# Patient Record
Sex: MALE | Race: WHITE | Employment: FULL TIME | URBAN - METROPOLITAN AREA
[De-identification: names, ages, dates, MRNs, and addresses within clinical notes are randomized per-mention and may not be internally consistent; named-entity substitution may affect disease eponyms.]

---

## 2021-05-25 ENCOUNTER — RECORDS - HEALTHEAST (OUTPATIENT)
Dept: ADMINISTRATIVE | Facility: CLINIC | Age: 41
End: 2021-05-25

## 2023-07-31 ENCOUNTER — HOSPITAL ENCOUNTER (OUTPATIENT)
Dept: CT IMAGING | Facility: CLINIC | Age: 43
Discharge: HOME | End: 2023-07-31
Attending: NURSE PRACTITIONER | Admitting: NURSE PRACTITIONER
Payer: COMMERCIAL

## 2023-07-31 ENCOUNTER — TELEPHONE (OUTPATIENT)
Dept: UROLOGY | Facility: CLINIC | Age: 43
End: 2023-07-31

## 2023-07-31 ENCOUNTER — VIRTUAL VISIT (OUTPATIENT)
Dept: UROLOGY | Facility: CLINIC | Age: 43
End: 2023-07-31
Payer: COMMERCIAL

## 2023-07-31 DIAGNOSIS — R10.9 ACUTE FLANK PAIN: Primary | ICD-10-CM

## 2023-07-31 DIAGNOSIS — R10.9 ACUTE FLANK PAIN: ICD-10-CM

## 2023-07-31 DIAGNOSIS — N20.0 NEPHROLITHIASIS: ICD-10-CM

## 2023-07-31 LAB — RADIOLOGIST FLAGS: ABNORMAL

## 2023-07-31 PROCEDURE — 99204 OFFICE O/P NEW MOD 45 MIN: CPT | Mod: VID | Performed by: NURSE PRACTITIONER

## 2023-07-31 PROCEDURE — 74176 CT ABD & PELVIS W/O CONTRAST: CPT

## 2023-07-31 RX ORDER — HYDROCODONE BITARTRATE AND ACETAMINOPHEN 5; 325 MG/1; MG/1
1 TABLET ORAL
COMMUNITY
Start: 2023-07-28 | End: 2023-08-07

## 2023-07-31 RX ORDER — TAMSULOSIN HYDROCHLORIDE 0.4 MG/1
0.4 CAPSULE ORAL
COMMUNITY
Start: 2023-03-07 | End: 2023-08-07

## 2023-07-31 ASSESSMENT — PAIN SCALES - GENERAL: PAINLEVEL: MODERATE PAIN (5)

## 2023-07-31 NOTE — PROGRESS NOTES
Patient is roomed via telephone for a telehealth visit.  Patient confirmed he is in the [facility] at the time of this appointment.  Patient understand that this visit is billable and agree to proceed with appointment.

## 2023-07-31 NOTE — PATIENT INSTRUCTIONS
UROLOGY CLINIC VISIT PATIENT INSTRUCTIONS    -Recommend CT scan for further evaluation  -Discussed if no current passing stone the option for definitive stone surgery.   -If no currently pass stone, recommend gross hematuria (blood in urine) workup with cystoscopy.  -Please continue on acetaminophen, ibuprofen, and Dramamine as needed for pain control.    If you have any issues, questions or concerns in the meantime, do not hesitate to contact us at [facility] at [phone] or via [email or web address].     Leslie Blackburn CNP  Department of Urology     Medicines to Control Your Kidney Stone Symptoms    Control Pain: First Line Treatment    Dramamine (Please use the drowsy version, nongeneric formulation)  Available over the counter  **This medicine will cause increased drowsiness. DON T DRIVE OR OPERATE MACHINERY FOR 6 HOURS**    How to take:   Take 50 mg at bedtime every night until the stone passes  In addition, take 50 mg every 6 hours as needed    What it does:  Decreases spasm of the ureter  Decreases recurrence of pain for next 24 hours  Decreases severe pain  Decreases nausea  Will help you sleep    Ibuprofen (Advil or Motrin)  Available over the counter  **Please do not take if advise to avoid NSAIDS, history of stomach ulcers/bleeding issues, blood thinners, or already on NSAIDS**    How to take:   Take 2 to 4 (200 mg) tablets every 6 hours for the first 48 hours. After that, use only as needed    What it does:  Decreases pain  Prevents spasm of the ureter    Acetaminophen (Tylenol)   Available over the counter    How to Take:  Take 2 (500 mg) tablets every 6 hours as needed. Do not exceed 8 tablets (4,000 mg total) in 24 hours    What it does:  Highly effective in controlling pain      Control pain: second line treatment (if you still have severe pain 1 hour after trying all of the above)    Narcotics (oxycodone)     How to take   Take 1 to 2 tablets every 4-6 hours as needed    Narcotics have  major side effects:   Confusion, disorientation and sleepiness. DO NOT DRIVE OR OPERATE MACHINERY WITHIN 24 HOURS.   Nausea. Take Dramamine, Zofran or Haldol to help control this.   May cause constipation (hard, dry stools). Start over-the-counter Miralax (1/2 to 1 capful) as needed if experiencing constipation.   Trouble sleeping    Other medicines we may give you:    Tamsulosin (Flomax): Take 0.4 mg daily with food     What it does:   May decrease stone pain   May help stones pass faster   May make surgery more successful by improving access to stone   May decrease discomfort from ureteral stent, if used    Possible side effects:   Lightheadedness when standing too quickly (especially in older people)   Stuffy nose

## 2023-07-31 NOTE — PROGRESS NOTES
Urology Video Office Visit  Video-Visit Details    Type of service:  Video Visit    Video Start Time (time video started): 0801    Video End Time (time video stopped): 08    Originating Location (pt. Location): Home    Distant Location (provider location):  Off-site    Mode of Communication:  Video Conference via [employer]    Video visit was converted to telephonic visit due to connection difficulties via [employer]        Chief Complaint:   Bilateral Low Back Pain         History of Present Illness:    Ba Mireles is a very pleasant 43 year old male who presents with concerns of a bilateral low back pain.    Mr. Mireles notes that symptoms started in Feb/March of 2023 with bilateral low back pain. He states symptoms are similar to when he has had acute stones in the past. He had a CT scan on 2/8/23 (images personally reviewed) revealed bilateral nonobstructing renal stones. Right kidney revealed about 4 stones with largest about 3.5mm in size. Left kidney revealed about 4 stones with largest about 4mm in size. No noted ureteral stones or hydronephrosis. He did not note a passage of a urinary stone at that time.     He continues to have bilateral low back pain with a pressure like feeling in hips. When pain becomes severe he will get nauseated-denies any bouts of emesis. Is able to tolerate fluids however states pain worsens with eating/drinking with increased abdominal bloating. He had 1-2 days of gross hematuria about one month ago-no dysuria at that time.     First stone episode at age 20. He notes he was passing stones about every year but now frequency has increased to 3 stones per year or every 4 months. He has passed most stones spontaneously. Did have a URS/LL in 2017 with [place].     Per patient stones in the past may have been calcium oxalate. Has not had a 24 hour urine chemistries completed at this time. He is following dietary prevention measurements at this time of increase water  intake, low sodium diet, low protein intake, and low oxalate intake.     Continue on tamsulosin for urination issues and to help with stone passage.    Family history of nephrolithiasis with mother and sister. Family history of renal cell carcinoma in paternal and maternal grandfathers.     History of smoking of 25 years, 1ppd .           Past Medical History:   Nephrolithiasis         Past Surgical History:     Past Surgical History:   Procedure Laterality Date    CYSTOSCOPY,URETEROSCOPY,LITHOTRIPSY      at [facility] SURGERY Right             Medications     Current Outpatient Medications   Medication    HYDROcodone-acetaminophen (NORCO) 5-325 MG tablet    tamsulosin (FLOMAX) 0.4 MG capsule     No current facility-administered medications for this visit.            Family History:     Family History   Problem Relation Age of Onset    Cancer Mother         kidney    Heart Disease Mother     Nephrolithiasis Mother     Gout Mother     Cancer Maternal Grandfather         renal cell    Heart Disease Maternal Grandfather     Nephrolithiasis Maternal Grandfather     Cancer Paternal Grandfather         lucille cell    Kidney Disease Paternal Grandfather     Nephrolithiasis Maternal Aunt             Social History:     Social History     Socioeconomic History    Marital status:      Spouse name: Not on file    Number of children: Not on file    Years of education: Not on file    Highest education level: Not on file   Occupational History    Not on file   Tobacco Use    Smoking status: Every Day     Types: Cigarettes    Smokeless tobacco: Never   Substance and Sexual Activity    Alcohol use: Not Currently    Drug use: Yes     Types: GHB     Comment: once in a while per pt    Sexual activity: Not on file   Other Topics Concern    Not on file   Social History Narrative    Not on file     Social Determinants of Health     Financial Resource Strain: Not on file   Food Insecurity: Not on file   Transportation Needs: Not  on file   Physical Activity: Not on file   Stress: Not on file   Social Connections: Not on file   Intimate Partner Violence: Not on file   Housing Stability: Not on file            Allergies:   Amoxicillin-pot clavulanate         Review of Systems:  From intake questionnaire   Negative 14 system review except as noted on HPI, nurse's note.         Physical Exam:   General Appearance: Well groomed, hygenic  Eyes: No redness, discharge  Respiratory: No cough, no respiratory distress or labored breathing  Musculoskeletal: Grossly normal, full range of motion in upper extremities, no gross deficits  Skin: No discoloration or apparent rashes  Neurologic - No tremors  Psychiatric - Alert and oriented  The rest of a comprehensive physical examination is deferred due to video visit restrictions        Labs:    I personally reviewed all applicable laboratory data and went over findings with patient\    Significant for:     BMP RESULTS:   Ref Range & Units 5 mo ago   SODIUM 136 - 145 mmol/L 140   POTASSIUM 3.5 - 5.1 mmol/L 4.4   CHLORIDE 98 - 107 mmol/L 105   CO2,TOTAL 22 - 29 mmol/L 25   ANION GAP 5 - 18 10   GLUCOSE 70 - 99 mg/dL 90   CALCIUM 8.6 - 10.0 mg/dL 9.2   BUN 6 - 20 mg/dL 9   CREATININE 0.70 - 1.20 mg/dL 0.82   BUN/CREAT RATIO 10 - 20 11   eGFR >90 mL/min/1.73m2 >90      ALBUMIN 4.0 - 4.9 g/dL 4.4   PROTEIN,TOTAL 6.0 - 8.0 g/dL 7.0   GLOBULIN 2.0 - 3.7 g/dL 2.6   BILIRUBIN,TOTAL 0.0 - 1.2 mg/dL <0.2   ALK PHOSPHATASE 40 - 129 IU/L 74   ALT (SGPT) 10 - 50 IU/L 14   AST (SGOT) 10 - 50 IU/L 26   A/G RATIO 1.0 - 2.0 1.7       UA RESULTS:   COLOR                     Yellow Color Yellow   CLARITY                   Clear Clarity Clear   SPECIFIC GRAVITY,URINE   1.010, 1.015, 1.020, 1.025                 1.015   PH,URINE                 6.0, 7.0, 8.0, 5.5, 6.5, 7.5, 8.5                 7.5   UROBILINOGEN,QUALITATIVE Normal EU/dl Normal   PROTEIN, URINE Negative mg/dL Negative   GLUCOSE, URINE Negative mg/dL Negative    KETONES,URINE             Negative mg/dL Negative   BILIRUBIN,URINE           Negative                 Negative   OCCULT BLOOD,URINE       Negative                 Trace Abnormal    NITRITE                   Negative                 Negative   LEUKOCYTE ESTERASE       Negative                 Negative     RBC 0-2, None Seen /HPF 0-2   WBC 0-2, 3-5, None Seen /HPF 0-2   BACTERIA                 None Seen, Rare, Few Bacteria/HPF None Seen   EPITHELIAL CELLS         None Seen, Few Epi/HPF None Seen       Imaging:    I personally reviewed all applicable imaging and went over the below findings with patient.    Impression    1. No acute findings in the abdomen or pelvis. No ureteral stone.  2. Numerous small nonobstructing bilateral intrarenal stones, unchanged.  Narrative    For Patients: As a result of the 21st Century Cures Act, medical imaging exams and procedure reports are released immediately into your electronic medical record. You may view this report before your referring provider. If you have questions, please contact your health care provider.    EXAM: CT ABDOMEN PELVIS STONE PROTOCOL WO  LOCATION: [facility]  DATE/TIME: 2/28/2023 8:02 AM    INDICATION: Calculus Of Kidney. Epigastric abdominal pain. New left flank pain.  COMPARISON: CT abdomen pelvis 02/20/2023.  TECHNIQUE: CT scan of the abdomen and pelvis was performed without oral or IV contrast. Multiplanar reformats were obtained. Dose reduction techniques were used.  CONTRAST: None.    FINDINGS:  LOWER CHEST: Normal.    HEPATOBILIARY: Normal.    PANCREAS: Normal.    SPLEEN: Normal.    ADRENAL GLANDS: Normal.    KIDNEY/BLADDER: Numerous small nonobstructing bilateral intrarenal stones, 6 on the right, largest 3 mm, 7 on the left, largest 4 mm. Findings unchanged. No ureteral stone or hydronephrosis, including on the left. Small benign simple right renal cyst, no follow-up needed. Normal bladder, no stone.    BOWEL: Few colonic diverticula. No acute  findings. No evidence of diverticulitis or colitis. No evidence of bowel obstruction. Normal appendix.    LYMPH NODES: Normal.    VASCULATURE: Minimal atherosclerotic calcifications. Normal caliber abdominal aorta.    PELVIC ORGANS: Normal.    MUSCULOSKELETAL: Normal.         Assessment and Plan:     Assessment: 43 year old male with bilateral low back pain and nephrolithiasis    Plan:  -Recommend to obtain CT scan for further evaluation of urological tract. Pt amenable to plan of care.   -Reviewed CT scan from Feb 2023 with patient. Noted no current ureteral stones or hydronephrosis at that time. Noted several bilateral nonobstructing renal stones.   -We discussed the nature of nonobstructing renal stones and pain.  We reviewed the fact that widely held opinion in the field of urology is that nonobstructing stones should not cause pain; however, several publications have demonstrated improvement with stone removal in such circumstances. We discussed that stone removal for pain in the absence of obstruction is not a guarantee to improve pain and that the potential for pain improvement is likely around 50%.    -If no noted stone and wanting to observe current stones discussed concern of gross hematuria and need for cystoscopy for further evaluation.   -We discussed some general measures to prevent recurrent kidney stones.  These include fluid intake to achieve 2.5 liters of urine per day, decreased salt intake, normal calcium intake, lowering animal protein intake, avoiding high amounts of oxalate containing foods, and increased citrate intake with orange juice/lemonade.  -Discussed future option of 24 hour urine chemistries for metabolic evaluation to determine risk factors for recurrent stones.   -Recommend acetaminophen, ibuprofen, and dimenhydrinate as needed for pain control.   -If having severe flank pain, fever,chills, and nausea/vomiting please notify Urology clinic or be seen in the ER.     Leslie Blackburn  CNP  Department of Urology  July 31, 2023    I spent a total of 45 minutes spent on the date of the encounter doing chart review, history and exam, documentation, and further activities as noted above.

## 2023-08-01 ENCOUNTER — MYC MEDICAL ADVICE (OUTPATIENT)
Dept: UROLOGY | Facility: CLINIC | Age: 43
End: 2023-08-01
Payer: COMMERCIAL

## 2023-08-01 NOTE — TELEPHONE ENCOUNTER
Called Mr. Mireles to discuss results of CT scan results on 07/31/23. Discussed left 4mm proximal ureteral stone with slight hydronephrosis. Noted bilateral nephrolithiasis.     Discussed treatment options including MET vs. ureteroscopy and laser lithotripsy. I counseled the patient regarding the potential need for a ureteral stent after treatment and the necessity of removing the stent after surgery. I also discussed the possibility of additional procedures to render the patient stone free. Discussed option for bilateral URS/LL    Pt will reach out via [employer] on decision of MET vs URS/LL.   Recommend to continue with acetaminophen, ibuprofen, dimenhydrinate, and oxycodone as needed for pain control.   If having severe flank pain, fevers,chills, nausea, or vomiting please notify urology clinic or be seen in the ER.     Leslie Blackburn CNP    CT ABDOMEN AND PELVIS WITHOUT CONTRAST 7/31/2023 2:50 PM     CLINICAL HISTORY: Abdominal pain. Acute bilateral flank. Assess for  nephrolithiasis.     TECHNIQUE: CT scan of the abdomen and pelvis was performed without IV  contrast. Multiplanar reformats were obtained. Dose reduction  techniques were used.  CONTRAST: None.     COMPARISON: CT abdomen and pelvis without contrast 2/28/2023.     FINDINGS:   LOWER CHEST: No infiltrates or effusions.     HEPATOBILIARY: No significant mass or bile duct dilatation. No  calcified gallstones.      PANCREAS: No significant mass, duct dilatation, or inflammatory  change.     SPLEEN: Normal size.     ADRENAL GLANDS: No significant nodules.     KIDNEYS/BLADDER: Left proximal ureteral calculus (0.4 cm; 3/#30)  without significant upstream collecting system dilatation. Bilateral  nephrolithiasis (at least 6 on the right, largest measures 0.4 cm; at  least 5 on the left, largest measures 0.3 cm). Similar  benign-appearing right mid pole cyst.     BOWEL: No obstruction or inflammatory change.     VASCULATURE: No abdominal aortic aneurysm.      PELVIC ORGANS: No pelvic masses.     OTHER: No free air or free fluid.     MUSCULOSKELETAL: No suspicious bony lesions.                                                                      IMPRESSION:   1.  Since 2/28/2023, new left proximal ureteral calculus (0.4 cm)  without significant upstream collecting system dilatation.     2.  Bilateral nephrolithiasis.

## 2023-08-01 NOTE — TELEPHONE ENCOUNTER
Patient is wanting a call back asap to discuss surgery per rosemarie below  He is wanting to do this as fast as possible due to not wanting to miss work because of being a single dad. Please call the patient back asap to discuss as well as set up an apt.

## 2023-08-01 NOTE — TELEPHONE ENCOUNTER
FUTURE VISIT INFORMATION      SURGERY INFORMATION:  Date: Kidney Stone removal// per pt// pt stated the surgery will be scheduled within the next couple of weeks. // records in [facility]       RECORDS REQUESTED FROM:       Primary Care Provider: [facility]

## 2023-08-02 DIAGNOSIS — N20.0 BILATERAL NEPHROLITHIASIS: Primary | ICD-10-CM

## 2023-08-02 DIAGNOSIS — N20.1 LEFT URETERAL STONE: ICD-10-CM

## 2023-08-03 ENCOUNTER — TELEPHONE (OUTPATIENT)
Dept: UROLOGY | Facility: CLINIC | Age: 43
End: 2023-08-03
Payer: COMMERCIAL

## 2023-08-03 ENCOUNTER — MYC REFILL (OUTPATIENT)
Dept: UROLOGY | Facility: CLINIC | Age: 43
End: 2023-08-03
Payer: COMMERCIAL

## 2023-08-03 DIAGNOSIS — R10.9 ACUTE FLANK PAIN: Primary | ICD-10-CM

## 2023-08-03 DIAGNOSIS — N20.0 NEPHROLITHIASIS: ICD-10-CM

## 2023-08-03 DIAGNOSIS — R10.9 ACUTE FLANK PAIN: ICD-10-CM

## 2023-08-03 PROBLEM — N20.1 LEFT URETERAL STONE: Status: ACTIVE | Noted: 2023-08-02

## 2023-08-03 RX ORDER — OXYCODONE HYDROCHLORIDE 5 MG/1
5 TABLET ORAL EVERY 6 HOURS PRN
Qty: 12 TABLET | Refills: 0 | Status: CANCELLED | OUTPATIENT
Start: 2023-08-03

## 2023-08-03 RX ORDER — OXYCODONE HYDROCHLORIDE 5 MG/1
5 TABLET ORAL EVERY 6 HOURS PRN
Qty: 12 TABLET | Refills: 0 | Status: SHIPPED | OUTPATIENT
Start: 2023-08-03 | End: 2023-08-07

## 2023-08-03 NOTE — TELEPHONE ENCOUNTER
Spoke with: Patient      Date of surgery: Monday August 7 2023       Location: [facility]      Informed patient they will need a adult : YES      Pre op with provider: Patient had pre op on 8/2 at [facility]       H&P Scheduled in PAC- NA        Pre procedure covid :Not required      Additional imaging: NA        Surgery Packet : Sent via [employer]       Additional comments: Please call for surgery teaching

## 2023-08-03 NOTE — TELEPHONE ENCOUNTER
Patient called to request a refill of pain medication, he is set up for surgery with Dr Shipman on 8/7.  No previous refills of oxycodone in chart.  Historical hydrocodone from past at [facility]. Patient had vv on 7/31.  Reena Davis RN

## 2023-08-04 ENCOUNTER — PRE VISIT (OUTPATIENT)
Dept: SURGERY | Facility: CLINIC | Age: 43
End: 2023-08-04

## 2023-08-04 ENCOUNTER — ANESTHESIA EVENT (OUTPATIENT)
Dept: SURGERY | Facility: AMBULATORY SURGERY CENTER | Age: 43
End: 2023-08-04
Payer: COMMERCIAL

## 2023-08-04 ENCOUNTER — PATIENT OUTREACH (OUTPATIENT)
Dept: UROLOGY | Facility: CLINIC | Age: 43
End: 2023-08-04

## 2023-08-04 NOTE — PROGRESS NOTES
RNCC lm for pt with cb number for surgery teaching.  Will send mychart also.  Per notes pt had pre-op on 08/02/2023    [facility]IA HernandezCC Urology  [phone]

## 2023-08-06 ENCOUNTER — HEALTH MAINTENANCE LETTER (OUTPATIENT)
Age: 43
End: 2023-08-06

## 2023-08-07 ENCOUNTER — HOSPITAL ENCOUNTER (OUTPATIENT)
Facility: AMBULATORY SURGERY CENTER | Age: 43
Discharge: HOME | End: 2023-08-07
Attending: UROLOGY
Payer: COMMERCIAL

## 2023-08-07 ENCOUNTER — ANESTHESIA (OUTPATIENT)
Dept: SURGERY | Facility: AMBULATORY SURGERY CENTER | Age: 43
End: 2023-08-07
Payer: COMMERCIAL

## 2023-08-07 ENCOUNTER — NURSE TRIAGE (OUTPATIENT)
Dept: NURSING | Facility: CLINIC | Age: 43
End: 2023-08-07

## 2023-08-07 VITALS
OXYGEN SATURATION: 99 % | HEIGHT: 69 IN | SYSTOLIC BLOOD PRESSURE: 132 MMHG | TEMPERATURE: 97.1 F | WEIGHT: 155 LBS | HEART RATE: 63 BPM | BODY MASS INDEX: 22.96 KG/M2 | DIASTOLIC BLOOD PRESSURE: 74 MMHG | RESPIRATION RATE: 16 BRPM

## 2023-08-07 DIAGNOSIS — N20.1 LEFT URETERAL STONE: ICD-10-CM

## 2023-08-07 DIAGNOSIS — N20.0 BILATERAL NEPHROLITHIASIS: ICD-10-CM

## 2023-08-07 DIAGNOSIS — N20.0 NEPHROLITHIASIS: ICD-10-CM

## 2023-08-07 DIAGNOSIS — R10.9 ACUTE FLANK PAIN: ICD-10-CM

## 2023-08-07 PROCEDURE — 82365 CALCULUS SPECTROSCOPY: CPT | Mod: 90 | Performed by: UROLOGY

## 2023-08-07 PROCEDURE — 99207 PR NO CHARGE NURSE ONLY: CPT | Performed by: UROLOGY

## 2023-08-07 PROCEDURE — 99000 SPECIMEN HANDLING OFFICE-LAB: CPT | Performed by: UROLOGY

## 2023-08-07 DEVICE — STENT, URETERAL, 6FR X 26CM, HYDROPLUS COATING, WITHOUT WIRE, PERCUFLEX PLUS: Type: IMPLANTABLE DEVICE | Site: URETER | Status: FUNCTIONAL

## 2023-08-07 RX ORDER — KETOROLAC TROMETHAMINE 30 MG/ML
INJECTION, SOLUTION INTRAMUSCULAR; INTRAVENOUS PRN
Status: DISCONTINUED | OUTPATIENT
Start: 2023-08-07 | End: 2023-08-07

## 2023-08-07 RX ORDER — FENTANYL CITRATE 0.05 MG/ML
50 INJECTION, SOLUTION INTRAMUSCULAR; INTRAVENOUS EVERY 5 MIN PRN
Status: DISCONTINUED | OUTPATIENT
Start: 2023-08-07 | End: 2023-08-09 | Stop reason: HOSPADM

## 2023-08-07 RX ORDER — OXYCODONE HYDROCHLORIDE 10 MG/1
10 TABLET ORAL
Status: DISCONTINUED | OUTPATIENT
Start: 2023-08-07 | End: 2023-08-09 | Stop reason: HOSPADM

## 2023-08-07 RX ORDER — FENTANYL CITRATE 0.05 MG/ML
25 INJECTION, SOLUTION INTRAMUSCULAR; INTRAVENOUS EVERY 5 MIN PRN
Status: DISCONTINUED | OUTPATIENT
Start: 2023-08-07 | End: 2023-08-09 | Stop reason: HOSPADM

## 2023-08-07 RX ORDER — LIDOCAINE 40 MG/G
CREAM TOPICAL
Status: DISCONTINUED | OUTPATIENT
Start: 2023-08-07 | End: 2023-08-09 | Stop reason: HOSPADM

## 2023-08-07 RX ORDER — ONDANSETRON 4 MG/1
4 TABLET, ORALLY DISINTEGRATING ORAL EVERY 30 MIN PRN
Status: DISCONTINUED | OUTPATIENT
Start: 2023-08-07 | End: 2023-08-09 | Stop reason: HOSPADM

## 2023-08-07 RX ORDER — PROPOFOL 10 MG/ML
INJECTION, EMULSION INTRAVENOUS CONTINUOUS PRN
Status: DISCONTINUED | OUTPATIENT
Start: 2023-08-07 | End: 2023-08-07

## 2023-08-07 RX ORDER — HYDROMORPHONE HCL IN WATER/PF 6 MG/30 ML
0.4 PATIENT CONTROLLED ANALGESIA SYRINGE INTRAVENOUS EVERY 5 MIN PRN
Status: DISCONTINUED | OUTPATIENT
Start: 2023-08-07 | End: 2023-08-09 | Stop reason: HOSPADM

## 2023-08-07 RX ORDER — LIDOCAINE HYDROCHLORIDE 20 MG/ML
JELLY TOPICAL PRN
Status: DISCONTINUED | OUTPATIENT
Start: 2023-08-07 | End: 2023-08-07 | Stop reason: HOSPADM

## 2023-08-07 RX ORDER — OXYCODONE HYDROCHLORIDE 5 MG/1
5 TABLET ORAL EVERY 6 HOURS PRN
Qty: 12 TABLET | Refills: 0 | Status: SHIPPED | OUTPATIENT
Start: 2023-08-07 | End: 2023-08-10

## 2023-08-07 RX ORDER — ONDANSETRON 2 MG/ML
4 INJECTION INTRAMUSCULAR; INTRAVENOUS EVERY 30 MIN PRN
Status: DISCONTINUED | OUTPATIENT
Start: 2023-08-07 | End: 2023-08-09 | Stop reason: HOSPADM

## 2023-08-07 RX ORDER — PROPOFOL 10 MG/ML
INJECTION, EMULSION INTRAVENOUS PRN
Status: DISCONTINUED | OUTPATIENT
Start: 2023-08-07 | End: 2023-08-07

## 2023-08-07 RX ORDER — TOLTERODINE TARTRATE 2 MG/1
2 TABLET, EXTENDED RELEASE ORAL ONCE
Status: DISCONTINUED | OUTPATIENT
Start: 2023-08-07 | End: 2023-08-09 | Stop reason: HOSPADM

## 2023-08-07 RX ORDER — DEXAMETHASONE SODIUM PHOSPHATE 10 MG/ML
INJECTION, SOLUTION INTRAMUSCULAR; INTRAVENOUS PRN
Status: DISCONTINUED | OUTPATIENT
Start: 2023-08-07 | End: 2023-08-07

## 2023-08-07 RX ORDER — GLYCOPYRROLATE 0.2 MG/ML
INJECTION, SOLUTION INTRAMUSCULAR; INTRAVENOUS PRN
Status: DISCONTINUED | OUTPATIENT
Start: 2023-08-07 | End: 2023-08-07

## 2023-08-07 RX ORDER — CEFAZOLIN SODIUM 2 G/100ML
2 INJECTION, SOLUTION INTRAVENOUS SEE ADMIN INSTRUCTIONS
Status: DISCONTINUED | OUTPATIENT
Start: 2023-08-07 | End: 2023-08-09 | Stop reason: HOSPADM

## 2023-08-07 RX ORDER — HYDROMORPHONE HCL IN WATER/PF 6 MG/30 ML
0.2 PATIENT CONTROLLED ANALGESIA SYRINGE INTRAVENOUS EVERY 5 MIN PRN
Status: DISCONTINUED | OUTPATIENT
Start: 2023-08-07 | End: 2023-08-09 | Stop reason: HOSPADM

## 2023-08-07 RX ORDER — CEFAZOLIN SODIUM 2 G/100ML
2 INJECTION, SOLUTION INTRAVENOUS
Status: DISCONTINUED | OUTPATIENT
Start: 2023-08-07 | End: 2023-08-09 | Stop reason: HOSPADM

## 2023-08-07 RX ORDER — FENTANYL CITRATE 0.05 MG/ML
25 INJECTION, SOLUTION INTRAMUSCULAR; INTRAVENOUS
Status: DISCONTINUED | OUTPATIENT
Start: 2023-08-07 | End: 2023-08-09 | Stop reason: HOSPADM

## 2023-08-07 RX ORDER — LIDOCAINE HYDROCHLORIDE 20 MG/ML
JELLY TOPICAL ONCE
Status: COMPLETED | OUTPATIENT
Start: 2023-08-07 | End: 2023-08-07

## 2023-08-07 RX ORDER — SODIUM CHLORIDE, SODIUM LACTATE, POTASSIUM CHLORIDE, CALCIUM CHLORIDE 600; 310; 30; 20 MG/100ML; MG/100ML; MG/100ML; MG/100ML
INJECTION, SOLUTION INTRAVENOUS CONTINUOUS
Status: DISCONTINUED | OUTPATIENT
Start: 2023-08-07 | End: 2023-08-09 | Stop reason: HOSPADM

## 2023-08-07 RX ORDER — OXYCODONE HYDROCHLORIDE 5 MG/1
5 TABLET ORAL
Status: COMPLETED | OUTPATIENT
Start: 2023-08-07 | End: 2023-08-07

## 2023-08-07 RX ORDER — ACETAMINOPHEN 325 MG/1
975 TABLET ORAL ONCE
Status: COMPLETED | OUTPATIENT
Start: 2023-08-07 | End: 2023-08-07

## 2023-08-07 RX ORDER — TAMSULOSIN HYDROCHLORIDE 0.4 MG/1
0.4 CAPSULE ORAL DAILY
Qty: 7 CAPSULE | Refills: 0 | Status: SHIPPED | OUTPATIENT
Start: 2023-08-07 | End: 2023-08-14

## 2023-08-07 RX ORDER — ONDANSETRON 2 MG/ML
INJECTION INTRAMUSCULAR; INTRAVENOUS PRN
Status: DISCONTINUED | OUTPATIENT
Start: 2023-08-07 | End: 2023-08-07

## 2023-08-07 RX ORDER — LIDOCAINE HYDROCHLORIDE 20 MG/ML
INJECTION, SOLUTION INFILTRATION; PERINEURAL PRN
Status: DISCONTINUED | OUTPATIENT
Start: 2023-08-07 | End: 2023-08-07

## 2023-08-07 RX ORDER — TOLTERODINE 2 MG/1
2 CAPSULE, EXTENDED RELEASE ORAL DAILY PRN
Qty: 7 CAPSULE | Refills: 0 | Status: SHIPPED | OUTPATIENT
Start: 2023-08-07 | End: 2023-08-14

## 2023-08-07 RX ORDER — FENTANYL CITRATE 50 UG/ML
INJECTION, SOLUTION INTRAMUSCULAR; INTRAVENOUS PRN
Status: DISCONTINUED | OUTPATIENT
Start: 2023-08-07 | End: 2023-08-07

## 2023-08-07 RX ORDER — EPHEDRINE SULFATE 50 MG/ML
INJECTION, SOLUTION INTRAMUSCULAR; INTRAVENOUS; SUBCUTANEOUS PRN
Status: DISCONTINUED | OUTPATIENT
Start: 2023-08-07 | End: 2023-08-07

## 2023-08-07 RX ADMIN — OXYCODONE HYDROCHLORIDE 5 MG: 5 TABLET ORAL at 12:25

## 2023-08-07 RX ADMIN — GLYCOPYRROLATE 0.2 MG: 0.2 INJECTION, SOLUTION INTRAMUSCULAR; INTRAVENOUS at 10:12

## 2023-08-07 RX ADMIN — SODIUM CHLORIDE, SODIUM LACTATE, POTASSIUM CHLORIDE, CALCIUM CHLORIDE: 600; 310; 30; 20 INJECTION, SOLUTION INTRAVENOUS at 11:25

## 2023-08-07 RX ADMIN — FENTANYL CITRATE 25 MCG: 0.05 INJECTION, SOLUTION INTRAMUSCULAR; INTRAVENOUS at 12:06

## 2023-08-07 RX ADMIN — PROPOFOL 200 MCG/KG/MIN: 10 INJECTION, EMULSION INTRAVENOUS at 10:25

## 2023-08-07 RX ADMIN — EPHEDRINE SULFATE 5 MG: 50 INJECTION, SOLUTION INTRAMUSCULAR; INTRAVENOUS; SUBCUTANEOUS at 11:18

## 2023-08-07 RX ADMIN — PROPOFOL 150 MG: 10 INJECTION, EMULSION INTRAVENOUS at 10:25

## 2023-08-07 RX ADMIN — ONDANSETRON 4 MG: 2 INJECTION INTRAMUSCULAR; INTRAVENOUS at 10:37

## 2023-08-07 RX ADMIN — FENTANYL CITRATE 25 MCG: 0.05 INJECTION, SOLUTION INTRAMUSCULAR; INTRAVENOUS at 11:52

## 2023-08-07 RX ADMIN — CEFAZOLIN SODIUM 2 G: 2 INJECTION, SOLUTION INTRAVENOUS at 10:12

## 2023-08-07 RX ADMIN — DEXAMETHASONE SODIUM PHOSPHATE 10 MG: 10 INJECTION, SOLUTION INTRAMUSCULAR; INTRAVENOUS at 10:37

## 2023-08-07 RX ADMIN — Medication 200 MCG: at 10:51

## 2023-08-07 RX ADMIN — Medication 1 MCG/KG/MIN: at 10:58

## 2023-08-07 RX ADMIN — FENTANYL CITRATE 75 MCG: 50 INJECTION, SOLUTION INTRAMUSCULAR; INTRAVENOUS at 10:17

## 2023-08-07 RX ADMIN — ACETAMINOPHEN 975 MG: 325 TABLET ORAL at 09:44

## 2023-08-07 RX ADMIN — PROPOFOL 50 MG: 10 INJECTION, EMULSION INTRAVENOUS at 10:29

## 2023-08-07 RX ADMIN — Medication 100 MCG: at 10:54

## 2023-08-07 RX ADMIN — Medication 200 MCG: at 10:43

## 2023-08-07 RX ADMIN — LIDOCAINE HYDROCHLORIDE: 20 JELLY TOPICAL at 12:01

## 2023-08-07 RX ADMIN — SODIUM CHLORIDE, SODIUM LACTATE, POTASSIUM CHLORIDE, CALCIUM CHLORIDE: 600; 310; 30; 20 INJECTION, SOLUTION INTRAVENOUS at 09:48

## 2023-08-07 RX ADMIN — FENTANYL CITRATE 25 MCG: 50 INJECTION, SOLUTION INTRAMUSCULAR; INTRAVENOUS at 10:29

## 2023-08-07 RX ADMIN — FENTANYL CITRATE 25 MCG: 0.05 INJECTION, SOLUTION INTRAMUSCULAR; INTRAVENOUS at 11:45

## 2023-08-07 RX ADMIN — LIDOCAINE HYDROCHLORIDE 3 ML: 20 INJECTION, SOLUTION INFILTRATION; PERINEURAL at 10:12

## 2023-08-07 RX ADMIN — EPHEDRINE SULFATE 10 MG: 50 INJECTION, SOLUTION INTRAMUSCULAR; INTRAVENOUS; SUBCUTANEOUS at 11:04

## 2023-08-07 RX ADMIN — KETOROLAC TROMETHAMINE 15 MG: 30 INJECTION, SOLUTION INTRAMUSCULAR; INTRAVENOUS at 10:51

## 2023-08-07 ASSESSMENT — LIFESTYLE VARIABLES: TOBACCO_USE: 1

## 2023-08-07 NOTE — BRIEF OP NOTE
[facility] Brief Operative Note    Pre-operative diagnosis: Bilateral nephrolithiasis [N20.0]  Left ureteral stone [N20.1]   Post-operative diagnosis bilateral renal stones   Procedure: Procedure(s):  Cystoscopy, Bilateral Ureteroscopy, Bilateral Retrograde Pyelogram, Bilateral Stone Basket Extraction, Bilateral Stent placement   Surgeon: Gus Shipman MD   Assistants(s): none   Estimated blood loss: 5 ml    Specimens: Right renal stone for analysis  Left renal stone for analysis   Findings: No left ureteral stone found  Multiple bilateral renal stones lasered and basketed  6 fr x 26 cm ureteral stent placed on modified single string tether

## 2023-08-07 NOTE — ANESTHESIA CARE TRANSFER NOTE
Patient: Ba Mireles    Procedure: Procedure(s):  Cystoscopy, Bilateral Ureteroscopy, Bilateral Retrograde Pyelogram, Bilateral Stone Basket Extraction, Bilateral Stent placement       Diagnosis: Bilateral nephrolithiasis [N20.0]  Left ureteral stone [N20.1]  Diagnosis Additional Information: No value filed.    Anesthesia Type:   General     Note:    Oropharynx: oropharynx clear of all foreign objects  Level of Consciousness: awake  Oxygen Supplementation: face mask  Level of Supplemental Oxygen (L/min / FiO2): 6  Independent Airway: airway patency satisfactory and stable  Dentition: dentition unchanged  Vital Signs Stable: post-procedure vital signs reviewed and stable  Report to RN Given: handoff report given  Patient transferred to: PACU    Handoff Report: Identifed the Patient, Identified the Reponsible Provider, Reviewed the pertinent medical history, Discussed the surgical course, Reviewed Intra-OP anesthesia mangement and issues during anesthesia, Set expectations for post-procedure period and Allowed opportunity for questions and acknowledgement of understanding      Vitals:  Vitals Value Taken Time   /62 08/07/23 1134   Temp 97.4  F (36.3  C) 08/07/23 1134   Pulse 87 08/07/23 1134   Resp 16 08/07/23 1134   SpO2 100 % 08/07/23 1134       Electronically Signed By: Dorie Pratt MD  August 7, 2023  11:37 AM

## 2023-08-07 NOTE — ANESTHESIA POSTPROCEDURE EVALUATION
Patient: Ba Mireles    Procedure: Procedure(s):  Cystoscopy, Bilateral Ureteroscopy, Bilateral Retrograde Pyelogram, Bilateral Stone Basket Extraction, Bilateral Stent placement       Anesthesia Type:  General    Note:  Disposition: Outpatient   Postop Pain Control: Uneventful            Sign Out: Well controlled pain   PONV: No   Neuro/Psych: Uneventful            Sign Out: Acceptable/Baseline neuro status   Airway/Respiratory: Uneventful            Sign Out: Acceptable/Baseline resp. status   CV/Hemodynamics: Uneventful            Sign Out: Acceptable CV status; No obvious hypovolemia; No obvious fluid overload   Other NRE: NONE   DID A NON-ROUTINE EVENT OCCUR?            Last vitals:  Vitals Value Taken Time   /76 08/07/23 1200   Temp 97.4  F (36.3  C) 08/07/23 1134   Pulse 57 08/07/23 1212   Resp 16 08/07/23 1200   SpO2 100 % 08/07/23 1212   Vitals shown include unvalidated device data.    Electronically Signed By: Ba Payne MD  August 7, 2023  12:19 PM

## 2023-08-07 NOTE — ANESTHESIA PROCEDURE NOTES
Airway       Patient location during procedure: OR       Procedure Start/Stop Times: 8/7/2023 10:25 AM  Staff -        Anesthesiologist:  Dorie Pratt MD       Performed By: anesthesiologistIndications and Patient Condition       Indications for airway management: grecia-procedural       Induction type:intravenous       Mask difficulty assessment: 0 - not attempted    Final Airway Details       Final airway type: supraglottic airway    Supraglottic Airway Details        Type: LMA       LMA size: 4    Post intubation assessment        Placement verified by: capnometry        Number of attempts at approach: 1       Number of other approaches attempted: 0       Secured with: commercial tube machado       Ease of procedure: easy       Dentition: Intact

## 2023-08-07 NOTE — DISCHARGE INSTRUCTIONS
"If you have any questions or concerns regarding your procedure, please contact Dr. Shipman, his office number is [phone].    Take tylenol and ibuprofen every 6 hours as your main form of pain control.     Acetaminophen (Tylenol): Next Dose: 3:44 pm. Take 650-1000 mg every 6 hours for mild to moderate pain.    Ibuprofen (Motrin, Advil): Next Dose: 4:51 pm. Take 600 mg every 6 hours for mild to moderate pain.    Do not exceed 4,000 mg of acetaminophen during a 24 hour period  or 1000 mg per dose. Keep in mind that acetaminophen can also be found in many over-the-counter cold medications as well as narcotics that may be given for pain.     Narcotics:  5mg of oxycodone given prior to discharge at 1240pm.    Senna-Docusate (Stool Softener): Next dose: When you get home. Take as instructed on the bottle. This is an over-the-counter medication. Take this while taking narcotic medications to prevent constipation.    Post-Operative Symptom Control    While you recover from your procedure, you can take steps to ease your recovery.    Diet and Fluids:    Eating your normal diet is fine.  Drink a little more than normal but you don not have to \"flush\" your system.    Activity:    There are no activity restrictions after stone surgery.  Some people find bending twisting movements cause discomfort or increased blood in urine.  Activity may irritating but you will not hurt yourself.    Medications: (That may be suggested or prescribed)    Ibuprofen (Advil/Motrin)-Available over the counter.  Take 2 (200mg) tablets at bedtime and every six hour for base pain management.      Dramamine (brand name drowsy version)-Is available over the counter.  Take 50 mg at bedtime and every 6 hours as needed.  This medication can be helpful by:   Decreasing nausea   Decrease acute pain   Decrease recurrence of pain for 24 hours  *This medication my cause increased drowsiness, do not drive or operate machinery within 6 hours.    Flomax " "(Tamsulosin)-After surgery Flomax has several potential benefits   Decreased stent discomfort   Increased likelihood passage of small stone fragments   Take daily with food until your stent is removed  *This may cause nasal congestion or light-headedness    Detrol (Tolterodine)-After surgery Detrol may decrease stent irritation and pelvic pain   Take as prescribed  *This medication may cause dry mouth, constipation or blurry vision.    Narcotics (oxycodone)   Take as prescribed for severe pain   Narcotics have significant side effects and only \"cover-up\" pain.  They have no effect on cause of pain.     Common side effects  Confusion, disorientation and sedation-DO NOT DRIVE OR OPERATE MACHINERY WITH IN 24 HOURS OF TAKING NARCOTICS    Nausea-take Dramamine or Zofran to help control  Constipation  Sleep Disturbances    Call the Clinic Immediately If You Have Any Of The Following Symptoms:   Nausea/vomiting that is uncontrolled with medications   You have a fever over 100.0   Chills   Are not able to urinate for 8 hours    Increasing back pain that is not relieved with pain medications   Large amounts of blood in urine or large clots    We can respond to your questions or concerns 24 hours a day at [phone].   For after hours phone calls please wait on call to be connected with the \"care connection\" service for after hours care.     Going Home With a Ureteral Stent    What is it?  A stent is a soft, plastic tube that helps urine (pee) drain into the bladder.  During the surgery, it is placed in the ureter the tube that connects the kidney to the bladder.  A thin curl at each end of the stent keeps one end in your kidney and the other in your bladder.  The stent can not be seen from outside of the body.    Why Do I Have It?  Some sort of blockage is not letting pee drain into your bladder.  This could be from a stone, certain surgeries or kidney infection.    What Should I Expect?   Stents often cause some " discomfort.  You may have:    The need to pee suddenly    Pain when you pee    A dull backache, which may get worse when you pee  Blood in your pee (color of fruit punch) and some clots, which may increase with physical activity.     What Can I Do To Feel Better?    Drink a little more fluids than usual.  You can eat your normal diet.    Enjoy a warm bath.  Decrease your activity.  Some people find bending or twisting movement cause discomfort or increased blood in the urine.  Even so, you will not harm yourself.    Your stent can be removed on Monday 8/14/2023 by pulling on the string until you see both curls.  If the stent is left in more than 3 months, it can lead to a need for procedure in order to remove it, permanent kidney damage or loss.    Follow up:  We will have you follow-up in clinic in 2 to 3 months after kidney renal ultrasound.     Please complete your lab testing and 24 hr urine testing around the same time, at least 2 weeks prior to your follow up appointment.     For 24 hours after surgery    Get plenty of rest.  A responsible adult must stay with you for at least 24 hours after you leave the hospital.   Do not drive or use heavy equipment.  If you have weakness or tingling, don't drive or use heavy equipment until this feeling goes away.  Do not drink alcohol.  Avoid strenuous or risky activities.  Ask for help when climbing stairs.   You may feel lightheaded.  IF so, sit for a few minutes before standing.  Have someone help you get up.   If you have nausea (feel sick to your stomach): Drink only clear liquids such as apple juice, ginger ale, broth or 7-Up.  Rest may also help.  Be sure to drink enough fluids.  Move to a regular diet as you feel able.  You may have a slight fever. Call the doctor if your fever is over 100 F (37.7 C) (taken under the tongue) or lasts longer than 24 hours.  You may have a dry mouth, a sore throat, muscle aches or trouble sleeping.  These should go away after 24  hours.  Do not make important or legal decisions.   Call your doctor for any of the followin.  Signs of infection (fever, growing tenderness at the surgery site, a large amount of drainage or bleeding, severe pain, foul-smelling drainage, redness, swelling).    2. It has been over 8 to 10 hours since surgery and you are still not able to urinate (pass water).    3.  Headache for over 24 hours.

## 2023-08-07 NOTE — ANESTHESIA PREPROCEDURE EVALUATION
Anesthesia Pre-Procedure Evaluation    Patient: Ba Mireles   MRN: [id number] : 1980        Procedure : Procedure(s):  Cytoscopy, Bilateral Ureteroscopy, Bilateral Retrograde Pyelogram, Bilateral Laser Lithotripsy, Right Possible Stent placement. Left Possible Stent placement          Past Medical History:   Diagnosis Date    Kidney stone     first stone since age 20      Past Surgical History:   Procedure Laterality Date    CYSTOSCOPY,URETEROSCOPY,LITHOTRIPSY      at [facility] SURGERY Right       Allergies   Allergen Reactions    Amoxicillin-Pot Clavulanate Anxiety     Has tolerated amoxicillin well in the past      Social History     Tobacco Use    Smoking status: Every Day     Types: Cigarettes    Smokeless tobacco: Never   Substance Use Topics    Alcohol use: Not Currently      Wt Readings from Last 1 Encounters:   23 70.3 kg (155 lb)        Anesthesia Evaluation   Pt has had prior anesthetic.     No history of anesthetic complications       ROS/MED HX  ENT/Pulmonary:     (+)                tobacco use (0.5 ppd), Current use,                      Neurologic:  - neg neurologic ROS     Cardiovascular:  - neg cardiovascular ROS     METS/Exercise Tolerance: >4 METS    Hematologic:  - neg hematologic  ROS     Musculoskeletal:  - neg musculoskeletal ROS     GI/Hepatic:  - neg GI/hepatic ROS     Renal/Genitourinary:     (+)       Nephrolithiasis ,       Endo:  - neg endo ROS     Psychiatric/Substance Use:  - neg psychiatric ROS     Infectious Disease:  - neg infectious disease ROS     Malignancy:  - neg malignancy ROS     Other:  - neg other ROS          Physical Exam    Airway  airway exam normal      Mallampati: II   TM distance: > 3 FB   Neck ROM: full   Mouth opening: > 3 cm    Respiratory Devices and Support         Dental  no notable dental history         Cardiovascular   cardiovascular exam normal          Pulmonary   pulmonary exam normal                OUTSIDE LABS:  CBC: No results  found for: WBC, HGB, HCT, PLT  BMP: No results found for: NA, POTASSIUM, CHLORIDE, CO2, BUN, CR, GLC  COAGS: No results found for: PTT, INR, FIBR  POC: No results found for: BGM, HCG, HCGS  HEPATIC: No results found for: ALBUMIN, PROTTOTAL, ALT, AST, GGT, ALKPHOS, BILITOTAL, BILIDIRECT, REJI  OTHER: No results found for: PH, LACT, A1C, CHARLY, PHOS, MAG, LIPASE, AMYLASE, TSH, T4, T3, CRP, SED    Anesthesia Plan    ASA Status:  2    NPO Status:  NPO Appropriate    Anesthesia Type: General.     - Airway: LMA   Induction: Intravenous, Propofol.   Maintenance: TIVA.        Consents    Anesthesia Plan(s) and associated risks, benefits, and realistic alternatives discussed. Questions answered and patient/representative(s) expressed understanding.     - Discussed:     - Discussed with:  Patient, Parent (Mother and/or Father)      - Extended Intubation/Ventilatory Support Discussed: No.      - Patient is DNR/DNI Status: No     Use of blood products discussed: No .     Postoperative Care    Pain management: IV analgesics, Multi-modal analgesia.   PONV prophylaxis: Ondansetron (or other 5HT-3), Dexamethasone or Solumedrol, Droperidol or Haldol     Comments:                Dorie Pratt MD

## 2023-08-07 NOTE — INTERVAL H&P NOTE
The History and Physical has been reviewed, the patient has been examined and no changes have occurred in the patient's condition since the H & P was completed.     We discussed the benefits and risks of ureteroscopy, including but not limited to, bleeding, infection, need for stent/stent related symptoms, injury to ureter, need for second procedure if unable to reach stone or residual fragments. He would like to be prestented if unable to access the kidneys

## 2023-08-08 ENCOUNTER — NURSE TRIAGE (OUTPATIENT)
Dept: NURSING | Facility: CLINIC | Age: 43
End: 2023-08-08
Payer: COMMERCIAL

## 2023-08-08 ENCOUNTER — TELEPHONE (OUTPATIENT)
Dept: UROLOGY | Facility: CLINIC | Age: 43
End: 2023-08-08
Payer: COMMERCIAL

## 2023-08-08 DIAGNOSIS — R10.9 ACUTE FLANK PAIN: Primary | ICD-10-CM

## 2023-08-08 DIAGNOSIS — N20.0 NEPHROLITHIASIS: Primary | ICD-10-CM

## 2023-08-08 DIAGNOSIS — N20.0 NEPHROLITHIASIS: ICD-10-CM

## 2023-08-08 RX ORDER — DIMENHYDRINATE 50 MG
50 TABLET ORAL AT BEDTIME
Qty: 20 TABLET | Refills: 1 | Status: SHIPPED | OUTPATIENT
Start: 2023-08-08 | End: 2023-08-28

## 2023-08-08 NOTE — TELEPHONE ENCOUNTER
[facility] Call Center    Phone Message    May a detailed message be left on voicemail: yes     Reason for Call: Other: Patient called stating he had surgery yesterday and he didn't sleep at all last night. States he called a number last night at 9 and at 2:30 am. Hasn't slept at all. States the care packet page 8 has a typo states that number [phone] doesn't work. (Our number is [phone]). States he's progressively getting worse. The number he did dial last night he found a number on another sheet,  states he spoke to a nurse but he feels like she wasn't really helpful, states he doesn't think she was a Urology nurse. States the pain meds are doing nothing. He's having extreme pain with urination. Please contact patient. Thank you       Action Taken: Other: KSI    Travel Screening: Not Applicable

## 2023-08-08 NOTE — TELEPHONE ENCOUNTER
Pt is calling again in regards to his postop pain. He at first stated he was going to the bathroom every hour and it was painful with jsut a trickling of urine. He mostly is just claling to see if he can take the oxy early. Recommended we try to page urology which unfortunately there was no response. Upon calling the pt back he stated that he was able to go a really good amount of urine and that it was not nearly as painful. No clots or any other issues there. Recommended he be seen in the ER should anything worsen but he should continue with the previous triage RN's advice and still call the clinic in the AM.     Reason for Disposition   Nursing judgment    Protocols used: No Guideline or Reference [id number]    Haley Castillo RN on 8/8/2023 at 3:15 AM

## 2023-08-08 NOTE — TELEPHONE ENCOUNTER
"Patient reporting he had stents put in for kidneys and kidney stones removed.  He tried calling the \"24-hour\" number to urology, [phone], and said it didn't work.     Patient reporting \"trickling urine constantly\" and having to clench.  He is able to urinate and denies large clots.  He is having increased pain now, but he is due for his pain medications soon. Pain medications bring pain to 5/10.  Discussed staggering his Tylenol/Oxy with the ibuprofen so he has something on board every three hours.      Disposition is to call his surgery office within 24 hours.  Patient will try plan for pain control and call urology in the morning.      Yovana Amado RN  [facility]      Reason for Disposition   [1] Caller has NON-URGENT question AND [2] triager unable to answer question    Additional Information   Negative: Sounds like a life-threatening emergency to the triager   Negative: [1] Widespread rash AND [2] bright red, sunburn-like   Negative: [1] SEVERE headache AND [2] after spinal (epidural) anesthesia   Negative: [1] Vomiting AND [2] persists > 4 hours   Negative: [1] Vomiting AND [2] abdomen looks much more swollen than usual   Negative: [1] Drinking very little AND [2] dehydration suspected (e.g., no urine > 12 hours, very dry mouth, very lightheaded)   Negative: Patient sounds very sick or weak to the triager   Negative: Sounds like a serious complication to the triager   Negative: Fever > 100.4 F (38.0 C)   Negative: [1] SEVERE post-op pain (e.g., excruciating, pain scale 8-10) AND [2] not controlled with pain medications   Negative: [1] Caller has URGENT question AND [2] triager unable to answer question   Negative: [1] Headache AND [2] after spinal (epidural) anesthesia AND [3] not severe   Negative: Fever present > 3 days (72 hours)   Negative: [1] MILD-MODERATE post-op pain (e.g., pain scale 1-7) AND [2] not controlled with pain medications    Protocols used: Post-Op Symptoms and " [id number]

## 2023-08-08 NOTE — TELEPHONE ENCOUNTER
Nurse Triage SBAR    Situation:   -Post-Op questions    Background:   -Patient calling, It is okay to leave a detailed message at this number.   -Had a Cystoscopy, Bilateral Ureteroscopy, Bilateral Retrograde Pyelogram, Bilateral Stone Basket Extraction, Bilateral Stent placement  by Gus Shipman MD at [facility]     Assessment:   Medication questions:  -taking (dimenhyDRINATE (DRAMAMINE)) for Acute flank pain Nephrolithiasis  -last took at 2pm  -can/should he take tonight again?    Pain:  -still has pain with urination  -pain is 6/10 at rest  -pain is 9/10 with urnation   -okay tolerate it overnight  -last took oxy at 1pm, has about 14 pills left    Color:  -urin is still pink   -not improving/getting worse  -color is between pink lemonade and fruit punch    Other:  -no fever, highest temp is 99.3  -no rash     Recommendation:   Call PCP Now   APARNA RN paged on call CRISTY PHILLIPS MD at 1822:  He called back with the following recommendations:  -okay to take another dramamine tonight  -pain response is okay, should improve after the first few days  -color is okay, normal for it to be this color until the stent removed  -if temp goes above 101.0F go to ED    Provider Recommendation Follow Up:   Reached patient/caregiver. Informed of provider's recommendations. Patient verbalized understanding and agrees with the plan.     .PRACHI LI RN on 8/8/2023 at 6:33 PM     Reason for Disposition   [1] Caller has URGENT question AND [2] triager unable to answer question    Additional Information   Negative: Sounds like a life-threatening emergency to the triager   Negative: Chest pain   Negative: Difficulty breathing   Negative: Acting confused (e.g., disoriented, slurred speech) or excessively sleepy   Negative: Post-Op tonsil and adenoid surgery, symptoms or questions about   Negative: Surgical incision symptoms and questions   Negative: [1] Pain or burning with passing urine  (urination) AND [2] male   Negative: [1] Pain or burning with passing urine (urination) AND [2] female   Negative: Constipation   Negative: New or worsening leg (calf, thigh) pain   Negative: New or worsening leg swelling   Negative: Dizziness is severe, or persists > 24 hours after surgery   Negative: Pain, redness, swelling, or pus at IV Site   Negative: Symptoms arising from use of a urinary catheter (e.g., coude, Ladd)   Negative: Cast problems or questions   Negative: Medication question   Negative: [1] Widespread rash AND [2] bright red, sunburn-like   Negative: [1] SEVERE headache AND [2] after spinal (epidural) anesthesia   Negative: [1] Vomiting AND [2] persists > 4 hours   Negative: [1] Vomiting AND [2] abdomen looks much more swollen than usual   Negative: [1] Drinking very little AND [2] dehydration suspected (e.g., no urine > 12 hours, very dry mouth, very lightheaded)   Negative: Patient sounds very sick or weak to the triager   Negative: Sounds like a serious complication to the triager   Negative: Fever > 100.4 F (38.0 C)   Negative: [1] SEVERE post-op pain (e.g., excruciating, pain scale 8-10) AND [2] not controlled with pain medications    Protocols used: Post-Op Symptoms and [id number]-RH

## 2023-08-08 NOTE — TELEPHONE ENCOUNTER
Spoke with patient regarding his symptoms. He is taking protocol medications as recommended.  He will add in dramamine also. His pain will settle and then will become severe again, jovany with urination.  His symptoms are consistent with bilateral stents.  He has no fever and is voiding.  He is staying well hydrated, and he will call as needed.  He is set up for his stent removal appointment.  Reena Davis RN

## 2023-08-08 NOTE — OP NOTE
OPERATIVE REPORT    PREOPERATIVE DIAGNOSIS:  Left ureteral stone, bilateral nephrolithiasis    POSTOPERATIVE DIAGNOSIS: Bilateral nephrolithiasis    PROCEDURES PERFORMED:   Cystoscopy  Bilateral ureteroscopy with stone basket extraction  Bilateral retrograde pyelogram  Bilateral ureteral stent placement  Intraoperative interpretation fluoroscopic imaging less than an hour    STAFF SURGEON: Gus Shipman MD  ASSISTANT(S): None  ANESTHESIA: General  ESTIMATED BLOOD LOSS: 5 ml  COMPLICATIONS: None.   SPECIMEN: Left renal stone for analysis  Right ureteral stone for analysis    SIGNIFICANT FINDINGS:   Left ureteral stone passed  Bilateral renal stones removed via basket extraction  Bilateral 6 Cambodian by 26 cm ureteral stents placed on modified single string tether    BRIEF OPERATIVE INDICATIONS: Ba Mireles is a(n) 43 year old male who presented with left ureteral stone, which he believes he may have passed, as well as bilateral renal stones.  Patient intermittent clearing his renal stone burden if it stone passage, which she experiences multiple times per year.  After a discussion of all risks, benefits, and alternatives, the patient elected to proceed with bilateral ureteroscopy.    DESCRIPTION OF PROCEDURE:  After informed consent was obtained, the patient was transported to the operating room & placed supine on the table. After adequate anesthesia was induced, the patient was placed in lithotomy and prepped and draped in the usual sterile fashion. A timeout was taken to confirm correct patient, procedure and laterality. Pre-operative IV antibiotics were administered.     A 22 (ureteroscope inserted through the urethra and the bladder.  There is no urethral or bladder abnormalities.  The ureteral orifices were cannulated with a Bentson wires without resistance.    Left ureteral orifice was calibrated with the dual-lumen catheter however this would not pass beyond the distal ureter.  Given his prior ureteral  stone, I elected to perform semirigid ureteroscopy.  Semirigid ureteroscopy to the mid ureter was unremarkable for stone.  Retrograde pyelogram was unremarkable.  I then placed a 11-13 Italian by 46 cm ureteral access sheath with mild resistance to the proximal ureter.  Flexible ureteroscopy with a digital Storz Flex XC ureteroscope demonstrated no ureteral stones, multiple renal stones with the largest being 4 mm.  All stones larger than 2 mm removed via basket extraction.  There are multiple papillary tip stones that were either brushed off of the scope or basket and basket extracted as possible.  Pullback ureteroscopy demonstrated a Traxer grade 1 ureteral injury in the distal ureter.  The wire was replaced before removing the ureteroscope completely.    Attention was then turned to the right side.  The dual-lumen catheter was easily inserted to the proximal ureter without resistance and retrograde pyelogram was unremarkable..  The ureteral access sheath was then inserted over the working wire to the proximal ureter without resistance.  Flexible ureteroscopy demonstrated again multiple stones including multiple papillary tip stones.  The largest stone approximately 3 mm and also enlarged with a 2 mm removed by basket extraction the rest were displaced with the scope or basket.  After removal of all basketball fragments, pullback ureteroscopy was unremarkable.    Bilateral 6 Italian by 26 cm ureteral stents were placed on modified single string tethers with good proximal and distal curl seen on fluoroscopy.    They were awakened from anesthesia and transferred to the PACU.       POSTOP PLAN:  Patient to remove stents Monday, August 14  Metabolic evaluation, renal ultrasound and follow-up visit in 2 to 3 months as an outpatient

## 2023-08-09 ENCOUNTER — MYC MEDICAL ADVICE (OUTPATIENT)
Dept: UROLOGY | Facility: CLINIC | Age: 43
End: 2023-08-09
Payer: COMMERCIAL

## 2023-08-09 LAB
APPEARANCE STONE: NORMAL
APPEARANCE STONE: NORMAL
COMPN STONE: NORMAL
COMPN STONE: NORMAL
SPECIMEN WT: 14 MG
SPECIMEN WT: 65 MG

## 2023-08-10 ENCOUNTER — TELEPHONE (OUTPATIENT)
Dept: UROLOGY | Facility: CLINIC | Age: 43
End: 2023-08-10
Payer: COMMERCIAL

## 2023-08-10 ENCOUNTER — NURSE TRIAGE (OUTPATIENT)
Dept: NURSING | Facility: CLINIC | Age: 43
End: 2023-08-10

## 2023-08-10 DIAGNOSIS — R10.9 ACUTE FLANK PAIN: ICD-10-CM

## 2023-08-10 DIAGNOSIS — N20.0 NEPHROLITHIASIS: ICD-10-CM

## 2023-08-10 DIAGNOSIS — N32.89 BLADDER SPASMS: Primary | ICD-10-CM

## 2023-08-10 RX ORDER — TOLTERODINE 4 MG/1
4 CAPSULE, EXTENDED RELEASE ORAL DAILY
Qty: 7 CAPSULE | Refills: 0 | Status: SHIPPED | OUTPATIENT
Start: 2023-08-10 | End: 2023-08-28

## 2023-08-10 RX ORDER — OXYCODONE HYDROCHLORIDE 5 MG/1
5 TABLET ORAL EVERY 6 HOURS PRN
Qty: 12 TABLET | Refills: 0 | Status: SHIPPED | OUTPATIENT
Start: 2023-08-10 | End: 2023-08-14

## 2023-08-10 NOTE — TELEPHONE ENCOUNTER
See telephone note, writer spoke with patient and send refill request to provider.  Reena Davis RN

## 2023-08-10 NOTE — TELEPHONE ENCOUNTER
[facility] Call Center    Phone Message    May a detailed message be left on voicemail: yes     Reason for Call: Medication Refill Request    Has the patient contacted the pharmacy for the refill? Yes   Name of medication being requested: oxyCODONE (ROXICODONE) 5 MG tablet    Provider who prescribed the medication: Ramonita  Pharmacy:   [id number] - [place], [place] - [address]     Date medication is needed: ASAP    Action Taken: Message routed to:  [facility] ([facility] Uro    Travel Screening: Not Applicable

## 2023-08-10 NOTE — TELEPHONE ENCOUNTER
Nurse Triage SBAR    Is this a 2nd Level Triage  Yes    Situation:  questions and med request    Background/Assessment:       8/7/2023 Cystoscopy, Bilateral Ureteroscopy, Bilateral Retrograde Pyelogram, Bilateral Stone Basket Extraction, Bilateral Stent placement     Pt is having severe bladder spasms.    Wondering if they can take more tolterodine ER (DETROL LA) 2 MG 24 hr capsule.         Pt wondering if he can get another order so he can take more medications for the spasms.    Also wondering if he could get another order for Oxycodone 5 Mg Tabs to get him through for a few more days.             Increased pinked lemonade urine.   Feels more urgency with the Stents.    A little burning when he starts to urinate.    No fever, hot sweats, cold sweats or chills.    Pain level 3/10 on pain scale.            Pt would like a call back from the clinic for further assistance.     [phone]    Thank you    Lacey Perez RN  Central Triage Red Flags/Med Refills      Protocol Recommended Disposition:   Pt would like a call back after discussing with the provider.       Additional Information   Negative: Nursing judgment   Nursing judgment    Protocols used: No Protocol Available - Information Only-A-OH

## 2023-08-10 NOTE — TELEPHONE ENCOUNTER
Spoke with patient and let him know that the provider was sent his refill request for pain medication.  Patient will continue protocol medications as he has.  He will call with any other issues or questions.  Reena Davis RN

## 2023-08-10 NOTE — TELEPHONE ENCOUNTER
Patient set up for stent removal appointment, he is having a lot of pain from his bilateral stents.  Is requesting a refill of pain medication.  Please advise.  Reena Davis RN

## 2023-08-14 ENCOUNTER — ALLIED HEALTH/NURSE VISIT (OUTPATIENT)
Dept: UROLOGY | Facility: CLINIC | Age: 43
End: 2023-08-14
Payer: COMMERCIAL

## 2023-08-14 DIAGNOSIS — N20.0 NEPHROLITHIASIS: Primary | ICD-10-CM

## 2023-08-14 DIAGNOSIS — R10.9 ACUTE FLANK PAIN: ICD-10-CM

## 2023-08-14 DIAGNOSIS — N20.0 NEPHROLITHIASIS: ICD-10-CM

## 2023-08-14 PROCEDURE — 99207 PR NO CHARGE NURSE ONLY: CPT

## 2023-08-14 RX ORDER — OXYCODONE HYDROCHLORIDE 5 MG/1
5 TABLET ORAL EVERY 6 HOURS PRN
Qty: 12 TABLET | Refills: 0 | Status: SHIPPED | OUTPATIENT
Start: 2023-08-14 | End: 2023-08-28

## 2023-08-14 NOTE — PROGRESS NOTES
Patient here to have stents on string removed in clinic.  Stents were removed without issue.  Patient is having a lot of pain, but it is improving. He will call as needed with any issues or concerns.  Reena Davis RN

## 2023-08-14 NOTE — TELEPHONE ENCOUNTER
Patient had removed bilateral stents today and is now having severe pain.  He is taking protocol medications, but is also requesting pain medication to get him through this pain, please advise.  Reena Davis RN

## 2023-08-21 ENCOUNTER — MYC MEDICAL ADVICE (OUTPATIENT)
Dept: UROLOGY | Facility: CLINIC | Age: 43
End: 2023-08-21
Payer: COMMERCIAL

## 2023-08-22 ENCOUNTER — HOSPITAL ENCOUNTER (OUTPATIENT)
Dept: ULTRASOUND IMAGING | Facility: HOSPITAL | Age: 43
Discharge: HOME | End: 2023-08-22
Attending: UROLOGY | Admitting: UROLOGY
Payer: COMMERCIAL

## 2023-08-22 ENCOUNTER — TELEPHONE (OUTPATIENT)
Dept: UROLOGY | Facility: CLINIC | Age: 43
End: 2023-08-22
Payer: COMMERCIAL

## 2023-08-22 DIAGNOSIS — N20.0 NEPHROLITHIASIS: ICD-10-CM

## 2023-08-22 PROCEDURE — 76770 US EXAM ABDO BACK WALL COMP: CPT

## 2023-08-22 NOTE — TELEPHONE ENCOUNTER
Spoke with patient on the phone.  Writer does not know why [facility] called out to patient, as we are [place] and no orders were sent to them.  Patient has not seen them in 20 years.  Patient is currently scheduled at the [facility] on [facility].  Patient states he is still having pain jovany with any movement other than walking and feeling tired.  No fever, urination symptoms.  Pain is keeping him from working.  Will confer with provider regarding next steps.  Reena Davis RN

## 2023-08-22 NOTE — TELEPHONE ENCOUNTER
[facility] Call Center    Phone Message    May a detailed message be left on voicemail: yes     Reason for Call: Other: Pt has questions regarding his US in Oct also pt is wanting to know would it be okay to get US at an earlier date. Please call pt      Action Taken: Message routed to:  Other: [facility]    Travel Screening: Not Applicable

## 2023-08-22 NOTE — TELEPHONE ENCOUNTER
Per provider, patient's ultrasound was moved up to this afternoon.  Will follow up.  Reena Davis RN

## 2023-08-23 ENCOUNTER — TELEPHONE (OUTPATIENT)
Dept: UROLOGY | Facility: CLINIC | Age: 43
End: 2023-08-23
Payer: COMMERCIAL

## 2023-08-23 NOTE — TELEPHONE ENCOUNTER
Spoke with patient who states that he continues to have pain especially on the right side near his spine.  It has not improved and feels the same as even before the surgery.  No improvement and is more intense with movement.  Provider advised.  Reena Davis RN

## 2023-08-28 ENCOUNTER — OFFICE VISIT (OUTPATIENT)
Dept: FAMILY MEDICINE | Facility: CLINIC | Age: 43
End: 2023-08-28
Payer: COMMERCIAL

## 2023-08-28 VITALS
DIASTOLIC BLOOD PRESSURE: 84 MMHG | TEMPERATURE: 98.9 F | BODY MASS INDEX: 22.33 KG/M2 | WEIGHT: 156 LBS | HEIGHT: 70 IN | OXYGEN SATURATION: 99 % | HEART RATE: 85 BPM | RESPIRATION RATE: 16 BRPM | SYSTOLIC BLOOD PRESSURE: 134 MMHG

## 2023-08-28 DIAGNOSIS — Z13.220 SCREENING FOR LIPOID DISORDERS: ICD-10-CM

## 2023-08-28 DIAGNOSIS — Z11.59 NEED FOR HEPATITIS C SCREENING TEST: ICD-10-CM

## 2023-08-28 DIAGNOSIS — R63.4 WEIGHT LOSS: ICD-10-CM

## 2023-08-28 DIAGNOSIS — Z11.4 SCREENING FOR HIV (HUMAN IMMUNODEFICIENCY VIRUS): ICD-10-CM

## 2023-08-28 DIAGNOSIS — N20.0 BILATERAL NEPHROLITHIASIS: ICD-10-CM

## 2023-08-28 DIAGNOSIS — N20.1 LEFT URETERAL STONE: ICD-10-CM

## 2023-08-28 DIAGNOSIS — R53.83 FATIGUE, UNSPECIFIED TYPE: Primary | ICD-10-CM

## 2023-08-28 LAB
ANION GAP SERPL CALCULATED.3IONS-SCNC: 10 MMOL/L (ref 7–15)
BUN SERPL-MCNC: 11.5 MG/DL (ref 6–20)
CALCIUM SERPL-MCNC: 9 MG/DL (ref 8.6–10)
CHLORIDE SERPL-SCNC: 103 MMOL/L (ref 98–107)
CHOLEST SERPL-MCNC: 223 MG/DL
CREAT SERPL-MCNC: 0.79 MG/DL (ref 0.67–1.17)
CRP SERPL-MCNC: <3 MG/L
DEPRECATED HCO3 PLAS-SCNC: 25 MMOL/L (ref 22–29)
ERYTHROCYTE [DISTWIDTH] IN BLOOD BY AUTOMATED COUNT: 12.8 % (ref 10–15)
ERYTHROCYTE [SEDIMENTATION RATE] IN BLOOD BY WESTERGREN METHOD: 10 MM/HR (ref 0–15)
GFR SERPL CREATININE-BSD FRML MDRD: >90 ML/MIN/1.73M2
GLUCOSE SERPL-MCNC: 87 MG/DL (ref 70–99)
HCT VFR BLD AUTO: 41.4 % (ref 40–53)
HCV AB SERPL QL IA: NONREACTIVE
HDLC SERPL-MCNC: 47 MG/DL
HGB BLD-MCNC: 13.7 G/DL (ref 13.3–17.7)
HIV 1+2 AB+HIV1 P24 AG SERPL QL IA: NONREACTIVE
LDLC SERPL CALC-MCNC: 150 MG/DL
MCH RBC QN AUTO: 30.9 PG (ref 26.5–33)
MCHC RBC AUTO-ENTMCNC: 33.1 G/DL (ref 31.5–36.5)
MCV RBC AUTO: 93 FL (ref 78–100)
NONHDLC SERPL-MCNC: 176 MG/DL
PLATELET # BLD AUTO: 322 10E3/UL (ref 150–450)
POTASSIUM SERPL-SCNC: 4 MMOL/L (ref 3.4–5.3)
RBC # BLD AUTO: 4.44 10E6/UL (ref 4.4–5.9)
SODIUM SERPL-SCNC: 138 MMOL/L (ref 136–145)
TRIGL SERPL-MCNC: 132 MG/DL
TSH SERPL DL<=0.005 MIU/L-ACNC: 1.62 UIU/ML (ref 0.3–4.2)
WBC # BLD AUTO: 8 10E3/UL (ref 4–11)

## 2023-08-28 PROCEDURE — 84443 ASSAY THYROID STIM HORMONE: CPT | Performed by: FAMILY MEDICINE

## 2023-08-28 PROCEDURE — 36415 COLL VENOUS BLD VENIPUNCTURE: CPT | Performed by: FAMILY MEDICINE

## 2023-08-28 PROCEDURE — 87389 HIV-1 AG W/HIV-1&-2 AB AG IA: CPT | Performed by: FAMILY MEDICINE

## 2023-08-28 PROCEDURE — 99204 OFFICE O/P NEW MOD 45 MIN: CPT | Performed by: FAMILY MEDICINE

## 2023-08-28 PROCEDURE — 80048 BASIC METABOLIC PNL TOTAL CA: CPT | Performed by: FAMILY MEDICINE

## 2023-08-28 PROCEDURE — 85027 COMPLETE CBC AUTOMATED: CPT | Performed by: FAMILY MEDICINE

## 2023-08-28 PROCEDURE — 86140 C-REACTIVE PROTEIN: CPT | Performed by: FAMILY MEDICINE

## 2023-08-28 PROCEDURE — 86803 HEPATITIS C AB TEST: CPT | Performed by: FAMILY MEDICINE

## 2023-08-28 PROCEDURE — 85652 RBC SED RATE AUTOMATED: CPT | Performed by: FAMILY MEDICINE

## 2023-08-28 PROCEDURE — 80061 LIPID PANEL: CPT | Performed by: FAMILY MEDICINE

## 2023-08-28 PROCEDURE — 86364 TISS TRNSGLTMNASE EA IG CLAS: CPT | Performed by: FAMILY MEDICINE

## 2023-08-28 RX ORDER — TAMSULOSIN HYDROCHLORIDE 0.4 MG/1
0.4 CAPSULE ORAL DAILY
COMMUNITY
End: 2024-05-30

## 2023-08-28 ASSESSMENT — PAIN SCALES - GENERAL: PAINLEVEL: NO PAIN (0)

## 2023-08-28 NOTE — PROGRESS NOTES
Assessment & Plan   Problem List Items Addressed This Visit          Urinary    Bilateral nephrolithiasis    Relevant Orders    Basic metabolic panel (Completed)    Left ureteral stone     Other Visit Diagnoses       Fatigue, unspecified type    -  Primary    Relevant Orders    CBC with platelets (Completed)    Lipid panel reflex to direct LDL Fasting (Completed)    Weight loss        Relevant Orders    TSH with free T4 reflex (Completed)    ESR: Erythrocyte sedimentation rate (Completed)    CRP, inflammation (Completed)    Tissue transglutaminase maia IgA and IgG (Completed)    Screening for HIV (human immunodeficiency virus)        Relevant Orders    HIV Antigen Antibody Combo (Completed)    Need for hepatitis C screening test        Relevant Orders    Hepatitis C Screen Reflex to HCV RNA Quant and Genotype (Completed)    Screening for lipoid disorders                       BRENDA JONES MD  [facility]    Wendy Cosme is a 43 year old, presenting for the following health issues:  Establish Care and Weight Loss (Loss 20 lbs in 2 months )      8/28/2023     7:58 AM   Additional Questions   Roomed by Ivory LOFTON CMA       History of Present Illness       Reason for visit:  Switching care providers. Need physical and need to get to the bottom of kidney issues.    He eats 2-3 servings of fruits and vegetables daily.He consumes 1 sweetened beverage(s) daily.He exercises with enough effort to increase his heart rate 30 to 60 minutes per day.  He exercises with enough effort to increase his heart rate 5 days per week.   He is taking medications regularly.     Patient reports overall improvements of back pain, but continues to have it time to time.  Bigger concerns are ongoing issues with fatigue and some weight loss.  Appetite has been somewhat decreased.    Has been dealing with kidney stones bilateral, please see urology notes.      Review of Systems   Constitutional, HEENT,  "cardiovascular, pulmonary, gi and gu systems are negative, except as otherwise noted.      Objective    /84   Pulse 85   Temp 98.9  F (37.2  C) (Temporal)   Resp 16   Ht 1.765 m (5' 9.5\")   Wt 70.8 kg (156 lb)   SpO2 99%   BMI 22.71 kg/m    Body mass index is 22.71 kg/m .  Physical Exam   GENERAL: healthy, alert and no distress  NECK: no adenopathy, no asymmetry, masses, or scars and thyroid normal to palpation  RESP: lungs clear to auscultation - no rales, rhonchi or wheezes  CV: regular rate and rhythm, normal S1 S2, no S3 or S4, no murmur, click or rub, no peripheral edema and peripheral pulses strong  ABDOMEN: soft, nontender, no hepatosplenomegaly, no masses and bowel sounds normal  MS: no gross musculoskeletal defects noted, no edema                      "

## 2023-08-30 LAB
TTG IGA SER-ACNC: 0.6 U/ML
TTG IGG SER-ACNC: <0.6 U/ML

## 2023-09-05 ENCOUNTER — TELEPHONE (OUTPATIENT)
Dept: UROLOGY | Facility: CLINIC | Age: 43
End: 2023-09-05

## 2023-09-05 ENCOUNTER — VIRTUAL VISIT (OUTPATIENT)
Dept: UROLOGY | Facility: CLINIC | Age: 43
End: 2023-09-05
Payer: COMMERCIAL

## 2023-09-05 VITALS — HEIGHT: 69 IN | WEIGHT: 156 LBS | BODY MASS INDEX: 23.11 KG/M2

## 2023-09-05 DIAGNOSIS — N20.0 NEPHROLITHIASIS: Primary | ICD-10-CM

## 2023-09-05 DIAGNOSIS — M54.50 RIGHT-SIDED LOW BACK PAIN WITHOUT SCIATICA, UNSPECIFIED CHRONICITY: ICD-10-CM

## 2023-09-05 PROCEDURE — 99214 OFFICE O/P EST MOD 30 MIN: CPT | Mod: VID | Performed by: NURSE PRACTITIONER

## 2023-09-05 RX ORDER — HYDROCODONE BITARTRATE AND ACETAMINOPHEN 5; 325 MG/1; MG/1
TABLET ORAL
COMMUNITY
End: 2023-09-06

## 2023-09-05 ASSESSMENT — PAIN SCALES - GENERAL: PAINLEVEL: MODERATE PAIN (5)

## 2023-09-05 NOTE — TELEPHONE ENCOUNTER
Spoke to patient regarding symptoms.  He continues to have back pain since his last stone surgery.  He is unsure if he pulled muscle or kidney stone related and would like to have an appointment with provider to discuss next step.   Ok per provider, a video visit was scheduled for today at 2:00 p.m.

## 2023-09-05 NOTE — PROGRESS NOTES
Urology Video Office Visit    Video-Visit Details    Type of service:  Video Visit    Video Start Time: 1404    Video End Time: 1417    Originating Location (pt. Location): Home    Distant Location (provider location):  Off-site     Platform used for Video Visit: [employer]           Assessment and Plan:     Assessment: 43 year old male with right low back pain    Plan:  -Reviewed renal US with patient. No noted hydronephrosis.   -Discussed possible etiologies for ongoing back pain unlikely due to renal stones at this time. Would recommend further evaluation with PCP. Pt amenable to plan of care.   -Discussed option of obtaining urinalysis and urine culture or repeat CT scan to ensure no stone passage. Pt deferred at this time.   -RTC with schedule appointment to review 24 hour urine study.     Leslie Blackburn CNP  Department of Urology  September 5, 2023    I spent a total of 35 minutes spent on the date of the encounter doing chart review, history and exam, documentation, and further activities as noted above.          Chief Complaint:   Low back pain         History of Present Illness:    Ba Mireles is a pleasant 43 year old male who presents with concerns of ongoing right low back pain.     Mr. Mireles was initially seen in July 2023 for bilateral low back pain. He notes pain started in Feb/March of 2023 and pain was similar to when he had acute stones in the past.     He had a bilateral URS/LL on 08/07/23 for bilateral nephrolithiasis and left ureteral stone. Left ureteral stone did pass prior to surgery date. Bilateral stents removed in office on 08/14/23 without complications.     He continue to have increasing back pain and states 3-4 days ago pain increased in severity. He was seen by his chiropractor and noted slight improvement after adjustments. He is using heat and oxycodone to help with pain control at this time.     He states pain is along right low back near spine. Pain is similar to pain he  felt prior to bilateral URS/LL.     Renal US on 08/22/23 revealed bilateral symmetrical kidneys without hydronephrosis. No noted concerns of obstructing renal stones.     Family history of nephrolithiasis with mother and sister. Family history of renal cell carcinoma in paternal and maternal grandfathers.      History of smoking of 25 years, 1ppd .            Past Medical History:     Past Medical History:   Diagnosis Date    Kidney stone     first stone since age 20            Past Surgical History:     Past Surgical History:   Procedure Laterality Date    COMBINED CYSTOSCOPY, RETROGRADES, URETEROSCOPY, LASER HOLMIUM LITHOTRIPSY URETER(S), INSERT STENT Bilateral 8/7/2023    Procedure: Cystoscopy, Bilateral Ureteroscopy, Bilateral Retrograde Pyelogram, Bilateral Stone Basket Extraction, Bilateral Stent placement;  Surgeon: Gus Shipman MD;  Location: [facility] OR    CYSTOSCOPY,URETEROSCOPY,LITHOTRIPSY      at [facility] SURGERY Right             Medications     Current Outpatient Medications   Medication    HYDROcodone-acetaminophen (NORCO) 5-325 MG tablet    tamsulosin (FLOMAX) 0.4 MG capsule     No current facility-administered medications for this visit.            Family History:     Family History   Problem Relation Age of Onset    Cancer Mother         kidney    Heart Disease Mother     Nephrolithiasis Mother     Gout Mother     Cancer Maternal Grandfather         renal cell    Heart Disease Maternal Grandfather     Nephrolithiasis Maternal Grandfather     Cancer Paternal Grandfather         lucille cell    Kidney Disease Paternal Grandfather     Nephrolithiasis Maternal Aunt             Social History:     Social History     Socioeconomic History    Marital status:      Spouse name: Not on file    Number of children: Not on file    Years of education: Not on file    Highest education level: Not on file   Occupational History    Not on file   Tobacco Use    Smoking status: Every Day      Packs/day: 1.00     Types: Cigarettes    Smokeless tobacco: Never   Vaping Use    Vaping Use: Never used   Substance and Sexual Activity    Alcohol use: Not Currently    Drug use: Yes     Types: Marijuana    Sexual activity: Not on file   Other Topics Concern    Not on file   Social History Narrative    Not on file     Social Determinants of Health     Financial Resource Strain: Not on file   Food Insecurity: Not on file   Transportation Needs: Not on file   Physical Activity: Not on file   Stress: Not on file   Social Connections: Not on file   Intimate Partner Violence: Not on file   Housing Stability: Not on file            Allergies:   Amoxicillin-pot clavulanate         Review of Systems:  From intake questionnaire   Negative 14 system review except as noted on HPI, nurse's note.         Physical Exam:   General Appearance: Well groomed, hygenic  Eyes: No redness, discharge  Respiratory: No cough, no respiratory distress or labored breathing  Musculoskeletal: Grossly normal, full range of motion in upper extremities, no gross deficits  Skin: No discoloration or apparent rashes  Neurologic - No tremors  Psychiatric - Alert and oriented  The rest of a comprehensive physical examination is deferred due to video visit restrictions        Labs:    I personally reviewed all applicable laboratory data and went over findings with patient  Significant for:    CBC RESULTS:  Recent Labs   Lab Test 08/28/23  0841   WBC 8.0   HGB 13.7           BMP RESULTS:  Recent Labs   Lab Test 08/28/23  0841      POTASSIUM 4.0   CHLORIDE 103   CO2 25   ANIONGAP 10   GLC 87   BUN 11.5   CR 0.79   GFRESTIMATED >90   CHARLY 9.0       CALCIUM RESULTS  Lab Results   Component Value Date    CHARLY 9.0 08/28/2023           Imaging:    I personally reviewed all applicable imaging and went over the below findings with patient.    Results for orders placed or performed during the hospital encounter of 08/22/23   US Renal Complete  Non-Vascular    Narrative    EXAM: US RENAL COMPLETE NON-VASCULAR  LOCATION: [facility]  DATE: 8/22/2023    INDICATION: Left ureteral stone and multiple bilateral renal stones at recent CT.  COMPARISON: Noncontrast CT AP 07/31/2023.  TECHNIQUE: Routine Bilateral Renal and Bladder Ultrasound.    FINDINGS:    RIGHT KIDNEY: 10.8 x 4.9 x 4.8 cm. Normal parenchymal echotexture with no hydronephrosis or solid mass. Benign 1.3 cm right renal cyst requires no follow-up. The known small nonobstructing renal stones are not well visualized sonographically.    LEFT KIDNEY: 10.2 x 6.2 x 5.8 cm. Normal parenchymal echotexture with no hydronephrosis or mass. The known small nonobstructing renal stones are not well visualized sonographically.     BLADDER: Normal. Urine jets within the bladder are visible by Doppler.      Impression    IMPRESSION:  1.  Benign 1.3 cm right renal cyst requires no follow-up.   2.  The known small nonobstructing renal stones are not well visualized sonographically.  3.  Kidneys and bladder otherwise normal.

## 2023-09-05 NOTE — TELEPHONE ENCOUNTER
[facility] Call Center    Phone Message    May a detailed message be left on voicemail: yes     Reason for Call: Symptoms or Concerns     If patient has red-flag symptoms, warm transfer to triage line    Current symptom or concern: Back pain    Symptoms have been present for:  1 month(s)    Has patient previously been seen for this? No    Are there any new or worsening symptoms? Yes: Over the weekend pain has gotten so bad it is making it hard to walk    Patient is unsure if it is a pulled muscle or kidney related. He states he has pulled muscles before that has felt like this but does not want to wait around if the provider thinks it is kidney related and should be seen. Patient said he can go to his [facility] or [facility] if provider does not think its kidney related. Please call back asap to discuss- PT phone has been acting up if no answer please leave MAYA and rosemarie     Action Taken: Message routed to:  Other: [facility]    Travel Screening: Not Applicable

## 2023-09-05 NOTE — NURSING NOTE
Is the patient currently in the state of [place]? YES    Visit mode:VIDEO    If the visit is dropped, the patient can be reconnected by: VIDEO VISIT: Text to cell phone:   Telephone Information:   Mobile [phone]       Will anyone else be joining the visit? NO  (If patient encounters technical issues they should call [phone])    How would you like to obtain your AVS? MyChart    Are changes needed to the allergy or medication list? No    Reason for visit: Follow Up    Nakita GOMEZ

## 2023-09-06 ENCOUNTER — TELEPHONE (OUTPATIENT)
Dept: FAMILY MEDICINE | Facility: CLINIC | Age: 43
End: 2023-09-06

## 2023-09-06 ENCOUNTER — OFFICE VISIT (OUTPATIENT)
Dept: FAMILY MEDICINE | Facility: CLINIC | Age: 43
End: 2023-09-06
Payer: COMMERCIAL

## 2023-09-06 ENCOUNTER — HOSPITAL ENCOUNTER (OUTPATIENT)
Dept: MRI IMAGING | Facility: CLINIC | Age: 43
Discharge: HOME | End: 2023-09-06
Attending: FAMILY MEDICINE | Admitting: FAMILY MEDICINE
Payer: COMMERCIAL

## 2023-09-06 VITALS
HEART RATE: 69 BPM | HEIGHT: 70 IN | WEIGHT: 159 LBS | DIASTOLIC BLOOD PRESSURE: 82 MMHG | BODY MASS INDEX: 22.76 KG/M2 | SYSTOLIC BLOOD PRESSURE: 130 MMHG | TEMPERATURE: 97.9 F | OXYGEN SATURATION: 99 % | RESPIRATION RATE: 16 BRPM

## 2023-09-06 DIAGNOSIS — M54.41 BILATERAL LOW BACK PAIN WITH RIGHT-SIDED SCIATICA, UNSPECIFIED CHRONICITY: ICD-10-CM

## 2023-09-06 DIAGNOSIS — M54.41 BILATERAL LOW BACK PAIN WITH RIGHT-SIDED SCIATICA, UNSPECIFIED CHRONICITY: Primary | ICD-10-CM

## 2023-09-06 PROCEDURE — 72148 MRI LUMBAR SPINE W/O DYE: CPT

## 2023-09-06 PROCEDURE — 99214 OFFICE O/P EST MOD 30 MIN: CPT | Performed by: FAMILY MEDICINE

## 2023-09-06 RX ORDER — HYDROCODONE BITARTRATE AND ACETAMINOPHEN 5; 325 MG/1; MG/1
1 TABLET ORAL EVERY 6 HOURS PRN
Qty: 12 TABLET | Refills: 0 | Status: SHIPPED | OUTPATIENT
Start: 2023-09-06 | End: 2024-05-30

## 2023-09-06 ASSESSMENT — PAIN SCALES - GENERAL: PAINLEVEL: SEVERE PAIN (6)

## 2023-09-06 NOTE — TELEPHONE ENCOUNTER
It appears there is disc bulge, that could be pushing on the nerves, at S1.       This may explain pt's symptoms.   Given findings and pt's symptoms, would recommend follow up with Spine Specialist.  Referral placed.    BRENDA JONES MD    IMPRESSION:  1.  At L4-L5 a small central herniation causes mild left and minor  right lateral recess narrowing and minor foraminal narrowing.  Correlate for any left L5 symptoms.  2.  At L5-S1 there is a mild disc bulge and small superimposed central  herniation that contacts the right greater than left nerve root  sleeves. Mild foraminal narrowing. Correlate for any right S1  symptoms.

## 2023-09-06 NOTE — PROGRESS NOTES
Assessment & Plan   Problem List Items Addressed This Visit    None  Visit Diagnoses       Bilateral low back pain with right-sided sciatica, unspecified chronicity    -  Primary    Relevant Medications    HYDROcodone-acetaminophen (NORCO) 5-325 MG tablet    Other Relevant Orders    MR Lumbar Spine w/o Contrast (Completed)           Setting up for MRI ASAP.  Discussed with patient given his continued low back pain and radicular symptoms, concerned of nerve impingement, possibly due to bulging disc.  Patient is neurologically intact at the time, but would want to hear if any change, reviewed red flags.  Follow-up based on MRI findings, or any other new symptoms.          BRENDA JONES MD  [facility]    Wendy Cosme is a 43 year old, presenting for the following health issues:  RECHECK (Back pain lower back tailbone , urology says kidney stones are all out that shouldn't be causing pain, )      9/6/2023     8:38 AM   Additional Questions   Roomed by jamil melgoza cma       History of Present Illness       Back Pain:  He presents for follow up of back pain. Patient's back pain is a recurring problem.  Location of back pain:  Right lower back  Description of back pain: burning, dull ache, sharp, shooting and stabbing  Back pain spreads: nowhere    Since patient first noticed back pain, pain is: gradually worsening  Does back pain interfere with his job:  Yes       He eats 2-3 servings of fruits and vegetables daily.He consumes 1 sweetened beverage(s) daily.He exercises with enough effort to increase his heart rate 10 to 19 minutes per day.  He exercises with enough effort to increase his heart rate 3 or less days per week. He is missing 2 dose(s) of medications per week.  He is not taking prescribed medications regularly due to remembering to take.        When further asked about back history.  Patient states continued bilateral lower back pain.  Seems to be greater on the right side.   "Does endorse pain down his right leg to his toes at times.  No bowel or bladder dysfunction.  No saddle numbness.      Objective    /82   Pulse 69   Temp 97.9  F (36.6  C)   Resp 16   Ht 1.765 m (5' 9.5\")   Wt 72.1 kg (159 lb)   SpO2 99%   BMI 23.14 kg/m    Body mass index is 23.14 kg/m .  Physical Exam   GENERAL: healthy, alert and no distress  RESP: lungs clear to auscultation - no rales, rhonchi or wheezes  CV: regular rate and rhythm, normal S1 S2, no S3 or S4, no murmur, click or rub, no peripheral edema and peripheral pulses strong  ABDOMEN: soft, nontender, no hepatosplenomegaly, no masses and bowel sounds normal  Comprehensive back pain exam:  Tenderness of midline umbar/sacral spine, Range of motion not limited by pain, Lower extremity strength functional and equal on both sides, Lower extremity reflexes within normal limits bilaterally, Lower extremity sensation normal and equal on both sides, and Straight leg raise negative bilaterally                    "

## 2023-09-06 NOTE — TELEPHONE ENCOUNTER
General Call      Reason for Call:  results from MRI    What are your questions or concerns:  please call patient's emergency contact cell phone number, per patient, regarding MRI results    Date of last appointment with provider: 9/6/23    Could we send this information to you in "[employer] or would you prefer to receive a phone call?:   Patient would prefer a phone call   Okay to leave a detailed message?: Yes at Other phone number:  [phone]

## 2023-09-07 NOTE — TELEPHONE ENCOUNTER
SPINE PATIENTS - NEW PROTOCOL PREVISIT    RECORDS RECEIVED FROM: Internal    REASON FOR VISIT: Bilateral low back pain with right-sided sciatica, down right leg to toes   Date of Appt: 9/8/23 10:45 am    NOTES (FOR ALL VISITS) STATUS DETAILS   OFFICE NOTE from referring provider Internal 9/6/23, 8/28/23 Zaid Eli MD @[facility]     OFFICE NOTE from other specialist Internal 1/4/21 Karla Ortez MD  @[facility]     MEDICATION LIST Internal    IMAGING  (FOR ALL VISITS)     MRI (HEAD, NECK, SPINE) Internal [facility]  9/6/23 MR Lumbar Spine

## 2023-09-07 NOTE — TELEPHONE ENCOUNTER
Pt called back , as stated in original message,we can call the emergency contact which is his mother, he has given a verbal ok to do so as it is the only number he can be reached at and he has no access to [facility],    No RN available, please call back asap

## 2023-09-07 NOTE — TELEPHONE ENCOUNTER
Spoke to patient.      Relayed message from Dr. Eli with results. He has his mother's cell phone is why the request is there to call that number.  Writer gave him the number for the Spine Specialist, since that referral was placed and they may have tried to call his cell.     Patient plans to call and get an appointment with the Spine Specialist.    MARIEL Chun RN  [facility]

## 2023-09-07 NOTE — TELEPHONE ENCOUNTER
Izabela Hussein, RN called Ba on 9/7 and left a message to return call to clinic. If patient calls back, please route to any available RN to triage.    Message stated to call emergency contact, but there is no consent to communicate on file for her.  Stated this in the message.      JUAN MANUEL ChunN RN  [facility]

## 2023-09-08 ENCOUNTER — PRE VISIT (OUTPATIENT)
Dept: NEUROSURGERY | Facility: CLINIC | Age: 43
End: 2023-09-08

## 2023-09-12 NOTE — PROGRESS NOTES
"Patient seen at the request of Zaid Eli for an opinion and evaluation of back pain back pain.      HISTORY OF PRESENT ILLNESS:  Ba Mireles is a 43 year old male who presents with a chief complaint of back pain.     He was seen today in the clinic.  He reports pain that started in March.  He was initially being seen at [facility].  He has a history of kidney stones since he was 20 years old.  In the past, he had yearly issue with kidney stones.  However over time he developed multiple episodes per year.  He was evaluated by 2 different urologists.  He had a procedure to remove kidney stones and 2 months ago he had stents removed.  However he continued to have persistent low back pain, and was referred to this clinic.    He says that his typical pain related to kidney stones is in his low back and hips.  The pain is worse on the right low back.  He has a constant pressure on his hips, that feels like being squeezed with a tight belt and also compressed vertically.  Intermittently, he has sharp shooting pain down both legs.  He cannot recall which part of his legs is affected when the pain happens.  He also has tingling in his legs and groin area \"randomly\".    Sometimes when walking he gets a sharp pain down his legs (R>L) and has to come down to his knees.  He has not fallen though.  Movement of any kind tends to aggravate the pain.  Laying down helps.  Today he rates the pain 1.5-2/10.  At its worst over the last week, the pain was 8-9/10.  His sleep is not affected.    Due to pain, he has been mostly off of work for the last 2 months.  He says he spent a lot of that time sitting in bed.  Lately, he says he is trying to slowly walk more.    He denies falls, weakness, bowel or bladder incontinence, saddle anesthesia, or fevers/chills.  He reports weight loss (he was 175 pounds in March, then in June he was 138 pounds).  However, he changed his diet substantially due to trying to control his kidney stone " issue.  He does endorse a history of night sweats and nightmares.    Of note, he does report an issue with dropping things at times.  He says that he had surgery on his right wrist.  He's was concerned that there was an issue with the nerve block that was done for that surgical procedure.  He has had intermittent numbness/tingling in the right upper extremity since that time.      PRIOR INJURIES/TREATMENT:   Ice/Heat: heat helps    Brace: none    Physical Therapy:   None     - Current Pain Medications -    Tylenol 2,000-3,000 mg per day  Ibuprofen 800 mg per day  Norco, rarely for more severe pain       - Prior/Trialed Pain Medications -   Oxycodone  Cyclobenzaprine - helped with sleep    Prior Procedures:  Date    Procedure   Improvement (%)  none              Prior Related Surgery: none     Other (acupuncture, OMT, CMM, TENS, DME, etc.):   Chiropractor - helps temporarily       Specialists Seen - (with most recent, available notes and clinic visits reviewed)   1. Urologist       IMAGING - reviewed   MR LUMBAR SPINE WITHOUT CONTRAST 9/6/2023   FINDINGS: Nomenclature is based on 5 lumbar type vertebral bodies.  Normal vertebral body heights. Normal alignment. Minimal T2  hyperintense Modic type 1 edematous degenerative endplate changes at  L5-S1. No pars defect. The conus tip is identified at T12-L1.     T12-L1: Normal disc signal. Slight loss of disc height. No herniation.  Unremarkable facets. No stenosis.         L1-L2: Mild loss of disc signal. Normal disc height. No herniation.  Unremarkable facets. No stenosis.     L2-L3: Minimal loss of disc signal. Normal disc height. No herniation.  Minor facet arthropathy. No stenosis.     L3-L4: Normal disc height and signal. No herniation. Mild facet  arthropathy. No stenosis.     L4-L5: Mild to moderate loss of disc signal. Mild loss of disc height.  Central annular tear and small protrusion. Mild to moderate facet  arthropathy. Mild left and minor right lateral recess  narrowing.  Adequate central canal. Minor foraminal narrowing.     L5-S1: Moderate loss of disc signal. Mild to moderate loss of disc  height. Mild circumferential disc bulge with small superimposed  central protrusion. This contacts the right greater than left S1 nerve  root sleeves. Otherwise adequate central canal. Minor facet  arthropathy. Mild foraminal narrowing.                                                                      IMPRESSION:  1.  At L4-L5 a small central herniation causes mild left and minor  right lateral recess narrowing and minor foraminal narrowing.  Correlate for any left L5 symptoms.  2.  At L5-S1 there is a mild disc bulge and small superimposed central  herniation that contacts the right greater than left nerve root  sleeves. Mild foraminal narrowing. Correlate for any right S1  symptoms.        Review Of Systems:  I am responding to those symptoms which are directly relevant to the specific indication for my consultation. I recommend that the patient follow up with their primary or referring provider to pursue any other symptoms which may be of concern.       Medical History:  He has a past medical history of Kidney stone.     He has a past surgical history that includes Wrist surgery (Right); cystoscopy,ureteroscopy,lithotripsy; and Combined Cystoscopy, Retrogrades, Ureteroscopy, Laser Holmium Lithotripsy Ureter(S), Insert Stent (Bilateral, 8/7/2023).    Family History  His family history includes Cancer in his maternal grandfather, mother, and paternal grandfather; Gout in his mother; Heart Disease in his maternal grandfather and mother; Kidney Disease in his paternal grandfather; Nephrolithiasis in his maternal aunt, maternal grandfather, and mother.     Social History:  Work: Works in [employer]. Has been mostly off work for last 2 months     Current living situation: lives with parents and daughter. Need help with lifting. Otherwise performs ADLs/IADLs  "independently.    He reports that he quit smoking 13 days ago. His smoking use included cigarettes. He smoked an average of 1 pack per day. He has never used smokeless tobacco. He reports that he does not currently use alcohol. He reports current drug use. Drug: Marijuana.        Current Medications:   He has a current medication list which includes the following prescription(s): hydrocodone-acetaminophen and tamsulosin.     Allergies:    -- Amoxicillin-Pot Clavulanate -- Anxiety    --  Has tolerated amoxicillin well in the past    PHYSICAL EXAMINATION:  BP (!) 146/88 (BP Location: Right arm, Patient Position: Sitting, Cuff Size: Adult Regular)   Pulse 62   Resp 16   Ht 1.79 m (5' 10.47\")   Wt 73 kg (161 lb)   SpO2 99%   BMI 22.79 kg/m      --CONSTITUTIONAL: Vital signs as above. No acute distress. The patient is well nourished and well groomed.  --PSYCHIATRIC: The patient is awake, alert, oriented to person, place, time and answering questions appropriately with clear speech. Appropriate mood and affect   --SKIN:  Posterior torso is clean, dry, intact without rashes.   --RESPIRATORY: Normal rhythm and effort. No abnormal accessory muscle breathing patterns noted.   --GROSS MOTOR: Easily arises from a seated position. Toe walking and heel walking are normal.    --STANDING EXAMINATION: Gait is non-antalgic.  Posterior pelvic tilt.  No lateral shift.  --LOWER EXTREMITY MOTOR TESTING:  Plantar flexion left 5/5, right 5/5   Dorsiflexion left 5/5, right 5/5   Great toe MTP extension left 5/5, right 5/5  Knee flexion left 4+/5, right 4+/5-pain limited?  Knee extension left 4+/5, right 4+/5-pain limited?  Hip flexion left 4+/5, right 4+/5-pain limited?  --MUSCULOSKELETAL: Lumbar spine inspection reveals no evidence of deformity. Range of motion is not limited in lumbar flexion, extension, lateral rotation. No tenderness to palpation lumbar spine. Straight leg raise negative. Sciatic notch non-tender. Facet loading " maneuvers are  egative.  Facet loading and lumbar extension provide pain relief.  Lumbar flexion elicits pain.  --SACROILIAC JOINT: No pain with palpation of the SI joint.  Natali negative.  Sherrell's positive bilaterally. Gaenslen's negative bilaterally. Sacroiliac Joint Compression Test negative bilaterally.   --HIPS: Slightly limited range of motion bilaterally.  He has pain with passive left hip flexion and passive right hip range of motion in general.  Sherrell's positive on both sides.  No pain with logrolling. No pain with palpation of the greater trochanter.   --NEUROLOGIC: 3/4 patellar and 2/4 achilles reflexes bilaterally.  Sensation to light touch is intact in the bilateral L4, L5, and S1 dermatomes except along the lateral right leg and affecting the whole right foot.  No clonus.    --VASCULAR:  Warm lower limbs bilaterally. There is no pitting edema of the bilateral lower extremities.       ASSESSMENT:  Ba Mireles is a pleasant 43 year old male who presents with right-sided low back pain with intermittent bilateral lower extremity pain and numbness since March.  He has bilateral weakness with knee flexion/ extension, and hip flexion -possibly pain limited.  He also has pain with range of motion in both hips.  He has a history of left hip surgery in the past.  He has a history of kidney stones and underwent combined cystoscopy, bilateral ureteroscopy, retrograde pyelogram, bilateral stone basket extraction, bilateral stent placement on 8/7/2023 with Dr. Shipman.    PLAN:  -MRI of the lumbar spine was reviewed in detail with the patient.  The imaging shows disc bulges at L4-5 and L5-S1 with lateral recess narrowing bilaterally at both levels as well.  -Could consider repeat imaging of the bilateral hips.  -Add referral to the acute spine PT program.  We discussed the importance of PT and home exercise program  -Add gabapentin to be increased slowly as tolerated according to the chart in the after visit  summary  -He was not able to tolerate oral steroid in the past due to mood/anger   -A letter was provided for his workplace regarding activity restrictions, to be reassessed at the visit in 4 weeks.  -If the above are not effective, he would like to consider an epidural steroid injection  -He plans to follow-up with the surgeon who performed his right wrist surgery regarding the numbness/tingling/dropping things he has experienced in the right upper extremity since wrist surgery  - RTC after 4 weeks of PT and home exercise program    Ready to learn, no apparent learning barriers.  Education provided on treatment plan according to patient's preferred learning style.  Patient verbalizes understanding.   __________________________________  Elle Mcgowan NP  Physical Medicine & Rehabilitation        60 minutes spent by me on the date of the encounter doing chart review, history and exam, documentation and further activities per the note

## 2023-09-14 ENCOUNTER — OFFICE VISIT (OUTPATIENT)
Dept: PHYSICAL MEDICINE AND REHAB | Facility: CLINIC | Age: 43
End: 2023-09-14
Payer: COMMERCIAL

## 2023-09-14 VITALS
OXYGEN SATURATION: 99 % | HEART RATE: 62 BPM | SYSTOLIC BLOOD PRESSURE: 146 MMHG | RESPIRATION RATE: 16 BRPM | HEIGHT: 70 IN | WEIGHT: 161 LBS | BODY MASS INDEX: 23.05 KG/M2 | DIASTOLIC BLOOD PRESSURE: 88 MMHG

## 2023-09-14 DIAGNOSIS — M54.41 CHRONIC RIGHT-SIDED LOW BACK PAIN WITH BILATERAL SCIATICA: Primary | ICD-10-CM

## 2023-09-14 DIAGNOSIS — M54.41 BILATERAL LOW BACK PAIN WITH RIGHT-SIDED SCIATICA, UNSPECIFIED CHRONICITY: ICD-10-CM

## 2023-09-14 DIAGNOSIS — G89.29 CHRONIC RIGHT-SIDED LOW BACK PAIN WITH BILATERAL SCIATICA: Primary | ICD-10-CM

## 2023-09-14 DIAGNOSIS — M25.551 HIP PAIN, RIGHT: ICD-10-CM

## 2023-09-14 DIAGNOSIS — R20.0 NUMBNESS AND TINGLING OF BOTH LOWER EXTREMITIES: ICD-10-CM

## 2023-09-14 DIAGNOSIS — R20.2 NUMBNESS AND TINGLING OF BOTH LOWER EXTREMITIES: ICD-10-CM

## 2023-09-14 DIAGNOSIS — M25.552 HIP PAIN, LEFT: ICD-10-CM

## 2023-09-14 DIAGNOSIS — M54.42 CHRONIC RIGHT-SIDED LOW BACK PAIN WITH BILATERAL SCIATICA: Primary | ICD-10-CM

## 2023-09-14 PROCEDURE — 99205 OFFICE O/P NEW HI 60 MIN: CPT | Performed by: NURSE PRACTITIONER

## 2023-09-14 RX ORDER — GABAPENTIN 300 MG/1
300 CAPSULE ORAL 3 TIMES DAILY
Qty: 90 CAPSULE | Refills: 3 | Status: SHIPPED | OUTPATIENT
Start: 2023-09-14 | End: 2024-05-30

## 2023-09-14 ASSESSMENT — PAIN SCALES - GENERAL: PAINLEVEL: MILD PAIN (2)

## 2023-09-14 NOTE — LETTER
[facility] PHYSICAL MEDICINE AND REHABILITATION CLINIC [place]  [address]  [address]RD [address]LO[address]  [facility] [address]  Phone: [phone]  Fax: [phone]    September 14, 2023        Ba Mireles  [address] [place] [id number]          To whom it may concern:    RE: Ba Mireles    Patient was seen and treated today at our clinic. He should avoid excessive bending and twisting activities. He should limit lifting to 10 lbs.       Please contact me for questions or concerns.      Sincerely,        Elle Mcgowan NP  Physical Medicine and Rehabilitation, Medical Spine  PM&R clinic Phone: [phone]  PM&R clinic Fax: [phone]

## 2023-09-14 NOTE — LETTER
[facility] PHYSICAL MEDICINE AND REHABILITATION CLINIC [place] [id number] [address]  3RD FLO[address]  [facility] [address]  Phone: [phone]  Fax: [phone]    September 14, 2023        Ba Mireles  [address] [place] [id number]          To whom it may concern:    RE: Ba Mireles    Patient was seen and treated today at our clinic. He should avoid excessive bending or twisting and avoid bending or lifting more than 10 lbs until follow up in 1 month.    Please contact me for questions or concerns.      Sincerely,        JUSTINA Yu CNP

## 2023-09-14 NOTE — LETTER
"9/14/2023       RE: Ba Mireles  [address] [facility] [address]     Dear Colleague,    Thank you for referring your patient, Ba Mireles, to the [facility] PHYSICAL MEDICINE AND REHABILITATION CLINIC [place] [facility]. Please see a copy of my visit note below.    Patient seen at the request of Zaid Eli for an opinion and evaluation of back pain back pain.      HISTORY OF PRESENT ILLNESS:  Ba Mireles is a 43 year old male who presents with a chief complaint of back pain.     He was seen today in the clinic.  He reports pain that started in March.  He was initially being seen at [facility].  He has a history of kidney stones since he was 20 years old.  In the past, he had yearly issue with kidney stones.  However over time he developed multiple episodes per year.  He was evaluated by 2 different urologists.  He had a procedure to remove kidney stones and 2 months ago he had stents removed.  However he continued to have persistent low back pain, and was referred to this clinic.    He says that his typical pain related to kidney stones is in his low back and hips.  The pain is worse on the right low back.  He has a constant pressure on his hips, that feels like being squeezed with a tight belt and also compressed vertically.  Intermittently, he has sharp shooting pain down both legs.  He cannot recall which part of his legs is affected when the pain happens.  He also has tingling in his legs and groin area \"randomly\".    Sometimes when walking he gets a sharp pain down his legs (R>L) and has to come down to his knees.  He has not fallen though.  Movement of any kind tends to aggravate the pain.  Laying down helps.  Today he rates the pain 1.5-2/10.  At its worst over the last week, the pain was 8-9/10.  His sleep is not affected.    Due to pain, he has been mostly off of work for the last 2 months.  He says he spent a lot of that time " sitting in bed.  Lately, he says he is trying to slowly walk more.    He denies falls, weakness, bowel or bladder incontinence, saddle anesthesia, or fevers/chills.  He reports weight loss (he was 175 pounds in March, then in June he was 138 pounds).  However, he changed his diet substantially due to trying to control his kidney stone issue.  He does endorse a history of night sweats and nightmares.    Of note, he does report an issue with dropping things at times.  He says that he had surgery on his right wrist.  He's was concerned that there was an issue with the nerve block that was done for that surgical procedure.  He has had intermittent numbness/tingling in the right upper extremity since that time.      PRIOR INJURIES/TREATMENT:   Ice/Heat: heat helps    Brace: none    Physical Therapy:   None     - Current Pain Medications -    Tylenol 2,000-3,000 mg per day  Ibuprofen 800 mg per day  Norco, rarely for more severe pain       - Prior/Trialed Pain Medications -   Oxycodone  Cyclobenzaprine - helped with sleep    Prior Procedures:  Date    Procedure   Improvement (%)  none              Prior Related Surgery: none     Other (acupuncture, OMT, CMM, TENS, DME, etc.):   Chiropractor - helps temporarily       Specialists Seen - (with most recent, available notes and clinic visits reviewed)   1. Urologist       IMAGING - reviewed   MR LUMBAR SPINE WITHOUT CONTRAST 9/6/2023   FINDINGS: Nomenclature is based on 5 lumbar type vertebral bodies.  Normal vertebral body heights. Normal alignment. Minimal T2  hyperintense Modic type 1 edematous degenerative endplate changes at  L5-S1. No pars defect. The conus tip is identified at T12-L1.     T12-L1: Normal disc signal. Slight loss of disc height. No herniation.  Unremarkable facets. No stenosis.         L1-L2: Mild loss of disc signal. Normal disc height. No herniation.  Unremarkable facets. No stenosis.     L2-L3: Minimal loss of disc signal. Normal disc height. No  herniation.  Minor facet arthropathy. No stenosis.     L3-L4: Normal disc height and signal. No herniation. Mild facet  arthropathy. No stenosis.     L4-L5: Mild to moderate loss of disc signal. Mild loss of disc height.  Central annular tear and small protrusion. Mild to moderate facet  arthropathy. Mild left and minor right lateral recess narrowing.  Adequate central canal. Minor foraminal narrowing.     L5-S1: Moderate loss of disc signal. Mild to moderate loss of disc  height. Mild circumferential disc bulge with small superimposed  central protrusion. This contacts the right greater than left S1 nerve  root sleeves. Otherwise adequate central canal. Minor facet  arthropathy. Mild foraminal narrowing.                                                                      IMPRESSION:  1.  At L4-L5 a small central herniation causes mild left and minor  right lateral recess narrowing and minor foraminal narrowing.  Correlate for any left L5 symptoms.  2.  At L5-S1 there is a mild disc bulge and small superimposed central  herniation that contacts the right greater than left nerve root  sleeves. Mild foraminal narrowing. Correlate for any right S1  symptoms.        Review Of Systems:  I am responding to those symptoms which are directly relevant to the specific indication for my consultation. I recommend that the patient follow up with their primary or referring provider to pursue any other symptoms which may be of concern.       Medical History:  He has a past medical history of Kidney stone.     He has a past surgical history that includes Wrist surgery (Right); cystoscopy,ureteroscopy,lithotripsy; and Combined Cystoscopy, Retrogrades, Ureteroscopy, Laser Holmium Lithotripsy Ureter(S), Insert Stent (Bilateral, 8/7/2023).    Family History  His family history includes Cancer in his maternal grandfather, mother, and paternal grandfather; Gout in his mother; Heart Disease in his maternal grandfather and mother; Kidney  "Disease in his paternal grandfather; Nephrolithiasis in his maternal aunt, maternal grandfather, and mother.     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 Has been mostly off work for last 2 months     Current living situation: lives with parents and daughter. Need help with lifting. Otherwise performs ADLs/IADLs independently.    He reports that he quit smoking 13 days ago. His smoking use included cigarettes. He smoked an average of 1 pack per day. He has never used smokeless tobacco. He reports that he does not currently use alcohol. He reports current drug use. Drug: Marijuana.        Current Medications:   He has a current medication list which includes the following prescription(s): hydrocodone-acetaminophen and tamsulosin.     Allergies:    -- Amoxicillin-Pot Clavulanate -- Anxiety    --  Has tolerated amoxicillin well in the past    PHYSICAL EXAMINATION:  BP (!) 146/88 (BP Location: Right arm, Patient Position: Sitting, Cuff Size: Adult Regular)   Pulse 62   Resp 16   Ht 1.79 m (5' 10.47\")   Wt 73 kg (161 lb)   SpO2 99%   BMI 22.79 kg/m      --CONSTITUTIONAL: Vital signs as above. No acute distress. The patient is well nourished and well groomed.  --PSYCHIATRIC: The patient is awake, alert, oriented to person, place, time and answering questions appropriately with clear speech. Appropriate mood and affect   --SKIN:  Posterior torso is clean, dry, intact without rashes.   --RESPIRATORY: Normal rhythm and effort. No abnormal accessory muscle breathing patterns noted.   --GROSS MOTOR: Easily arises from a seated position. Toe walking and heel walking are normal.    --STANDING EXAMINATION: Gait is non-antalgic.  Posterior pelvic tilt.  No lateral shift.  --LOWER EXTREMITY MOTOR TESTING:  Plantar flexion left 5/5, right 5/5   Dorsiflexion left 5/5, right 5/5   Great toe MTP extension left 5/5, right 5/5  Knee flexion left 4+/5, right 4+/5-pain limited?  Knee extension left 4+/5, right " 4+/5-pain limited?  Hip flexion left 4+/5, right 4+/5-pain limited?  --MUSCULOSKELETAL: Lumbar spine inspection reveals no evidence of deformity. Range of motion is not limited in lumbar flexion, extension, lateral rotation. No tenderness to palpation lumbar spine. Straight leg raise negative. Sciatic notch non-tender. Facet loading maneuvers are  egative.  Facet loading and lumbar extension provide pain relief.  Lumbar flexion elicits pain.  --SACROILIAC JOINT: No pain with palpation of the SI joint.  Natali negative.  Sherrell's positive bilaterally. Gaenslen's negative bilaterally. Sacroiliac Joint Compression Test negative bilaterally.   --HIPS: Slightly limited range of motion bilaterally.  He has pain with passive left hip flexion and passive right hip range of motion in general.  Sherrell's positive on both sides.  No pain with logrolling. No pain with palpation of the greater trochanter.   --NEUROLOGIC: 3/4 patellar and 2/4 achilles reflexes bilaterally.  Sensation to light touch is intact in the bilateral L4, L5, and S1 dermatomes except along the lateral right leg and affecting the whole right foot.  No clonus.    --VASCULAR:  Warm lower limbs bilaterally. There is no pitting edema of the bilateral lower extremities.       ASSESSMENT:  Ba Mireles is a pleasant 43 year old male who presents with right-sided low back pain with intermittent bilateral lower extremity pain and numbness since March.  He has bilateral weakness with knee flexion/ extension, and hip flexion -possibly pain limited.  He also has pain with range of motion in both hips.  He has a history of left hip surgery in the past.  He has a history of kidney stones and underwent combined cystoscopy, bilateral ureteroscopy, retrograde pyelogram, bilateral stone basket extraction, bilateral stent placement on 8/7/2023 with Dr. Shipman.    PLAN:  -MRI of the lumbar spine was reviewed in detail with the patient.  The imaging shows disc bulges at L4-5  and L5-S1 with lateral recess narrowing bilaterally at both levels as well.  -Could consider repeat imaging of the bilateral hips.  -Add referral to the acute spine PT program.  We discussed the importance of PT and home exercise program  -Add gabapentin to be increased slowly as tolerated according to the chart in the after visit summary  -He was not able to tolerate oral steroid in the past due to mood/anger   -A letter was provided for his workplace regarding activity restrictions, to be reassessed at the visit in 4 weeks.  -If the above are not effective, he would like to consider an epidural steroid injection  -He plans to follow-up with the surgeon who performed his right wrist surgery regarding the numbness/tingling/dropping things he has experienced in the right upper extremity since wrist surgery  - RTC after 4 weeks of PT and home exercise program    Ready to learn, no apparent learning barriers.  Education provided on treatment plan according to patient's preferred learning style.  Patient verbalizes understanding.   __________________________________      60 minutes spent by me on the date of the encounter doing chart review, history and exam, documentation and further activities per the note         Again, thank you for allowing me to participate in the care of your patient.      Sincerely,    JUSTINA Yu CNP

## 2023-09-14 NOTE — PATIENT INSTRUCTIONS
It was a pleasure seeing you in the clinic today.  As discussed, we made the following updates to your plan of care:       An order for physical therapy was added today. Physical therapy schedulers should call you in the next few days to schedule an appointment. You may also call [phone] to arrange an appointment at any of the [facility] outpatient physical therapy locations.  It will be very important for you to do the physical therapy exercises on a regular basis to decrease your pain and prevent future flares of pain.       Gabapentin (Neurontin) was prescribed that you can slowly increase as tolerated, according to the chart below:             AM        PM       BEDTIME    Day 0-5         -            -             300mg  Day 5-10      300       -              300mg  Day 10+       300       300         300mg    Continue to increase your dose as discussed above if you are not experiencing any side effects (in other words - if you experience side effects do not progress to the next week). If you are experiencing any side effects, return to the previous dose that was tolerable and continue until follow-up.     The most common side effect is sedation. Do not drive a motor vehicle until after you are familiar with how the medication may impact your attention and reaction.    Note that it may take several weeks to notice the benefits of this medication.   Do not abruptly stopped this medication prior to consulting with a physician.       Work letter was provided.       Let's follow up after 4 weeks of physical therapy and home exercise program to see how you are doing and talk about next steps.       Thank you,  Elle Mcgowan NP  Physical Medicine and Rehabilitation, Medical Spine  PM&R clinic Phone: [phone]  PM&R clinic Fax: [phone]

## 2023-09-14 NOTE — NURSING NOTE
Chief Complaint   Patient presents with    New Patient     Here for consult, confirmed with patient     Mandi Posadas

## 2023-09-21 ENCOUNTER — THERAPY VISIT (OUTPATIENT)
Dept: PHYSICAL THERAPY | Facility: REHABILITATION | Age: 43
End: 2023-09-21
Attending: NURSE PRACTITIONER
Payer: COMMERCIAL

## 2023-09-21 DIAGNOSIS — M54.41 CHRONIC RIGHT-SIDED LOW BACK PAIN WITH BILATERAL SCIATICA: Primary | ICD-10-CM

## 2023-09-21 DIAGNOSIS — G89.29 CHRONIC RIGHT-SIDED LOW BACK PAIN WITH BILATERAL SCIATICA: Primary | ICD-10-CM

## 2023-09-21 DIAGNOSIS — M25.551 HIP PAIN, RIGHT: ICD-10-CM

## 2023-09-21 DIAGNOSIS — R20.2 NUMBNESS AND TINGLING OF BOTH LOWER EXTREMITIES: ICD-10-CM

## 2023-09-21 DIAGNOSIS — M25.552 HIP PAIN, LEFT: ICD-10-CM

## 2023-09-21 DIAGNOSIS — R20.0 NUMBNESS AND TINGLING OF BOTH LOWER EXTREMITIES: ICD-10-CM

## 2023-09-21 DIAGNOSIS — M54.42 CHRONIC RIGHT-SIDED LOW BACK PAIN WITH BILATERAL SCIATICA: Primary | ICD-10-CM

## 2023-09-21 PROCEDURE — 97110 THERAPEUTIC EXERCISES: CPT | Mod: GP | Performed by: PHYSICAL THERAPIST

## 2023-09-21 PROCEDURE — 97161 PT EVAL LOW COMPLEX 20 MIN: CPT | Mod: GP | Performed by: PHYSICAL THERAPIST

## 2023-09-21 NOTE — PROGRESS NOTES
"PHYSICAL THERAPY EVALUATION  Type of Visit: Evaluation    See electronic medical record for Abuse and Falls Screening details.    Subjective       Presenting condition or subjective complaint: Pain with movement especially with bending forward. Pain is in lower back near the spine.  Pain started many years ago when he began having kidney stones. He did have these removed as well as stent removed.He does report falling on a skateboard and landed on his tailbone pretty hard in the Spring of 2023. 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 He is worse when he is moving more. He is going out of town for [employer] race this weekend and concerned about this. Pain Described as pressure around the hips compacted in the back, has had pain shoot down the right leg and leg can give out. Initially did have some numbness and tingling, is slightly less sensitive in the right side. No change in bowel/bladder control. Worse with bending over, lifting with the arms extended out, sitting for long periods of time, driving, walking can catch up with him, working/digging/shoveling, raking rock. Better with laying down and resting. Previous Treatment chiropractor about beginning of September.   Date of onset:      Relevant medical history:     Dates & types of surgery: Kidney stone removal August 7th 2023. Right wrist surgery October 15th 2022. Kidney stone removal October of 2016. Hip surgery sometime in May of 2014    Prior diagnostic imaging/testing results: CT scan; Other Ultrasound   Prior therapy history for the same diagnosis, illness or injury: No      From Referring provider note on 9/14/23  \"HISTORY OF PRESENT ILLNESS:  Ba Mireles is a 43 year old male who presents with a chief complaint of back pain.      He was seen today in the clinic.  He reports pain that started in March.  He was initially being seen at [facility].  He has a history of kidney stones since he was 20 years old.  In the " "past, he had yearly issue with kidney stones.  However over time he developed multiple episodes per year.  He was evaluated by 2 different urologists.  He had a procedure to remove kidney stones and 2 months ago he had stents removed.  However he continued to have persistent low back pain, and was referred to this clinic.     He says that his typical pain related to kidney stones is in his low back and hips.  The pain is worse on the right low back.  He has a constant pressure on his hips, that feels like being squeezed with a tight belt and also compressed vertically.  Intermittently, he has sharp shooting pain down both legs.  He cannot recall which part of his legs is affected when the pain happens.  He also has tingling in his legs and groin area \"randomly\".     Sometimes when walking he gets a sharp pain down his legs (R>L) and has to come down to his knees.  He has not fallen though.  Movement of any kind tends to aggravate the pain.  Laying down helps.  Today he rates the pain 1.5-2/10.  At its worst over the last week, the pain was 8-9/10.  His sleep is not affected.     Due to pain, he has been mostly off of work for the last 2 months.  He says he spent a lot of that time sitting in bed.  Lately, he says he is trying to slowly walk more.     He denies falls, weakness, bowel or bladder incontinence, saddle anesthesia, or fevers/chills.  He reports weight loss (he was 175 pounds in March, then in June he was 138 pounds).  However, he changed his diet substantially due to trying to control his kidney stone issue.  He does endorse a history of night sweats and nightmares.     Of note, he does report an issue with dropping things at times.  He says that he had surgery on his right wrist.  He's was concerned that there was an issue with the nerve block that was done for that surgical procedure.  He has had intermittent numbness/tingling in the right upper extremity since that time.    IMAGING - reviewed   MR " "LUMBAR SPINE WITHOUT CONTRAST 9/6/2023   FINDINGS: Nomenclature is based on 5 lumbar type vertebral bodies.  Normal vertebral body heights. Normal alignment. Minimal T2  hyperintense Modic type 1 edematous degenerative endplate changes at  L5-S1. No pars defect. The conus tip is identified at T12-L1.     T12-L1: Normal disc signal. Slight loss of disc height. No herniation.  Unremarkable facets. No stenosis.         L1-L2: Mild loss of disc signal. Normal disc height. No herniation.  Unremarkable facets. No stenosis.     L2-L3: Minimal loss of disc signal. Normal disc height. No herniation.  Minor facet arthropathy. No stenosis.     L3-L4: Normal disc height and signal. No herniation. Mild facet  arthropathy. No stenosis.     L4-L5: Mild to moderate loss of disc signal. Mild loss of disc height.  Central annular tear and small protrusion. Mild to moderate facet  arthropathy. Mild left and minor right lateral recess narrowing.  Adequate central canal. Minor foraminal narrowing.     L5-S1: Moderate loss of disc signal. Mild to moderate loss of disc  height. Mild circumferential disc bulge with small superimposed  central protrusion. This contacts the right greater than left S1 nerve  root sleeves. Otherwise adequate central canal. Minor facet  arthropathy. Mild foraminal narrowing.                                                                      IMPRESSION:  1.  At L4-L5 a small central herniation causes mild left and minor  right lateral recess narrowing and minor foraminal narrowing.  Correlate for any left L5 symptoms.  2.  At L5-S1 there is a mild disc bulge and small superimposed central  herniation that contacts the right greater than left nerve root  sleeves. Mild foraminal narrowing. Correlate for any right S1  symptoms.\"    Prior Level of Function  Transfers:   Ambulation:   ADL:   IADL:     Living Environment  Social support: With family members   Type of home: House; 2-story   Stairs to enter the home: " Yes 1 Is there a railing: No   Ramp: No   Stairs inside the home: Yes 12 Is there a railing: Yes   Help at home: None; Self Cares (home health aide/personal care attendant, family, etc)  Equipment owned:       Employment: Yes [employer]  Hobbies/Interests: [employer],    Patient goals for therapy: Go back to work.  Be able to do everything that i have had in the past.    Pain assessment: Pain present     Objective   Precautions/Restrictions: h/o kidney stones  Involved side: R>L  Posture Observation:      Patient flexed forward    Lumbar ROM:    Date: 9/21/2023     *Indicate scale AROM AROM AROM   Lumbar Flexion 14 cm      Lumbar Extension Feels better      Right Left Right Left Right Left   Lumbar Sidebending WNL WNL       Lumbar Rotation         Thoracic Rotation           Lower Extremity Strength:     Date: 9/21/2023     LE strength/5 Right Left Right Left Right Left   Hip Flexion (L1-3) 5 5       Hip Extension (L5-S1) 4 4       Hip Abduction (L4-5) 4 4       Hip Adduction (L2-3)         Hip External Rotation         Hip Internal Rotation         Knee Extension (L3-4) 5 5       Knee Flexion         Ankle Dorsiflexion (L4-5) 5 5       Great Toe Extension (L5) 5 5       Ankle Plantar flexion (S1) 5 5       Abdominals        Sensation           Reflex Testing  Lumbar Dermatomes Right Left UE Reflexes Right Left   Iliac Crest and Groin (L1)   Biceps (C5-6)     Anterior Medial Thigh (L2)   Brachioradialis (C5-6)     Anterior Thigh, Medial Epicondyle Femur (L3) dec  Triceps (C7-8)     Lateral Thigh, Anterior Knee, Medial Leg/Malleolus (L4) dec  Padmini's test     Lateral Leg, Dorsal Foot (L5) dec  LE Reflexes     Lateral Foot (S1) dec  Patellar (L3-4)     Posterior Leg (S2)   Achilles (S1-2)     Other:   Babinski Response       Palpation: tenderness to lower lumbar paraspinals bilaterally    Lumbar Special Tests:     Lumbar Special Tests Right Left SI Tests Right  Left   Quadrant test   SI Compression      Straight leg raise 50 + 75 SI Distraction     Crossover response   POSH/Thigh Thrust Test - -   Slump + - Sacral Thrust     Sit-up test  Gaenslen's - -   Trunk extensor endurance test  FADIR - -   Prone instability test  SANDRITA - -   Pubic shotgun  Natali's - -     Repeated Motion Testing:  Repeated extensions - reduction in pain, improved lumbar flexion to 8 cm, SLR improved to 65 degrees    Passive Mobility - Joint Integrity:  Painfree, WNL    LE Screen:  Pain with ely's test    Assessment & Plan   CLINICAL IMPRESSIONS  Medical Diagnosis: Chronic right-sided low back pain with bilateral sciatica  Hip pain, left  Hip pain, right  Numbness and tingling of both lower extremities    Treatment Diagnosis: low back pain, + LE neural tension, lumbar derangement with directional preference   Impression/Assessment: Patient is a 43 year old male with low back pain and RLE pain complaints.  The following significant findings have been identified: Pain, Decreased ROM/flexibility, Decreased joint mobility, and Decreased strength. These impairments interfere with their ability to perform self care tasks, work tasks, recreational activities, household chores, driving , household mobility, and community mobility as compared to previous level of function. Pain is consistent with lumbar derangement with directional preference into extension.    Clinical Decision Making (Complexity):  Clinical Presentation: Stable/Uncomplicated  Clinical Presentation Rationale: based on medical and personal factors listed in PT evaluation  Clinical Decision Making (Complexity): Low complexity    PLAN OF CARE  Treatment Interventions:  Interventions: Manual Therapy, Neuromuscular Re-education, Therapeutic Activity, Therapeutic Exercise, Self-Care/Home Management    Long Term Goals            Frequency of Treatment:    Duration of Treatment:      Recommended Referrals to Other Professionals:   Education Assessment:        Risks and benefits of  evaluation/treatment have been explained.   Patient/Family/caregiver agrees with Plan of Care.     Evaluation Time:             Signing Clinician: Matthew Malloy PT

## 2023-09-28 ENCOUNTER — THERAPY VISIT (OUTPATIENT)
Dept: PHYSICAL THERAPY | Facility: REHABILITATION | Age: 43
End: 2023-09-28
Attending: NURSE PRACTITIONER
Payer: COMMERCIAL

## 2023-09-28 DIAGNOSIS — M54.41 CHRONIC RIGHT-SIDED LOW BACK PAIN WITH BILATERAL SCIATICA: Primary | ICD-10-CM

## 2023-09-28 DIAGNOSIS — M25.551 HIP PAIN, RIGHT: ICD-10-CM

## 2023-09-28 DIAGNOSIS — M25.552 HIP PAIN, LEFT: ICD-10-CM

## 2023-09-28 DIAGNOSIS — M54.42 CHRONIC RIGHT-SIDED LOW BACK PAIN WITH BILATERAL SCIATICA: Primary | ICD-10-CM

## 2023-09-28 DIAGNOSIS — G89.29 CHRONIC RIGHT-SIDED LOW BACK PAIN WITH BILATERAL SCIATICA: Primary | ICD-10-CM

## 2023-09-28 DIAGNOSIS — R20.0 NUMBNESS AND TINGLING OF BOTH LOWER EXTREMITIES: ICD-10-CM

## 2023-09-28 DIAGNOSIS — R20.2 NUMBNESS AND TINGLING OF BOTH LOWER EXTREMITIES: ICD-10-CM

## 2023-09-28 PROCEDURE — 97110 THERAPEUTIC EXERCISES: CPT | Mod: GP

## 2023-10-05 ENCOUNTER — THERAPY VISIT (OUTPATIENT)
Dept: PHYSICAL THERAPY | Facility: REHABILITATION | Age: 43
End: 2023-10-05
Attending: NURSE PRACTITIONER

## 2023-10-05 DIAGNOSIS — G89.29 CHRONIC RIGHT-SIDED LOW BACK PAIN WITH BILATERAL SCIATICA: Primary | ICD-10-CM

## 2023-10-05 DIAGNOSIS — R20.2 NUMBNESS AND TINGLING OF BOTH LOWER EXTREMITIES: ICD-10-CM

## 2023-10-05 DIAGNOSIS — R20.0 NUMBNESS AND TINGLING OF BOTH LOWER EXTREMITIES: ICD-10-CM

## 2023-10-05 DIAGNOSIS — M25.551 HIP PAIN, RIGHT: ICD-10-CM

## 2023-10-05 DIAGNOSIS — M54.41 CHRONIC RIGHT-SIDED LOW BACK PAIN WITH BILATERAL SCIATICA: Primary | ICD-10-CM

## 2023-10-05 DIAGNOSIS — M25.552 HIP PAIN, LEFT: ICD-10-CM

## 2023-10-05 DIAGNOSIS — M54.42 CHRONIC RIGHT-SIDED LOW BACK PAIN WITH BILATERAL SCIATICA: Primary | ICD-10-CM

## 2023-10-05 PROCEDURE — 97110 THERAPEUTIC EXERCISES: CPT | Mod: GP | Performed by: PHYSICAL THERAPIST

## 2023-10-12 ENCOUNTER — THERAPY VISIT (OUTPATIENT)
Dept: PHYSICAL THERAPY | Facility: REHABILITATION | Age: 43
End: 2023-10-12
Attending: NURSE PRACTITIONER

## 2023-10-12 DIAGNOSIS — M25.552 HIP PAIN, LEFT: ICD-10-CM

## 2023-10-12 DIAGNOSIS — M25.551 HIP PAIN, RIGHT: ICD-10-CM

## 2023-10-12 DIAGNOSIS — G89.29 CHRONIC RIGHT-SIDED LOW BACK PAIN WITH BILATERAL SCIATICA: Primary | ICD-10-CM

## 2023-10-12 DIAGNOSIS — R20.2 NUMBNESS AND TINGLING OF BOTH LOWER EXTREMITIES: ICD-10-CM

## 2023-10-12 DIAGNOSIS — M54.42 CHRONIC RIGHT-SIDED LOW BACK PAIN WITH BILATERAL SCIATICA: Primary | ICD-10-CM

## 2023-10-12 DIAGNOSIS — R20.0 NUMBNESS AND TINGLING OF BOTH LOWER EXTREMITIES: ICD-10-CM

## 2023-10-12 DIAGNOSIS — M54.41 CHRONIC RIGHT-SIDED LOW BACK PAIN WITH BILATERAL SCIATICA: Primary | ICD-10-CM

## 2023-10-12 PROCEDURE — 97110 THERAPEUTIC EXERCISES: CPT | Mod: GP | Performed by: PHYSICAL THERAPIST

## 2023-10-12 PROCEDURE — 97140 MANUAL THERAPY 1/> REGIONS: CPT | Mod: GP | Performed by: PHYSICAL THERAPIST

## 2023-10-16 ENCOUNTER — VIRTUAL VISIT (OUTPATIENT)
Dept: PHYSICAL MEDICINE AND REHAB | Facility: CLINIC | Age: 43
End: 2023-10-16

## 2023-10-16 DIAGNOSIS — R20.2 NUMBNESS AND TINGLING OF BOTH LOWER EXTREMITIES: ICD-10-CM

## 2023-10-16 DIAGNOSIS — R20.0 NUMBNESS AND TINGLING OF BOTH LOWER EXTREMITIES: ICD-10-CM

## 2023-10-16 DIAGNOSIS — M25.552 HIP PAIN, LEFT: ICD-10-CM

## 2023-10-16 DIAGNOSIS — M25.551 HIP PAIN, RIGHT: ICD-10-CM

## 2023-10-16 DIAGNOSIS — M54.42 CHRONIC RIGHT-SIDED LOW BACK PAIN WITH BILATERAL SCIATICA: Primary | ICD-10-CM

## 2023-10-16 DIAGNOSIS — M54.41 CHRONIC RIGHT-SIDED LOW BACK PAIN WITH BILATERAL SCIATICA: Primary | ICD-10-CM

## 2023-10-16 DIAGNOSIS — G89.29 CHRONIC RIGHT-SIDED LOW BACK PAIN WITH BILATERAL SCIATICA: Primary | ICD-10-CM

## 2023-10-16 PROCEDURE — 99213 OFFICE O/P EST LOW 20 MIN: CPT | Mod: VID | Performed by: NURSE PRACTITIONER

## 2023-10-16 ASSESSMENT — PAIN SCALES - GENERAL: PAINLEVEL: MILD PAIN (2)

## 2023-10-16 NOTE — NURSING NOTE
Is the patient currently in the state of [place]? YES    Visit mode:VIDEO    If the visit is dropped, the patient can be reconnected by: VIDEO VISIT: Text to cell phone:   Telephone Information:   Mobile [phone]       Will anyone else be joining the visit? NO  (If patient encounters technical issues they should call [phone] :[id number])    How would you like to obtain your AVS? MyChart    Are changes needed to the allergy or medication list? Yes pt states he is no longer taking any of the medication listed    Reason for visit: RECHECK (4 weeks recheck )        Pt has pain in low back/ mid.  Pt states pain level 2- its the best he has felt in a long time.     Nya GOODWINF

## 2023-10-16 NOTE — LETTER
"10/16/2023       RE: Ba Mireles  [address] [facility] [address]     Dear Colleague,    Thank you for referring your patient, Ba Mireles, to the [facility] PHYSICAL MEDICINE AND REHABILITATION CLINIC [place] [facility]. Please see a copy of my visit note below.    Ba is a 43 year old who is being evaluated via a billable video visit.        PM&R Follow-Up Visit -     Date of Initial Visit: 9/14/2023  LOV: 9/14/2023  TD: 10/16/2023     Recall: Ba Mireles is a 43 year old male who presents with a chief complaint of back pain.      INTERVAL HISTORY:  Patient was last seen in clinic 9/14/2023. At that visit, the plan was to start acute spine PT, start gabapentin, and consider MARCELO in the future.    He was seen today via telehealth.  He states that PT has really been helping a lot.  He has been participating with a home exercise program.  He was also working on some home stretches which he found helpful.  He says today he feels \"better than he has in the last 2 years\".  Today, he estimates that he feels \"75% better\".  Since last time, he started gabapentin and slowly had increased dosing up to 3 times a day.  He did not find the medication helpful and found it difficult to keep up with the dosing intervals, and so last week he decided to taper the medication back down on his own.  Now he says he is completely off of the gabapentin and has not noticed much difference.  He continues to have some pain with transitioning -in particular from supine to sitting or sitting to standing.  When he has shooting pain into his legs, this makes him possibly feel weak, but denies overt weakness.  He has not yet returned to work.  He says there is not an option for light duty at work so he was not comfortable returning it.  He says he does not need a letter for work.    He denies falls, weakness, bowel or bladder incontinence, saddle anesthesia. " "        RECALL HISTORY OF PRESENT ILLNESS:  Ba Mireles is a 43 year old male who presents with a chief complaint of back pain.     He was seen today in the clinic.  He reports pain that started in March.  He was initially being seen at [facility].  He has a history of kidney stones since he was 20 years old.  In the past, he had yearly issue with kidney stones.  However over time he developed multiple episodes per year.  He was evaluated by 2 different urologists.  He had a procedure to remove kidney stones and 2 months ago he had stents removed.  However he continued to have persistent low back pain, and was referred to this clinic.    He says that his typical pain related to kidney stones is in his low back and hips.  The pain is worse on the right low back.  He has a constant pressure on his hips, that feels like being squeezed with a tight belt and also compressed vertically.  Intermittently, he has sharp shooting pain down both legs.  He cannot recall which part of his legs is affected when the pain happens.  He also has tingling in his legs and groin area \"randomly\".    Sometimes when walking he gets a sharp pain down his legs (R>L) and has to come down to his knees.  He has not fallen though.  Movement of any kind tends to aggravate the pain.  Laying down helps.  Today he rates the pain 1.5-2/10.  At its worst over the last week, the pain was 8-9/10.  His sleep is not affected.    Due to pain, he has been mostly off of work for the last 2 months.  He says he spent a lot of that time sitting in bed.  Lately, he says he is trying to slowly walk more.    He denies falls, weakness, bowel or bladder incontinence, saddle anesthesia, or fevers/chills.  He reports weight loss (he was 175 pounds in March, then in June he was 138 pounds).  However, he changed his diet substantially due to trying to control his kidney stone issue.  He does endorse a history of night sweats and nightmares.    Of note, he does report an " issue with dropping things at times.  He says that he had surgery on his right wrist.  He's was concerned that there was an issue with the nerve block that was done for that surgical procedure.  He has had intermittent numbness/tingling in the right upper extremity since that time.      PRIOR INJURIES/TREATMENT:   Ice/Heat: heat helps    Brace: none    Physical Therapy:   None     - Current Pain Medications -    Tylenol 2,000-3,000 mg per day  Ibuprofen 800 mg per day  Norco, rarely for more severe pain       - Prior/Trialed Pain Medications -   Gabapentin 300 mg TID - did not improve his pain  Oxycodone  Cyclobenzaprine - helped with sleep    Prior Procedures:  Date    Procedure   Improvement (%)  none              Prior Related Surgery: none     Other (acupuncture, OMT, CMM, TENS, DME, etc.):   Chiropractor - helps temporarily       Specialists Seen - (with most recent, available notes and clinic visits reviewed)   1. Urologist       IMAGING - reviewed   MR LUMBAR SPINE WITHOUT CONTRAST 9/6/2023   FINDINGS: Nomenclature is based on 5 lumbar type vertebral bodies.  Normal vertebral body heights. Normal alignment. Minimal T2  hyperintense Modic type 1 edematous degenerative endplate changes at  L5-S1. No pars defect. The conus tip is identified at T12-L1.     T12-L1: Normal disc signal. Slight loss of disc height. No herniation.  Unremarkable facets. No stenosis.         L1-L2: Mild loss of disc signal. Normal disc height. No herniation.  Unremarkable facets. No stenosis.     L2-L3: Minimal loss of disc signal. Normal disc height. No herniation.  Minor facet arthropathy. No stenosis.     L3-L4: Normal disc height and signal. No herniation. Mild facet  arthropathy. No stenosis.     L4-L5: Mild to moderate loss of disc signal. Mild loss of disc height.  Central annular tear and small protrusion. Mild to moderate facet  arthropathy. Mild left and minor right lateral recess narrowing.  Adequate central canal. Minor  foraminal narrowing.     L5-S1: Moderate loss of disc signal. Mild to moderate loss of disc  height. Mild circumferential disc bulge with small superimposed  central protrusion. This contacts the right greater than left S1 nerve  root sleeves. Otherwise adequate central canal. Minor facet  arthropathy. Mild foraminal narrowing.                                                                      IMPRESSION:  1.  At L4-L5 a small central herniation causes mild left and minor  right lateral recess narrowing and minor foraminal narrowing.  Correlate for any left L5 symptoms.  2.  At L5-S1 there is a mild disc bulge and small superimposed central  herniation that contacts the right greater than left nerve root  sleeves. Mild foraminal narrowing. Correlate for any right S1  symptoms.        Review Of Systems:  I am responding to those symptoms which are directly relevant to the specific indication for my consultation. I recommend that the patient follow up with their primary or referring provider to pursue any other symptoms which may be of concern.       Medical History:  He has a past medical history of Kidney stone.     He has a past surgical history that includes Wrist surgery (Right); cystoscopy,ureteroscopy,lithotripsy; and Combined Cystoscopy, Retrogrades, Ureteroscopy, Laser Holmium Lithotripsy Ureter(S), Insert Stent (Bilateral, 8/7/2023).    Family History  His family history includes Cancer in his maternal grandfather, mother, and paternal grandfather; Gout in his mother; Heart Disease in his maternal grandfather and mother; Kidney Disease in his paternal grandfather; Nephrolithiasis in his maternal aunt, maternal grandfather, and mother.     Social History:  Work: Works in [employer]. Has been mostly off work for last 2 months     Current living situation: lives with parents and daughter. Need help with lifting. Otherwise performs ADLs/IADLs independently.    He reports that he quit smoking 13  days ago. His smoking use included cigarettes. He smoked an average of 1 pack per day. He has never used smokeless tobacco. He reports that he does not currently use alcohol. He reports current drug use. Drug: Marijuana.        Current Medications:   He has a current medication list which includes the following prescription(s): hydrocodone-acetaminophen and tamsulosin.     Allergies:    -- Amoxicillin-Pot Clavulanate -- Anxiety    --  Has tolerated amoxicillin well in the past    PHYSICAL EXAMINATION: *limited by nature of virtual visit   No vital signs taken for visit  --CONSTITUTIONAL: No acute distress.   --PSYCHIATRIC: The patient is awake, alert, oriented to person, place, time and answering questions appropriately with clear speech. Appropriate mood and affect         ASSESSMENT:  Ba Mireles is a pleasant 43 year old male who presents with right-sided low back pain with intermittent bilateral lower extremity pain and numbness since March.  On prior exam, he had bilateral weakness with knee flexion/ extension, and hip flexion -possibly pain limited.  He also had pain with range of motion in both hips.  He has a history of left hip surgery in the past.  He has a history of kidney stones and underwent combined cystoscopy, bilateral ureteroscopy, retrograde pyelogram, bilateral stone basket extraction, bilateral stent placement on 8/7/2023 with Dr. Shipman.    MRI of the lumbar spine shows disc bulges at L4-5 and L5-S1 with lateral recess narrowing bilaterally at both levels as well.    PLAN:  -He reports 75% improvement in the level of his pain since last time.  -Continue PT and home exercise program.  -He did not find gabapentin helpful and discontinued it on his own last week.  He had been prescribed Norco and Flexeril by another provider in the past.  No medication changes made today.  -He was not able to tolerate oral steroid in the past due to mood/anger   -MRI of the lumbar spine disc bulges at L4-5  and L5-S1 with lateral recess narrowing bilaterally at both levels as well. Could consider MARCELO in future for recurrent pain  -He declines need for a letter for his workplace  -RTC in 1-2 months    Ready to learn, no apparent learning barriers.  Education provided on treatment plan according to patient's preferred learning style.  Patient verbalizes understanding.     25 minutes spent by me on the date of the encounter doing chart review, history and exam, documentation and further activities per the note         Again, thank you for allowing me to participate in the care of your patient.      Sincerely,    JUSTINA Yu CNP

## 2023-10-16 NOTE — PROGRESS NOTES
"Ba is a 43 year old who is being evaluated via a billable video visit.        Video-Visit Details    Type of service:  Video Visit     Originating Location (pt. Location): Home  Distant Location (provider location):  on site  Platform used for Video Visit: Rosy    Joined the call at 10/16/2023, 11:04:33 am.  Left the call at 10/16/2023, 11:20:06 am.  You were on the call for 15 minutes 33 seconds .        PM&R Follow-Up Visit -     Date of Initial Visit: 9/14/2023  LOV: 9/14/2023  TD: 10/16/2023     Recall: Ba Mireles is a 43 year old male who presents with a chief complaint of back pain.      INTERVAL HISTORY:  Patient was last seen in clinic 9/14/2023. At that visit, the plan was to start acute spine PT, start gabapentin, and consider MARCELO in the future.    He was seen today via telehealth.  He states that PT has really been helping a lot.  He has been participating with a home exercise program.  He was also working on some home stretches which he found helpful.  He says today he feels \"better than he has in the last 2 years\".  Today, he estimates that he feels \"75% better\".  Since last time, he started gabapentin and slowly had increased dosing up to 3 times a day.  He did not find the medication helpful and found it difficult to keep up with the dosing intervals, and so last week he decided to taper the medication back down on his own.  Now he says he is completely off of the gabapentin and has not noticed much difference.  He continues to have some pain with transitioning -in particular from supine to sitting or sitting to standing.  When he has shooting pain into his legs, this makes him possibly feel weak, but denies overt weakness.  He has not yet returned to work.  He says there is not an option for light duty at work so he was not comfortable returning it.  He says he does not need a letter for work.    He denies falls, weakness, bowel or bladder incontinence, saddle anesthesia.         RECALL " "HISTORY OF PRESENT ILLNESS:  Ba Mireles is a 43 year old male who presents with a chief complaint of back pain.     He was seen today in the clinic.  He reports pain that started in March.  He was initially being seen at [facility].  He has a history of kidney stones since he was 20 years old.  In the past, he had yearly issue with kidney stones.  However over time he developed multiple episodes per year.  He was evaluated by 2 different urologists.  He had a procedure to remove kidney stones and 2 months ago he had stents removed.  However he continued to have persistent low back pain, and was referred to this clinic.    He says that his typical pain related to kidney stones is in his low back and hips.  The pain is worse on the right low back.  He has a constant pressure on his hips, that feels like being squeezed with a tight belt and also compressed vertically.  Intermittently, he has sharp shooting pain down both legs.  He cannot recall which part of his legs is affected when the pain happens.  He also has tingling in his legs and groin area \"randomly\".    Sometimes when walking he gets a sharp pain down his legs (R>L) and has to come down to his knees.  He has not fallen though.  Movement of any kind tends to aggravate the pain.  Laying down helps.  Today he rates the pain 1.5-2/10.  At its worst over the last week, the pain was 8-9/10.  His sleep is not affected.    Due to pain, he has been mostly off of work for the last 2 months.  He says he spent a lot of that time sitting in bed.  Lately, he says he is trying to slowly walk more.    He denies falls, weakness, bowel or bladder incontinence, saddle anesthesia, or fevers/chills.  He reports weight loss (he was 175 pounds in March, then in June he was 138 pounds).  However, he changed his diet substantially due to trying to control his kidney stone issue.  He does endorse a history of night sweats and nightmares.    Of note, he does report an issue with " dropping things at times.  He says that he had surgery on his right wrist.  He's was concerned that there was an issue with the nerve block that was done for that surgical procedure.  He has had intermittent numbness/tingling in the right upper extremity since that time.      PRIOR INJURIES/TREATMENT:   Ice/Heat: heat helps    Brace: none    Physical Therapy:   None     - Current Pain Medications -    Tylenol 2,000-3,000 mg per day  Ibuprofen 800 mg per day  Norco, rarely for more severe pain       - Prior/Trialed Pain Medications -   Gabapentin 300 mg TID - did not improve his pain  Oxycodone  Cyclobenzaprine - helped with sleep    Prior Procedures:  Date    Procedure   Improvement (%)  none              Prior Related Surgery: none     Other (acupuncture, OMT, CMM, TENS, DME, etc.):   Chiropractor - helps temporarily       Specialists Seen - (with most recent, available notes and clinic visits reviewed)   1. Urologist       IMAGING - reviewed   MR LUMBAR SPINE WITHOUT CONTRAST 9/6/2023   FINDINGS: Nomenclature is based on 5 lumbar type vertebral bodies.  Normal vertebral body heights. Normal alignment. Minimal T2  hyperintense Modic type 1 edematous degenerative endplate changes at  L5-S1. No pars defect. The conus tip is identified at T12-L1.     T12-L1: Normal disc signal. Slight loss of disc height. No herniation.  Unremarkable facets. No stenosis.         L1-L2: Mild loss of disc signal. Normal disc height. No herniation.  Unremarkable facets. No stenosis.     L2-L3: Minimal loss of disc signal. Normal disc height. No herniation.  Minor facet arthropathy. No stenosis.     L3-L4: Normal disc height and signal. No herniation. Mild facet  arthropathy. No stenosis.     L4-L5: Mild to moderate loss of disc signal. Mild loss of disc height.  Central annular tear and small protrusion. Mild to moderate facet  arthropathy. Mild left and minor right lateral recess narrowing.  Adequate central canal. Minor foraminal  narrowing.     L5-S1: Moderate loss of disc signal. Mild to moderate loss of disc  height. Mild circumferential disc bulge with small superimposed  central protrusion. This contacts the right greater than left S1 nerve  root sleeves. Otherwise adequate central canal. Minor facet  arthropathy. Mild foraminal narrowing.                                                                      IMPRESSION:  1.  At L4-L5 a small central herniation causes mild left and minor  right lateral recess narrowing and minor foraminal narrowing.  Correlate for any left L5 symptoms.  2.  At L5-S1 there is a mild disc bulge and small superimposed central  herniation that contacts the right greater than left nerve root  sleeves. Mild foraminal narrowing. Correlate for any right S1  symptoms.        Review Of Systems:  I am responding to those symptoms which are directly relevant to the specific indication for my consultation. I recommend that the patient follow up with their primary or referring provider to pursue any other symptoms which may be of concern.       Medical History:  He has a past medical history of Kidney stone.     He has a past surgical history that includes Wrist surgery (Right); cystoscopy,ureteroscopy,lithotripsy; and Combined Cystoscopy, Retrogrades, Ureteroscopy, Laser Holmium Lithotripsy Ureter(S), Insert Stent (Bilateral, 8/7/2023).    Family History  His family history includes Cancer in his maternal grandfather, mother, and paternal grandfather; Gout in his mother; Heart Disease in his maternal grandfather and mother; Kidney Disease in his paternal grandfather; Nephrolithiasis in his maternal aunt, maternal grandfather, and mother.     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 Need help with lifting. Otherwise performs ADLs/IADLs independently.    He reports that he quit smoking 13 days ago.  His smoking use included cigarettes. He smoked an average of 1 pack per day. He has never used smokeless tobacco. He reports that he does not currently use alcohol. He reports current drug use. Drug: Marijuana.        Current Medications:   He has a current medication list which includes the following prescription(s): hydrocodone-acetaminophen and tamsulosin.     Allergies:    -- Amoxicillin-Pot Clavulanate -- Anxiety    --  Has tolerated amoxicillin well in the past    PHYSICAL EXAMINATION: *limited by nature of virtual visit   No vital signs taken for visit  --CONSTITUTIONAL: No acute distress.   --PSYCHIATRIC: The patient is awake, alert, oriented to person, place, time and answering questions appropriately with clear speech. Appropriate mood and affect         ASSESSMENT:  Ba Mireles is a pleasant 43 year old male who presents with right-sided low back pain with intermittent bilateral lower extremity pain and numbness since March.  On prior exam, he had bilateral weakness with knee flexion/ extension, and hip flexion -possibly pain limited.  He also had pain with range of motion in both hips.  He has a history of left hip surgery in the past.  He has a history of kidney stones and underwent combined cystoscopy, bilateral ureteroscopy, retrograde pyelogram, bilateral stone basket extraction, bilateral stent placement on 8/7/2023 with Dr. Shipman.    MRI of the lumbar spine shows disc bulges at L4-5 and L5-S1 with lateral recess narrowing bilaterally at both levels as well.    PLAN:  -He reports 75% improvement in the level of his pain since last time.  -Continue PT and home exercise program.  -He did not find gabapentin helpful and discontinued it on his own last week.  He had been prescribed Norco and Flexeril by another provider in the past.  No medication changes made today.  -He was not able to tolerate oral steroid in the past due to mood/anger   -MRI of the lumbar spine disc bulges at L4-5 and L5-S1  with lateral recess narrowing bilaterally at both levels as well. Could consider MARCELO in future for recurrent pain  -He declines need for a letter for his workplace  -RTC in 1-2 months    Ready to learn, no apparent learning barriers.  Education provided on treatment plan according to patient's preferred learning style.  Patient verbalizes understanding.   __________________________________  Elle Mcgowan NP  Physical Medicine & Rehabilitation        25 minutes spent by me on the date of the encounter doing chart review, history and exam, documentation and further activities per the note

## 2023-10-20 ENCOUNTER — THERAPY VISIT (OUTPATIENT)
Dept: PHYSICAL THERAPY | Facility: CLINIC | Age: 43
End: 2023-10-20

## 2023-10-20 DIAGNOSIS — M54.42 CHRONIC RIGHT-SIDED LOW BACK PAIN WITH BILATERAL SCIATICA: Primary | ICD-10-CM

## 2023-10-20 DIAGNOSIS — R20.0 NUMBNESS AND TINGLING OF BOTH LOWER EXTREMITIES: ICD-10-CM

## 2023-10-20 DIAGNOSIS — M25.552 HIP PAIN, LEFT: ICD-10-CM

## 2023-10-20 DIAGNOSIS — G89.29 CHRONIC RIGHT-SIDED LOW BACK PAIN WITH BILATERAL SCIATICA: Primary | ICD-10-CM

## 2023-10-20 DIAGNOSIS — M25.551 HIP PAIN, RIGHT: ICD-10-CM

## 2023-10-20 DIAGNOSIS — R20.2 NUMBNESS AND TINGLING OF BOTH LOWER EXTREMITIES: ICD-10-CM

## 2023-10-20 DIAGNOSIS — M54.41 CHRONIC RIGHT-SIDED LOW BACK PAIN WITH BILATERAL SCIATICA: Primary | ICD-10-CM

## 2023-10-20 PROCEDURE — 97110 THERAPEUTIC EXERCISES: CPT | Mod: GP | Performed by: PHYSICAL THERAPIST

## 2023-10-27 ENCOUNTER — THERAPY VISIT (OUTPATIENT)
Dept: PHYSICAL THERAPY | Facility: CLINIC | Age: 43
End: 2023-10-27

## 2023-10-27 DIAGNOSIS — M25.552 HIP PAIN, LEFT: ICD-10-CM

## 2023-10-27 DIAGNOSIS — M54.42 CHRONIC RIGHT-SIDED LOW BACK PAIN WITH BILATERAL SCIATICA: Primary | ICD-10-CM

## 2023-10-27 DIAGNOSIS — R20.2 NUMBNESS AND TINGLING OF BOTH LOWER EXTREMITIES: ICD-10-CM

## 2023-10-27 DIAGNOSIS — M25.551 HIP PAIN, RIGHT: ICD-10-CM

## 2023-10-27 DIAGNOSIS — M54.41 CHRONIC RIGHT-SIDED LOW BACK PAIN WITH BILATERAL SCIATICA: Primary | ICD-10-CM

## 2023-10-27 DIAGNOSIS — R20.0 NUMBNESS AND TINGLING OF BOTH LOWER EXTREMITIES: ICD-10-CM

## 2023-10-27 DIAGNOSIS — G89.29 CHRONIC RIGHT-SIDED LOW BACK PAIN WITH BILATERAL SCIATICA: Primary | ICD-10-CM

## 2023-10-27 PROCEDURE — 97110 THERAPEUTIC EXERCISES: CPT | Mod: GP | Performed by: PHYSICAL THERAPIST

## 2023-11-02 ENCOUNTER — THERAPY VISIT (OUTPATIENT)
Dept: PHYSICAL THERAPY | Facility: REHABILITATION | Age: 43
End: 2023-11-02

## 2023-11-02 DIAGNOSIS — R20.2 NUMBNESS AND TINGLING OF BOTH LOWER EXTREMITIES: ICD-10-CM

## 2023-11-02 DIAGNOSIS — M54.41 CHRONIC RIGHT-SIDED LOW BACK PAIN WITH BILATERAL SCIATICA: Primary | ICD-10-CM

## 2023-11-02 DIAGNOSIS — R20.0 NUMBNESS AND TINGLING OF BOTH LOWER EXTREMITIES: ICD-10-CM

## 2023-11-02 DIAGNOSIS — G89.29 CHRONIC RIGHT-SIDED LOW BACK PAIN WITH BILATERAL SCIATICA: Primary | ICD-10-CM

## 2023-11-02 DIAGNOSIS — M25.552 HIP PAIN, LEFT: ICD-10-CM

## 2023-11-02 DIAGNOSIS — M25.551 HIP PAIN, RIGHT: ICD-10-CM

## 2023-11-02 DIAGNOSIS — M54.42 CHRONIC RIGHT-SIDED LOW BACK PAIN WITH BILATERAL SCIATICA: Primary | ICD-10-CM

## 2023-11-02 PROCEDURE — 97110 THERAPEUTIC EXERCISES: CPT | Mod: GP | Performed by: PHYSICAL THERAPIST

## 2023-11-09 ENCOUNTER — THERAPY VISIT (OUTPATIENT)
Dept: PHYSICAL THERAPY | Facility: REHABILITATION | Age: 43
End: 2023-11-09

## 2023-11-09 DIAGNOSIS — M25.552 HIP PAIN, LEFT: ICD-10-CM

## 2023-11-09 DIAGNOSIS — M54.42 CHRONIC RIGHT-SIDED LOW BACK PAIN WITH BILATERAL SCIATICA: Primary | ICD-10-CM

## 2023-11-09 DIAGNOSIS — R20.0 NUMBNESS AND TINGLING OF BOTH LOWER EXTREMITIES: ICD-10-CM

## 2023-11-09 DIAGNOSIS — M25.551 HIP PAIN, RIGHT: ICD-10-CM

## 2023-11-09 DIAGNOSIS — G89.29 CHRONIC RIGHT-SIDED LOW BACK PAIN WITH BILATERAL SCIATICA: Primary | ICD-10-CM

## 2023-11-09 DIAGNOSIS — R20.2 NUMBNESS AND TINGLING OF BOTH LOWER EXTREMITIES: ICD-10-CM

## 2023-11-09 DIAGNOSIS — M54.41 CHRONIC RIGHT-SIDED LOW BACK PAIN WITH BILATERAL SCIATICA: Primary | ICD-10-CM

## 2023-11-09 PROCEDURE — 97110 THERAPEUTIC EXERCISES: CPT | Mod: GP | Performed by: PHYSICAL THERAPIST

## 2023-11-15 ENCOUNTER — THERAPY VISIT (OUTPATIENT)
Dept: PHYSICAL THERAPY | Facility: CLINIC | Age: 43
End: 2023-11-15

## 2023-11-15 DIAGNOSIS — M54.42 CHRONIC RIGHT-SIDED LOW BACK PAIN WITH BILATERAL SCIATICA: Primary | ICD-10-CM

## 2023-11-15 DIAGNOSIS — M54.41 CHRONIC RIGHT-SIDED LOW BACK PAIN WITH BILATERAL SCIATICA: Primary | ICD-10-CM

## 2023-11-15 DIAGNOSIS — R20.2 NUMBNESS AND TINGLING OF BOTH LOWER EXTREMITIES: ICD-10-CM

## 2023-11-15 DIAGNOSIS — G89.29 CHRONIC RIGHT-SIDED LOW BACK PAIN WITH BILATERAL SCIATICA: Primary | ICD-10-CM

## 2023-11-15 DIAGNOSIS — M25.552 HIP PAIN, LEFT: ICD-10-CM

## 2023-11-15 DIAGNOSIS — M25.551 HIP PAIN, RIGHT: ICD-10-CM

## 2023-11-15 DIAGNOSIS — R20.0 NUMBNESS AND TINGLING OF BOTH LOWER EXTREMITIES: ICD-10-CM

## 2023-11-15 PROCEDURE — 97110 THERAPEUTIC EXERCISES: CPT | Mod: GP | Performed by: PHYSICAL THERAPIST

## 2023-11-15 NOTE — PROGRESS NOTES
"Ba is a 43 year old who is being evaluated via a billable video visit.      Video-Visit Details    Type of service:  Video Visit     Originating Location (pt. Location): Home  Distant Location (provider location):  On-site  Platform used for Video Visit: Rosy    Joined the call at 11/17/2023, 10:00:37 am.  Left the call at 11/17/2023, 10:25:37 am.  You were on the call for 24 minutes 59 seconds .        PM&R Follow-Up Visit -     Date of Initial Visit: 9/14/2023  LOV: 10/16/2023-virtual visit  TD: 11/17/2023     Recall: Ba Mireles is a 43 year old male who presents with a chief complaint of back pain.       INTERVAL HISTORY:  Patient was last seen via virtual visit 10/16/2023. At that visit, the plan was to continue PT and the home exercise program.    He was seen today for follow-up.  He reports that the level of his pain varies day-to-day.    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 He finds that if he does more activity during the day, he has pain in the evening or the next day.  He wakes up feeling stiff in the morning.  If he drives for longer than 30 minutes he feels like his back \"locks up\".  Both prolonged sitting or prolonged activity can trigger pain.  Lifting things also can cause pain -  he noticed that yesterday when he tried to lift a cast iron pan out of the oven. He feels like his \"core\" is weak but denies lower extremity weakness.    Today he describes the level of pain as \"so-so\". He describes right-sided low back pain.  Previously he was experiencing paresthesias into the posterior right leg and all 5 toes, but currently denies RLE paresthesias.      He has been taking Tylenol as needed for pain.  He does not feel that the pain level is high enough to warrant taking Flexeril.  He prefers to avoid medications when he can.    He says that he was discharged from physical therapy Wednesday.  He did find " "the PT very helpful, but noticed increased level of pain the day after the activity.      He would like to consider an epidural steroid injection to help with pain, however he did experience mood related effects with an oral steroid in the past and would also like to know more about insurance coverage for the procedure.        RECALL HISTORY OF PRESENT ILLNESS:  Ba Mireles is a 43 year old male who presents with a chief complaint of back pain.     He was seen today in the clinic.  He reports pain that started in March.  He was initially being seen at [facility].  He has a history of kidney stones since he was 20 years old.  In the past, he had yearly issue with kidney stones.  However over time he developed multiple episodes per year.  He was evaluated by 2 different urologists.  He had a procedure to remove kidney stones and 2 months ago he had stents removed.  However he continued to have persistent low back pain, and was referred to this clinic.    He says that his typical pain related to kidney stones is in his low back and hips.  The pain is worse on the right low back.  He has a constant pressure on his hips, that feels like being squeezed with a tight belt and also compressed vertically.  Intermittently, he has sharp shooting pain down both legs.  He cannot recall which part of his legs is affected when the pain happens.  He also has tingling in his legs and groin area \"randomly\".    Sometimes when walking he gets a sharp pain down his legs (R>L) and has to come down to his knees.  He has not fallen though.  Movement of any kind tends to aggravate the pain.  Laying down helps.  Today he rates the pain 1.5-2/10.  At its worst over the last week, the pain was 8-9/10.  His sleep is not affected.    Due to pain, he has been mostly off of work for the last 2 months.  He says he spent a lot of that time sitting in bed.  Lately, he says he is trying to slowly walk more.    He denies falls, weakness, bowel or " "bladder incontinence, saddle anesthesia, or fevers/chills.  He reports weight loss (he was 175 pounds in March, then in June he was 138 pounds).  However, he changed his diet substantially due to trying to control his kidney stone issue.  He does endorse a history of night sweats and nightmares.    Of note, he does report an issue with dropping things at times.  He says that he had surgery on his right wrist.  He's was concerned that there was an issue with the nerve block that was done for that surgical procedure.  He has had intermittent numbness/tingling in the right upper extremity since that time.      10/16/2023:  He was seen today via telehealth.  He states that PT has really been helping a lot.  He has been participating with a home exercise program.  He was also working on some home stretches which he found helpful.  He says today he feels \"better than he has in the last 2 years\".  Today, he estimates that he feels \"75% better\".  Since last time, he started gabapentin and slowly had increased dosing up to 3 times a day.  He did not find the medication helpful and found it difficult to keep up with the dosing intervals, and so last week he decided to taper the medication back down on his own.  Now he says he is completely off of the gabapentin and has not noticed much difference.  He continues to have some pain with transitioning -in particular from supine to sitting or sitting to standing.  When he has shooting pain into his legs, this makes him possibly feel weak, but denies overt weakness.  He has not yet returned to work.  He says there is not an option for light duty at work so he was not comfortable returning it.  He says he does not need a letter for work.    He denies falls, weakness, bowel or bladder incontinence, saddle anesthesia.       PRIOR INJURIES/TREATMENT:   Ice/Heat: heat helps    Brace: none    Physical Therapy:   None     - Current Pain Medications -    Tylenol  Ibuprofen   Norco, rarely " for more severe pain       - Prior/Trialed Pain Medications -   Gabapentin 300 mg TID - did not improve his pain  Oxycodone  Cyclobenzaprine - helped with sleep    Prior Procedures:  Date    Procedure   Improvement (%)  none              Prior Related Surgery: none     Other (acupuncture, OMT, CMM, TENS, DME, etc.):   Chiropractor - helps temporarily       Specialists Seen - (with most recent, available notes and clinic visits reviewed)   1. Urologist       IMAGING - reviewed   MR LUMBAR SPINE WITHOUT CONTRAST 9/6/2023   FINDINGS: Nomenclature is based on 5 lumbar type vertebral bodies.  Normal vertebral body heights. Normal alignment. Minimal T2  hyperintense Modic type 1 edematous degenerative endplate changes at  L5-S1. No pars defect. The conus tip is identified at T12-L1.     T12-L1: Normal disc signal. Slight loss of disc height. No herniation.  Unremarkable facets. No stenosis.         L1-L2: Mild loss of disc signal. Normal disc height. No herniation.  Unremarkable facets. No stenosis.     L2-L3: Minimal loss of disc signal. Normal disc height. No herniation.  Minor facet arthropathy. No stenosis.     L3-L4: Normal disc height and signal. No herniation. Mild facet  arthropathy. No stenosis.     L4-L5: Mild to moderate loss of disc signal. Mild loss of disc height.  Central annular tear and small protrusion. Mild to moderate facet  arthropathy. Mild left and minor right lateral recess narrowing.  Adequate central canal. Minor foraminal narrowing.     L5-S1: Moderate loss of disc signal. Mild to moderate loss of disc  height. Mild circumferential disc bulge with small superimposed  central protrusion. This contacts the right greater than left S1 nerve  root sleeves. Otherwise adequate central canal. Minor facet  arthropathy. Mild foraminal narrowing.                                                                      IMPRESSION:  1.  At L4-L5 a small central herniation causes mild left and minor  right  lateral recess narrowing and minor foraminal narrowing.  Correlate for any left L5 symptoms.  2.  At L5-S1 there is a mild disc bulge and small superimposed central  herniation that contacts the right greater than left nerve root  sleeves. Mild foraminal narrowing. Correlate for any right S1  symptoms.        Review Of Systems:  I am responding to those symptoms which are directly relevant to the specific indication for my consultation. I recommend that the patient follow up with their primary or referring provider to pursue any other symptoms which may be of concern.       Medical History:  He has a past medical history of Kidney stone.     He has a past surgical history that includes Wrist surgery (Right); cystoscopy,ureteroscopy,lithotripsy; and Combined Cystoscopy, Retrogrades, Ureteroscopy, Laser Holmium Lithotripsy Ureter(S), Insert Stent (Bilateral, 8/7/2023).    Family History  His family history includes Cancer in his maternal grandfather, mother, and paternal grandfather; Gout in his mother; Heart Disease in his maternal grandfather and mother; Kidney Disease in his paternal grandfather; Nephrolithiasis in his maternal aunt, maternal grandfather, and mother.     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 Need help with lifting. Otherwise performs ADLs/IADLs independently.    He reports that he quit smoking 13 days ago. His smoking use included cigarettes. He smoked an average of 1 pack per day. He has never used smokeless tobacco. He reports that he does not currently use alcohol. He reports current drug use. Drug: Marijuana.        Current Medications:   He has a current medication list which includes the following prescription(s): hydrocodone-acetaminophen and tamsulosin.     Allergies:    -- Amoxicillin-Pot Clavulanate -- Anxiety    --  Has tolerated amoxicillin well in the  past    PHYSICAL EXAMINATION: *limited by nature of virtual visit   No vital signs taken for visit  --CONSTITUTIONAL: No acute distress.   --PSYCHIATRIC: The patient is awake, alert, oriented to person, place, time and answering questions appropriately with clear speech. Appropriate mood and affect         ASSESSMENT:  Ba Mireles is a pleasant 43 year old male who presents with right-sided low back pain with intermittent bilateral lower extremity pain and numbness since March.  On prior exam, he had bilateral weakness with knee flexion/ extension, and hip flexion -possibly pain limited. He is no longer reporting lower extremity weakness.    He also had pain with range of motion in both hips.  He has a history of left hip surgery in the past.    He has a history of kidney stones and underwent combined cystoscopy, bilateral ureteroscopy, retrograde pyelogram, bilateral stone basket extraction, bilateral stent placement on 8/7/2023 with Dr. Shipman.    MRI of the lumbar spine shows disc bulges at L4-5 and L5-S1 with lateral recess narrowing bilaterally at both levels as well.    PLAN:  -Could consider interventional procedure to help with pain control, such as L5-S1 ILESI. He is interested in MARCELO, but does report concern regarding mood related side effects with an oral steroid in the past.  Will send a message to our pharmacist regarding likelihood of that effect with a steroid injection as well.  Additionally, he would like more information about insurance coverage for the procedure and I told him that I will request the business office to reach out.  -He was participating with physical therapy but unfortunately had some degree of increased pain after doing activity.  He was recently discharged from PT. Could consider alternate form of PT such as pool therapy  -We discussed maintaining some activity restrictions such as limiting weight lifting to 10 pounds and minimizing bending and twisting.  He was provided  with a letter for work for the next 2 weeks.  We discussed that should longer-term work restrictions be needed, referral to [facility] could be sent.  He prefers to hold off on the referral for now.  -RTC in 2 weeks, as per his preference.      Ready to learn, no apparent learning barriers.  Education provided on treatment plan according to patient's preferred learning style.  Patient verbalizes understanding.   __________________________________  Elle Mcgowan NP  Physical Medicine & Rehabilitation        42 minutes spent by me on the date of the encounter doing chart review, history and exam, documentation and further activities per the note

## 2023-11-17 ENCOUNTER — VIRTUAL VISIT (OUTPATIENT)
Dept: PHYSICAL MEDICINE AND REHAB | Facility: CLINIC | Age: 43
End: 2023-11-17

## 2023-11-17 VITALS — HEIGHT: 70 IN | BODY MASS INDEX: 23.05 KG/M2 | WEIGHT: 161 LBS

## 2023-11-17 DIAGNOSIS — M51.369 BULGING LUMBAR DISC: ICD-10-CM

## 2023-11-17 DIAGNOSIS — M25.552 HIP PAIN, LEFT: ICD-10-CM

## 2023-11-17 DIAGNOSIS — R20.2 NUMBNESS AND TINGLING OF BOTH LOWER EXTREMITIES: ICD-10-CM

## 2023-11-17 DIAGNOSIS — M25.551 HIP PAIN, RIGHT: ICD-10-CM

## 2023-11-17 DIAGNOSIS — M54.41 BILATERAL LOW BACK PAIN WITH RIGHT-SIDED SCIATICA, UNSPECIFIED CHRONICITY: Primary | ICD-10-CM

## 2023-11-17 DIAGNOSIS — R20.0 NUMBNESS AND TINGLING OF BOTH LOWER EXTREMITIES: ICD-10-CM

## 2023-11-17 PROCEDURE — 99215 OFFICE O/P EST HI 40 MIN: CPT | Mod: VID | Performed by: NURSE PRACTITIONER

## 2023-11-17 ASSESSMENT — PAIN SCALES - GENERAL: PAINLEVEL: MILD PAIN (2)

## 2023-11-17 NOTE — NURSING NOTE
Is the patient currently in the state of [place]? YES    Visit mode:VIDEO    If the visit is dropped, the patient can be reconnected by: VIDEO VISIT: Text to cell phone:   Telephone Information:   Mobile [phone]       Will anyone else be joining the visit? NO  (If patient encounters technical issues they should call [phone])    How would you like to obtain your AVS? MyChart    Are changes needed to the allergy or medication list? No    Reason for visit: Follow Up    Nakita GOMEZ

## 2023-11-17 NOTE — PROGRESS NOTES
"Virtual Visit Details    Type of service:  Video Visit     Originating Location (pt. Location): {video visit patient location:012302::\"Home\"}  {PROVIDER LOCATION On-site should be selected for visits conducted from your clinic location or adjoining [facility] hospital, academic office, or other nearby [facility] building. Off-site should be selected for all other provider locations, including home:[id number]}  Distant Location (provider location):  {virtual location provider:935969}  Platform used for Video Visit: {Virtual Visit Platforms:363743::\"Channelkit\"}    "

## 2023-11-17 NOTE — LETTER
[facility] PHYSICAL MEDICINE AND REHABILITATION CLINIC [place] [id number] [facility]  3RD FLOOR  [facility] [address]  Phone: [phone]  Fax: [phone]    November 17, 2023        Ba Mireles  [address] [place] [id number]          To whom it may concern:    RE: Ba Mireles    Patient was seen and treated today at our clinic.  Please allow him to limit any lifting to no more than 10 pounds, and avoid excessive bending or twisting for the next 2 weeks (until 12/4/23), at which time he should be reevaluated in the clinic.    Please contact me for questions or concerns.      Sincerely,        JUSTINA Yu CNP

## 2023-11-17 NOTE — LETTER
"11/17/2023       RE: Ba Mireles  [address] [facility] [address]       Dear Colleague,    Thank you for referring your patient, Ba Mireles, to the [facility] PHYSICAL MEDICINE AND REHABILITATION CLINIC [place] [facility]. Please see a copy of my visit note below.    Ba is a 43 year old who is being evaluated via a billable video visit.        PM&R Follow-Up Visit -     Date of Initial Visit: 9/14/2023  LOV: 10/16/2023-virtual visit  TD: 11/17/2023     Recall: Ba Mirelse is a 43 year old male who presents with a chief complaint of back pain.       INTERVAL HISTORY:  Patient was last seen via virtual visit 10/16/2023. At that visit, the plan was to continue PT and the home exercise program.    He was seen today for follow-up.  He reports that the level of his pain varies day-to-day.    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 He finds that if he does more activity during the day, he has pain in the evening or the next day.  He wakes up feeling stiff in the morning.  If he drives for longer than 30 minutes he feels like his back \"locks up\".  Both prolonged sitting or prolonged activity can trigger pain.  Lifting things also can cause pain -  he noticed that yesterday when he tried to lift a cast iron pan out of the oven. He feels like his \"core\" is weak but denies lower extremity weakness.    Today he describes the level of pain as \"so-so\". He describes right-sided low back pain.  Previously he was experiencing paresthesias into the posterior right leg and all 5 toes, but currently denies RLE paresthesias.      He has been taking Tylenol as needed for pain.  He does not feel that the pain level is high enough to warrant taking Flexeril.  He prefers to avoid medications when he can.    He says that he was discharged from physical therapy Wednesday.  He did " "find the PT very helpful, but noticed increased level of pain the day after the activity.      He would like to consider an epidural steroid injection to help with pain, however he did experience mood related effects with an oral steroid in the past and would also like to know more about insurance coverage for the procedure.        RECALL HISTORY OF PRESENT ILLNESS:  Ba Mireles is a 43 year old male who presents with a chief complaint of back pain.     He was seen today in the clinic.  He reports pain that started in March.  He was initially being seen at [facility].  He has a history of kidney stones since he was 20 years old.  In the past, he had yearly issue with kidney stones.  However over time he developed multiple episodes per year.  He was evaluated by 2 different urologists.  He had a procedure to remove kidney stones and 2 months ago he had stents removed.  However he continued to have persistent low back pain, and was referred to this clinic.    He says that his typical pain related to kidney stones is in his low back and hips.  The pain is worse on the right low back.  He has a constant pressure on his hips, that feels like being squeezed with a tight belt and also compressed vertically.  Intermittently, he has sharp shooting pain down both legs.  He cannot recall which part of his legs is affected when the pain happens.  He also has tingling in his legs and groin area \"randomly\".    Sometimes when walking he gets a sharp pain down his legs (R>L) and has to come down to his knees.  He has not fallen though.  Movement of any kind tends to aggravate the pain.  Laying down helps.  Today he rates the pain 1.5-2/10.  At its worst over the last week, the pain was 8-9/10.  His sleep is not affected.    Due to pain, he has been mostly off of work for the last 2 months.  He says he spent a lot of that time sitting in bed.  Lately, he says he is trying to slowly walk more.    He denies falls, weakness, bowel or " "bladder incontinence, saddle anesthesia, or fevers/chills.  He reports weight loss (he was 175 pounds in March, then in June he was 138 pounds).  However, he changed his diet substantially due to trying to control his kidney stone issue.  He does endorse a history of night sweats and nightmares.    Of note, he does report an issue with dropping things at times.  He says that he had surgery on his right wrist.  He's was concerned that there was an issue with the nerve block that was done for that surgical procedure.  He has had intermittent numbness/tingling in the right upper extremity since that time.      10/16/2023:  He was seen today via telehealth.  He states that PT has really been helping a lot.  He has been participating with a home exercise program.  He was also working on some home stretches which he found helpful.  He says today he feels \"better than he has in the last 2 years\".  Today, he estimates that he feels \"75% better\".  Since last time, he started gabapentin and slowly had increased dosing up to 3 times a day.  He did not find the medication helpful and found it difficult to keep up with the dosing intervals, and so last week he decided to taper the medication back down on his own.  Now he says he is completely off of the gabapentin and has not noticed much difference.  He continues to have some pain with transitioning -in particular from supine to sitting or sitting to standing.  When he has shooting pain into his legs, this makes him possibly feel weak, but denies overt weakness.  He has not yet returned to work.  He says there is not an option for light duty at work so he was not comfortable returning it.  He says he does not need a letter for work.    He denies falls, weakness, bowel or bladder incontinence, saddle anesthesia.       PRIOR INJURIES/TREATMENT:   Ice/Heat: heat helps    Brace: none    Physical Therapy:   None     - Current Pain Medications -    Tylenol  Ibuprofen   Norco, rarely " for more severe pain       - Prior/Trialed Pain Medications -   Gabapentin 300 mg TID - did not improve his pain  Oxycodone  Cyclobenzaprine - helped with sleep    Prior Procedures:  Date    Procedure   Improvement (%)  none              Prior Related Surgery: none     Other (acupuncture, OMT, CMM, TENS, DME, etc.):   Chiropractor - helps temporarily       Specialists Seen - (with most recent, available notes and clinic visits reviewed)   1. Urologist       IMAGING - reviewed   MR LUMBAR SPINE WITHOUT CONTRAST 9/6/2023   FINDINGS: Nomenclature is based on 5 lumbar type vertebral bodies.  Normal vertebral body heights. Normal alignment. Minimal T2  hyperintense Modic type 1 edematous degenerative endplate changes at  L5-S1. No pars defect. The conus tip is identified at T12-L1.     T12-L1: Normal disc signal. Slight loss of disc height. No herniation.  Unremarkable facets. No stenosis.         L1-L2: Mild loss of disc signal. Normal disc height. No herniation.  Unremarkable facets. No stenosis.     L2-L3: Minimal loss of disc signal. Normal disc height. No herniation.  Minor facet arthropathy. No stenosis.     L3-L4: Normal disc height and signal. No herniation. Mild facet  arthropathy. No stenosis.     L4-L5: Mild to moderate loss of disc signal. Mild loss of disc height.  Central annular tear and small protrusion. Mild to moderate facet  arthropathy. Mild left and minor right lateral recess narrowing.  Adequate central canal. Minor foraminal narrowing.     L5-S1: Moderate loss of disc signal. Mild to moderate loss of disc  height. Mild circumferential disc bulge with small superimposed  central protrusion. This contacts the right greater than left S1 nerve  root sleeves. Otherwise adequate central canal. Minor facet  arthropathy. Mild foraminal narrowing.                                                                      IMPRESSION:  1.  At L4-L5 a small central herniation causes mild left and minor  right  lateral recess narrowing and minor foraminal narrowing.  Correlate for any left L5 symptoms.  2.  At L5-S1 there is a mild disc bulge and small superimposed central  herniation that contacts the right greater than left nerve root  sleeves. Mild foraminal narrowing. Correlate for any right S1  symptoms.        Review Of Systems:  I am responding to those symptoms which are directly relevant to the specific indication for my consultation. I recommend that the patient follow up with their primary or referring provider to pursue any other symptoms which may be of concern.       Medical History:  He has a past medical history of Kidney stone.     He has a past surgical history that includes Wrist surgery (Right); cystoscopy,ureteroscopy,lithotripsy; and Combined Cystoscopy, Retrogrades, Ureteroscopy, Laser Holmium Lithotripsy Ureter(S), Insert Stent (Bilateral, 8/7/2023).    Family History  His family history includes Cancer in his maternal grandfather, mother, and paternal grandfather; Gout in his mother; Heart Disease in his maternal grandfather and mother; Kidney Disease in his paternal grandfather; Nephrolithiasis in his maternal aunt, maternal grandfather, and mother.     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 Has been mostly off work for last 2 months     Current living situation: lives with parents and daughter. Need help with lifting. Otherwise performs ADLs/IADLs independently.    He reports that he quit smoking 13 days ago. His smoking use included cigarettes. He smoked an average of 1 pack per day. He has never used smokeless tobacco. He reports that he does not currently use alcohol. He reports current drug use. Drug: Marijuana.        Current Medications:   He has a current medication list which includes the following prescription(s): hydrocodone-acetaminophen and tamsulosin.     Allergies:    -- Amoxicillin-Pot Clavulanate -- Anxiety    --  Has tolerated amoxicillin well in the  past    PHYSICAL EXAMINATION: *limited by nature of virtual visit   No vital signs taken for visit  --CONSTITUTIONAL: No acute distress.   --PSYCHIATRIC: The patient is awake, alert, oriented to person, place, time and answering questions appropriately with clear speech. Appropriate mood and affect         ASSESSMENT:  Ba Mireles is a pleasant 43 year old male who presents with right-sided low back pain with intermittent bilateral lower extremity pain and numbness since March.  On prior exam, he had bilateral weakness with knee flexion/ extension, and hip flexion -possibly pain limited. He is no longer reporting lower extremity weakness.    He also had pain with range of motion in both hips.  He has a history of left hip surgery in the past.    He has a history of kidney stones and underwent combined cystoscopy, bilateral ureteroscopy, retrograde pyelogram, bilateral stone basket extraction, bilateral stent placement on 8/7/2023 with Dr. Shipman.    MRI of the lumbar spine shows disc bulges at L4-5 and L5-S1 with lateral recess narrowing bilaterally at both levels as well.    PLAN:  -Could consider interventional procedure to help with pain control, such as L5-S1 ILESI. He is interested in MARCELO, but does report concern regarding mood related side effects with an oral steroid in the past.  Will send a message to our pharmacist regarding likelihood of that effect with a steroid injection as well.  Additionally, he would like more information about insurance coverage for the procedure and I told him that I will request the business office to reach out.  -He was participating with physical therapy but unfortunately had some degree of increased pain after doing activity.  He was recently discharged from PT. Could consider alternate form of PT such as pool therapy  -We discussed maintaining some activity restrictions such as limiting weight lifting to 10 pounds and minimizing bending and twisting.  He was provided  with a letter for work for the next 2 weeks.  We discussed that should longer-term work restrictions be needed, referral to [facility] could be sent.  He prefers to hold off on the referral for now.  -RTC in 2 weeks, as per his preference.      Ready to learn, no apparent learning barriers.  Education provided on treatment plan according to patient's preferred learning style.  Patient verbalizes understanding.   __________________________________      42 minutes spent by me on the date of the encounter doing chart review, history and exam, documentation and further activities per the note         Again, thank you for allowing me to participate in the care of your patient.      Sincerely,    JUSTINA Yu CNP

## 2023-11-28 ENCOUNTER — TELEPHONE (OUTPATIENT)
Dept: PHYSICAL MEDICINE AND REHAB | Facility: CLINIC | Age: 43
End: 2023-11-28

## 2023-11-28 DIAGNOSIS — M54.41 BILATERAL LOW BACK PAIN WITH RIGHT-SIDED SCIATICA, UNSPECIFIED CHRONICITY: ICD-10-CM

## 2023-11-28 DIAGNOSIS — R20.0 NUMBNESS AND TINGLING OF BOTH LOWER EXTREMITIES: ICD-10-CM

## 2023-11-28 DIAGNOSIS — G89.29 CHRONIC RIGHT-SIDED LOW BACK PAIN WITH BILATERAL SCIATICA: ICD-10-CM

## 2023-11-28 DIAGNOSIS — M54.42 CHRONIC RIGHT-SIDED LOW BACK PAIN WITH BILATERAL SCIATICA: ICD-10-CM

## 2023-11-28 DIAGNOSIS — M54.41 CHRONIC RIGHT-SIDED LOW BACK PAIN WITH BILATERAL SCIATICA: ICD-10-CM

## 2023-11-28 DIAGNOSIS — R20.2 NUMBNESS AND TINGLING OF BOTH LOWER EXTREMITIES: ICD-10-CM

## 2023-11-28 DIAGNOSIS — M51.369 BULGING LUMBAR DISC: Primary | ICD-10-CM

## 2023-11-28 NOTE — PROGRESS NOTES
11/28/23: Patient interested to proceed with L5-S1 IL MARCELO with Dr. Painter.  The case request order was entered.    Allergies: Augmentin  AC: None  Last MRI of the lumbar spine: 9/6/2023    Elle Mcgowan NP  Physical Medicine and Rehabilitation, Medical Spine

## 2023-12-05 NOTE — PROGRESS NOTES
DISCHARGE  Reason for Discharge: Patient chooses to discontinue therapy. He has made good progress towards his goals.    Equipment Issued: NA    Discharge Plan: Patient to continue home program.    Referring Provider:  Elle Mcgowan

## 2023-12-11 ENCOUNTER — TELEPHONE (OUTPATIENT)
Dept: PHYSICAL MEDICINE AND REHAB | Facility: CLINIC | Age: 43
End: 2023-12-11

## 2023-12-11 NOTE — TELEPHONE ENCOUNTER
Phoned the patient to schedule injection procedure with dr. Painter. Patient stated he is not ready to schedule the injection procedure due to not having current insurance.

## 2024-05-28 ENCOUNTER — OFFICE VISIT (OUTPATIENT)
Dept: FAMILY MEDICINE | Facility: CLINIC | Age: 44
End: 2024-05-28
Payer: COMMERCIAL

## 2024-05-28 VITALS
HEART RATE: 80 BPM | DIASTOLIC BLOOD PRESSURE: 82 MMHG | TEMPERATURE: 97.1 F | BODY MASS INDEX: 22.93 KG/M2 | OXYGEN SATURATION: 97 % | SYSTOLIC BLOOD PRESSURE: 120 MMHG | WEIGHT: 162 LBS

## 2024-05-28 DIAGNOSIS — J32.9 SINUSITIS, UNSPECIFIED CHRONICITY, UNSPECIFIED LOCATION: ICD-10-CM

## 2024-05-28 DIAGNOSIS — H61.23 BILATERAL IMPACTED CERUMEN: Primary | ICD-10-CM

## 2024-05-28 PROCEDURE — 69209 REMOVE IMPACTED EAR WAX UNI: CPT | Mod: 50 | Performed by: FAMILY MEDICINE

## 2024-05-28 PROCEDURE — 99213 OFFICE O/P EST LOW 20 MIN: CPT | Mod: 25 | Performed by: FAMILY MEDICINE

## 2024-05-28 RX ORDER — AMOXICILLIN 500 MG/1
500 CAPSULE ORAL 3 TIMES DAILY
Qty: 21 CAPSULE | Refills: 0 | Status: SHIPPED | OUTPATIENT
Start: 2024-05-28 | End: 2024-06-04

## 2024-05-28 NOTE — PROGRESS NOTES
"   Assessment & Plan     (H61.23) Bilateral impacted cerumen  (primary encounter diagnosis)  Comment: Bilateral ceruminosis  Plan: REMOVE IMPACTED CERUMEN             (J32.9) Sinusitis, unspecified chronicity, unspecified location  Comment: Patient also with a sinus infection, and I suspect some of the diminished auditory acuity is secondary to fluid behind the right tympanic membrane  Plan: amoxicillin (AMOXIL) 500 MG capsule             PLAN:  1.  Bilateral ceruminosis removed by clinical assistant with usual method of water and hydration peroxide irrigation.  2.  Empiric treatment with amoxicillin 500 mg 3 times daily x 1 days I also recommend over-the-counter Sudafed and otherwise follow-up as needed.                Wendy Stoddard is a 44 year old, presenting for the following health issues:  Ear Problem (Bilateral ear pain  with pressure Right one is worse and can't hear for the last 3 days.)        5/28/2024     2:37 PM   Additional Questions   Roomed by Marlyn WHITMAN     Ear Problem    History of Present Illness       Headaches:   Since the patient's last clinic visit, headaches are: no change  The patient is getting headaches:  Once a day  He is able to do normal daily activities when he has a migraine.  The patient is taking the following rescue/relief medications:  No rescue/relief medications   Patient states \"I get some relief\" from the rescue/relief medications.   The patient is taking the following medications to prevent migraines:  No medications to prevent migraines  In the past 4 weeks, the patient has gone to an Urgent Care or Emergency Room 0 times times due to headaches.    Reason for visit:  Ear hurts  Symptom onset:  3-4 weeks ago  Symptoms include:  Pain in right ear cant hear pluged  Symptom intensity:  Moderate  Symptom progression:  Worsening  Had these symptoms before:  Yes  Has tried/received treatment for these symptoms:  No  What makes it worse:  No  What makes it better:  No    He eats " 2-3 servings of fruits and vegetables daily.He consumes 0 sweetened beverage(s) daily.He exercises with enough effort to increase his heart rate 30 to 60 minutes per day.  He exercises with enough effort to increase his heart rate 5 days per week.   He is taking medications regularly.     Patient comes in because he is not hearing well particularly on the right ear also mentions that he stopped wearing ear buds maybe a month ago or so he is thinks that there was some crawled on them and he was cleaning them off, feels a lot of pressure mainly on the right side.    I told the patient that he does have earwax in both ear canals and that may not explain all of the problems or difficulties having but I think he will help to clean out the earwax.    Patient reports however that he is been feeling more congested particularly in the right facial area for about the past month or so does not typically have sinus issues or sinus problems has not really been taking anything for this but I do suspect an underlying sinus infection    Patient is allergic to Augmentin, but he has tolerated amoxicillin in the past                        Objective    /82   Pulse 80   Temp 97.1  F (36.2  C)   Wt 73.5 kg (162 lb)   SpO2 97%   BMI 22.93 kg/m    Body mass index is 22.93 kg/m .  Physical Exam   ENT examination initial exam revealed bilateral ceruminosis, after the ears were irrigated, tympanic membranes were normal in appearance              Signed Electronically by: Rios Chapman MD

## 2024-05-30 ENCOUNTER — OFFICE VISIT (OUTPATIENT)
Dept: FAMILY MEDICINE | Facility: CLINIC | Age: 44
End: 2024-05-30
Payer: COMMERCIAL

## 2024-05-30 ENCOUNTER — NURSE TRIAGE (OUTPATIENT)
Dept: FAMILY MEDICINE | Facility: CLINIC | Age: 44
End: 2024-05-30

## 2024-05-30 VITALS
BODY MASS INDEX: 22.45 KG/M2 | WEIGHT: 156.8 LBS | DIASTOLIC BLOOD PRESSURE: 72 MMHG | RESPIRATION RATE: 10 BRPM | HEART RATE: 82 BPM | HEIGHT: 70 IN | SYSTOLIC BLOOD PRESSURE: 117 MMHG | OXYGEN SATURATION: 97 % | TEMPERATURE: 98.3 F

## 2024-05-30 DIAGNOSIS — H60.393 INFECTIVE OTITIS EXTERNA, BILATERAL: Primary | ICD-10-CM

## 2024-05-30 DIAGNOSIS — J32.9 SINUSITIS, UNSPECIFIED CHRONICITY, UNSPECIFIED LOCATION: ICD-10-CM

## 2024-05-30 PROCEDURE — 99214 OFFICE O/P EST MOD 30 MIN: CPT | Performed by: FAMILY MEDICINE

## 2024-05-30 RX ORDER — NEOMYCIN SULFATE, POLYMYXIN B SULFATE AND HYDROCORTISONE 10; 3.5; 1 MG/ML; MG/ML; [USP'U]/ML
3 SUSPENSION/ DROPS AURICULAR (OTIC) 4 TIMES DAILY
Qty: 20 ML | Refills: 0 | Status: SHIPPED | OUTPATIENT
Start: 2024-05-30 | End: 2024-07-29

## 2024-05-30 ASSESSMENT — ENCOUNTER SYMPTOMS: EYE PAIN: 1

## 2024-05-30 ASSESSMENT — PAIN SCALES - GENERAL: PAINLEVEL: SEVERE PAIN (6)

## 2024-05-30 NOTE — PATIENT INSTRUCTIONS
Please stop amoxicillin, and start Augmentin twice a day for 7 days  Please use the eardrops Cortisporin to each ear 4 times daily for at least 1 week   please let us know if you are having worsening symptoms or any other new issues.

## 2024-05-30 NOTE — TELEPHONE ENCOUNTER
"A month ago patient stopped wearing his ear buds as it was bothering his ears. He then started getting some \"crusting\" in his ears which progressed to drainage and patient seen in office. Patient was started on Amoxicillin on Wednesday. No hearing in right ear since Sunday, balance is off and with quick movements patient can have some vertigo. Patient calling due to finding a lot of stains on his pillow from discharge from his ears. The drainage is white and pussy.  Both ears are affected but the right is worse. Patient also has jaw pain and clicking on the right side. Ears are always feeling wet. Ear pain this morning is 5/10.   Patient has appointment this afternoon in clinic  Denies fever, redness, swelling  Protocol recommends see in office today or tomorrow  Reviewed patient care advice.  Patient to call back with worsening symptoms.  April Lei RN   05/30/24 7:34 AM  [facility] Nurse Advisor              Reason for Disposition   Patient wants to be seen    Additional Information   Negative: SEVERE difficulty breathing (e.g., struggling for each breath, speaks in single words)   Negative: Sounds like a life-threatening emergency to the triager   Negative: Sinus infection and taking an antibiotic   Negative: Wound infection and taking an antibiotic   Negative: MODERATE difficulty breathing (e.g., speaks in phrases, SOB even at rest, pulse 100-120)   Negative: Fever > 103 F  (39.4 C)   Negative: Patient sounds very sick or weak to the triager   Negative: SEVERE difficulty breathing (e.g., struggling for each breath, speaks in single words)   Negative: Sounds like a life-threatening emergency to the triager   Negative: Difficulty breathing and not from stuffy nose (e.g., not relieved by cleaning out the nose)   Negative: SEVERE headache and fever   Negative: Taking antibiotic > 24 hours and fever > 103 F (39.4 C)   Negative: Redness or swelling on the cheek, forehead or around the eye and fever   " Negative: Patient sounds very sick or weak to the triager   Negative: SEVERE sinus pain and not improved 2 hours after pain medicine   Negative: Redness or swelling on the cheek, forehead or around the eye and new since starting antibiotics   Negative: Taking antibiotic > 48 hours (2 days) and fever persists   Negative: Taking antibiotic > 72 hours (3 days) and sinus pain not improved    Protocols used: Infection on Antibiotic Follow-up Call-A-[place], Sinus Infection on Antibiotic Follow-up Call-A-[place]

## 2024-05-30 NOTE — PROGRESS NOTES
"  Assessment & Plan   Problem List Items Addressed This Visit    None  Visit Diagnoses       Infective otitis externa, bilateral    -  Primary    Relevant Medications    neomycin-polymyxin-hydrocortisone (CORTISPORIN) 3.5-21862-3 otic suspension    Sinusitis, unspecified chronicity, unspecified location        Relevant Medications    amoxicillin-clavulanate (AUGMENTIN) 875-125 MG tablet                  Nicotine/Tobacco Cessation  He reports that he has been smoking cigarettes. He has never been exposed to tobacco smoke. He has never used smokeless tobacco.  Nicotine/Tobacco Cessation Plan        Patient Instructions   Please stop amoxicillin, and start Augmentin twice a day for 7 days  Please use the eardrops Cortisporin to each ear 4 times daily for at least 1 week   please let us know if you are having worsening symptoms or any other new issues.        Wendy Stoddard is a 44 year old, presenting for the following health issues:  Ear Problem (Pt has been having creamy discharge coming out of both ears. Pt is unable to hear out of Right ear. Pt started amoxicillin and sudafed on Tuesday but has not felt any relief. Pt states it feels like his face is swollen. Pt states his Right ear hurts when he opens his mouth. Pt states he does get occasional vertigo. ) and Eye Problem (Pt states his vision is also getting blurry. This has been a change in the last couple days. )        5/30/2024     2:39 PM   Additional Questions   Roomed by Jami DAVIS CMA     Ear Problem    Eye Problem                Review of Systems  Constitutional, HEENT, cardiovascular, pulmonary, gi and gu systems are negative, except as otherwise noted.      Objective    /72 (BP Location: Left arm, Patient Position: Sitting, Cuff Size: Adult Regular)   Pulse 82   Temp 98.3  F (36.8  C) (Oral)   Resp 10   Ht 1.765 m (5' 9.5\")   Wt 71.1 kg (156 lb 12.8 oz)   SpO2 97%   BMI 22.82 kg/m    Body mass index is 22.82 kg/m .  Physical Exam "   GENERAL: alert and no distress  HENT: normal cephalic/atraumatic, right ear: clear effusion and purulent drainage in canal, left ear: clear effusion and purulent drainage in canal, nose and mouth without ulcers or lesions, nasal mucosa edematous , oropharynx clear, and oral mucous membranes moist  NECK: no adenopathy, no asymmetry, masses, or scars  RESP: lungs clear to auscultation - no rales, rhonchi or wheezes  CV: regular rate and rhythm, normal S1 S2, no S3 or S4, no murmur, click or rub, no peripheral edema  ABDOMEN: soft, nontender, no hepatosplenomegaly, no masses and bowel sounds normal  MS: no gross musculoskeletal defects noted, no edema            Signed Electronically by: BRENDA JONES MD

## 2024-05-31 ENCOUNTER — MYC MEDICAL ADVICE (OUTPATIENT)
Dept: FAMILY MEDICINE | Facility: CLINIC | Age: 44
End: 2024-05-31
Payer: COMMERCIAL

## 2024-05-31 ENCOUNTER — NURSE TRIAGE (OUTPATIENT)
Dept: FAMILY MEDICINE | Facility: CLINIC | Age: 44
End: 2024-05-31
Payer: COMMERCIAL

## 2024-05-31 ENCOUNTER — HOSPITAL ENCOUNTER (EMERGENCY)
Facility: CLINIC | Age: 44
Discharge: HOME | End: 2024-05-31
Attending: EMERGENCY MEDICINE | Admitting: EMERGENCY MEDICINE
Payer: COMMERCIAL

## 2024-05-31 VITALS
HEART RATE: 88 BPM | BODY MASS INDEX: 23.29 KG/M2 | RESPIRATION RATE: 16 BRPM | SYSTOLIC BLOOD PRESSURE: 144 MMHG | DIASTOLIC BLOOD PRESSURE: 71 MMHG | OXYGEN SATURATION: 98 % | WEIGHT: 160 LBS

## 2024-05-31 DIAGNOSIS — H65.03 NON-RECURRENT ACUTE SEROUS OTITIS MEDIA OF BOTH EARS: ICD-10-CM

## 2024-05-31 DIAGNOSIS — H60.503 ACUTE OTITIS EXTERNA OF BOTH EARS, UNSPECIFIED TYPE: ICD-10-CM

## 2024-05-31 PROCEDURE — 99284 EMERGENCY DEPT VISIT MOD MDM: CPT

## 2024-05-31 PROCEDURE — 250N000011 HC RX IP 250 OP 636: Performed by: EMERGENCY MEDICINE

## 2024-05-31 PROCEDURE — 96372 THER/PROPH/DIAG INJ SC/IM: CPT | Performed by: EMERGENCY MEDICINE

## 2024-05-31 RX ORDER — KETOROLAC TROMETHAMINE 30 MG/ML
30 INJECTION, SOLUTION INTRAMUSCULAR; INTRAVENOUS ONCE
Status: COMPLETED | OUTPATIENT
Start: 2024-05-31 | End: 2024-05-31

## 2024-05-31 RX ORDER — OXYCODONE HYDROCHLORIDE 5 MG/1
5 TABLET ORAL EVERY 6 HOURS PRN
Qty: 12 TABLET | Refills: 0 | Status: SHIPPED | OUTPATIENT
Start: 2024-05-31 | End: 2024-06-03

## 2024-05-31 RX ADMIN — KETOROLAC TROMETHAMINE 30 MG: 30 INJECTION, SOLUTION INTRAMUSCULAR at 18:00

## 2024-05-31 ASSESSMENT — ACTIVITIES OF DAILY LIVING (ADL): ADLS_ACUITY_SCORE: 34

## 2024-05-31 ASSESSMENT — COLUMBIA-SUICIDE SEVERITY RATING SCALE - C-SSRS
2. HAVE YOU ACTUALLY HAD ANY THOUGHTS OF KILLING YOURSELF IN THE PAST MONTH?: NO
1. IN THE PAST MONTH, HAVE YOU WISHED YOU WERE DEAD OR WISHED YOU COULD GO TO SLEEP AND NOT WAKE UP?: NO
6. HAVE YOU EVER DONE ANYTHING, STARTED TO DO ANYTHING, OR PREPARED TO DO ANYTHING TO END YOUR LIFE?: NO

## 2024-05-31 NOTE — ED NOTES
Patient verbalized understanding of discharge instructions including medication administration and recommended follow up care as noted on discharge instructions.  Written discharge instructions given, denies any further questions.  Prescriptions: were printed and sent with patient.  Barriers to learning identified and addressed:  None observed. Discharged from the waiting room. See providers notes for further assessment.   
independent

## 2024-05-31 NOTE — TELEPHONE ENCOUNTER
Nurse Triage SBAR    Is this a 2nd Level Triage? YES, LICENSED PRACTITIONER REVIEW IS REQUIRED    Situation: Bilateral ear infection, symptoms not improving    Background: Patient called with worsening symptoms of a bilateral ear infection. He was on amoxicillin starting 5/28, but was seen in clinic yesterday 5/30 and started on augmentin. Since then, he has had increased stabbing pain in his ear, worsening jaw and neck pain, and a new low grade fever at 99.3.     Assessment:  Patient was on amoxicillin starting 5/28, but was seen in clinic yesterday 5/30 and started on augmentin. Since then, he has had increased stabbing pain in his ear, worsening jaw and neck pain, and a new low grade fever at 99.3. He states his pain gets worse when standing. Nothing is helping to relieve the pain at this time.     Protocol Recommended Disposition:   Callback by PCP Today    Recommendation: PCP call back     Routed to provider    Does the patient meet one of the following criteria for ADS visit consideration? 16+ years old, with an [facility] PCP     TIP  Providers, please consider if this condition is appropriate for management at one of our Acute and Diagnostic Services sites.     If patient is a good candidate, please use dotphrase <dot>triageresponse and select Refer to ADS to document.  Reason for Disposition   Taking antibiotic and new-onset of fever    Additional Information   Negative: SEVERE difficulty breathing (e.g., struggling for each breath, speaks in single words)   Negative: Sounds like a life-threatening emergency to the triager   Negative: Sinus infection and taking an antibiotic   Negative: Wound infection and taking an antibiotic   Negative: MODERATE difficulty breathing (e.g., speaks in phrases, SOB even at rest, pulse 100-120)   Negative: Fever > 103 F  (39.4 C)   Negative: Patient sounds very sick or weak to the triager   Negative: Finished taking antibiotics and symptoms are BETTER but are not completely gone    "Negative: Patient wants to be seen    Answer Assessment - Initial Assessment Questions  1. INFECTION: \"What infection is the antibiotic being given for?\"      Bilateral ear infection  2. ANTIBIOTIC: \"What antibiotic are you taking\" \"How many times per day?\"      Augmention, amoxicillin prior to augmentin for a day and a half  3. DURATION: \"When was the antibiotic started?\"      5/28  4. MAIN CONCERN OR SYMPTOM:  \"What is your main concern right now?\"      Ear pain  5. BETTER-SAME-WORSE: \"Are you getting better, staying the same, or getting worse compared to when you first started the antibiotics?\" If getting worse, ask: \"In what way?\"       Worse, increase of pain, stabbing type getting hard to chew food  6. FEVER: \"Do you have a fever?\" If Yes, ask: \"What is your temperature, how was it measured, and when did it start?\"      99.3 low grade fever, typical temp approx 97.8  7. SYMPTOMS: \"Are there any other symptoms you're concerned about?\" If Yes, ask: \"When did it start?\"      Hard to chew, jaw pain, neck pain   8. FOLLOW-UP APPOINTMENT: \"Do you have a follow-up appointment with your doctor?\"      No    Protocols used: Infection on Antibiotic Follow-up Call-A-[place]    Inez Glass RN  [facility]    "

## 2024-05-31 NOTE — ED TRIAGE NOTES
Pt has had B ear pain R>L  for about 1 week.  States he cannot hear out of right side , has dizziness, getting difficult to open his mouth.  Denies injury     Triage Assessment (Adult)       Row Name 05/31/24 1130          Triage Assessment    Airway WDL WDL        Respiratory WDL    Respiratory WDL WDL        Skin Circulation/Temperature WDL    Skin Circulation/Temperature WDL WDL        Cardiac WDL    Cardiac WDL WDL        Peripheral/Neurovascular WDL    Peripheral Neurovascular WDL WDL        Cognitive/Neuro/Behavioral WDL    Cognitive/Neuro/Behavioral WDL WDL

## 2024-05-31 NOTE — ED PROVIDER NOTES
Emergency Department Encounter      NAME: Ba Mireles  AGE: 44 year old male  YOB: 1980  MRN: [id number]  EVALUATION DATE & TIME: No admission date for patient encounter.    PCP: Zaid Eli    ED PROVIDER: Erik Walker M.D.      Chief Complaint   Patient presents with    Otalgia         FINAL IMPRESSION:  1. Acute otitis externa of both ears, unspecified type    2. Non-recurrent acute serous otitis media of both ears        MEDICAL DECISION MAKIN:49 PM I met with the patient, obtained history, performed an initial exam, and discussed options and plan for diagnostics and treatment here in the ED.     This patient is a 44-year-old male who presents with ear pain which has been present for the past 6 days despite being on antibiotics including amoxicillin and Augmentin as well as decongestants and for the last 24 hours Cortisporin eardrops.  He says that he is using acetaminophen 500 mg as well as over-the-counter Sudafed.  He says that the pain is felt in and around both ears.  He says that for the past month he has been having some crusty discharge from the ears but yesterday it was noted that he had a creamy discharge.  He had had his ears irrigated on the  because of ceruminosis.  Today he was given a shot of Toradol for the pain as he was seen in the waiting room.  On exam he had pain with traction of both pinna and some mild swelling of both external auditory canals.  His TMs showed fluid behind both of them but they did not appear erythematous.  I suspect that he has serous otitis media as well as external otitis although with his degree of pain I considered Troy Maynard.  He did not have any clusters of blisters or lesions on his face but he did mention that the pain was felt down his right jaw.  I did not see any evidence of this on the ear exam either.  He was comfortable giving the eardrops for another day or 2 to work.  I sent him home with 12 tablets of oxycodone that  he could use in addition to the over-the-counter acetaminophen and ibuprofen.  He will also be using decongestants as well.    Pertinent Labs & Imaging studies reviewed. (See chart for details)    The importance of close follow up was discussed. We reviewed warning signs and symptoms, and I instructed Mr. Mireles to return to the emergency department immediately if he develops any new or worsening symptoms. I provided additional verbal discharge instructions. Mr. Mireles expressed understanding and agreement with this plan of care, his questions were answered, and he was discharged in stable condition.     Medical Decision Making     History:  Supplemental history from: Documented in chart, if applicable  External Record(s) reviewed: Documented in chart, if applicable.     Work Up:  Chart documentation includes differential considered and any EKGs or imaging independently interpreted by provider, where specified.  In additional to work up documented, I considered the following work up: Documented in chart, if applicable.     External consultation:  Discussion of management with another provider: Documented in chart, if applicable     Complicating factors:  Care impacted by chronic illness: Kidney stones  Care affected by social determinants of health: Access to Medical Care     Disposition considerations: Discharged with prescription for oxycodone      MEDICATIONS GIVEN IN THE EMERGENCY:  Medications   ketorolac (TORADOL) injection 30 mg (has no administration in time range)       NEW PRESCRIPTIONS STARTED AT TODAY'S ER VISIT:  New Prescriptions    OXYCODONE (ROXICODONE) 5 MG TABLET    Take 1 tablet (5 mg) by mouth every 6 hours as needed for pain          =================================================================    HPI          Ba Mireles is a 44 year old male with a past medical history of kidney stones who presents with ear pain.  I reviewed the patient's medical records from the 28th and 30 May when  he saw his primary care doctor Dr. Chapman for this problem.  He was noted on the 28th to have impacted cerumen which was removed by water and hydrogen peroxide irrigation.  He was started on amoxicillin and over-the-counter Sudafed for presumed sinusitis.  At that time he had had some diminished hearing acuity from the fluid behind the right TM.  Yesterday he followed up in my clinic again for the ear pain and was diagnosed with bilateral otitis externa as well as sinusitis.  He was started on Cortisporin eardrops and switched to Augmentin.  He had told them that he had had a whitish creamy discharge from both ears and that he had difficulty hearing from the right ear.  He was seen in the ER because of the ongoing pain although he had been on the Cortisporin eardrops for less than a day.    REVIEW OF SYSTEMS   Review of Systems     PAST MEDICAL HISTORY:  Past Medical History:   Diagnosis Date    Kidney stone     first stone since age 20       PAST SURGICAL HISTORY:  Past Surgical History:   Procedure Laterality Date    COMBINED CYSTOSCOPY, RETROGRADES, URETEROSCOPY, LASER HOLMIUM LITHOTRIPSY URETER(S), INSERT STENT Bilateral 8/7/2023    Procedure: Cystoscopy, Bilateral Ureteroscopy, Bilateral Retrograde Pyelogram, Bilateral Stone Basket Extraction, Bilateral Stent placement;  Surgeon: Gus Shipman MD;  Location: [facility]    CYSTOSCOPY,URETEROSCOPY,LITHOTRIPSY      at [facility] SURGERY Right        CURRENT MEDICATIONS:      Current Facility-Administered Medications:     ketorolac (TORADOL) injection 30 mg, 30 mg, Intramuscular, Once, Erik Walker MD    Current Outpatient Medications:     oxyCODONE (ROXICODONE) 5 MG tablet, Take 1 tablet (5 mg) by mouth every 6 hours as needed for pain, Disp: 12 tablet, Rfl: 0    amoxicillin (AMOXIL) 500 MG capsule, Take 1 capsule (500 mg) by mouth 3 times daily for 7 days, Disp: 21 capsule, Rfl: 0    amoxicillin-clavulanate (AUGMENTIN) 875-125 MG tablet, Take  1 tablet by mouth 2 times daily for 7 days, Disp: 14 tablet, Rfl: 0    neomycin-polymyxin-hydrocortisone (CORTISPORIN) 3.5-98492-2 otic suspension, Place 3 drops into both ears 4 times daily, Disp: 20 mL, Rfl: 0    ALLERGIES:  Allergies   Allergen Reactions    Amoxicillin-Pot Clavulanate Anxiety     Has tolerated amoxicillin well in the past       FAMILY HISTORY:  Family History   Problem Relation Age of Onset    Cancer Mother         kidney    Heart Disease Mother     Nephrolithiasis Mother     Gout Mother     Cancer Maternal Grandfather         renal cell    Heart Disease Maternal Grandfather     Nephrolithiasis Maternal Grandfather     Cancer Paternal Grandfather         lucille cell    Kidney Disease Paternal Grandfather     Nephrolithiasis Maternal Aunt        SOCIAL HISTORY:   Social History     Socioeconomic History    Marital status:    Tobacco Use    Smoking status: Some Days     Current packs/day: 0.00     Types: Cigarettes     Last attempt to quit: 2023     Years since quittin.7     Passive exposure: Never    Smokeless tobacco: Never   Vaping Use    Vaping status: Never Used   Substance and Sexual Activity    Alcohol use: Not Currently    Drug use: Yes     Types: Marijuana     Social Determinants of Health     Financial Resource Strain: Low Risk  (3/3/2023)    Received from [employer] [facility], [facility] [employer] [facility] [employer] [facility]    Financial Resource Strain     Difficulty of Paying Living Expenses: 3   Food Insecurity: No Food Insecurity (3/3/2023)    [facility], [facility] [employer] [facility]    Food Insecurity     Worried About Running Out of Food in the Last Year: 1   Transportation Needs: No Transportation Needs (3/3/2023)    [facility], [facility] [employer] [facility]    Transportation Needs     Lack of Transportation (Medical): 1     Received from [employer] [facility]    Social Connections   Interpersonal Safety: Low Risk  (5/28/2024)    Interpersonal Safety     Do you feel physically and emotionally safe where you currently live?: Yes     Within the past 12 months, have you been hit, slapped, kicked or otherwise physically hurt by someone?: No     Within the past 12 months, have you been humiliated or emotionally abused in other ways by your partner or ex-partner?: No   Housing Stability: Low Risk  (3/3/2023)    [facility] [facility], [facility]    Housing Stability     Unable to Pay for Housing in the Last Year: 1       PHYSICAL EXAM:    Vitals: BP (!) 158/75   Pulse 95   Resp 16   Wt 72.6 kg (160 lb)   SpO2 98%   BMI 23.29 kg/m     Constitutional: White middle-aged male who appears uncomfortable and is holding his right ear.  HEAD:Normocephalic, atraumatic,   Eyes: PERRLA, EOM intact, conjunctiva clear, no discharge  ENT: There is tenderness bilaterally over the area anterior to to the pinna bilaterally.  There is pain with traction of the pinna as well.  Mild swelling of both external auditory canals.  There is fluid and air bubbles seen behind both TMs which are pearly.  Mucous membranes moist, tonsils are not erythematous, enlarged and there is no exudate seen.  Nose normal.   Neck-there are 2 mildly tender shotty mobile lymph nodes palpable at the base of the right anterior neck.  Thyroid nontender.  Supple, gross ROM intact.  No JVD.    Skin: Warm, dry, no rash.  Neurologic: Alert & oriented x 3, speech clear, moving all extremities spontaneously   Psychiatric: Affect normal, cooperative.       Erik Walker M.D.  Emergency Medicine  [facility] EMERGENCY ROOM  [address] [address] [phone]  Dept: [phone]     Erik Walker MD  05/31/24  1081

## 2024-05-31 NOTE — TELEPHONE ENCOUNTER
Called patient to update him on doctors recommendation. He verbalized understanding and will go in to be seen.     Inez Glass RN  [facility]

## 2024-05-31 NOTE — TELEPHONE ENCOUNTER
Given worsening symptoms, and jovany neck pain, pt needs to be reevaluated asap, today.   UC or ER.

## 2024-05-31 NOTE — DISCHARGE INSTRUCTIONS
Finish the course of Augmentin/clavulanic acid as well as the neomycin eardrops.  Use acetaminophen 1000 mg 3 times a day as needed for pain.  Use over-the-counter ibuprofen 4 to 600 mg 3 times a day for pain as well.  Continue using your Sudafed for nasal congestion and for the next 3 to 5 days you can use Afrin nasal spray.   Male

## 2024-06-03 ENCOUNTER — PATIENT OUTREACH (OUTPATIENT)
Dept: FAMILY MEDICINE | Facility: CLINIC | Age: 44
End: 2024-06-03
Payer: COMMERCIAL

## 2024-06-03 NOTE — TELEPHONE ENCOUNTER
Transitions of Care Outreach  No chief complaint on file.      Most Recent Admission Date: 5/31/2024   Most Recent Admission Diagnosis:      Most Recent Discharge Date: 5/31/2024   Most Recent Discharge Diagnosis: Acute otitis externa of both ears, unspecified type - H60.503  Non-recurrent acute serous otitis media of both ears - H65.03     Transitions of Care Assessment    Discharge Assessment  How are you doing now that you are home?: first day not in pain.  Right ear is still clogged and cannot hear out of the right ear.  How are your symptoms? (Red Flag symptoms escalate to triage hotline per guidelines): Improved  Do you know how to contact your clinic care team if you have future questions or changes to your health status? : Yes  Does the patient have their discharge instructions? : Yes  Does the patient have questions regarding their discharge instructions? : No  Were you started on any new medications or were there changes to any of your previous medications? : Yes  Does the patient have all of their medications?: Yes  Do you have questions regarding any of your medications? : No  Do you have all of your needed medical supplies or equipment (DME)?  (i.e. oxygen tank, CPAP, cane, etc.): No - What equipment or supplies are needed?    Follow up Plan     Discharge Follow-Up  Discharge follow up appointment scheduled in alignment with recommended follow up timeframe or Transitions of Risk Category? (Low = within 30 days; Moderate= within 14 days; High= within 7 days): Yes  Discharge Follow Up Appointment Date: 06/12/24  Discharge Follow Up Appointment Scheduled with?: Primary Care Provider    Future Appointments   Date Time Provider Department Center   6/12/2024 11:00 AM Zaid Eli MD [facility]       Outpatient Plan as outlined on AVS reviewed with patient.    For any urgent concerns, please contact our 24 hour nurse triage line: [phone] ([phone])       Izabela Hussein RN

## 2024-06-03 NOTE — TELEPHONE ENCOUNTER
Spoke to patient and completed ED F/U.  Izabela Hussein, JUAN MANUELN RN  [facility]

## 2024-06-06 ENCOUNTER — OFFICE VISIT (OUTPATIENT)
Dept: FAMILY MEDICINE | Facility: CLINIC | Age: 44
End: 2024-06-06
Payer: COMMERCIAL

## 2024-06-06 VITALS
SYSTOLIC BLOOD PRESSURE: 126 MMHG | RESPIRATION RATE: 16 BRPM | OXYGEN SATURATION: 98 % | WEIGHT: 158.1 LBS | DIASTOLIC BLOOD PRESSURE: 84 MMHG | BODY MASS INDEX: 22.63 KG/M2 | TEMPERATURE: 98.6 F | HEART RATE: 86 BPM | HEIGHT: 70 IN

## 2024-06-06 DIAGNOSIS — J32.9 SINUSITIS, UNSPECIFIED CHRONICITY, UNSPECIFIED LOCATION: Primary | ICD-10-CM

## 2024-06-06 DIAGNOSIS — H60.393 INFECTIVE OTITIS EXTERNA, BILATERAL: ICD-10-CM

## 2024-06-06 PROCEDURE — 99214 OFFICE O/P EST MOD 30 MIN: CPT | Performed by: FAMILY MEDICINE

## 2024-06-06 RX ORDER — DOXYCYCLINE 100 MG/1
100 CAPSULE ORAL 2 TIMES DAILY
Qty: 14 CAPSULE | Refills: 0 | Status: SHIPPED | OUTPATIENT
Start: 2024-06-06 | End: 2024-06-13

## 2024-06-06 ASSESSMENT — PAIN SCALES - GENERAL: PAINLEVEL: NO PAIN (0)

## 2024-06-06 NOTE — PROGRESS NOTES
"  Assessment & Plan   Problem List Items Addressed This Visit    None  Visit Diagnoses       Sinusitis, unspecified chronicity, unspecified location    -  Primary    Relevant Medications    doxycycline hyclate (VIBRAMYCIN) 100 MG capsule    Other Relevant Orders    Adult ENT  Referral    Infective otitis externa, bilateral        Relevant Orders    Adult ENT  Referral                  Patient Instructions   1)   please start doxycycline 100mg twice a day for 7 days  2)  referral to ENT was placed, they will call about scheduling  3)  let us know if you aren't feeling better by next week      Wendy Stoddard is a 44 year old, presenting for the following health issues:  Ear Problem (Continued Ear issues, would like referral to ENT, Dizzyness, ocean wave sound, visual issues, nausea, short of breath)      6/6/2024     8:56 AM   Additional Questions   Roomed by  LPN     Ear Problem  This is a recurrent problem. The current episode started more than 1 week ago. There is pain in the right ear. The problem occurs constantly. The problem has been rapidly improving. The pain is at a severity of 0/10. The patient is experiencing no pain.          Review of Systems  Constitutional, HEENT, cardiovascular, pulmonary, gi and gu systems are negative, except as otherwise noted.      Objective    /84 (BP Location: Left arm, Patient Position: Sitting, Cuff Size: Adult Regular)   Pulse 86   Temp 98.6  F (37  C) (Oral)   Resp 16   Ht 1.765 m (5' 9.5\")   Wt 71.7 kg (158 lb 1.6 oz)   SpO2 98%   BMI 23.01 kg/m    Body mass index is 23.01 kg/m .  Physical Exam   GENERAL: alert and no distress  HENT: normal cephalic/atraumatic, ear canals and TM's normal, nose and mouth without ulcers or lesions, oropharynx clear, oral mucous membranes moist, and sinuses: maxillary tenderness on bilateral, maxillary swelling on bilateral  NECK: no adenopathy, no asymmetry, masses, or scars  RESP: lungs clear to " auscultation - no rales, rhonchi or wheezes  CV: regular rate and rhythm, normal S1 S2, no S3 or S4, no murmur, click or rub, no peripheral edema  ABDOMEN: soft, nontender, no hepatosplenomegaly, no masses and bowel sounds normal  MS: no gross musculoskeletal defects noted, no edema            Signed Electronically by: BRENDA JONES MD

## 2024-06-06 NOTE — PATIENT INSTRUCTIONS
1)   please start doxycycline 100mg twice a day for 7 days  2)  referral to ENT was placed, they will call about scheduling  3)  let us know if you aren't feeling better by next week

## 2024-06-17 NOTE — TELEPHONE ENCOUNTER
S-(situation): My Chart message sent today stating that he has about 60 percent hearing in his right ear and can feel mucus moving around but nothing draining although is having sore throat and coughing.    B-(background): Patient completed a 7 day course of doxycycline on Thursday.  Has ENT referral for 7/20 and is on the waiting list.  Also has another ENT scheduled on 7/5 that is outside our system.      A-(assessment): Patient denies any fever. Does have occasional headache. Does state that feels like the hearing is a little improved, but is wondering what else he can do prior to ENT.  Feels unbalanced and has lost weight due to poor appetite r/t this ear issue.     R-(recommendations): Please advise if any recommendations for relief prior to seeing ENT.     JUAN MANUEL ChunN RN  [facility]

## 2024-06-18 NOTE — TELEPHONE ENCOUNTER
Spoke to patient and relayed provider message. Patient verbalized understanding.     Patient does not want to go to urgent care as he feels PCP will be better able to help. Scheduled office visit with PCP for tomorrow.     Advised patient to go to urgent care tonight if any new or worsening symptoms. Patient verbalized understanding and is in agreement with plan.     Silke Garcia RN  [facility]

## 2024-06-18 NOTE — TELEPHONE ENCOUNTER
If ENT is not able to see pt in timely manner, I would recommend reevaluation ASAP, as pt is reporting hearing loss.   Should be seen today or tomorrow.   May need UC

## 2024-06-19 ENCOUNTER — OFFICE VISIT (OUTPATIENT)
Dept: FAMILY MEDICINE | Facility: CLINIC | Age: 44
End: 2024-06-19
Payer: COMMERCIAL

## 2024-06-19 VITALS
TEMPERATURE: 98.8 F | DIASTOLIC BLOOD PRESSURE: 78 MMHG | HEART RATE: 80 BPM | HEIGHT: 68 IN | OXYGEN SATURATION: 98 % | WEIGHT: 154 LBS | BODY MASS INDEX: 23.34 KG/M2 | SYSTOLIC BLOOD PRESSURE: 126 MMHG

## 2024-06-19 DIAGNOSIS — H66.006 RECURRENT ACUTE SUPPURATIVE OTITIS MEDIA WITHOUT SPONTANEOUS RUPTURE OF TYMPANIC MEMBRANE OF BOTH SIDES: Primary | ICD-10-CM

## 2024-06-19 PROCEDURE — 99214 OFFICE O/P EST MOD 30 MIN: CPT | Performed by: FAMILY MEDICINE

## 2024-06-19 RX ORDER — CIPROFLOXACIN 500 MG/1
500 TABLET, FILM COATED ORAL 2 TIMES DAILY
Qty: 14 TABLET | Refills: 0 | Status: SHIPPED | OUTPATIENT
Start: 2024-06-19 | End: 2024-06-26

## 2024-06-19 ASSESSMENT — PAIN SCALES - GENERAL: PAINLEVEL: MILD PAIN (2)

## 2024-06-19 NOTE — PROGRESS NOTES
"  Assessment & Plan   Problem List Items Addressed This Visit    None  Visit Diagnoses       Recurrent acute suppurative otitis media without spontaneous rupture of tympanic membrane of both sides    -  Primary    Relevant Medications    ciprofloxacin (CIPRO) 500 MG tablet                  Patient Instructions   1)  please start ciprofloxacin 500mg twice a day for 7 days.  2)  continue with good hydration  3)  follow up with ENT on July 5, 2024 as scheduled  4)  please let us know if any issues with antibiotic or worsening symptoms.        Wendy Stoddard is a 44 year old, presenting for the following health issues:  Ear Problem (Continuation of ear problems. On both ears. Vertigo. Hearing loss.)        6/19/2024     2:51 PM   Additional Questions   Roomed by Cristiana Cerna CMA to see ENT 7/5/24 ([facility])    History of Present Illness       Reason for visit:  New    He eats 2-3 servings of fruits and vegetables daily.He consumes 1 sweetened beverage(s) daily.He exercises with enough effort to increase his heart rate 30 to 60 minutes per day.  He exercises with enough effort to increase his heart rate 5 days per week.   He is taking medications regularly.                 Review of Systems  Constitutional, HEENT, cardiovascular, pulmonary, gi and gu systems are negative, except as otherwise noted.      Objective    /78   Pulse 80   Temp 98.8  F (37.1  C)   Ht 1.737 m (5' 8.4\")   Wt 69.9 kg (154 lb)   SpO2 98%   BMI 23.14 kg/m    Body mass index is 23.14 kg/m .  Physical Exam   GENERAL: alert and no distress  HENT: normal cephalic/atraumatic, right ear: mucopurulent effusion, left ear: mucopurulent effusion, nose and mouth without ulcers or lesions, nasal mucosa edematous , oropharynx clear, and oral mucous membranes moist  NECK: no adenopathy, no asymmetry, masses, or scars  RESP: lungs clear to auscultation - no rales, rhonchi or wheezes  CV: regular rate and rhythm, " normal S1 S2, no S3 or S4, no murmur, click or rub, no peripheral edema  MS: no gross musculoskeletal defects noted, no edema            Signed Electronically by: BRENDA JONES MD

## 2024-06-19 NOTE — PATIENT INSTRUCTIONS
1)  please start ciprofloxacin 500mg twice a day for 7 days.  2)  continue with good hydration  3)  follow up with ENT on July 5, 2024 as scheduled  4)  please let us know if any issues with antibiotic or worsening symptoms.

## 2024-06-27 ENCOUNTER — MYC MEDICAL ADVICE (OUTPATIENT)
Dept: FAMILY MEDICINE | Facility: CLINIC | Age: 44
End: 2024-06-27
Payer: COMMERCIAL

## 2024-06-27 ENCOUNTER — NURSE TRIAGE (OUTPATIENT)
Dept: FAMILY MEDICINE | Facility: CLINIC | Age: 44
End: 2024-06-27
Payer: COMMERCIAL

## 2024-06-27 ENCOUNTER — OFFICE VISIT (OUTPATIENT)
Dept: FAMILY MEDICINE | Facility: CLINIC | Age: 44
End: 2024-06-27
Payer: COMMERCIAL

## 2024-06-27 VITALS
TEMPERATURE: 98.6 F | HEART RATE: 74 BPM | SYSTOLIC BLOOD PRESSURE: 138 MMHG | WEIGHT: 154 LBS | OXYGEN SATURATION: 98 % | RESPIRATION RATE: 17 BRPM | DIASTOLIC BLOOD PRESSURE: 89 MMHG | BODY MASS INDEX: 23.14 KG/M2

## 2024-06-27 DIAGNOSIS — H57.89 EYE SWELLING, LEFT: Primary | ICD-10-CM

## 2024-06-27 PROCEDURE — 99213 OFFICE O/P EST LOW 20 MIN: CPT

## 2024-06-27 NOTE — PATIENT INSTRUCTIONS
- If swelling returns Ice, benadryl  - Artificial tears for eye dryness  - If you develop worsening pain, vision changes, or development of infectious symptoms (rash fever, etc) return to Urgent Care or ED

## 2024-06-27 NOTE — PROGRESS NOTES
Assessment & Plan:      Problem List Items Addressed This Visit    None  Visit Diagnoses       Eye swelling, left    -  Primary          Medical Decision Making  Eyelid swelling, left   Patient's presentation seems consistent with an allergic eyelid swelling possibly from insect or allergen.  No evidence of preseptal cellulitis.  No vision changes or pain with extraocular movements.  At the time of this writing patient's symptoms had resolved.  Low suspicion for other infectious process.  Counseled patient that this could be a prodrome of a zoster infection, however patient has had no fevers, erythema, or new rashes.  Suspect patient's condition will be self-limited.  Patient counseled extensively with girlfriend present about return precautions.  Patient comfortable with plan as outlined below.  - If swelling returns Ice, benadryl  - Artificial tears for eye dryness  - If you develop worsening pain, vision changes, or development of infectious symptoms (rash fever, etc) return to Urgent Care or ED  - Follow-up with PCP and ENT as previously scheduled    Patient verbalizes understanding of the treatment regimen and will follow up as needed for recheck and evaluation if symptoms worsen or do not improve. Patient states understanding and agreement with the plan.    Wil Toledo MD  Internal Medicine-Pediatrics, PGY-3  [facility]     Subjective:      Ba Mireles is a 44 year old male here for evaluation of eyelid swelling.    Richi woke up this morning with significant lower eyelid and cheek swelling.  Patient had no known insect bites or new skin exposures.  Patient has not used any new soaps, lotions, fragrances.  Patient has not noticed a this has happened in the past.  When his eye was swollen shut he had decreased vision on the left side because he was struggling to open his eye.  At the time of his visit his eyelid swelling has significantly reduced and is able to see with normal vision out  of both eyes together and independently.  He has had no slurring of his speech, facial droop, rash, fever, significant pain, nausea, vomiting, diarrhea, or weakness.  He has been taking antibiotics for acute otitis externa and media for the past few weeks as drops and oral medications.  He has upcoming follow-up with ENT for these complaints.  He does feel like he had a more active dream where he was rubbing his eye more significantly last night.  He has not noticed any abrasions, redness, purulence coming from his eye.     The following portions of the patient's history were reviewed and updated as appropriate: allergies, current medications, and problem list.     Review of Systems  Pertinent items are noted in HPI.  All other systems are negative.    Allergies  Allergies   Allergen Reactions    Amoxicillin-Pot Clavulanate Anxiety     Has tolerated amoxicillin well in the past       Family History   Problem Relation Age of Onset    Cancer Mother         kidney    Heart Disease Mother     Nephrolithiasis Mother     Gout Mother     Cancer Maternal Grandfather         renal cell    Heart Disease Maternal Grandfather     Nephrolithiasis Maternal Grandfather     Cancer Paternal Grandfather         lucille cell    Kidney Disease Paternal Grandfather     Nephrolithiasis Maternal Aunt        Social History     Tobacco Use    Smoking status: Every Day     Current packs/day: 0.00     Types: Cigarettes     Last attempt to quit: 2023     Years since quittin.8     Passive exposure: Current    Smokeless tobacco: Never   Substance Use Topics    Alcohol use: Not Currently        Objective:      /89 (BP Location: Right arm, Patient Position: Sitting, Cuff Size: Adult Regular)   Pulse 74   Temp 98.6  F (37  C) (Oral)   Resp 17   Wt 69.9 kg (154 lb)   SpO2 98%   BMI 23.14 kg/m    General appearance - alert, well appearing, and in no distress  Eyes - pupils equal and reactive, extraocular eye movements intact, mild  left periorbital swelling significantly decreased from photo from this morning, no tenderness to palpation, no erythema, no exudate  Ears - right greater than left TMs visualized with effusion and mild amount of purulence, right greater than left erythema of external canal  Nose - normal and patent, no erythema, discharge or polyps  Mouth - moist mucous membranes  Neck - supple, no significant adenopathy  Chest - breathing comfortably on room air  Heart - appears well-perfused  Abdomen - nondistended  Neurological - alert, oriented, normal speech, no focal findings or movement disorder noted, cranial nerves II through XII intact  Musculoskeletal - no muscular tenderness noted  Skin - normal coloration and turgor, no rashes, no suspicious skin lesions noted     Lab & Imaging Results    No results found for this or any previous visit (from the past 24 hour(s)).    I personally reviewed these results and discussed findings with the patient.

## 2024-06-27 NOTE — TELEPHONE ENCOUNTER
Nurse Triage SBAR    Is this a 2nd Level Triage? NO    Situation: Patient woke up with lower left eye swelling    Background: Patient is on last day of treatment for ear and sinus infection    Assessment: Patient called in with new swelling to his left lower eyelid. He doesn't know what could have caused this and says there is no pain, but it feels like the swollen pocket has fluid in it. He is recovering from a sinus and ear infection in which he just completed his antibiotic course this morning. He does not endorse fever, redness, injury, or bug bite at this time.     Protocol Recommended Disposition:   Go To Office Now    Recommendation: Be seen in office, no appointment available       Does the patient meet one of the following criteria for ADS visit consideration? 16+ years old, with an [facility] PCP     TIP  Providers, please consider if this condition is appropriate for management at one of our Acute and Diagnostic Services sites.     If patient is a good candidate, please use dotphrase <dot>triageresponse and select Refer to ADS to document.  Reason for Disposition   SEVERE eyelid swelling on one side and sinus pain or pressure    Additional Information   Negative: Unresponsive, passed out or very weak   Negative: Difficulty breathing or wheezing   Negative: Difficulty swallowing or slurred speech and sudden onset   Negative: Sounds like a life-threatening emergency to the triager   Negative: Chemical got in the eye   Negative: Recent injury to the eye   Negative: Entire face is swollen   Negative: Sacs of clear fluid (blisters) on whites of eyes (allergic cysts)   Negative: Contact with pollen, other allergic substance or eyedrops   Negative: Bee sting and within last 24 hours   Negative: Insect bite suspected   Negative: Yellow or green discharge (pus) in the eye   Negative: Redness of white area (sclera) of eye(s)   Negative: SEVERE eyelid swelling (i.e., shut or almost) and fever   Negative: Eyelid (outer)  "is very red and fever   Negative: Pregnant 20 or more weeks and sudden weight gain (e.g., more than 3 lbs or 1.4 kg in one week)   Negative: Postpartum (from 0 to 6 weeks after delivery) and sudden weight gain (e.g., more than 3 lbs or 1.4 kg in one week)   Negative: Patient sounds very sick or weak to the triager   Negative: SEVERE eyelid swelling (i.e., shut or almost) and involves both eyes   Negative: SEVERE eyelid swelling and taking an ACE Inhibitor medication (e.g., benazepril/LOTENSIN, captopril/CAPOTEN, enalapril/VASOTEC, lisinopril/ZESTRIL)   Negative: SEVERE eyelid swelling on one side that is red and painful (or tender to touch)    Answer Assessment - Initial Assessment Questions  1. ONSET: \"When did the swelling start?\" (e.g., minutes, hours, days)      Just noticed this morning  2. LOCATION: \"What part of the eyelids is swollen?\"      Lower left eye  3. SEVERITY: \"How swollen is it?\"      Appears about 3mL of fluid could fit there  4. ITCHING: \"Is there any itching?\" If Yes, ask: \"How much?\"   (Scale 1-10; mild, moderate or severe)      No  5. PAIN: \"Is the swelling painful to touch?\" If Yes, ask: \"How painful is it?\"   (Scale 1-10; mild, moderate or severe)      No  6. FEVER: \"Do you have a fever?\" If Yes, ask: \"What is it, how was it measured, and when did it start?\"       No  7. CAUSE: \"What do you think is causing the swelling?\"      Doesn't know  8. RECURRENT SYMPTOM: \"Have you had eyelid swelling before?\" If Yes, ask: \"When was the last time?\" \"What happened that time?\"      No  9. OTHER SYMPTOMS: \"Do you have any other symptoms?\" (e.g., blurred vision, eye discharge, rash, runny nose)      Blurry vision, clear watery fluid  10. PREGNANCY: \"Is there any chance you are pregnant?\" \"When was your last menstrual period?\"        No    Protocols used: Eye - Swelling-A-OH    Inez Glass RN  [facility]  "

## 2024-07-09 ENCOUNTER — MYC MEDICAL ADVICE (OUTPATIENT)
Dept: FAMILY MEDICINE | Facility: CLINIC | Age: 44
End: 2024-07-09
Payer: COMMERCIAL

## 2024-07-09 NOTE — TELEPHONE ENCOUNTER
Spoke to patient and relayed provider message. Patient verbalized understanding and is in agreement with plan.     Silke Garcia RN  [facility]

## 2024-07-09 NOTE — TELEPHONE ENCOUNTER
Steroids are reasonably safe for short periods like this with the kidneys.     He'd want to take them in the morning to help prevent issues with sleeping.     He would have an increase in appetite, not decrease.     For the stomach issues, if persisting after antibiotics, he should consider seeing someone. He could try limiting dairy for a short period of time, and doing a more bland diet.

## 2024-07-09 NOTE — TELEPHONE ENCOUNTER
Left message for patient to return call. If patient calls back, please route to [facility].     TC please route to RN.    Silke Garcia RN  [facility]

## 2024-07-09 NOTE — TELEPHONE ENCOUNTER
Spoke with patient.     [facility]. He was prescribed prednisone for 2 weeks due to sinus issues and nasal steroid.    He is concerned about how this will affect his kidneys - he has a history of kidney stones and reports he gets 2-3 kidney stones per year. Patient is requesting PCP input on if the steroid is recommended for him due to kidney history.  Patient reports he would prefer not to take it if there are any other options, as he does not like the side effects.     He reports the CT scan mentioned a deviated septum, but the ENT told him there was nothing wrong.    He has another ENT appointment on 7/24 with [facility], as he would like another option.     Regarding GI concerns -  patient reports he has been having lose stool for about 2-3 weeks, which he attributes to the several rounds of antibiotics he has taken. He reports he needs to have a bowel movement about 10-30 minutes after every meal. He will see if this resolves after being off the antibiotics for a while. He reports he is able to eat OK and is keeping up on fluids.     Routed to PCP to advise on if prednisone is recommended for patient or if any other medications/options could potentially replace it.    Mari Oneill RN  [facility]

## 2024-07-10 NOTE — TELEPHONE ENCOUNTER
Spoke with patient. He reports he just wants to provide an update at this time. Writer reviewed recommendations/plan from Urgent Care visit on 6/27 when his other eye had swelling. Per notes:    - If swelling returns Ice, benadryl  - Artificial tears for eye dryness  - If you develop worsening pain, vision changes, or development of infectious symptoms (rash fever, etc) return to Urgent Care or ED  - Follow-up with PCP and ENT as previously scheduled    Patient denies pain in eye, vision changes or fever.     He does report he woke up sweating this morning and has been sweating more than usual. Denies chills. Temp at time of call was 96.9F    Patient reports he has had a headache since last this morning that has not improved with use of Tylenol. Reports he has been pushing water.     Started prednisone and fluticasone this morning.      Patient reports he has spoke to [facility] about his poor experience with the nurse. They are looking into other treatment options to replace the steroids at this time.     Writer advised patient to seek care with pain, vision change or if headache persists. Also advised to call back if needing advice/follow-up. Patient agreed with plan and will await slava back from [facility] fot treatment options.     Mari Oneill RN  [facility]

## 2024-07-19 NOTE — TELEPHONE ENCOUNTER
FUTURE VISIT INFORMATION      FUTURE VISIT INFORMATION:  Date: 7/24/2024  Time: 7:45AM   Location: [facility]  REFERRAL INFORMATION:  Referring provider:  Zaid Eli MD   Referring providers clinic:  [facility]   Reason for visit/diagnosis  Sinusitis, unspecified chronicity, unspecified location   Infective otitis externa, bilateral     RECORDS REQUESTED FROM:       Clinic name Comments Records Status Imaging Status    OV with Dr. Eli- Sinusitis  In EPIC     ED- 5/31/2024 Acute Otitis externa of both ears In EPIC

## 2024-07-22 NOTE — PROGRESS NOTES
[facility]  New Patient Visit      Encounter date:   July 22, 2024    Referring Provider:   Zaid Eli MD  [address]  [place] [address]    Chief Complaint: consult    History of Present Illness:   Ba Mireles is a 44 year old male who presents for consultation regarding sinusitis, bilateral infective otitis externa, and chronic nasal congestion.    5/30/24 OV with Dr. Eli- Sinusitis: Richi is a 44 year old, presenting for the following health issues:  Ear Problem (Pt has been having creamy discharge coming out of both ears. Pt is unable to hear out of Right ear. Pt started amoxicillin and sudafed on Tuesday but has not felt any relief. Pt states it feels like his face is swollen. Pt states his Right ear hurts when he opens his mouth. Pt states he does get occasional vertigo. ) and Eye Problem (Pt states his vision is also getting blurry. This has been a change in the last couple days. ) His A&P detailed that he had bilateral infective otitis externa and sinusitis. He was prescribed neomycin-polymyxin-hydrocortisone (CORTISPORIN) 3.5-23111-9 otic suspension and amoxicillin-clavulanate (AUGMENTIN) 875-125 MG tablet. He was also seen on 6/6/ and 6/13 for follow-ups on sinusitis and bilateral infective otitis externa.      Today, he reports that he has always had intermittent nasal congestion that onset 5 years ago. He reports that a couple of weeks ago, he had hearing loss, facial pain, and constant headaches therefore he was prescribed antibiotics for over a month and Prednisone for bilateral ear infections and sinusitis. He states that he did not tolerate Prednisone well and had issues sleeping. He states that he has been using Flonase since he started taking the Prednisone (for the last few weeks), however he thinks Flonase is making him worse and burns his nose. He has tried saline irrigation in his nostrils in the past. He reports that he had a CT sinus  recently completed for further investigation. He states that since he stopped taking Prednisone and antibiotics, he feels facial pressure and nasal congestion coming back but nowhere near what it was. He states that he has never had allergies and does not think his symptoms are due to allergies, but he has allergy testing scheduled. He states that before this recent instance of sinusitis, he previously would get sinusitis about twice a year. He states that a couple of years ago, he hacked up a very large amount of phlegm that cleared him up at the time. Currently, he reports a globus sensation. He states that he smokes a pack a day and has tried to quit many times. He denies new animals to the house, exposure to mold, hx of acid reflux, hx of tonsil stones. 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    Review of systems: A 14-point review of systems has been conducted and was negative for any notable symptoms, except as dictated in the history of present illness.     Medical History:  Past Medical History:   Diagnosis Date    Kidney stone     first stone since age 20        Surgical History:   Past Surgical History:   Procedure Laterality Date    COMBINED CYSTOSCOPY, RETROGRADES, URETEROSCOPY, LASER HOLMIUM LITHOTRIPSY URETER(S), INSERT STENT Bilateral 8/7/2023    Procedure: Cystoscopy, Bilateral Ureteroscopy, Bilateral Retrograde Pyelogram, Bilateral Stone Basket Extraction, Bilateral Stent placement;  Surgeon: Gus Shipman MD;  Location: [facility] OR    CYSTOSCOPY,URETEROSCOPY,LITHOTRIPSY      at [facility]         Family History:  Family History   Problem Relation Age of Onset    Cancer Mother         kidney    Heart Disease Mother     Nephrolithiasis Mother     Gout Mother     Cancer Maternal Grandfather         renal cell    Heart Disease Maternal Grandfather     Nephrolithiasis Maternal Grandfather     Cancer  Paternal Grandfather         lucille cell    Kidney Disease Paternal Grandfather     Nephrolithiasis Maternal Aunt         Social History:   Social History     Socioeconomic History    Marital status:    Tobacco Use    Smoking status: Every Day     Current packs/day: 0.00     Types: Cigarettes     Last attempt to quit: 2023     Years since quittin.9     Passive exposure: Current    Smokeless tobacco: Never   Vaping Use    Vaping status: Never Used   Substance and Sexual Activity    Alcohol use: Not Currently    Drug use: Yes     Types: Marijuana     Social Determinants of Health     Financial Resource Strain: Low Risk  (3/3/2023)    [facility], [facility]    Financial Resource Strain     Difficulty of Paying Living Expenses: 3   Food Insecurity: No Food Insecurity (3/3/2023)    [facility], [facility] [email or web address] [facility]    Food Insecurity     Worried About Running Out of Food in the Last Year: 1   Transportation Needs: No Transportation Needs (3/3/2023)    [facility], [facility] [email or web address] [facility]    Transportation Needs     Lack of Transportation (Medical): 1    Received from [facility] [email or web address] [facility]    Social Connections   Interpersonal Safety: Low Risk  (2024)    Interpersonal Safety     Do you feel physically and emotionally safe where you currently live?: Yes     Within the past 12 months, have you been hit, slapped, kicked or otherwise physically hurt by someone?: No     Within the past 12 months, have you been humiliated or emotionally abused in other ways by your partner or ex-partner?: No   Housing Stability: Low Risk  (3/3/2023)    [facility], [facility]    Housing Stability      "Unable to Pay for Housing in the Last Year: 1        Physical Exam:  Vital signs: BP (!) 141/94 (BP Location: Left arm, Patient Position: Sitting, Cuff Size: Adult Regular)   Pulse 89   Ht 1.765 m (5' 9.5\")   Wt 68.5 kg (151 lb 1.6 oz)   SpO2 100%   BMI 21.99 kg/m     General Appearance: No acute distress, appropriate demeanor, conversant  Eyes: moist conjunctivae; EOMI; pupils symmetric; visual acuity grossly intact; no proptosis  Head: normocephalic; overall symmetric appearance without deformity  Face: overall symmetric without deformity  Ears: Normal appearance of external ear; external meatus normal in appearance  Nose: No external deformity  Oral Cavity/oropharynx: Normal appearance of mucosa  Neck: no palpable lymphadenopathy; thyroid without palpable nodules  Lungs: symmetric chest rise; no wheezing  CV: Good distal perfusion; normal hear rate  Extremities: No deformity  Neurologic Exam: Cranial nerves II-XII are grossly intact; no focal deficit    Procedure Note  Procedure performed: Rigid nasal endoscopy  Indication: To evaluate for sinonasal pathology not visualized on routine anterior rhinoscopy  Anesthesia: 4% topical lidocaine with 0.05% oxymetazoline  Description of procedure: A 30 degree, 3 mm rigid endoscope was inserted into bilateral nasal cavities and the nasal valves, nasal cavity, middle meatus, sphenoethmoid recess, and nasopharynx were thoroughly evaluated for evidence of obstruction, edema, purulence, polyps and/or mass/lesion.     [place] Endoscopic Scoring System  Endoscopic observation Right Left   Polyps in middle meatus (0 = absent, 1 = restricted to middle meatus, 2 = Beyond middle meatus) 0 0   Discharge (0 = absent, 1 = thin and clear, 2 = thick, purulent) 0 0   Edema (0 = absent, 1 = mild-moderate, 2 = moderate-severe) 1 1   Crusting (0 = absent, 1 = mild-moderate, 2 = moderate-severe) 0 0   Scarring (0= absent, 1 = mild-moderate, 2 = moderate-severe) 0 0   Total 1 1 "     Findings  RT and LT: Bilateral sinonasal passages patent without evidence of infection, polyps, or mass.    The patient tolerated the procedure well without complication.     Assessment/Medical Decision Making:  Ba Mireles is a 44 year old male with chronic nasal congestion and a recent hx of sinusitis and bilateral infective otitis externa. Etiology and potential causes for nasal congestion were discussed. His nasal endoscopy today showed that bilateral sinonasal passages were patent without evidence of infection, polyps, or masses. He was counseled on the negative impacts of smoking and recommended to quit. Questions and concerns were addressed.    Plan:  Patient will receive a phone call/message to review the results of his CT sinus once the office receives these records  Start sinus rinses once daily  Start OTC Nasonex nasal spray two sprays BID  Stop OTC Flonase  Follow-up in 3-4 months    Scribe Disclosure:   I, Sandra Oden, am serving as a scribe; to document services personally performed by Americo Friedman MD -based on data collection and the provider's statements to me.     Provider Disclosure:  I agree with above History, Review of Systems, Physical exam and Plan.  I have reviewed the content of the documentation and have edited it as needed. I have personally performed the services documented here and the documentation accurately represents those services and the decisions I have made.      Electronically signed by:    Americo Friedman MD    [facility]  Rhinology  Endoscopic Skull Base Surgery  [facility]  Department of Otolaryngology - Head & Neck Surgery

## 2024-07-24 ENCOUNTER — OFFICE VISIT (OUTPATIENT)
Dept: FAMILY MEDICINE | Facility: CLINIC | Age: 44
End: 2024-07-24
Payer: COMMERCIAL

## 2024-07-24 ENCOUNTER — OFFICE VISIT (OUTPATIENT)
Dept: OTOLARYNGOLOGY | Facility: CLINIC | Age: 44
End: 2024-07-24
Payer: COMMERCIAL

## 2024-07-24 ENCOUNTER — PRE VISIT (OUTPATIENT)
Dept: OTOLARYNGOLOGY | Facility: CLINIC | Age: 44
End: 2024-07-24

## 2024-07-24 ENCOUNTER — MYC MEDICAL ADVICE (OUTPATIENT)
Dept: OTOLARYNGOLOGY | Facility: CLINIC | Age: 44
End: 2024-07-24

## 2024-07-24 VITALS
BODY MASS INDEX: 21.63 KG/M2 | OXYGEN SATURATION: 100 % | DIASTOLIC BLOOD PRESSURE: 94 MMHG | WEIGHT: 151.1 LBS | HEART RATE: 89 BPM | HEIGHT: 70 IN | SYSTOLIC BLOOD PRESSURE: 141 MMHG

## 2024-07-24 VITALS
HEART RATE: 77 BPM | OXYGEN SATURATION: 98 % | SYSTOLIC BLOOD PRESSURE: 133 MMHG | RESPIRATION RATE: 16 BRPM | TEMPERATURE: 98.6 F | WEIGHT: 152 LBS | BODY MASS INDEX: 21.76 KG/M2 | HEIGHT: 70 IN | DIASTOLIC BLOOD PRESSURE: 89 MMHG

## 2024-07-24 DIAGNOSIS — H60.393 INFECTIVE OTITIS EXTERNA, BILATERAL: ICD-10-CM

## 2024-07-24 DIAGNOSIS — G47.00 INSOMNIA, UNSPECIFIED TYPE: ICD-10-CM

## 2024-07-24 DIAGNOSIS — J32.9 SINUSITIS, UNSPECIFIED CHRONICITY, UNSPECIFIED LOCATION: Primary | ICD-10-CM

## 2024-07-24 DIAGNOSIS — J32.9 SINUSITIS, UNSPECIFIED CHRONICITY, UNSPECIFIED LOCATION: ICD-10-CM

## 2024-07-24 PROCEDURE — 99213 OFFICE O/P EST LOW 20 MIN: CPT | Performed by: FAMILY MEDICINE

## 2024-07-24 PROCEDURE — 99203 OFFICE O/P NEW LOW 30 MIN: CPT | Mod: 25 | Performed by: STUDENT IN AN ORGANIZED HEALTH CARE EDUCATION/TRAINING PROGRAM

## 2024-07-24 PROCEDURE — 31231 NASAL ENDOSCOPY DX: CPT | Performed by: STUDENT IN AN ORGANIZED HEALTH CARE EDUCATION/TRAINING PROGRAM

## 2024-07-24 RX ORDER — PREDNISONE 10 MG/1
TABLET ORAL
COMMUNITY
Start: 2024-07-09 | End: 2024-07-29

## 2024-07-24 RX ORDER — FLUTICASONE PROPIONATE 50 MCG
2 SPRAY, SUSPENSION (ML) NASAL DAILY
COMMUNITY
Start: 2024-07-09 | End: 2024-07-29

## 2024-07-24 RX ORDER — TRAZODONE HYDROCHLORIDE 50 MG/1
50 TABLET, FILM COATED ORAL AT BEDTIME
Qty: 30 TABLET | Refills: 1 | Status: SHIPPED | OUTPATIENT
Start: 2024-07-24

## 2024-07-24 ASSESSMENT — PAIN SCALES - GENERAL
PAINLEVEL: NO PAIN (0)
PAINLEVEL: MILD PAIN (2)

## 2024-07-24 ASSESSMENT — ENCOUNTER SYMPTOMS: WHEEZING: 1

## 2024-07-24 NOTE — PROGRESS NOTES
"  Assessment & Plan   Problem List Items Addressed This Visit    None  Visit Diagnoses       Sinusitis, unspecified chronicity, unspecified location    -  Primary    Relevant Orders    Adult Allergy/Asthma  Referral    Insomnia, unspecified type        Relevant Medications    traZODone (DESYREL) 50 MG tablet             Given ongoing issues with sinus symptoms, would recommend follow-up with allergy for further evaluation.  Of note patient has seen ENT.    Regards to sleep, trial of trazodone, patient update us in the next couple weeks.  Things are going.           Wendy Stoddard is a 44 year old, presenting for the following health issues:  Allergies and Sleep Problem      7/24/2024    11:52 AM   Additional Questions   Roomed by jamil melgoza cma     Allergies    History of Present Illness       Reason for visit:  Physical / allergen test / sleeping issues    He eats 2-3 servings of fruits and vegetables daily.He consumes 0 sweetened beverage(s) daily.He exercises with enough effort to increase his heart rate 60 or more minutes per day.  He exercises with enough effort to increase his heart rate 5 days per week.   He is taking medications regularly.                 Review of Systems  Constitutional, HEENT, cardiovascular, pulmonary, gi and gu systems are negative, except as otherwise noted.      Objective    /89   Pulse 77   Temp 98.6  F (37  C)   Resp 16   Ht 1.765 m (5' 9.5\")   Wt 68.9 kg (152 lb)   SpO2 98%   BMI 22.12 kg/m    Body mass index is 22.12 kg/m .  Physical Exam   GENERAL: alert and no distress            Signed Electronically by: BRENDA JONES MD    "

## 2024-07-24 NOTE — PATIENT INSTRUCTIONS
You were seen in the ENT Clinic today by Dr. Friedman. If you have any questions or concerns after your appointment, please contact us (see below)       2.   The following recommendations have been made based upon your appointment today:   -Purchase Nasonex 2 sprays in each nostril twice daily.   -Perform sinus rinses once daily. Be sure to not use the nasal spray immediately before a sinus rinse. We have included additional instructions below regarding sinus rinses.   -Dr. Friedman will review the CT images from [facility] and he will reach out should he have any additional information for you regarding the scan.       3.   Plan to return to the ENT clinic in 3-4 months.           How to Contact Us:  Send a [employer] message to your provider. Our team will respond to you via [employer]. Occasionally, we will need to call you to get further information.  For urgent matters (Monday-Friday), call the ENT Clinic: [phone] and speak with a call center team member - they will route your call appropriately.   If you'd like to speak directly with a nurse, please find our contact information below. We do our best to check voicemail frequently throughout the day, and will work to call you back within 1-2 days. For urgent matters, please use the general clinic phone numbers listed above.     Donna OLSON RN  Direct: [phone]  Mel GOYAL LPN  Direct: [phone]         [facility]  Department of Otolaryngology       Cleaning of the Nasal or Sinus Cavity    Please follow all three steps.  Nasal irrigation (cleaning) should be done 2 times/day.    Preparation:  1.  Wash your hands  2.  We suggest that you buy the [employer] Sinus Rinse Kit  3.  Use distilled water or tap water that has been boiled and brought to room temperature.  4.  Fill the irrigation bottle with room temperature water (distilled or boiled tap water) and add mixture (pre made packet or homemade recipe).        If you wish to make a homemade recipe:        Mix 1/4  teaspoon (kosher, non-iodine) salt with 1/4 teaspoon baking soda in each bottle.    Cleansing Irrigation/Sinus Rinse:    1.  Lean forward over a sink and insert the rinse bottle applicator into the right side of your nose.    2.  Tilt head down and to the left side.  With your mouth open (breathing through your mouth), gently direct the water around the inside of your nose until clear fluid starts to drain from the opposite nostril.  This is called flushing or irrigation.    3.  When you have used 1/4 to 1/2 or the solution, switch to the left nostril.    4.  To irrigate the left nostril, tilt your head down and to the right side.  With your mouth open (breathing through your mouth),  gently direct the water around the inside of your nose until clear fluid starts to drain from the opposite nostril.     Cleaning the Equipment:  1.  Throw away any leftover solution  2.  Clean the rinse bottle and cap with clear water. Air dry.      Call the ENT Clinic if you have any questions or concerns at [phone]

## 2024-07-24 NOTE — LETTER
7/24/2024       RE: Ba Mireles  [address] [facility] [address]     Dear Colleague,    Thank you for referring your patient, Ba Mireles, to the [facility] E[facility] [place] at [facility]. Please see a copy of my visit note below.      [facility]  New Patient Visit      Encounter date:   July 22, 2024    Referring Provider:   Zaid Eli MD  [address] [address]    Chief Complaint: consult    History of Present Illness:   Ba Mireles is a 44 year old male who presents for consultation regarding sinusitis, bilateral infective otitis externa, and chronic nasal congestion.    5/30/24 OV with Dr. Eli- Sinusitis: Richi is a 44 year old, presenting for the following health issues:  Ear Problem (Pt has been having creamy discharge coming out of both ears. Pt is unable to hear out of Right ear. Pt started amoxicillin and sudafed on Tuesday but has not felt any relief. Pt states it feels like his face is swollen. Pt states his Right ear hurts when he opens his mouth. Pt states he does get occasional vertigo. ) and Eye Problem (Pt states his vision is also getting blurry. This has been a change in the last couple days. ) His A&P detailed that he had bilateral infective otitis externa and sinusitis. He was prescribed neomycin-polymyxin-hydrocortisone (CORTISPORIN) 3.5-32804-8 otic suspension and amoxicillin-clavulanate (AUGMENTIN) 875-125 MG tablet. He was also seen on 6/6/ and 6/13 for follow-ups on sinusitis and bilateral infective otitis externa.      Today, he reports that he has always had intermittent nasal congestion that onset 5 years ago. He reports that a couple of weeks ago, he had hearing loss, facial pain, and constant headaches therefore he was prescribed antibiotics for over a month and Prednisone for bilateral ear infections and sinusitis. He states that he did not  tolerate Prednisone well and had issues sleeping. He states that he has been using Flonase since he started taking the Prednisone (for the last few weeks), however he thinks Flonase is making him worse and burns his nose. He has tried saline irrigation in his nostrils in the past. He reports that he had a CT sinus recently completed for further investigation. He states that since he stopped taking Prednisone and antibiotics, he feels facial pressure and nasal congestion coming back but nowhere near what it was. He states that he has never had allergies and does not think his symptoms are due to allergies, but he has allergy testing scheduled. He states that before this recent instance of sinusitis, he previously would get sinusitis about twice a year. He states that a couple of years ago, he hacked up a very large amount of phlegm that cleared him up at the time. Currently, he reports a globus sensation. He states that he smokes a pack a day and has tried to quit many times. He denies new animals to the house, exposure to mold, hx of acid reflux, hx of tonsil stones. 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    Review of systems: A 14-point review of systems has been conducted and was negative for any notable symptoms, except as dictated in the history of present illness.     Medical History:  Past Medical History:   Diagnosis Date     Kidney stone     first stone since age 20        Surgical History:   Past Surgical History:   Procedure Laterality Date     COMBINED CYSTOSCOPY, RETROGRADES, URETEROSCOPY, LASER HOLMIUM LITHOTRIPSY URETER(S), INSERT STENT Bilateral 8/7/2023    Procedure: Cystoscopy, Bilateral Ureteroscopy, Bilateral Retrograde Pyelogram, Bilateral Stone Basket Extraction, Bilateral Stent placement;  Surgeon: Gus Shipman MD;  Location: [facility] OR     CYSTOSCOPY,URETEROSCOPY,LITHOTRIPSY      at [facility] SURGERY  Right         Family History:  Family History   Problem Relation Age of Onset     Cancer Mother         kidney     Heart Disease Mother      Nephrolithiasis Mother      Gout Mother      Cancer Maternal Grandfather         renal cell     Heart Disease Maternal Grandfather      Nephrolithiasis Maternal Grandfather      Cancer Paternal Grandfather         lucille cell     Kidney Disease Paternal Grandfather      Nephrolithiasis Maternal Aunt         Social History:   Social History     Socioeconomic History     Marital status:    Tobacco Use     Smoking status: Every Day     Current packs/day: 0.00     Types: Cigarettes     Last attempt to quit: 2023     Years since quittin.9     Passive exposure: Current     Smokeless tobacco: Never   Vaping Use     Vaping status: Never Used   Substance and Sexual Activity     Alcohol use: Not Currently     Drug use: Yes     Types: Marijuana     Social Determinants of Health     Financial Resource Strain: Low Risk  (3/3/2023)    [facility] [employer] [facility]    Financial Resource Strain      Difficulty of Paying Living Expenses: 3   Food Insecurity: No Food Insecurity (3/3/2023)    [facility] from [employer] [facility]    Food Insecurity      Worried About Running Out of Food in the Last Year: 1   Transportation Needs: No Transportation Needs (3/3/2023)    Received from [employer] [facility], [facility]    Transportation Needs      Lack of Transportation (Medical): 1    [facility]    Social Connections   Interpersonal Safety: Low Risk  (2024)    Interpersonal Safety      Do you feel physically and emotionally safe where you currently live?: Yes      Within the past 12 months, have you been hit, slapped, kicked or otherwise  "physically hurt by someone?: No      Within the past 12 months, have you been humiliated or emotionally abused in other ways by your partner or ex-partner?: No   Housing Stability: Low Risk  (3/3/2023)    Received from [facility], [facility]    Housing Stability      Unable to Pay for Housing in the Last Year: 1        Physical Exam:  Vital signs: BP (!) 141/94 (BP Location: Left arm, Patient Position: Sitting, Cuff Size: Adult Regular)   Pulse 89   Ht 1.765 m (5' 9.5\")   Wt 68.5 kg (151 lb 1.6 oz)   SpO2 100%   BMI 21.99 kg/m     General Appearance: No acute distress, appropriate demeanor, conversant  Eyes: moist conjunctivae; EOMI; pupils symmetric; visual acuity grossly intact; no proptosis  Head: normocephalic; overall symmetric appearance without deformity  Face: overall symmetric without deformity  Ears: Normal appearance of external ear; external meatus normal in appearance  Nose: No external deformity  Oral Cavity/oropharynx: Normal appearance of mucosa  Neck: no palpable lymphadenopathy; thyroid without palpable nodules  Lungs: symmetric chest rise; no wheezing  CV: Good distal perfusion; normal hear rate  Extremities: No deformity  Neurologic Exam: Cranial nerves II-XII are grossly intact; no focal deficit    Procedure Note  Procedure performed: Rigid nasal endoscopy  Indication: To evaluate for sinonasal pathology not visualized on routine anterior rhinoscopy  Anesthesia: 4% topical lidocaine with 0.05% oxymetazoline  Description of procedure: A 30 degree, 3 mm rigid endoscope was inserted into bilateral nasal cavities and the nasal valves, nasal cavity, middle meatus, sphenoethmoid recess, and nasopharynx were thoroughly evaluated for evidence of obstruction, edema, purulence, polyps and/or mass/lesion.     Sebastian-Tristan Endoscopic Scoring System  Endoscopic observation Right Left   Polyps in middle meatus (0 = absent, 1 = restricted " to middle meatus, 2 = Beyond middle meatus) 0 0   Discharge (0 = absent, 1 = thin and clear, 2 = thick, purulent) 0 0   Edema (0 = absent, 1 = mild-moderate, 2 = moderate-severe) 1 1   Crusting (0 = absent, 1 = mild-moderate, 2 = moderate-severe) 0 0   Scarring (0= absent, 1 = mild-moderate, 2 = moderate-severe) 0 0   Total 1 1     Findings  RT and LT: Bilateral sinonasal passages patent without evidence of infection, polyps, or mass.    The patient tolerated the procedure well without complication.     Assessment/Medical Decision Making:  Ba Mireles is a 44 year old male with chronic nasal congestion and a recent hx of sinusitis and bilateral infective otitis externa. Etiology and potential causes for nasal congestion were discussed. His nasal endoscopy today showed that bilateral sinonasal passages were patent without evidence of infection, polyps, or masses. He was counseled on the negative impacts of smoking and recommended to quit. Questions and concerns were addressed.    Plan:  Patient will receive a phone call/message to review the results of his CT sinus once the office receives these records  Start sinus rinses once daily  Start OTC Nasonex nasal spray two sprays BID  Stop OTC Flonase  Follow-up in 3-4 months    Scribe Disclosure:   I, Sandra Oden, am serving as a scribe; to document services personally performed by Americo Friedman MD -based on data collection and the provider's statements to me.     Provider Disclosure:  I agree with above History, Review of Systems, Physical exam and Plan.  I have reviewed the content of the documentation and have edited it as needed. I have personally performed the services documented here and the documentation accurately represents those services and the decisions I have made.      Electronically signed by:    Americo Friedman MD    [facility]  Rhinology  Endoscopic Skull Base Surgery  [facility]  Department of  Otolaryngology - Head & Neck Surgery      Again, thank you for allowing me to participate in the care of your patient.      Sincerely,    Americo Friedman MD

## 2024-07-24 NOTE — NURSING NOTE
"Chief Complaint   Patient presents with    Consult   Blood pressure (!) 141/94, pulse 89, height 1.765 m (5' 9.5\"), weight 68.5 kg (151 lb 1.6 oz), SpO2 100%. Hunter Zavaleta, EMT    "

## 2024-07-28 SDOH — HEALTH STABILITY: PHYSICAL HEALTH: ON AVERAGE, HOW MANY DAYS PER WEEK DO YOU ENGAGE IN MODERATE TO STRENUOUS EXERCISE (LIKE A BRISK WALK)?: 2 DAYS

## 2024-07-28 SDOH — HEALTH STABILITY: PHYSICAL HEALTH: ON AVERAGE, HOW MANY MINUTES DO YOU ENGAGE IN EXERCISE AT THIS LEVEL?: 20 MIN

## 2024-07-28 ASSESSMENT — SOCIAL DETERMINANTS OF HEALTH (SDOH): HOW OFTEN DO YOU GET TOGETHER WITH FRIENDS OR RELATIVES?: ONCE A WEEK

## 2024-07-29 ENCOUNTER — MYC MEDICAL ADVICE (OUTPATIENT)
Dept: FAMILY MEDICINE | Facility: CLINIC | Age: 44
End: 2024-07-29

## 2024-07-29 ENCOUNTER — OFFICE VISIT (OUTPATIENT)
Dept: FAMILY MEDICINE | Facility: CLINIC | Age: 44
End: 2024-07-29
Payer: COMMERCIAL

## 2024-07-29 VITALS
BODY MASS INDEX: 22.45 KG/M2 | HEART RATE: 83 BPM | DIASTOLIC BLOOD PRESSURE: 85 MMHG | HEIGHT: 69 IN | OXYGEN SATURATION: 98 % | SYSTOLIC BLOOD PRESSURE: 130 MMHG | RESPIRATION RATE: 14 BRPM | TEMPERATURE: 98.4 F | WEIGHT: 151.6 LBS

## 2024-07-29 DIAGNOSIS — Z72.0 TOBACCO ABUSE: ICD-10-CM

## 2024-07-29 DIAGNOSIS — Z13.220 SCREENING FOR LIPOID DISORDERS: ICD-10-CM

## 2024-07-29 DIAGNOSIS — G47.00 INSOMNIA, UNSPECIFIED TYPE: ICD-10-CM

## 2024-07-29 DIAGNOSIS — Z00.00 ROUTINE GENERAL MEDICAL EXAMINATION AT A HEALTH CARE FACILITY: Primary | ICD-10-CM

## 2024-07-29 DIAGNOSIS — Z13.1 SCREENING FOR DIABETES MELLITUS: ICD-10-CM

## 2024-07-29 DIAGNOSIS — K64.4 EXTERNAL HEMORRHOIDS: ICD-10-CM

## 2024-07-29 LAB
ANION GAP SERPL CALCULATED.3IONS-SCNC: 7 MMOL/L (ref 7–15)
BUN SERPL-MCNC: 14.2 MG/DL (ref 6–20)
CALCIUM SERPL-MCNC: 8.9 MG/DL (ref 8.8–10.4)
CHLORIDE SERPL-SCNC: 105 MMOL/L (ref 98–107)
CHOLEST SERPL-MCNC: 197 MG/DL
CREAT SERPL-MCNC: 0.81 MG/DL (ref 0.67–1.17)
EGFRCR SERPLBLD CKD-EPI 2021: >90 ML/MIN/1.73M2
FASTING STATUS PATIENT QL REPORTED: ABNORMAL
FASTING STATUS PATIENT QL REPORTED: NORMAL
GLUCOSE SERPL-MCNC: 95 MG/DL (ref 70–99)
HCO3 SERPL-SCNC: 27 MMOL/L (ref 22–29)
HDLC SERPL-MCNC: 55 MG/DL
LDLC SERPL CALC-MCNC: 119 MG/DL
NONHDLC SERPL-MCNC: 142 MG/DL
POTASSIUM SERPL-SCNC: 4.3 MMOL/L (ref 3.4–5.3)
SODIUM SERPL-SCNC: 139 MMOL/L (ref 135–145)
TRIGL SERPL-MCNC: 116 MG/DL

## 2024-07-29 PROCEDURE — 80061 LIPID PANEL: CPT | Performed by: FAMILY MEDICINE

## 2024-07-29 PROCEDURE — 36415 COLL VENOUS BLD VENIPUNCTURE: CPT | Performed by: FAMILY MEDICINE

## 2024-07-29 PROCEDURE — 99396 PREV VISIT EST AGE 40-64: CPT | Performed by: FAMILY MEDICINE

## 2024-07-29 PROCEDURE — 80048 BASIC METABOLIC PNL TOTAL CA: CPT | Performed by: FAMILY MEDICINE

## 2024-07-29 RX ORDER — HYDROCORTISONE 25 MG/G
CREAM TOPICAL 2 TIMES DAILY PRN
Qty: 30 G | Refills: 1 | Status: SHIPPED | OUTPATIENT
Start: 2024-07-29

## 2024-07-29 ASSESSMENT — PAIN SCALES - GENERAL: PAINLEVEL: NO PAIN (0)

## 2024-07-29 NOTE — PROGRESS NOTES
Preventive Care Visit  [facility]  BRENDA JONES MD, Family Medicine  Jul 29, 2024      Assessment & Plan   Problem List Items Addressed This Visit    None  Visit Diagnoses       Routine general medical examination at a health care facility    -  Primary    Insomnia, unspecified type        Tobacco abuse        Screening for lipoid disorders        Relevant Orders    Lipid panel reflex to direct LDL Fasting (Completed)    Screening for diabetes mellitus        Relevant Orders    Basic metabolic panel (Completed)    External hemorrhoids        Relevant Medications    hydrocortisone, Perianal, (HYDROCORTISONE) 2.5 % cream             Patient has been advised of split billing requirements and indicates understanding: Yes       Counseling  Appropriate preventive services were addressed with this patient via screening, questionnaire, or discussion as appropriate for fall prevention, nutrition, physical activity, Tobacco-use cessation, weight loss and cognition.  Checklist reviewing preventive services available has been given to the patient.  Reviewed patient's diet, addressing concerns and/or questions.   He is at risk for lack of exercise and has been provided with information to increase physical activity for the benefit of his well-being.   The patient was instructed to see the dentist every 6 months.           Wendy Stoddard is a 44 year old, presenting for the following:  Physical (Annual Physical. No concerns. )        7/29/2024     9:18 AM   Additional Questions   Roomed by Jami DAVIS CMA         7/29/2024     9:20 AM   Patient Reported Additional Medications   Patient reports taking the following new medications Mometasone (Nasonex)        Health Care Directive  Patient does not have a Health Care Directive or Living Will: Discussed advance care planning with patient; however, patient declined at this time.    1)  Insomnia-  recently started on trazodone-  seems to be helping.    Feels a bit groggy in the morning.    2)  Sinus issues-   has seen ENT, scheduled to see Allergy  3)                   7/28/2024   General Health   How would you rate your overall physical health? (!) FAIR   Feel stress (tense, anxious, or unable to sleep) To some extent      (!) STRESS CONCERN      7/28/2024   Nutrition   Three or more servings of calcium each day? (!) NO   Diet: Other   If other, please elaborate: No sota, I try to drink less milk, I try not to eat alot of red meat.   How many servings of fruit and vegetables per day? (!) 2-3   How many sweetened beverages each day? 0-1            7/28/2024   Exercise   Days per week of moderate/strenous exercise 2 days   Average minutes spent exercising at this level 20 min      (!) EXERCISE CONCERN      7/28/2024   Social Factors   Frequency of gathering with friends or relatives Once a week   Worry food won't last until get money to buy more No   Food not last or not have enough money for food? No   Do you have housing? (Housing is defined as stable permanent housing and does not include staying ouside in a car, in a tent, in an abandoned building, in an overnight shelter, or couch-surfing.) Yes   Are you worried about losing your housing? No   Lack of transportation? No   Unable to get utilities (heat,electricity)? No            7/28/2024   Dental   Dentist two times every year? (!) NO            7/28/2024   TB Screening   Were you born outside of the [place]? No              Today's PHQ-2 Score:       7/29/2024     9:23 AM   PHQ-2 ( 1999 [employer])   Q1: Little interest or pleasure in doing things 0   Q2: Feeling down, depressed or hopeless 0   PHQ-2 Score 0         7/28/2024   Substance Use   Alcohol more than 3/day or more than 7/wk No   Do you use any other substances recreationally? (!) OTHER        Social History     Tobacco Use    Smoking status: Every Day     Current packs/day: 0.00     Average packs/day: 1 pack/day for 26.2 years (26.2 ttl pk-yrs)     Types:  "Cigarettes     Start date: 1997     Last attempt to quit: 2023     Years since quittin.9     Passive exposure: Current    Smokeless tobacco: Never   Vaping Use    Vaping status: Never Used   Substance Use Topics    Alcohol use: Yes    Drug use: Yes     Types: Marijuana     Comment: Stopped smoking beginning of 2024   STI Screening   New sexual partner(s) since last STI/HIV test? No      ASCVD Risk   The 10-year ASCVD risk score (Alex SIGALA, et al., 2019) is: 5.9%    Values used to calculate the score:      Age: 44 years      Sex: Male      Is Non- : No      Diabetic: No      Tobacco smoker: Yes      Systolic Blood Pressure: 130 mmHg      Is BP treated: No      HDL Cholesterol: 47 mg/dL      Total Cholesterol: 223 mg/dL        2024   Contraception/Family Planning   Questions about contraception or family planning No           Reviewed and updated as needed this visit by Provider                          Review of Systems  Constitutional, HEENT, cardiovascular, pulmonary, gi and gu systems are negative, except as otherwise noted.     Objective    Exam  /85 (BP Location: Left arm, Patient Position: Sitting, Cuff Size: Adult Regular)   Pulse 83   Temp 98.4  F (36.9  C) (Oral)   Resp 14   Ht 1.753 m (5' 9\")   Wt 68.8 kg (151 lb 9.6 oz)   SpO2 98%   BMI 22.39 kg/m     Estimated body mass index is 22.39 kg/m  as calculated from the following:    Height as of this encounter: 1.753 m (5' 9\").    Weight as of this encounter: 68.8 kg (151 lb 9.6 oz).    Physical Exam  GENERAL: alert and no distress  NECK: no adenopathy, no asymmetry, masses, or scars  RESP: lungs clear to auscultation - no rales, rhonchi or wheezes  CV: regular rate and rhythm, normal S1 S2, no S3 or S4, no murmur, click or rub, no peripheral edema  ABDOMEN: soft, nontender, no hepatosplenomegaly, no masses and bowel sounds normal  MS: no gross musculoskeletal defects noted, no " edema        Signed Electronically by: BRENDA JONES MD

## 2024-07-31 NOTE — PATIENT INSTRUCTIONS
Patient Education   Preventive Care Advice   This is general advice given by our system to help you stay healthy. However, your care team may have specific advice just for you. Please talk to your care team about your preventive care needs.  Nutrition  Eat 5 or more servings of fruits and vegetables each day.  Try wheat bread, brown rice and whole grain pasta (instead of white bread, rice, and pasta).  Get enough calcium and vitamin D. Check the label on foods and aim for 100% of the RDA (recommended daily allowance).  Lifestyle  Exercise at least 150 minutes each week  (30 minutes a day, 5 days a week).  Do muscle strengthening activities 2 days a week. These help control your weight and prevent disease.  No smoking.  Wear sunscreen to prevent skin cancer.  Have a dental exam and cleaning every 6 months.  Yearly exams  See your health care team every year to talk about:  Any changes in your health.  Any medicines your care team has prescribed.  Preventive care, family planning, and ways to prevent chronic diseases.  Shots (vaccines)   HPV shots (up to age 26), if you've never had them before.  Hepatitis B shots (up to age 59), if you've never had them before.  COVID-19 shot: Get this shot when it's due.  Flu shot: Get a flu shot every year.  Tetanus shot: Get a tetanus shot every 10 years.  Pneumococcal, hepatitis A, and RSV shots: Ask your care team if you need these based on your risk.  Shingles shot (for age 50 and up)  General health tests  Diabetes screening:  Starting at age 35, Get screened for diabetes at least every 3 years.  If you are younger than age 35, ask your care team if you should be screened for diabetes.  Cholesterol test: At age 39, start having a cholesterol test every 5 years, or more often if advised.  Bone density scan (DEXA): At age 50, ask your care team if you should have this scan for osteoporosis (brittle bones).  Hepatitis C: Get tested at least once in your life.  STIs (sexually  transmitted infections)  Before age 24: Ask your care team if you should be screened for STIs.  After age 24: Get screened for STIs if you're at risk. You are at risk for STIs (including HIV) if:  You are sexually active with more than one person.  You don't use condoms every time.  You or a partner was diagnosed with a sexually transmitted infection.  If you are at risk for HIV, ask about PrEP medicine to prevent HIV.  Get tested for HIV at least once in your life, whether you are at risk for HIV or not.  Cancer screening tests  Cervical cancer screening: If you have a cervix, begin getting regular cervical cancer screening tests starting at age 21.  Breast cancer scan (mammogram): If you've ever had breasts, begin having regular mammograms starting at age 40. This is a scan to check for breast cancer.  Colon cancer screening: It is important to start screening for colon cancer at age 45.  Have a colonoscopy test every 10 years (or more often if you're at risk) Or, ask your provider about stool tests like a FIT test every year or Cologuard test every 3 years.  To learn more about your testing options, visit:   .  For help making a decision, visit:   https://bit.ly/lz28779.  Prostate cancer screening test: If you have a prostate, ask your care team if a prostate cancer screening test (PSA) at age 55 is right for you.  Lung cancer screening: If you are a current or former smoker ages 50 to 80, ask your care team if ongoing lung cancer screenings are right for you.  For informational purposes only. Not to replace the advice of your health care provider. Copyright   2023 [facility]. All rights reserved. Clinically reviewed by the [facility]. LK FREEMAN 388892 - REV 01/24.  Substance Use Disorder: Care Instructions  Overview     You can improve your life and health by stopping your use of alcohol or drugs. When you don't drink or use drugs, you may feel and sleep better. You may  get along better with your family, friends, and coworkers. There are medicines and programs that can help with substance use disorder.  How can you care for yourself at home?  Here are some ways to help you stay sober and prevent relapse.  If you have been given medicine to help keep you sober or reduce your cravings, be sure to take it exactly as prescribed.  Talk to your doctor about programs that can help you stop using drugs or drinking alcohol.  Do not keep alcohol or drugs in your home.  Plan ahead. Think about what you'll say if other people ask you to drink or use drugs. Try not to spend time with people who drink or use drugs.  Use the time and money spent on drinking or drugs to do something that's important to you.  Preventing a relapse  Have a plan to deal with relapse. Learn to recognize changes in your thinking that lead you to drink or use drugs. Get help before you start to drink or use drugs again.  Try to stay away from situations, friends, or places that may lead you to drink or use drugs.  If you feel the need to drink alcohol or use drugs again, seek help right away. Call a trusted friend or family member. Some people get support from organizations such as Narcotics Anonymous or [employer] or from treatment facilities.  If you relapse, get help as soon as you can. Some people make a plan with another person that outlines what they want that person to do for them if they relapse. The plan usually includes how to handle the relapse and who to notify in case of relapse.  Don't give up. Remember that a relapse doesn't mean that you have failed. Use the experience to learn the triggers that lead you to drink or use drugs. Then quit again. Recovery is a lifelong process. Many people have several relapses before they are able to quit for good.  Follow-up care is a key part of your treatment and safety. Be sure to make and go to all appointments, and call your doctor if you are having problems. It's  "also a good idea to know your test results and keep a list of the medicines you take.  When should you call for help?   Call 911  anytime you think you may need emergency care. For example, call if you or someone else:    Has overdosed or has withdrawal signs. Be sure to tell the emergency workers that you are or someone else is using or trying to quit using drugs. Overdose or withdrawal signs may include:  Losing consciousness.  Seizure.  Seeing or hearing things that aren't there (hallucinations).     Is thinking or talking about suicide or harming others.   Where to get help 24 hours a day, 7 days a week   If you or someone you know talks about suicide, self-harm, a mental health crisis, a substance use crisis, or any other kind of emotional distress, get help right away. You can:    Call the Suicide and Crisis Lifeline at 988.     Call 6-056-529-TALK (1-600.126.8330).     Text HOME to 517347 to access the Crisis Text Line.   Consider saving these numbers in your phone.  Go to Well.ca.Protonet for more information or to chat online.  Call your doctor now or seek immediate medical care if:    You are having withdrawal symptoms. These may include nausea or vomiting, sweating, shakiness, and anxiety.   Watch closely for changes in your health, and be sure to contact your doctor if:    You have a relapse.     You need more help or support to stop.   Where can you learn more?  Go to https://www.[employer].net/patiented  Enter H573 in the search box to learn more about \"Substance Use Disorder: Care Instructions.\"  Current as of: November 15, 2023               Content Version: 14.0    7187-8786 [employer].   Care instructions adapted under license by your healthcare professional. If you have questions about a medical condition or this instruction, always ask your healthcare professional. [employer] disclaims any warranty or liability for your use of this information.         "

## 2024-08-06 ENCOUNTER — MYC MEDICAL ADVICE (OUTPATIENT)
Dept: OTOLARYNGOLOGY | Facility: CLINIC | Age: 44
End: 2024-08-06
Payer: COMMERCIAL

## 2024-08-06 DIAGNOSIS — J32.9 SINUSITIS, UNSPECIFIED CHRONICITY, UNSPECIFIED LOCATION: Primary | ICD-10-CM

## 2024-08-13 ENCOUNTER — HOSPITAL ENCOUNTER (OUTPATIENT)
Dept: CT IMAGING | Facility: HOSPITAL | Age: 44
Discharge: HOME | End: 2024-08-13
Attending: STUDENT IN AN ORGANIZED HEALTH CARE EDUCATION/TRAINING PROGRAM | Admitting: STUDENT IN AN ORGANIZED HEALTH CARE EDUCATION/TRAINING PROGRAM
Payer: COMMERCIAL

## 2024-08-13 DIAGNOSIS — J32.9 SINUSITIS, UNSPECIFIED CHRONICITY, UNSPECIFIED LOCATION: ICD-10-CM

## 2024-08-13 DIAGNOSIS — J32.9 SINUSITIS, UNSPECIFIED CHRONICITY, UNSPECIFIED LOCATION: Primary | ICD-10-CM

## 2024-08-13 PROCEDURE — 70486 CT MAXILLOFACIAL W/O DYE: CPT

## 2024-08-13 RX ORDER — CIPROFLOXACIN 500 MG/1
500 TABLET, FILM COATED ORAL 2 TIMES DAILY
Qty: 28 TABLET | Refills: 0 | Status: SHIPPED | OUTPATIENT
Start: 2024-08-13 | End: 2024-08-27

## 2024-08-13 NOTE — TELEPHONE ENCOUNTER
Writer LVM stating that Dr. Friedman is going to send in a prescription for ciprofloxacin to pts pharmacy. Writer encouraged pt to call back with questions or concerns.    Donna Barreto RN

## 2024-08-13 NOTE — TELEPHONE ENCOUNTER
Writer called ans spoke to pt regarding current symptoms. Writer stated that a CT order has been placed and pt will need to call and get himself scheduled for this. Writer stated that Dr. Friedman would like to prescribe pt amoxicillin and prednisone, pt stated that he does not want to take these two medications as they have caused him to be angry and irritable in the past. Writer stated a message will be sent to Dr. Friedman to see if there is a different medication he would like the pt to take.    Donna Barreto RN

## 2024-08-14 NOTE — TELEPHONE ENCOUNTER
Writer called and spoke to pt regarding VM that was left for writer about ciprofloxacin medication. Pt read on the prescription bottle that taking this medication can make patient more sensitive to the sun and pt is going to be outside all weekend. Writer stated that pt can wait to take medication until after the weekend if he is more comfortable but delaying will prolong his symptoms. Pt stated that he already stated taking the medication and will just apply sunscreen and try his best to stay out of the sun. Pt stated this is his second sinus infection within 3 months and he is interested in pursuing surgery if it is an option. Writer stated this will be determined by Dr. Friedman. Pt stated he is starting a full time job on 8/31 and would need surgery done by then, writer stated that we cannot guarantee that quick of a turn around if it is decided patient needs surgery.      Donna Barreto RN

## 2024-08-26 ENCOUNTER — VIRTUAL VISIT (OUTPATIENT)
Dept: FAMILY MEDICINE | Facility: CLINIC | Age: 44
End: 2024-08-26
Payer: COMMERCIAL

## 2024-08-26 DIAGNOSIS — G47.00 INSOMNIA, UNSPECIFIED TYPE: Primary | ICD-10-CM

## 2024-08-26 PROCEDURE — 99213 OFFICE O/P EST LOW 20 MIN: CPT | Mod: 95 | Performed by: FAMILY MEDICINE

## 2024-08-26 RX ORDER — ZOLPIDEM TARTRATE 5 MG/1
5 TABLET ORAL
Qty: 20 TABLET | Refills: 0 | Status: SHIPPED | OUTPATIENT
Start: 2024-08-26

## 2024-08-26 NOTE — PROGRESS NOTES
"Richi is a 44 year old who is being evaluated via a billable video visit.    What phone number would you like to be contacted at? [phone]  [facility]      Assessment & Plan     Insomnia, unspecified type     - zolpidem (AMBIEN) 5 MG tablet; Take 1 tablet (5 mg) by mouth nightly as needed for sleep.    Patient did not tolerate trazodone well, felt very groggy afterwards.  Will try Ambien.  Went over precautions.  Patient will keep us updated with any new concerns.          Subjective   Richi is a 44 year old, presenting for the following health issues:  Sleep Problem (Pt has been having issues. Pt has been having issues falling and staying asleep. Pt has been prescribed something but has stopped taking due to reaction. )        8/26/2024    10:24 AM   Additional Questions   Roomed by Jami DAVIS CMA       History of Present Illness       Reason for visit:  Sleep    He eats 2-3 servings of fruits and vegetables daily.He consumes 0 sweetened beverage(s) daily.He exercises with enough effort to increase his heart rate 9 or less minutes per day.  He exercises with enough effort to increase his heart rate 3 or less days per week.   He is taking medications regularly.               Review of Systems  Constitutional, HEENT, cardiovascular, pulmonary, gi and gu systems are negative, except as otherwise noted.      Objective    Vitals - Patient Reported  Weight (Patient Reported): 68.5 kg (151 lb)  Height (Patient Reported): 175.3 cm (5' 9\")  BMI (Based on Pt Reported Ht/Wt): 22.3  Pain Score: No Pain (0)        Physical Exam   GENERAL: alert and no distress  EYES: Eyes grossly normal to inspection.  No discharge or erythema, or obvious scleral/conjunctival abnormalities.  RESP: No audible wheeze, cough, or visible cyanosis.    SKIN: Visible skin clear. No significant rash, abnormal pigmentation or lesions.  NEURO: Cranial nerves grossly intact.  Mentation and speech appropriate for " age.  PSYCH: Appropriate affect, tone, and pace of words          Video-Visit Details    Type of service:  Video Visit   Originating Location (pt. Location): Other passenger in a parked car    Distant Location (provider location):  On-site  Platform used for Video Visit: Rosy  Signed Electronically by: BRENDA JONES MD

## 2024-10-17 ENCOUNTER — OFFICE VISIT (OUTPATIENT)
Dept: ALLERGY | Facility: CLINIC | Age: 44
End: 2024-10-17
Payer: COMMERCIAL

## 2024-10-17 VITALS — BODY MASS INDEX: 23.07 KG/M2 | HEART RATE: 67 BPM | OXYGEN SATURATION: 98 % | WEIGHT: 156.2 LBS

## 2024-10-17 DIAGNOSIS — J30.1 SEASONAL ALLERGIC RHINITIS DUE TO POLLEN: Primary | ICD-10-CM

## 2024-10-17 DIAGNOSIS — J30.89 ALLERGIC RHINITIS DUE TO DUST MITE: ICD-10-CM

## 2024-10-17 DIAGNOSIS — J32.9 SINUSITIS, UNSPECIFIED CHRONICITY, UNSPECIFIED LOCATION: ICD-10-CM

## 2024-10-17 PROCEDURE — 95004 PERQ TESTS W/ALRGNC XTRCS: CPT

## 2024-10-17 PROCEDURE — 99204 OFFICE O/P NEW MOD 45 MIN: CPT | Mod: 25

## 2024-10-17 RX ORDER — AZELASTINE 1 MG/ML
1-2 SPRAY, METERED NASAL 2 TIMES DAILY
Qty: 30 ML | Refills: 3 | Status: SHIPPED | OUTPATIENT
Start: 2024-10-17

## 2024-10-17 RX ORDER — CETIRIZINE HYDROCHLORIDE 10 MG/1
10 TABLET ORAL DAILY
Qty: 90 TABLET | Refills: 3 | Status: SHIPPED | OUTPATIENT
Start: 2024-10-17

## 2024-10-17 NOTE — PROGRESS NOTES
Ba Mireles was seen in the Allergy Clinic at [facility].    Ba Mireles is a 44 year old male  being seen today for ongoing evaluation of sinusitis referral from,    Zaid Eli, [facility]    Given ongoing issues with sinus symptoms, would recommend follow-up with allergy for further evaluation. Of note patient has seen ENT.     History of Present Illness:     Patient had CT performed 8-,     IMPRESSION:  1. Sinus inflammatory disease described above.    2. Left maxillary sinus bubbly secretions.    3. No paranasal sinus air-fluid levels.    4. Right mastoid air cells moderate to severe patchy opacification.    Patient also sees [facility] Dr. Friedman.  Patient reports prior to further ENT evaluation, they have recommended environmental allergy testing.     Patient reports over the last 2 to 3 years he has had continuous sinus troubles.   He reports approximately 2 years ago he had somebody move in with him with a dog, he denies having problems in childhood in relation to dog exposure.  Patient is currently unsure if he is allergic to dogs.    Patient denies that he has having significant rhinoconjunctivitis symptoms/allergy symptoms get any particular time during the year.  Patient denies that he takes any antihistamines/nasal sprays in relation to his concerns.    [facility]    Question 10/17/2024  8:20 AM CDT - Filed by Patient   Reason for visit: Other   Please specify: Ent referral. To rule out allergens.   Environmental and Social History    Place of Residence: House   Do you have Central Air Conditioning? Yes   Type of Heating System: Forced air   Wood burning stove or fireplace: No   Occupation: Facility maintenanceFacility maintenance   Pets: No   Do you smoke cigarettes: Yes   How many per day?    Do you use an e-cigarette or vape? No   Does anyone living in your home smoke or vape? Yes    Family History    Do your parents, siblings, or children have asthma? Yes   Who? Daughter   Do your parents, siblings, or children have environmental allergies? No   Do your parents, siblings, or children have food allergies? No   Do your parents, siblings, or children have eczema? No       Past Medical History:   Diagnosis Date    Arthritis 08/16/2014    When I had my hip surgery they told me I have arthritis going already    Kidney stone     first stone since age 20       Past Surgical History:   Procedure Laterality Date    COMBINED CYSTOSCOPY, RETROGRADES, URETEROSCOPY, LASER HOLMIUM LITHOTRIPSY URETER(S), INSERT STENT Bilateral 08/07/2023    Procedure: Cystoscopy, Bilateral Ureteroscopy, Bilateral Retrograde Pyelogram, Bilateral Stone Basket Extraction, Bilateral Stent placement;  Surgeon: Gus Shipman MD;  Location: [facility] OR    CYSTOSCOPY,URETEROSCOPY,LITHOTRIPSY      at [place]    ORTHOPEDIC SURGERY  2022    Right Wrist    WRIST SURGERY Right          Current Outpatient Medications:     hydrocortisone, Perianal, (HYDROCORTISONE) 2.5 % cream, Place rectally 2 times daily as needed for hemorrhoids, Disp: 30 g, Rfl: 1    traZODone (DESYREL) 50 MG tablet, Take 1 tablet (50 mg) by mouth at bedtime (Patient not taking: Reported on 8/26/2024), Disp: 30 tablet, Rfl: 1    zolpidem (AMBIEN) 5 MG tablet, Take 1 tablet (5 mg) by mouth nightly as needed for sleep., Disp: 20 tablet, Rfl: 0  Allergies   Allergen Reactions    Amoxicillin-Pot Clavulanate Anxiety     Has tolerated amoxicillin well in the past         Family History   Problem Relation Age of Onset    Cancer Mother         kidney    Heart Disease Mother 66    Nephrolithiasis Mother     Gout Mother     Cancer Maternal Grandfather         renal cell    Heart Disease Maternal Grandfather     Nephrolithiasis Maternal Grandfather     Hyperlipidemia Maternal Grandfather     Cancer Paternal Grandfather         lucille cell    Kidney Disease Paternal  Grandfather     Prostate Cancer Paternal Grandfather     Nephrolithiasis Maternal Aunt        EXAM:   There were no vitals taken for this visit.    Physical Exam  Constitutional:       General: He is not in acute distress.     Appearance: Normal appearance. He is not ill-appearing.   HENT:      Nose: Rhinorrhea present. No nasal deformity, septal deviation or congestion.      Right Turbinates: Not enlarged, swollen or pale.      Left Turbinates: Not enlarged, swollen or pale.      Comments: Mild nasal turbinate enlargement, with discharge, nonobstructive     Mouth/Throat:      Mouth: Mucous membranes are moist.      Pharynx: Oropharynx is clear. Uvula midline. No posterior oropharyngeal erythema.      Tonsils: 0 on the right. 0 on the left.   Eyes:      General: No allergic shiner.        Right eye: No discharge.         Left eye: No discharge.      Conjunctiva/sclera: Conjunctivae normal.   Cardiovascular:      Rate and Rhythm: Normal rate and regular rhythm.      Heart sounds: Normal heart sounds, S1 normal and S2 normal.   Pulmonary:      Effort: Pulmonary effort is normal. No prolonged expiration.      Breath sounds: Normal breath sounds. No decreased air movement. No wheezing.   Neurological:      Mental Status: He is alert.   Psychiatric:         Mood and Affect: Mood normal.         Behavior: Behavior normal.         Judgment: Judgment normal.         WORKUP: Skin testing, appropriate positive and negative controls     Percutaneous    Environmental Testing     Ordering Provider :  Caryn Velasco PA-C    Testing Technician : HANK    Date: 10/17/2024      Time: 10:28 AM       (W/F in millimeters)    Allergen      Concentration : Manufacture     [place], white  1:20 H   0/0   Birch mix 1:20 H 0/0   [place], common 1:20 H 0/0   4. Elm, American 1:20 H 0/0   5. [place], Shagbark 1:20 H 0/0   6. Maple, Hard/Sugar 1:20 H 0/0   7. [place] Mix 1:20 H 0/0   8. Oak, Red 1:20 H 0/0   9. [place], American 1:20 H 0/0   10.  [place] Tree 1:20 H 0/0   11. Dp Mite 30,000 A U /ML H 11/30   12. Df Mite 30,000 A U /ML H 9/35   13. Grass Mix # 4 10,000 B A U /ML H 0/0   14. Jet grass 1:20 H 0/0   15.Cockroach mix 1:10 H 0/0   16. Alternaria Tenuis 1:10 H 0/0   17. Aspergillus Fumigatus 1:10 H 0/0   18. Homodendrum cladosporioides 1:10 H 0/0   19. Penicillium notatum 1:10 H 0/0   20. Epicoccum 1:10 H 0/0   21. Ragweed, Short 1:20 H 13/40   22. Dock, Rhodes 1:20 H 0/0   23. Lamb's Quarters 1:20 H 0/0   24. Pigweed, Rough Redroot 1:20 H 0/0   25. Plantain, English 1:20 H 0/0   26. Sagebrush, Mugwort 1:20 H 0/0   27. Cat 10,000  B A U /ML H 0/0   28. AP Dog 1:100 H 0/0   29. Neg. Control: 50% glycerine/saline H 0/0   30. Pos. Control: histamine 6mg/ML 7/10     At today's visit, the patient and I engaged in an informed consent discussion about allergy testing.  We discussed skin testing, blood testing, and the alternative of not undergoing any testing. The patient has a preference for skin testing. We then discussed the risks and benefits of skin testing.  The patient understands skin testing risks can include, but are not limited to, urticaria, angioedema, shortness of breath, and severe anaphylaxis.  The benefits include, but are not limited, to evaluation for allergens causing symptoms.  After answering the patient's questions they have agreed to proceed with skin testing.       ASSESSMENT/PLAN:  Ba Mireles is a 44 year old male who is undergoing evaluation for chronic sinus concerns.    Chronic/recurrent sinusitis  Environmental allergy to dust mites, and ragweed pollen  Patient reports he has an ENT appointment approximately 2 weeks.  We discussed beginning a treatment plan of Azelastine (Astepro ) nasal spray, 1-2 sprays in each nostril twice daily as needed.  This medication may be bitter taste, please consider brushing your teeth after using this nasal spray.  We did briefly discuss nasal steroid sprays, patient may potentially  benefit from these medications if he does not tolerate Astelin nasal spray.  Cetirizine (Zyrtec) 10 mg, levocetirizine (Xyzal) 5 mg, or fexofenadine (Allegra) 180 mg, daily for treatment of dust mite allergy.  Patient was given instructions regarding aero allergen avoidance measures.      Follow-up as needed      Thank you for allowing me to participate in the care of Ba Mireles.      I spent 45 minutes on the date of the encounter doing chart review, history and exam, documentation and further coordination as noted above exclusive of separately reported interpretations.     This excludes time spent for skin testing interpretation.     Please note that this note consists of symbols derived from keyboarding, dictation and/or voice recognition software. As a result, there may be errors in the script that have gone undetected. Please consider this when interpreting information found in this chart.     Caryn Velasco PA-C  [facility]

## 2024-10-17 NOTE — LETTER
10/17/2024      Ba Mireles  [address] [place] [id number]      Dear Colleague,    Thank you for referring your patient, Ba Mireles, to the [facility]. Please see a copy of my visit note below.    Ba Mireles was seen in the Allergy Clinic at [facility].    Ba Mireles is a 44 year old male  being seen today for ongoing evaluation of sinusitis referral from,    Zaid Eli, [facility]/OB    Given ongoing issues with sinus symptoms, would recommend follow-up with allergy for further evaluation. Of note patient has seen ENT.     History of Present Illness:     Patient had CT performed 8-,     IMPRESSION:  1. Sinus inflammatory disease described above.    2. Left maxillary sinus bubbly secretions.    3. No paranasal sinus air-fluid levels.    4. Right mastoid air cells moderate to severe patchy opacification.    Patient also sees [facility] Dr. Friedman.  Patient reports prior to further ENT evaluation, they have recommended environmental allergy testing.     Patient reports over the last 2 to 3 years he has had continuous sinus troubles.   He reports approximately 2 years ago he had somebody move in with him with a dog, he denies having problems in childhood in relation to dog exposure.  Patient is currently unsure if he is allergic to dogs.    Patient denies that he has having significant rhinoconjunctivitis symptoms/allergy symptoms get any particular time during the year.  Patient denies that he takes any antihistamines/nasal sprays in relation to his concerns.    [facility]    Question 10/17/2024  8:20 AM CDT - Filed by Patient   Reason for visit: Other   Please specify: Ent referral. To rule out allergens.   Environmental and Social History    Place of Residence: House   Do you have Central Air Conditioning? Yes   Type of Heating System: Forced air   Wood burning stove  or fireplace: No   Occupation: Facility maintenanceFacility maintenance   Pets: No   Do you smoke cigarettes: Yes   How many per day?    Do you use an e-cigarette or vape? No   Does anyone living in your home smoke or vape? Yes   Family History    Do your parents, siblings, or children have asthma? Yes   Who? Daughter   Do your parents, siblings, or children have environmental allergies? No   Do your parents, siblings, or children have food allergies? No   Do your parents, siblings, or children have eczema? No       Past Medical History:   Diagnosis Date     Arthritis 08/16/2014    When I had my hip surgery they told me I have arthritis going already     Kidney stone     first stone since age 20       Past Surgical History:   Procedure Laterality Date     COMBINED CYSTOSCOPY, RETROGRADES, URETEROSCOPY, LASER HOLMIUM LITHOTRIPSY URETER(S), INSERT STENT Bilateral 08/07/2023    Procedure: Cystoscopy, Bilateral Ureteroscopy, Bilateral Retrograde Pyelogram, Bilateral Stone Basket Extraction, Bilateral Stent placement;  Surgeon: Gus Shipman MD;  Location: [facility] OR     CYSTOSCOPY,URETEROSCOPY,LITHOTRIPSY      at [place]     ORTHOPEDIC SURGERY  2022    Right Wrist     WRIST SURGERY Right          Current Outpatient Medications:      hydrocortisone, Perianal, (HYDROCORTISONE) 2.5 % cream, Place rectally 2 times daily as needed for hemorrhoids, Disp: 30 g, Rfl: 1     traZODone (DESYREL) 50 MG tablet, Take 1 tablet (50 mg) by mouth at bedtime (Patient not taking: Reported on 8/26/2024), Disp: 30 tablet, Rfl: 1     zolpidem (AMBIEN) 5 MG tablet, Take 1 tablet (5 mg) by mouth nightly as needed for sleep., Disp: 20 tablet, Rfl: 0  Allergies   Allergen Reactions     Amoxicillin-Pot Clavulanate Anxiety     Has tolerated amoxicillin well in the past         Family History   Problem Relation Age of Onset     Cancer Mother         kidney     Heart Disease Mother 66     Nephrolithiasis Mother      Gout Mother      Cancer  Maternal Grandfather         renal cell     Heart Disease Maternal Grandfather      Nephrolithiasis Maternal Grandfather      Hyperlipidemia Maternal Grandfather      Cancer Paternal Grandfather         lucille cell     Kidney Disease Paternal Grandfather      Prostate Cancer Paternal Grandfather      Nephrolithiasis Maternal Aunt        EXAM:   There were no vitals taken for this visit.    Physical Exam  Constitutional:       General: He is not in acute distress.     Appearance: Normal appearance. He is not ill-appearing.   HENT:      Nose: Rhinorrhea present. No nasal deformity, septal deviation or congestion.      Right Turbinates: Not enlarged, swollen or pale.      Left Turbinates: Not enlarged, swollen or pale.      Comments: Mild nasal turbinate enlargement, with discharge, nonobstructive     Mouth/Throat:      Mouth: Mucous membranes are moist.      Pharynx: Oropharynx is clear. Uvula midline. No posterior oropharyngeal erythema.      Tonsils: 0 on the right. 0 on the left.   Eyes:      General: No allergic shiner.        Right eye: No discharge.         Left eye: No discharge.      Conjunctiva/sclera: Conjunctivae normal.   Cardiovascular:      Rate and Rhythm: Normal rate and regular rhythm.      Heart sounds: Normal heart sounds, S1 normal and S2 normal.   Pulmonary:      Effort: Pulmonary effort is normal. No prolonged expiration.      Breath sounds: Normal breath sounds. No decreased air movement. No wheezing.   Neurological:      Mental Status: He is alert.   Psychiatric:         Mood and Affect: Mood normal.         Behavior: Behavior normal.         Judgment: Judgment normal.         WORKUP: Skin testing, appropriate positive and negative controls     Percutaneous    Environmental Testing     Ordering Provider :  Caryn Velasco PA-C    Testing Technician : HANK    Date: 10/17/2024      Time: 10:28 AM       (W/F in millimeters)    Allergen      Concentration : Manufacture     Hayden, white  1:20 H   0/0    Birch mix 1:20 H 0/0   Weakley, common 1:20 H 0/0   4. Elm, American 1:20 H 0/0   5. Wilson, Shagbark 1:20 H 0/0   6. Maple, Hard/Sugar 1:20 H 0/0   7. [place] Mix 1:20 H 0/0   8. Oak, Red 1:20 H 0/0   9. [place], American 1:20 H 0/0   10. Rosendale Tree 1:20 H 0/0   11. Dp Mite 30,000 A U /ML H 11/30   12. Df Mite 30,000 A U /ML H 9/35   13. Grass Mix # 4 10,000 B A U /ML H 0/0   14. Jet grass 1:20 H 0/0   15.Cockroach mix 1:10 H 0/0   16. Alternaria Tenuis 1:10 H 0/0   17. Aspergillus Fumigatus 1:10 H 0/0   18. Homodendrum cladosporioides 1:10 H 0/0   19. Penicillium notatum 1:10 H 0/0   20. Epicoccum 1:10 H 0/0   21. Ragweed, Short 1:20 H 13/40   22. Dock, Kn[place] River 1:20 H 0/0   23. Lamb's Quarters 1:20 H 0/0   24. Pigweed, Rough Redroot 1:20 H 0/0   25. Plantain, English 1:20 H 0/0   26. Sagebrush, Mugwort 1:20 H 0/0   27. Cat 10,000  B A U /ML H 0/0   28. AP Dog 1:100 H 0/0   29. Neg. Control: 50% glycerine/saline H 0/0   30. Pos. Control: histamine 6mg/ML 7/10     At today's visit, the patient and I engaged in an informed consent discussion about allergy testing.  We discussed skin testing, blood testing, and the alternative of not undergoing any testing. The patient has a preference for skin testing. We then discussed the risks and benefits of skin testing.  The patient understands skin testing risks can include, but are not limited to, urticaria, angioedema, shortness of breath, and severe anaphylaxis.  The benefits include, but are not limited, to evaluation for allergens causing symptoms.  After answering the patient's questions they have agreed to proceed with skin testing.       ASSESSMENT/PLAN:  Ba Mireles is a 44 year old male who is undergoing evaluation for chronic sinus concerns.    Chronic/recurrent sinusitis  Environmental allergy to dust mites, and ragweed pollen  Patient reports he has an ENT appointment approximately 2 weeks.  We discussed beginning a treatment plan of Azelastine  (Astepro ) nasal spray, 1-2 sprays in each nostril twice daily as needed.  This medication may be bitter taste, please consider brushing your teeth after using this nasal spray.  We did briefly discuss nasal steroid sprays, patient may potentially benefit from these medications if he does not tolerate Astelin nasal spray.  Cetirizine (Zyrtec) 10 mg, levocetirizine (Xyzal) 5 mg, or fexofenadine (Allegra) 180 mg, daily for treatment of dust mite allergy.  Patient was given instructions regarding aero allergen avoidance measures.      Follow-up as needed      Thank you for allowing me to participate in the care of Ba Mireles.      I spent 45 minutes on the date of the encounter doing chart review, history and exam, documentation and further coordination as noted above exclusive of separately reported interpretations.     This excludes time spent for skin testing interpretation.     Please note that this note consists of symbols derived from keyboarding, dictation and/or voice recognition software. As a result, there may be errors in the script that have gone undetected. Please consider this when interpreting information found in this chart.     Caryn Velasco PA-C  [facility]      Again, thank you for allowing me to participate in the care of your patient.        Sincerely,        Caryn Velasco PA-C

## 2024-10-17 NOTE — PATIENT INSTRUCTIONS
Azelastine (Astepro ) nasal spray, 1-2 sprays in each nostril twice daily as needed.  This medication may be bitter taste, please consider brushing your teeth after using this nasal spray.     Cetirizine (Zyrtec) 10 mg, levocetirizine (Xyzal) 5 mg, or fexofenadine (Allegra) 180 mg      AEROALLERGEN AVOIDANCE INSTRUCTIONS  .  POLLEN  Pollens are the tiny airborne particles given off by weeds. They can be the cause of seasonal allergic rhinitis or hay fever symptoms, which include stuffy, itchy, runny nose, redness, swelling and itching of the eyes, and itching of the ears and throat. Here are some tips on how to avoid pollen exposure.  Keep windows closed and use the air conditioner when possible.   Avoid outside exposure in the early morning as pollen counts are highest at that time.   Take a shower and wash hair each night.   Consider wearing a mask when working in the yard and/or garden.   Clean furnace filter monthly with HEPA filters. Consider a HEPA filter vacuum  which will prevent pollen from being reintroduced into the air.   DUST MITES  Dust mites can never be entirely eliminated in the house no matter how clean your house is. Dust mites are attracted to warm, moist areas and feed on dead skin flakes. Here are tips to minimize dust mites in your home.   Encase pillows and mattress/box springs in zippered allergy covers.   Wash bedding in hot water (at least 130 F) every 7-14 days.   Avoid curtains, carpet, and upholstered furniture if possible.   Use HEPA air filters and a HEPA filter vacuum . Change filters monthly. Vacuum weekly.   Keep bedroom simple, avoiding clutter, so it can quickly be dusted.   Cover heating vents with vent filters.   Keep stuffed toys in a closed container and wash or freeze regularly.   Keep clothing in the closet with the door closed.    Common seasonal allergens:  Weed pollens ? Early summer through fall  Molds ? Present year round in many climates, but significant  primarily when it is warm, humid and wet  Common perennial (year?round) allergens:  Dust mites ? a very common indoor allergen that causes significant allergy in most of the [place]; highest allergen levels are in the bed

## 2024-10-28 ENCOUNTER — OFFICE VISIT (OUTPATIENT)
Dept: URGENT CARE | Facility: URGENT CARE | Age: 44
End: 2024-10-28
Payer: COMMERCIAL

## 2024-10-28 ENCOUNTER — OFFICE VISIT (OUTPATIENT)
Dept: PEDIATRICS | Facility: CLINIC | Age: 44
End: 2024-10-28
Payer: COMMERCIAL

## 2024-10-28 ENCOUNTER — HOSPITAL ENCOUNTER (OUTPATIENT)
Dept: CT IMAGING | Facility: HOSPITAL | Age: 44
Discharge: HOME | End: 2024-10-28
Attending: FAMILY MEDICINE
Payer: COMMERCIAL

## 2024-10-28 VITALS
RESPIRATION RATE: 18 BRPM | TEMPERATURE: 98.3 F | HEART RATE: 74 BPM | SYSTOLIC BLOOD PRESSURE: 152 MMHG | WEIGHT: 156 LBS | OXYGEN SATURATION: 100 % | BODY MASS INDEX: 23.04 KG/M2 | DIASTOLIC BLOOD PRESSURE: 80 MMHG

## 2024-10-28 VITALS
SYSTOLIC BLOOD PRESSURE: 152 MMHG | TEMPERATURE: 98.1 F | OXYGEN SATURATION: 100 % | DIASTOLIC BLOOD PRESSURE: 89 MMHG | HEART RATE: 66 BPM | RESPIRATION RATE: 20 BRPM | BODY MASS INDEX: 22.73 KG/M2 | WEIGHT: 153.9 LBS
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
## 2024-10-28 DIAGNOSIS — R10.31 RLQ ABDOMINAL PAIN: ICD-10-CM
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
## 2024-10-28 DIAGNOSIS — M54.6 ACUTE RIGHT-SIDED THORACIC BACK PAIN: ICD-10-CM

## 2024-10-28 DIAGNOSIS — S20.211D CONTUSION OF RIGHT CHEST WALL, SUBSEQUENT ENCOUNTER: Primary | ICD-10-CM

## 2024-10-28 DIAGNOSIS — R07.89 RIGHT-SIDED CHEST WALL PAIN: ICD-10-CM

## 2024-10-28 DIAGNOSIS — G44.319 ACUTE POST-TRAUMATIC HEADACHE, NOT INTRACTABLE: ICD-10-CM

## 2024-10-28 DIAGNOSIS — R10.11 RUQ ABDOMINAL PAIN: ICD-10-CM

## 2024-10-28 LAB
ALBUMIN SERPL-MCNC: 4.5 G/DL (ref 3.4–5)
ALBUMIN UR-MCNC: NEGATIVE MG/DL
ALP SERPL-CCNC: 56 U/L (ref 40–150)
ALT SERPL W P-5'-P-CCNC: 19 U/L (ref 0–70)
ANION GAP SERPL CALCULATED.3IONS-SCNC: 13 MMOL/L (ref 3–14)
APPEARANCE UR: CLEAR
AST SERPL W P-5'-P-CCNC: 31 U/L (ref 0–45)
BACTERIA #/AREA URNS HPF: ABNORMAL /HPF
BILIRUB SERPL-MCNC: 1 MG/DL (ref 0.2–1.3)
BILIRUB UR QL STRIP: NEGATIVE
BUN SERPL-MCNC: 15 MG/DL (ref 7–30)
CALCIUM SERPL-MCNC: 9 MG/DL (ref 8.5–10.1)
CHLORIDE BLD-SCNC: 105 MMOL/L (ref 94–109)
CO2 SERPL-SCNC: 26 MMOL/L (ref 20–32)
COLOR UR AUTO: YELLOW
CREAT SERPL-MCNC: 0.8 MG/DL (ref 0.66–1.25)
EGFRCR SERPLBLD CKD-EPI 2021: >90 ML/MIN/1.73M2
ERYTHROCYTE [DISTWIDTH] IN BLOOD BY AUTOMATED COUNT: 12.8 % (ref 10–15)
GLUCOSE BLD-MCNC: 85 MG/DL (ref 70–99)
GLUCOSE UR STRIP-MCNC: NEGATIVE MG/DL
HCT VFR BLD AUTO: 43.7 % (ref 40–53)
HGB BLD-MCNC: 14.9 G/DL (ref 13.3–17.7)
HGB UR QL STRIP: ABNORMAL
KETONES UR STRIP-MCNC: 15 MG/DL
LEUKOCYTE ESTERASE UR QL STRIP: NEGATIVE
MCH RBC QN AUTO: 31.6 PG (ref 26.5–33)
MCHC RBC AUTO-ENTMCNC: 34.1 G/DL (ref 31.5–36.5)
MCV RBC AUTO: 93 FL (ref 78–100)
NITRATE UR QL: NEGATIVE
PH UR STRIP: 6 [PH] (ref 5–8)
PLATELET # BLD AUTO: 331 10E3/UL (ref 150–450)
POTASSIUM BLD-SCNC: 4.2 MMOL/L (ref 3.4–5.3)
PROT SERPL-MCNC: 7.8 G/DL (ref 6.8–8.8)
RBC # BLD AUTO: 4.72 10E6/UL (ref 4.4–5.9)
RBC #/AREA URNS AUTO: ABNORMAL /HPF
SODIUM SERPL-SCNC: 144 MMOL/L (ref 135–145)
SP GR UR STRIP: 1.02 (ref 1–1.03)
SQUAMOUS #/AREA URNS AUTO: ABNORMAL /LPF
UROBILINOGEN UR STRIP-ACNC: 0.2 E.U./DL
WBC # BLD AUTO: 8.8 10E3/UL (ref 4–11)
WBC #/AREA URNS AUTO: ABNORMAL /HPF

## 2024-10-28 PROCEDURE — 85027 COMPLETE CBC AUTOMATED: CPT | Performed by: FAMILY MEDICINE

## 2024-10-28 PROCEDURE — 250N000009 HC RX 250: Performed by: FAMILY MEDICINE

## 2024-10-28 PROCEDURE — 36415 COLL VENOUS BLD VENIPUNCTURE: CPT | Performed by: FAMILY MEDICINE

## 2024-10-28 PROCEDURE — 250N000011 HC RX IP 250 OP 636: Performed by: FAMILY MEDICINE

## 2024-10-28 PROCEDURE — 96374 THER/PROPH/DIAG INJ IV PUSH: CPT | Performed by: FAMILY MEDICINE

## 2024-10-28 PROCEDURE — 99207 REFERRAL TO ACUTE AND DIAGNOSTIC SERVICES: CPT | Performed by: PHYSICIAN ASSISTANT

## 2024-10-28 PROCEDURE — 81001 URINALYSIS AUTO W/SCOPE: CPT | Performed by: FAMILY MEDICINE

## 2024-10-28 PROCEDURE — 74177 CT ABD & PELVIS W/CONTRAST: CPT

## 2024-10-28 PROCEDURE — 80053 COMPREHEN METABOLIC PANEL: CPT | Performed by: FAMILY MEDICINE

## 2024-10-28 PROCEDURE — 99214 OFFICE O/P EST MOD 30 MIN: CPT | Mod: 25 | Performed by: FAMILY MEDICINE

## 2024-10-28 RX ORDER — HYDROCODONE BITARTRATE AND ACETAMINOPHEN 5; 325 MG/1; MG/1
1 TABLET ORAL EVERY 6 HOURS PRN
Qty: 10 TABLET | Refills: 0 | Status: SHIPPED | OUTPATIENT
Start: 2024-10-28 | End: 2024-10-31

## 2024-10-28 RX ORDER — IOPAMIDOL 755 MG/ML
76 INJECTION, SOLUTION INTRAVASCULAR ONCE
Status: COMPLETED | OUTPATIENT
Start: 2024-10-28 | End: 2024-10-28

## 2024-10-28 RX ORDER — KETOROLAC TROMETHAMINE 30 MG/ML
30 INJECTION, SOLUTION INTRAMUSCULAR; INTRAVENOUS ONCE
Status: COMPLETED | OUTPATIENT
Start: 2024-10-28 | End: 2024-10-28

## 2024-10-28 RX ORDER — CYCLOBENZAPRINE HCL 5 MG
5 TABLET ORAL 3 TIMES DAILY PRN
Qty: 21 TABLET | Refills: 0 | Status: SHIPPED | OUTPATIENT
Start: 2024-10-28

## 2024-10-28 RX ADMIN — KETOROLAC TROMETHAMINE 30 MG: 30 INJECTION, SOLUTION INTRAMUSCULAR; INTRAVENOUS at 12:08

## 2024-10-28 RX ADMIN — SODIUM CHLORIDE 90 ML: 9 INJECTION, SOLUTION INTRAVENOUS at 12:43

## 2024-10-28 RX ADMIN — IOPAMIDOL 76 ML: 755 INJECTION, SOLUTION INTRAVENOUS at 12:42

## 2024-10-28 ASSESSMENT — PAIN SCALES - GENERAL: PAINLEVEL_OUTOF10: MILD PAIN (3)
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
## 2024-10-28 NOTE — Clinical Note
KOFI-seen at [facility] today.  Will need follow-up in about 2 weeks, sooner as needed.

## 2024-10-28 NOTE — LETTER
October 28, 2024      Ba Mireles  [address] [facility] [address]        To Whom It May Concern:    Ba Mireles was seen in our clinic. He may return to work with the following: limited to light duty - lifting no greater than 20 pounds, no use of arms over shoulders, no bending/stooping, and no climbing and may not operate heavy equipment  on for about 2 weeks.      Sincerely,      Salina Blackburn
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
## 2024-10-28 NOTE — PATIENT INSTRUCTIONS
Acetaminophen (Tylenol) 500mg tablets.  You may take 2 tabs every 4-6 hours as needed  Ibuprofen (Advil, Motrin) 200mg tablets.  You may take 3 tabs every 6-8 hours as needed with food.    No driving while taking cyclobenzaprine or hydrocodone.

## 2024-10-30 ENCOUNTER — TELEPHONE (OUTPATIENT)
Dept: OTOLARYNGOLOGY | Facility: CLINIC | Age: 44
End: 2024-10-30
Payer: COMMERCIAL

## 2024-10-30 NOTE — TELEPHONE ENCOUNTER
This writer called patient to follow up on request for virtual visit with Dr. Friedman tomorrow. Left patient a voicemail message to call back to discuss scheduling.    ANGELA ROMEO LPN on 10/30/2024 at 3:30 PM
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
## 2024-10-30 NOTE — TELEPHONE ENCOUNTER
See other telephone encounter from 10/30/2024 for additional information.    ANGELA ROMEO LPN on 10/30/2024 at 3:50 PM

## 2024-10-30 NOTE — TELEPHONE ENCOUNTER
Patient returned call to this writer. Patient has access to a computer and is okay with a video visit. Patient is aware how to get online for that visit. Patient was advised that someone would contact him prior to his appointment to verify information before appointment. Patient requesting a later appointment time if possible. Patient offered 9:40 am instead of 8:50 am. Patient is in agreement with this and patient was scheduled accordingly.     ANGELA ROMEO LPN on 10/30/2024 at 3:46 PM
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
## 2024-10-31 ENCOUNTER — TELEPHONE (OUTPATIENT)
Dept: OTOLARYNGOLOGY | Facility: CLINIC | Age: 44
End: 2024-10-31

## 2024-10-31 ENCOUNTER — VIRTUAL VISIT (OUTPATIENT)
Dept: OTOLARYNGOLOGY | Facility: CLINIC | Age: 44
End: 2024-10-31
Payer: COMMERCIAL

## 2024-10-31 DIAGNOSIS — H60.393 INFECTIVE OTITIS EXTERNA, BILATERAL: ICD-10-CM

## 2024-10-31 DIAGNOSIS — J32.9 SINUSITIS, UNSPECIFIED CHRONICITY, UNSPECIFIED LOCATION: ICD-10-CM

## 2024-10-31 PROCEDURE — 99212 OFFICE O/P EST SF 10 MIN: CPT | Mod: 95 | Performed by: STUDENT IN AN ORGANIZED HEALTH CARE EDUCATION/TRAINING PROGRAM

## 2024-10-31 NOTE — PATIENT INSTRUCTIONS
You were seen in the ENT Clinic today by Dr. Friedman. If you have any questions or concerns after your appointment, please contact us (see below)       2.   Plan to return to the ENT clinic in 3-4 months with a CT scan the same day prior to your appointment.           How to Contact Us:  Send a [employer] message to your provider. Our team will respond to you via [employer]. Occasionally, we will need to call you to get further information.  For urgent matters (Monday-Friday), call the ENT Clinic: [phone] and speak with a call center team member - they will route your call appropriately.   If you'd like to speak directly with a nurse, please find our contact information below. We do our best to check voicemail frequently throughout the day, and will work to call you back within 1-2 days. For urgent matters, please use the general clinic phone numbers listed above.     Donna OLSON RN  Direct: [phone]  Mel GOYAL LPN  Direct: [phone]         [facility]  Department of Otolaryngology
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
## 2024-11-25 ENCOUNTER — OFFICE VISIT (OUTPATIENT)
Dept: FAMILY MEDICINE | Facility: CLINIC | Age: 44
End: 2024-11-25
Payer: COMMERCIAL

## 2024-11-25 VITALS
SYSTOLIC BLOOD PRESSURE: 123 MMHG | DIASTOLIC BLOOD PRESSURE: 77 MMHG | TEMPERATURE: 98.7 F | HEART RATE: 71 BPM | OXYGEN SATURATION: 98 % | BODY MASS INDEX: 22.88 KG/M2 | RESPIRATION RATE: 12 BRPM | WEIGHT: 154.5 LBS | HEIGHT: 69 IN

## 2024-11-25 DIAGNOSIS — S29.9XXD INJURY OF CHEST WALL, SUBSEQUENT ENCOUNTER: ICD-10-CM
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
## 2024-11-25 PROBLEM — Z87.442 HISTORY OF RENAL CALCULI: Status: ACTIVE | Noted: 2024-11-25

## 2024-11-25 PROBLEM — N20.1 LEFT URETERAL STONE: Status: RESOLVED | Noted: 2023-08-02 | Resolved: 2024-11-25

## 2024-11-25 PROBLEM — M67.40 GANGLION CYST: Status: ACTIVE | Noted: 2022-09-08

## 2024-11-25 PROCEDURE — 99213 OFFICE O/P EST LOW 20 MIN: CPT | Performed by: NURSE PRACTITIONER

## 2024-11-25 RX ORDER — CYCLOBENZAPRINE HCL 10 MG
10 TABLET ORAL 3 TIMES DAILY PRN
Qty: 90 TABLET | Refills: 1 | Status: SHIPPED | OUTPATIENT
Start: 2024-11-25

## 2024-11-25 ASSESSMENT — PAIN SCALES - GENERAL: PAINLEVEL_OUTOF10: NO PAIN (1)
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
## 2025-05-26 ENCOUNTER — NURSE TRIAGE (OUTPATIENT)
Dept: NURSING | Facility: CLINIC | Age: 45
End: 2025-05-26

## 2025-05-26 ENCOUNTER — APPOINTMENT (OUTPATIENT)
Dept: CT IMAGING | Facility: HOSPITAL | Age: 45
End: 2025-05-26
Attending: PHYSICIAN ASSISTANT
Payer: COMMERCIAL

## 2025-05-26 ENCOUNTER — HOSPITAL ENCOUNTER (EMERGENCY)
Facility: HOSPITAL | Age: 45
Discharge: HOME | End: 2025-05-26
Admitting: PHYSICIAN ASSISTANT
Payer: COMMERCIAL

## 2025-05-26 ENCOUNTER — OFFICE VISIT (OUTPATIENT)
Dept: URGENT CARE | Facility: URGENT CARE | Age: 45
End: 2025-05-26
Payer: COMMERCIAL

## 2025-05-26 VITALS
WEIGHT: 140.9 LBS | OXYGEN SATURATION: 97 % | SYSTOLIC BLOOD PRESSURE: 141 MMHG | RESPIRATION RATE: 18 BRPM | DIASTOLIC BLOOD PRESSURE: 74 MMHG | BODY MASS INDEX: 20.81 KG/M2 | TEMPERATURE: 98 F | HEART RATE: 75 BPM

## 2025-05-26 VITALS
SYSTOLIC BLOOD PRESSURE: 122 MMHG | BODY MASS INDEX: 20.81 KG/M2 | HEART RATE: 82 BPM | WEIGHT: 140.9 LBS | RESPIRATION RATE: 18 BRPM | DIASTOLIC BLOOD PRESSURE: 78 MMHG | TEMPERATURE: 97.3 F | OXYGEN SATURATION: 100 %

## 2025-05-26 DIAGNOSIS — M54.50 ACUTE ON CHRONIC LOW BACK PAIN: Primary | ICD-10-CM

## 2025-05-26 DIAGNOSIS — G89.29 ACUTE ON CHRONIC LOW BACK PAIN: Primary | ICD-10-CM

## 2025-05-26 DIAGNOSIS — M54.42 ACUTE LEFT-SIDED LOW BACK PAIN WITH LEFT-SIDED SCIATICA: ICD-10-CM

## 2025-05-26 DIAGNOSIS — M51.369 L4-L5 DISC BULGE: ICD-10-CM

## 2025-05-26 PROCEDURE — 96372 THER/PROPH/DIAG INJ SC/IM: CPT | Performed by: PHYSICIAN ASSISTANT

## 2025-05-26 PROCEDURE — 72131 CT LUMBAR SPINE W/O DYE: CPT

## 2025-05-26 PROCEDURE — 250N000013 HC RX MED GY IP 250 OP 250 PS 637: Performed by: PHYSICIAN ASSISTANT

## 2025-05-26 PROCEDURE — 3078F DIAST BP <80 MM HG: CPT | Performed by: NURSE PRACTITIONER

## 2025-05-26 PROCEDURE — 3074F SYST BP LT 130 MM HG: CPT | Performed by: NURSE PRACTITIONER

## 2025-05-26 PROCEDURE — 99214 OFFICE O/P EST MOD 30 MIN: CPT | Performed by: NURSE PRACTITIONER

## 2025-05-26 PROCEDURE — 250N000011 HC RX IP 250 OP 636: Mod: JZ | Performed by: PHYSICIAN ASSISTANT

## 2025-05-26 PROCEDURE — 99285 EMERGENCY DEPT VISIT HI MDM: CPT | Mod: 25

## 2025-05-26 RX ORDER — OXYCODONE HYDROCHLORIDE 5 MG/1
5 TABLET ORAL EVERY 6 HOURS PRN
Qty: 6 TABLET | Refills: 0 | Status: SHIPPED | OUTPATIENT
Start: 2025-05-26 | End: 2025-05-29

## 2025-05-26 RX ORDER — OXYCODONE HYDROCHLORIDE 5 MG/1
5 TABLET ORAL ONCE
Refills: 0 | Status: COMPLETED | OUTPATIENT
Start: 2025-05-26 | End: 2025-05-26

## 2025-05-26 RX ORDER — METHYLPREDNISOLONE 4 MG/1
TABLET ORAL
Qty: 21 TABLET | Refills: 0 | Status: SHIPPED | OUTPATIENT
Start: 2025-05-26

## 2025-05-26 RX ORDER — DIAZEPAM 5 MG/1
5 TABLET ORAL ONCE
Status: COMPLETED | OUTPATIENT
Start: 2025-05-26 | End: 2025-05-26

## 2025-05-26 RX ORDER — LIDOCAINE 4 G/G
1 PATCH TOPICAL ONCE
Status: DISCONTINUED | OUTPATIENT
Start: 2025-05-26 | End: 2025-05-26 | Stop reason: HOSPADM

## 2025-05-26 RX ORDER — KETOROLAC TROMETHAMINE 15 MG/ML
15 INJECTION, SOLUTION INTRAMUSCULAR; INTRAVENOUS ONCE
Status: COMPLETED | OUTPATIENT
Start: 2025-05-26 | End: 2025-05-26

## 2025-05-26 RX ADMIN — DIAZEPAM 5 MG: 5 TABLET ORAL at 16:00

## 2025-05-26 RX ADMIN — LIDOCAINE 1 PATCH: 4 PATCH TOPICAL at 16:02

## 2025-05-26 RX ADMIN — KETOROLAC TROMETHAMINE 15 MG: 15 INJECTION, SOLUTION INTRAMUSCULAR; INTRAVENOUS at 16:04

## 2025-05-26 RX ADMIN — OXYCODONE HYDROCHLORIDE 5 MG: 5 TABLET ORAL at 16:00

## 2025-05-26 ASSESSMENT — ACTIVITIES OF DAILY LIVING (ADL)
ADLS_ACUITY_SCORE: 43
ADLS_ACUITY_SCORE: 42

## 2025-05-26 ASSESSMENT — COLUMBIA-SUICIDE SEVERITY RATING SCALE - C-SSRS
1. IN THE PAST MONTH, HAVE YOU WISHED YOU WERE DEAD OR WISHED YOU COULD GO TO SLEEP AND NOT WAKE UP?: NO
6. HAVE YOU EVER DONE ANYTHING, STARTED TO DO ANYTHING, OR PREPARED TO DO ANYTHING TO END YOUR LIFE?: NO
2. HAVE YOU ACTUALLY HAD ANY THOUGHTS OF KILLING YOURSELF IN THE PAST MONTH?: NO

## 2025-05-26 ASSESSMENT — ENCOUNTER SYMPTOMS
NUMBNESS: 1
BACK PAIN: 1
WEAKNESS: 1

## 2025-05-26 NOTE — DISCHARGE INSTRUCTIONS
You were here in the emergency department for evaluation of low back pain.  There is no obvious lower lumbar impingement or disc herniation.    For pain or fever you may use:  -Tylenol 650 mg every 6 hours.  Max 4000 mg in 24 hours  Do not use thismedication with alcohol as it can cause liver problems.  -Ibuprofen 600 mg every 6 hours.  Max 3500 mg in 24 hours  Do not take this medication if you have a history of a GI bleed or have kidney problems.  You may use both of these medications at the same time or you can alternate them every 3 hours.  For example, Tylenol at 6 AM, ibuprofen at 9 AM, Tylenol at noon, etc.

## 2025-05-26 NOTE — PROGRESS NOTES
Assessment & Plan       ICD-10-CM    1. Acute on chronic low back pain  M54.50     G89.29            MDM: Patient is in significant amount of pain.  I do not believe I will be able to get this under control today in clinic.  Also, he is having numbness and tingling as well as weakness of the left lower extremity.  Patient does have known back issues but we will have him go directly to the emergency department for further evaluation and treatment.    Patient Instructions   Go directly to the Emergency Department for further evaluation of back and treatment of pain.       Patient agreed to the treatment plan and verbalized understanding.       Wendy Stoddard is a 45 year old male who presents to clinic today for the following health issues:  Chief Complaint   Patient presents with    Back Pain     Lower back spasms, says has bulging discs, 2 wks ago helped dad meredith. Says got better when he got sick then Saturday was playing golf and heard something pop in lower back. Left leg is tingling all the way to his toes. Can only sit in a somewhat curled up position. Says at times feels nauseous with the pain.     HPI  Patient with a history of right-sided low back pain with bilateral sciatica as well as bilateral hip pain and numbness and tingling of both lower extremities has had back pain that flared starting 2 days ago while playing golf.  He states he has left leg weakness being able to lift it off the ground.  No fevers, or saddle anesthesia. No recent trauma.    Review of Systems   Musculoskeletal:  Positive for back pain (Lower back).   Neurological:  Positive for weakness (Left lower extremity) and numbness (Left lower extremity).       Problem List:  2024-11: History of renal calculi  2023-09: Chronic right-sided low back pain with bilateral sciatica  2023-09: Hip pain, left  2023-09: Hip pain, right  2023-09: Numbness and tingling of both lower extremities  2023-08: Bilateral nephrolithiasis  2023-08: Left  ureteral stone  2022: Ganglion cyst      Past Medical History:   Diagnosis Date    Arthritis 2014    When I had my hip surgery they told me I have arthritis going already    Kidney stone     first stone since age 20    Left ureteral stone 2023         Social History     Tobacco Use    Smoking status: Every Day     Current packs/day: 0.00     Average packs/day: 1 pack/day for 26.2 years (26.2 ttl pk-yrs)     Types: Cigarettes     Start date: 1997     Last attempt to quit: 2023     Years since quittin.7     Passive exposure: Current    Smokeless tobacco: Never   Substance Use Topics    Alcohol use: Yes     Alcohol/week: 2.0 standard drinks of alcohol     Types: 2 Standard drinks or equivalent per week           Objective    /78   Pulse 82   Temp 97.3  F (36.3  C) (Tympanic)   Resp 18   Wt 63.9 kg (140 lb 14.4 oz)   SpO2 100%   BMI 20.81 kg/m    Physical Exam  Vitals and nursing note reviewed.   Constitutional:       General: He is in acute distress (Patient is unable to sit in chair due to discomfort.  He can only slowly shuffle his feet to ambulate.  He states he is in quite a bit of pain.).   HENT:      Head: Normocephalic and atraumatic.   Neurological:      Mental Status: He is alert and oriented to person, place, and time.     Difficulty performing rest of the examination due to pain.      Lea Lopez NP

## 2025-05-26 NOTE — ED PROVIDER NOTES
EMERGENCY DEPARTMENT ENCOUNTER      NAME: Ba Mireles  AGE: 45 year old male  YOB: 1980  MRN: [id number]  EVALUATION DATE & TIME: 5/26/2025  3:49 PM    PCP: Zaid Eli    ED PROVIDER: David Salmeron PA-C      Chief Complaint   Patient presents with    Back Pain         FINAL IMPRESSION:  1. L4-L5 disc bulge    2. Acute left-sided low back pain with left-sided sciatica          ED COURSE & MEDICAL DECISION MAKING:    Pertinent Labs & Imaging studies reviewed. (See chart for details)  3:50 PM I met the patient and performed my initial interview and exam.   4:53 PM Patient updated, plan for discharge.     45 year old male presents to the Emergency Department for evaluation of acute back pain.     ED Course as of 05/26/25 1654   Mon May 26, 2025   1556 Patient is a 45-year-old male, presents emergency department for evaluation of left-sided back and hip pain.  Patient had a previous injury to his back, played golf on Saturday, felt a pop, now having worsening back pain.  He has been intermittently taking pain medications at home including Tylenol, 1 dose of oxycodone, as well as some muscle relaxers with no relief.  Has a history of disc herniation in the past.  No loss of control of bowel or bladder.  Some tingling and numbness into the left hip.  He is able to flex and extend the hip without difficulty.  Has difficult time with ambulation.  More comfortable laying flat on his stomach.  He does not have significant tenderness to palpation to the midline spine.  No other red flag neurologic signs suggestive of cauda equina.  Suspect likely disc herniation, however unlikely to be cauda equina.  Plan for medications here in the emergency department following Valium, Toradol, lidocaine patch and oxycodone.  Patient will undergo CT of the spine to evaluate for fracture or deformity, however think this is less likely.  At this point, no red flag neurologic signs indicative of spinal abscess or  cauda equina.   1638     I have independently reviewed the CT lumbar, I do not appreciate any obvious fracture or deformity.  Pending final radiology read.   1646 CT Lumbar Spine w/o Contrast  CT lumbar findings as [id number]: No posterior disc bulge or spinal canal narrowing. No neural foraminal narrowing.     L1-L2: No posterior disc bulge or spinal canal narrowing. No neural foraminal narrowing.     L2-L3: No posterior disc bulge or spinal canal narrowing. No neural foraminal narrowing.     L3-L4: No posterior disc bulge or spinal canal narrowing. No neural foraminal narrowing.     L4-L5: Symmetric disc bulge with mild spinal canal narrowing. No neural foraminal narrowing.     L5-S1: Symmetric disc bulge and mild facet arthropathy without significant spinal canal narrowing. Mild bilateral neural foraminal narrowing.    Overall, no evidence of acute fracture, subluxation of the lumbar spine by CT imaging.   1654     Reevaluated here in the emergency department, pain mildly improved here, recommend outpatient follow-up with primary care, will place on steroids, send with some additional pain medications.  Suspect disc herniation as likely cause.  He is agreeable with this plan.       Medical Decision Making  I reviewed the EMR: Outpatient Record: Patient office visit from [facility], outpatient office visit on 11/25/2024  Discharge. I prescribed additional prescription strength medication(s) as charted. N/A.    MIPS (CTPE, Dental pain, Ladd, Sinusitis, Asthma/COPD, Head Trauma): Not Applicable    SEPSIS: None    At the conclusion of the encounter I discussed the results of all of the tests and the disposition. The questions were answered. The patient or family acknowledged understanding and was agreeable with the care plan.     0 minutes of critical care time     MEDICATIONS GIVEN IN THE EMERGENCY:  Medications   Lidocaine (LIDOCARE) 4 % Patch 1 patch (1 patch Transdermal $Patch/Med Applied 5/26/25 1601)   ketorolac  (TORADOL) injection 15 mg (15 mg Intramuscular $Given 5/26/25 1604)   diazepam (VALIUM) tablet 5 mg (5 mg Oral $Given 5/26/25 1600)   oxyCODONE (ROXICODONE) tablet 5 mg (5 mg Oral $Given 5/26/25 1600)       NEW PRESCRIPTIONS STARTED AT TODAY'S ER VISIT  New Prescriptions    METHYLPREDNISOLONE (MEDROL DOSEPAK) 4 MG TABLET THERAPY PACK    Follow Package Directions    OXYCODONE (ROXICODONE) 5 MG TABLET    Take 1 tablet (5 mg) by mouth every 6 hours as needed for severe pain.          =================================================================    HPI    Patient information was obtained from: Patient     Use of : N/A         Ba Mireles is a 45 year old male with a pertinent history of bilateral nephrolithiasis, chronic right-sided low back pain with sciatica, left-sided hip pain, numbness and tingling in lower extremities who presents to this ED for evaluation of left hip and back pain.  Patient initially having pain in the left back and left hip for couple weeks, initially getting better and then he played golf again on Saturday, felt a pop and reinjury.  Some nausea, did present to urgent care and was sent to the emergency department for further assessment.  Denies any loss control of bowel or bladder.  Has tried a couple of doses of pain medications: Tylenol, oxycodone at home.  No urinary symptoms.  No bowel issues.    PAST MEDICAL HISTORY:  Past Medical History:   Diagnosis Date    Arthritis 08/16/2014    When I had my hip surgery they told me I have arthritis going already    Kidney stone     first stone since age 20    Left ureteral stone 08/02/2023       PAST SURGICAL HISTORY:  Past Surgical History:   Procedure Laterality Date    COMBINED CYSTOSCOPY, RETROGRADES, URETEROSCOPY, LASER HOLMIUM LITHOTRIPSY URETER(S), INSERT STENT Bilateral 08/07/2023    Procedure: Cystoscopy, Bilateral Ureteroscopy, Bilateral Retrograde Pyelogram, Bilateral Stone Basket Extraction, Bilateral Stent placement;   Surgeon: Gus Shipman MD;  Location: [facility] OR    CYSTOSCOPY,URETEROSCOPY,LITHOTRIPSY      at [place]    ORTHOPEDIC SURGERY      Right Wrist    WRIST SURGERY Right            CURRENT MEDICATIONS:    methylPREDNISolone (MEDROL DOSEPAK) 4 MG tablet therapy pack  oxyCODONE (ROXICODONE) 5 MG tablet  azelastine (ASTELIN) 0.1 % nasal spray  cyclobenzaprine (FLEXERIL) 10 MG tablet  hydrocortisone, Perianal, (HYDROCORTISONE) 2.5 % cream         ALLERGIES:  Allergies   Allergen Reactions    Dust Mites     Ragweeds     Amoxicillin-Pot Clavulanate Anxiety     Has tolerated amoxicillin well in the past       FAMILY HISTORY:  Family History   Problem Relation Age of Onset    Cancer Mother         kidney    Heart Disease Mother 66    Nephrolithiasis Mother     Gout Mother     Cancer Maternal Grandfather         renal cell    Heart Disease Maternal Grandfather     Nephrolithiasis Maternal Grandfather     Hyperlipidemia Maternal Grandfather     Cancer Paternal Grandfather         lucille cell    Kidney Disease Paternal Grandfather     Prostate Cancer Paternal Grandfather     Nephrolithiasis Maternal Aunt        SOCIAL HISTORY:   Social History     Socioeconomic History    Marital status:    Tobacco Use    Smoking status: Every Day     Current packs/day: 0.00     Average packs/day: 1 pack/day for 26.2 years (26.2 ttl pk-yrs)     Types: Cigarettes     Start date: 1997     Last attempt to quit: 2023     Years since quittin.7     Passive exposure: Current    Smokeless tobacco: Never   Vaping Use    Vaping status: Never Used   Substance and Sexual Activity    Alcohol use: Yes     Alcohol/week: 2.0 standard drinks of alcohol     Types: 2 Standard drinks or equivalent per week    Drug use: Yes     Types: Marijuana    Sexual activity: Yes     Partners: Female     Birth control/protection: Pull-out method   Other Topics Concern    Parent/sibling w/ CABG, MI or angioplasty before 65F 55M? Yes     Comment:  Mother     Social Drivers of Health     Financial Resource Strain: Low Risk  (7/28/2024)    Financial Resource Strain     Within the past 12 months, have you or your family members you live with been unable to get utilities (heat, electricity) when it was really needed?: No   Food Insecurity: Low Risk  (7/28/2024)    Food Insecurity     Within the past 12 months, did you worry that your food would run out before you got money to buy more?: No     Within the past 12 months, did the food you bought just not last and you didn t have money to get more?: No   Transportation Needs: Low Risk  (7/28/2024)    Transportation Needs     Within the past 12 months, has lack of transportation kept you from medical appointments, getting your medicines, non-medical meetings or appointments, work, or from getting things that you need?: No   Physical Activity: Insufficiently Active (7/28/2024)    Exercise Vital Sign     Days of Exercise per Week: 2 days     Minutes of Exercise per Session: 20 min   Stress: Stress Concern Present (7/28/2024)    [place] [employer] - Occupational Stress Questionnaire     Feeling of Stress : To some extent   Social Connections: Unknown (7/28/2024)    Social Connection and Isolation Panel [NHANES]     Frequency of Social Gatherings with Friends and Family: Once a week   Interpersonal Safety: Low Risk  (7/29/2024)    Interpersonal Safety     Do you feel physically and emotionally safe where you currently live?: Yes     Within the past 12 months, have you been hit, slapped, kicked or otherwise physically hurt by someone?: No     Within the past 12 months, have you been humiliated or emotionally abused in other ways by your partner or ex-partner?: No   Housing Stability: Low Risk  (7/28/2024)    Housing Stability     Do you have housing? : Yes     Are you worried about losing your housing?: No       VITALS:  /75   Pulse 102   Temp 98  F (36.7  C) (Oral)   Resp 18   Wt 63.9 kg  (140 lb 14.4 oz)   SpO2 99%   BMI 20.81 kg/m      PHYSICAL EXAM    Physical Exam  Vitals and nursing note reviewed.   Constitutional:       General: He is not in acute distress.     Appearance: Normal appearance. He is normal weight. He is not toxic-appearing or diaphoretic.   HENT:      Head: Normocephalic.      Right Ear: External ear normal.      Left Ear: External ear normal.   Cardiovascular:      Rate and Rhythm: Normal rate and regular rhythm.      Heart sounds: Normal heart sounds. No murmur heard.     No friction rub. No gallop.   Pulmonary:      Effort: Pulmonary effort is normal. No respiratory distress.      Breath sounds: No wheezing.   Abdominal:      General: Abdomen is flat. Bowel sounds are normal. There is no distension.      Palpations: Abdomen is soft.      Tenderness: There is no abdominal tenderness. There is no right CVA tenderness, left CVA tenderness, guarding or rebound.   Musculoskeletal:         General: Tenderness present.      Cervical back: No tenderness.      Comments: Tenderness palpation to the left lateral low back, left hip area.  No midline cervical, thoracic, or lumbar tenderness.   Skin:     General: Skin is warm.      Findings: No erythema or rash.   Neurological:      Mental Status: He is alert. Mental status is at baseline.      Motor: No weakness.      Comments: Minimal decrease sensation to the left lower extremity, however sensation to soft touch intact.  Able to flex and extend lower extremities, difficulty.           LAB:  All pertinent labs reviewed and interpreted.  Labs Ordered and Resulted from Time of ED Arrival to Time of ED Departure - No data to display    RADIOLOGY:  Reviewed all pertinent imaging. Please see official radiology report.  CT Lumbar Spine w/o Contrast   Final Result   IMPRESSION:   1.  No evidence of acute fracture or subluxation of the lumbar spine by CT imaging.          PROCEDURES:   None.     David Salmeron PA-C  Emergency  [facility] EMERGENCY DEPARTMENT  [facility] [facility] [address]  [phone]  Dept: [phone]       David Salmeron PA-C  05/26/25 9635

## 2025-05-26 NOTE — PROGRESS NOTES
Urgent Care Clinic Visit    Chief Complaint   Patient presents with    Back Pain     Lower back spasms, says has bulging discs, 2 wks ago helped dad meredith. Says got better when he got sick then Saturday was playing golf and heard something pop in lower back. Left leg is tingling all the way to his toes. Can only sit in a somewhat curled up position. Says at times feels nauseous with the pain.               5/26/2025     3:04 PM   Additional Questions   Roomed by Leslie HANNON

## 2025-05-26 NOTE — Clinical Note
Ba Mireles was seen and treated in our emergency department on 5/26/2025.  He may return to work on 05/30/2025.       If you have any questions or concerns, please don't hesitate to call.      David Salmeron PA-C

## 2025-05-26 NOTE — ED TRIAGE NOTES
Pt states back pain in center and left hip for last couple of weeks. Thought it was getting better and played golf on Sat and felt a pop and reinjury. Some nausea. Some numbness in left hip. Was sent here from walk in clinic. Did not take anything for the pain     Triage Assessment (Adult)       Row Name 05/26/25 1547          Triage Assessment    Airway WDL WDL

## 2025-05-27 ENCOUNTER — OFFICE VISIT (OUTPATIENT)
Dept: FAMILY MEDICINE | Facility: CLINIC | Age: 45
End: 2025-05-27
Attending: EMERGENCY MEDICINE
Payer: COMMERCIAL

## 2025-05-27 ENCOUNTER — HOSPITAL ENCOUNTER (EMERGENCY)
Facility: HOSPITAL | Age: 45
Discharge: HOME | End: 2025-05-27
Attending: EMERGENCY MEDICINE | Admitting: EMERGENCY MEDICINE
Payer: COMMERCIAL

## 2025-05-27 VITALS
TEMPERATURE: 98.2 F | OXYGEN SATURATION: 100 % | HEART RATE: 100 BPM | RESPIRATION RATE: 20 BRPM | WEIGHT: 140.9 LBS | DIASTOLIC BLOOD PRESSURE: 74 MMHG | HEIGHT: 69 IN | BODY MASS INDEX: 20.87 KG/M2 | SYSTOLIC BLOOD PRESSURE: 145 MMHG

## 2025-05-27 VITALS
OXYGEN SATURATION: 97 % | HEIGHT: 69 IN | DIASTOLIC BLOOD PRESSURE: 58 MMHG | WEIGHT: 140 LBS | TEMPERATURE: 97.9 F | RESPIRATION RATE: 14 BRPM | BODY MASS INDEX: 20.73 KG/M2 | HEART RATE: 131 BPM | SYSTOLIC BLOOD PRESSURE: 102 MMHG

## 2025-05-27 DIAGNOSIS — M54.42 ACUTE LEFT-SIDED LOW BACK PAIN WITH LEFT-SIDED SCIATICA: ICD-10-CM

## 2025-05-27 PROBLEM — H61.20 IMPACTED CERUMEN: Status: ACTIVE | Noted: 2025-05-27

## 2025-05-27 PROBLEM — H90.11 CONDUCTIVE HEARING LOSS OF RIGHT EAR WITH UNRESTRICTED HEARING OF LEFT EAR: Status: ACTIVE | Noted: 2025-05-27

## 2025-05-27 PROBLEM — H92.03 OTALGIA, BILATERAL: Status: ACTIVE | Noted: 2025-05-27

## 2025-05-27 PROBLEM — J34.89 SINUS PRESSURE: Status: ACTIVE | Noted: 2025-05-27

## 2025-05-27 PROCEDURE — 99284 EMERGENCY DEPT VISIT MOD MDM: CPT

## 2025-05-27 PROCEDURE — 250N000011 HC RX IP 250 OP 636: Performed by: EMERGENCY MEDICINE

## 2025-05-27 PROCEDURE — 250N000013 HC RX MED GY IP 250 OP 250 PS 637: Performed by: EMERGENCY MEDICINE

## 2025-05-27 PROCEDURE — 99213 OFFICE O/P EST LOW 20 MIN: CPT | Mod: 25 | Performed by: NURSE PRACTITIONER

## 2025-05-27 PROCEDURE — 3078F DIAST BP <80 MM HG: CPT | Performed by: NURSE PRACTITIONER

## 2025-05-27 PROCEDURE — 96372 THER/PROPH/DIAG INJ SC/IM: CPT | Performed by: NURSE PRACTITIONER

## 2025-05-27 PROCEDURE — 96372 THER/PROPH/DIAG INJ SC/IM: CPT | Performed by: EMERGENCY MEDICINE

## 2025-05-27 PROCEDURE — 3074F SYST BP LT 130 MM HG: CPT | Performed by: NURSE PRACTITIONER

## 2025-05-27 RX ORDER — GABAPENTIN 100 MG/1
300 CAPSULE ORAL ONCE
Status: COMPLETED | OUTPATIENT
Start: 2025-05-27 | End: 2025-05-27

## 2025-05-27 RX ORDER — DEXAMETHASONE SODIUM PHOSPHATE 4 MG/ML
10 INJECTION, SOLUTION INTRA-ARTICULAR; INTRALESIONAL; INTRAMUSCULAR; INTRAVENOUS; SOFT TISSUE ONCE
Status: COMPLETED | OUTPATIENT
Start: 2025-05-27 | End: 2025-05-27

## 2025-05-27 RX ORDER — FLUTICASONE PROPIONATE 50 MCG
2 SPRAY, SUSPENSION (ML) NASAL DAILY
COMMUNITY
End: 2025-05-29

## 2025-05-27 RX ORDER — GABAPENTIN 300 MG/1
300 CAPSULE ORAL 3 TIMES DAILY
Qty: 63 CAPSULE | Refills: 0 | Status: SHIPPED | OUTPATIENT
Start: 2025-05-27

## 2025-05-27 RX ORDER — KETOROLAC TROMETHAMINE 30 MG/ML
30 INJECTION, SOLUTION INTRAMUSCULAR; INTRAVENOUS ONCE
Status: COMPLETED | OUTPATIENT
Start: 2025-05-27 | End: 2025-05-27

## 2025-05-27 RX ORDER — GABAPENTIN 300 MG/1
300 CAPSULE ORAL 3 TIMES DAILY
Qty: 63 CAPSULE | Refills: 0 | Status: SHIPPED | OUTPATIENT
Start: 2025-05-27 | End: 2025-05-27

## 2025-05-27 RX ORDER — DEXAMETHASONE SODIUM PHOSPHATE 4 MG/ML
10 INJECTION, SOLUTION INTRA-ARTICULAR; INTRALESIONAL; INTRAMUSCULAR; INTRAVENOUS; SOFT TISSUE ONCE
Status: DISCONTINUED | OUTPATIENT
Start: 2025-05-27 | End: 2025-05-27

## 2025-05-27 RX ORDER — OXYCODONE HYDROCHLORIDE 5 MG/1
10 TABLET ORAL ONCE
Refills: 0 | Status: COMPLETED | OUTPATIENT
Start: 2025-05-27 | End: 2025-05-27

## 2025-05-27 RX ADMIN — DEXAMETHASONE SODIUM PHOSPHATE 10 MG: 4 INJECTION, SOLUTION INTRA-ARTICULAR; INTRALESIONAL; INTRAMUSCULAR; INTRAVENOUS; SOFT TISSUE at 01:19

## 2025-05-27 RX ADMIN — GABAPENTIN 300 MG: 100 CAPSULE ORAL at 01:16

## 2025-05-27 RX ADMIN — OXYCODONE HYDROCHLORIDE 10 MG: 5 TABLET ORAL at 01:18

## 2025-05-27 RX ADMIN — KETOROLAC TROMETHAMINE 30 MG: 30 INJECTION, SOLUTION INTRAMUSCULAR; INTRAVENOUS at 09:43

## 2025-05-27 ASSESSMENT — COLUMBIA-SUICIDE SEVERITY RATING SCALE - C-SSRS
6. HAVE YOU EVER DONE ANYTHING, STARTED TO DO ANYTHING, OR PREPARED TO DO ANYTHING TO END YOUR LIFE?: NO
1. IN THE PAST MONTH, HAVE YOU WISHED YOU WERE DEAD OR WISHED YOU COULD GO TO SLEEP AND NOT WAKE UP?: NO
2. HAVE YOU ACTUALLY HAD ANY THOUGHTS OF KILLING YOURSELF IN THE PAST MONTH?: NO

## 2025-05-27 ASSESSMENT — ACTIVITIES OF DAILY LIVING (ADL): ADLS_ACUITY_SCORE: 42

## 2025-05-27 NOTE — PROGRESS NOTES
Assessment & Plan     Acute left-sided low back pain with left-sided sciatica  Toradol IM given in clinic   Resent gabapentin to closer pharmacy to patient.  Patient still has 4 tablets of oxycodone. Discussed not recommend to have more.    Recommend return to ER if any loss of bowel or bladder or saddle anesthesia    - ED To Primary Care Referral  - ketorolac (TORADOL) injection 30 mg  - gabapentin (NEURONTIN) 300 MG capsule; Take 1 capsule (300 mg) by mouth 3 times daily.        MED REC REQUIRED  Post Medication Reconciliation Status:     Nicotine/Tobacco Cessation  He reports that he has been smoking cigarettes. He started smoking about 27 years ago. He has a 26.2 pack-year smoking history. He has been exposed to tobacco smoke. He has never used smokeless tobacco.  Nicotine/Tobacco Cessation Plan  Information offered: Patient not interested at this time            Wendy Stoddard is a 45 year old, presenting for the following health issues:  Hospital F/U        5/27/2025     9:28 AM   Additional Questions   Roomed by DELIA Foote     HPI      45yoM with history of chronic low back pain with sciatica, MRI L-spine in 2023 with L4/5 herniated disc to left & L5/S1 herniated disc to right. Was seen in the ED on 5/26 and 5/27 and had CT L-spine with similar disc herniation with no vertebral fracture; discharged with oxycodone & medrol dosepak prescriptions on 5/26. Returned to the ED 5/27 for evaluation of worsened left low back pain radiating down the back of his left leg to his foot. Worse with turning while outside smoking tonight; no direct fall or trauma. No urinary retention, bowel or bladder incontinence, numbness, tingling, focal weakness. Took Flexeril & oxycodone without relief; thus went to the ED.      Given additional steroids & PO pain medication In ED; will start on gabapentin. Patient able to tolerate PO and walk without difficulty. Spine clinic referral made                      Objective    /58  "(BP Location: Right arm, Patient Position: Sitting, Cuff Size: Adult Regular)   Pulse (!) 131   Temp 97.9  F (36.6  C) (Oral)   Resp 14   Ht 1.753 m (5' 9.02\")   Wt 63.5 kg (140 lb)   SpO2 97%   BMI 20.66 kg/m    Body mass index is 20.66 kg/m .  Physical Exam  Constitutional:       Appearance: Normal appearance.   Cardiovascular:      Rate and Rhythm: Tachycardia present.   Musculoskeletal:      Comments: Patient moving around room due to pain.   Tenderness    Neurological:      General: No focal deficit present.      Mental Status: He is alert and oriented to person, place, and time.   Psychiatric:         Mood and Affect: Mood normal.         Behavior: Behavior normal.                    Signed Electronically by: JUSTINA Chappell CNP    "

## 2025-05-27 NOTE — DISCHARGE INSTRUCTIONS
As needed for pain control at home, take:  - over-the-counter ibuprofen 800mg by mouth every 8 hours (max dose 2400mg in 24 hours)  - over-the-counter acetaminophen 1g by mouth every 6 hours (max dose 4g in 24 hours)  - previously-prescribed oxycodone for breakthrough pain  - prescribed Flexeril for muscle spasm  - previously-prescribed steroids (methylprednisolone) to decrease inflammation  - prescribed gabapentin for nerve pain  - over-the-counter lidocaine patches to painful area (up to 12 hours on, then 12 hours off)  - over-the-counter Tiger Balm patches to painful area (1-2 per day)  - ice pack to painful area up to 20 minutes every 1 hour   - back exercises 30 minutes daily    The worst thing you can do is lie still in bed and not move. This will result in worsened tightening of the low back muscles and worse pain. You need to move as much as possible to help keep the muscles loose.    I made a referral to [facility] for you; they will be calling you in the next couple days. You can also call them sooner to arrange follow up.    Follow up with your Primary Care provider in 2 days for a recheck.    Return to the Emergency Department for any inability to urinate, inability to move your legs, or any other concerns.

## 2025-05-27 NOTE — Clinical Note
Ba Mireles was seen and treated in our emergency department on 5/27/2025.  He may return to work on 06/02/2025.       If you have any questions or concerns, please don't hesitate to call.      Robinson Dutton MD

## 2025-05-27 NOTE — ED PROVIDER NOTES
EMERGENCY DEPARTMENT ENCOUNTER      NAME: Ba Mireles  AGE: 45 year old male  YOB: 1980  MRN: [id number]  EVALUATION DATE & TIME: 5/27/2025 12:31 AM    PCP: No Ref-Primary, Physician    ED PROVIDER: Robinson Dutton M.D.      Chief Complaint   Patient presents with    left hip/back pain         IMPRESSION  1. Acute left-sided low back pain with left-sided sciatica        PLAN  - routine home back pain cares (NSAIDs, Flexeril, gabapentin, Lidoderm patches, Tiger Balm patches, ice, back exercises, avoid bedrest)  - close PCP & spine clinic follow up  - discharge to home      ED COURSE & MEDICAL DECISION MAKING    ED Course as of 05/27/25 0219   Tue May 27, 2025   0121 45yoM with history of chronic low back pain with sciatica, MRI L-spine in 2023 with L4/5 herniated disc to left & L5/S1 herniated disc to right. Was seen in the ED <12 hours ago with CT L-spine with similar disc herniation with no vertebral fracture; discharged with oxycodone & medrol dosepak prescriptions. Returns to the ED tonight for evaluation of worsened left low back pain radiating down the back of his left leg to his foot. Worse with turning while outside smoking tonight; no direct fall or trauma. No urinary retention, bowel or bladder incontinence, numbness, tingling, focal weakness. Took Flexeril & oxycodone without relief; thus came to the ED. Lying in ED bed, patient states pain down his left resolved; still mild left low back pain though. Denies any fevers, sweats, chills, any other problems or complaints at this time.    SBP 140s on presentation with otherwise normal vitals. Exam with focal left lower lumbar paraspinal muscle tenderness/spasm with no overlying skin changes, negative straight leg-raise bilaterally, CMS intact to BLE, benign abdomen, otherwise unremarkable exam.    No concern for cauda equina syndrome, conus medullaris syndrome, spinal epidural abscess, spinal epidural hematoma. Suspect herniated disc with  sciatica. Emergent MRI not indicated at this time. Just had CT within the past 12 hours. Pain notable improved from initial ED presentation without meds; suspect inflammation waxing/waning & irritating nerve. Given additional steroids & PO pain medication here now; will start on gabapentin. Patient able to tolerate PO and walk without difficulty. Spine clinic referral made and contact info given. Patient comfortable with discharge at this time. Return precautions and need for PCP & spine clinic follow up discussed and understood. No further questions at the time of discharge.           --------------------------------------------------------------------------------  --------------------------------------------------------------------------------   This patient involved a high degree of complexity in medical decision making, as significant risks were present and assessed. Recent encounters & results in medical record reviewed by me.    All workup (i.e. any EKG/labs/imaging as per charting below) reviewed and independently interpreted by me. See respective sections for details.    Broad differential considered for this patient, including but not limited to:  herniated disc, sciatica, cauda equina syndrome, conus medullaris syndrome, spinal epidural abscess, spinal epidural hematoma, vertebral fracture, acute aortic syndrome, muscle spasm      See additional MDM below if interested.      MEDICATIONS GIVEN IN THE EMERGENCY DEPARTMENT  Medications   gabapentin (NEURONTIN) capsule 300 mg (300 mg Oral $Given 5/27/25 0116)   dexAMETHasone (DECADRON) injection 10 mg (10 mg Intramuscular $Given 5/27/25 0119)   oxyCODONE (ROXICODONE) tablet 10 mg (10 mg Oral $Given 5/27/25 0118)       NEW PRESCRIPTIONS STARTED AT TODAY'S ER VISIT  Discharge Medication List as of 5/27/2025  1:24 AM        START taking these medications    Details   gabapentin (NEURONTIN) 300 MG capsule Take 1 capsule (300 mg) by mouth 3 times daily for 21  days., Disp-63 capsule, R-0, E-Prescribe           CONTINUE these medications which have NOT CHANGED    Details   azelastine (ASTELIN) 0.1 % nasal spray Spray 1-2 sprays into both nostrils 2 times daily., Disp-30 mL, R-3, E-Prescribe      cyclobenzaprine (FLEXERIL) 10 MG tablet Take 1 tablet (10 mg) by mouth 3 times daily as needed for muscle spasms., Disp-90 tablet, R-1, E-Prescribe      hydrocortisone, Perianal, (HYDROCORTISONE) 2.5 % cream Place rectally 2 times daily as needed for hemorrhoidsDisp-30 g, [id number]      methylPREDNISolone (MEDROL DOSEPAK) 4 MG tablet therapy pack Follow Package Directions, Disp-21 tablet, R-0, E-Prescribe      oxyCODONE (ROXICODONE) 5 MG tablet Take 1 tablet (5 mg) by mouth every 6 hours as needed for severe pain., Disp-6 tablet, R-0, E-Prescribe                 =================================================================      HPI  Use of : N/A         Ba Mireles is a 45 year old male with a pertinent history of chronic right-sided low back pain with bilateral sciatica who presents to this ED via walk-in for evaluation of left hip/back pain.    The patient reports he was seen in the ER yesterday for back pain after playing golf. He reports feeling a pop in his back during his back swing. He felt fine after being discharged from the ED. He felt another pop tonight at 9:30 PM as he was going out for a smoke. He then developed burning pain down the left lower extremity, but has now subsided. He reports laying on his stomach improves his back pain. No history of back surgery.    No complaints of urinary/bowel incontinence or any other associated symptoms.          --------------- MEDICAL HISTORY ---------------  PAST MEDICAL HISTORY:  Reviewed independently by me.  Past Medical History:   Diagnosis Date    Arthritis 08/16/2014    When I had my hip surgery they told me I have arthritis going already    Kidney stone     first stone since age 20    Left ureteral  stone 08/02/2023     Patient Active Problem List   Diagnosis    Bilateral nephrolithiasis    Chronic right-sided low back pain with bilateral sciatica    Hip pain, left    Hip pain, right    Numbness and tingling of both lower extremities    Ganglion cyst    History of renal calculi    Conductive hearing loss of right ear with unrestricted hearing of left ear    Impacted cerumen    Otalgia, bilateral    Sinus pressure       PAST SURGICAL HISTORY:  Reviewed independently by me.  Past Surgical History:   Procedure Laterality Date    COMBINED CYSTOSCOPY, RETROGRADES, URETEROSCOPY, LASER HOLMIUM LITHOTRIPSY URETER(S), INSERT STENT Bilateral 08/07/2023    Procedure: Cystoscopy, Bilateral Ureteroscopy, Bilateral Retrograde Pyelogram, Bilateral Stone Basket Extraction, Bilateral Stent placement;  Surgeon: Gus Shipman MD;  Location: [facility] OR    CYSTOSCOPY,URETEROSCOPY,LITHOTRIPSY      at [facility]    ORTHOPEDIC SURGERY  2022    Right Wrist    WRIST SURGERY Right        CURRENT MEDICATIONS:    Reviewed independently by me.    Current Facility-Administered Medications:     sodium chloride (PF) 0.9% PF flush 3 mL, 3 mL, Intravenous, q1 min prn, , 3 mL at 10/28/24 1208    Current Outpatient Medications:     gabapentin (NEURONTIN) 300 MG capsule, Take 1 capsule (300 mg) by mouth 3 times daily for 21 days., Disp: 63 capsule, Rfl: 0    azelastine (ASTELIN) 0.1 % nasal spray, Spray 1-2 sprays into both nostrils 2 times daily., Disp: 30 mL, Rfl: 3    cyclobenzaprine (FLEXERIL) 10 MG tablet, Take 1 tablet (10 mg) by mouth 3 times daily as needed for muscle spasms., Disp: 90 tablet, Rfl: 1    hydrocortisone, Perianal, (HYDROCORTISONE) 2.5 % cream, Place rectally 2 times daily as needed for hemorrhoids, Disp: 30 g, Rfl: 1    methylPREDNISolone (MEDROL DOSEPAK) 4 MG tablet therapy pack, Follow Package Directions, Disp: 21 tablet, Rfl: 0    oxyCODONE (ROXICODONE) 5 MG tablet, Take 1 tablet (5 mg) by mouth every 6 hours as needed  for severe pain., Disp: 6 tablet, Rfl: 0    ALLERGIES:  Reviewed independently by me.  Allergies   Allergen Reactions    Dust Mites     Ragweeds     Amoxicillin-Pot Clavulanate Anxiety     Has tolerated amoxicillin well in the past       FAMILY HISTORY:  Reviewed independently by me.  Family History   Problem Relation Age of Onset    Cancer Mother         kidney    Heart Disease Mother 66    Nephrolithiasis Mother     Gout Mother     Cancer Maternal Grandfather         renal cell    Heart Disease Maternal Grandfather     Nephrolithiasis Maternal Grandfather     Hyperlipidemia Maternal Grandfather     Cancer Paternal Grandfather         lucille cell    Kidney Disease Paternal Grandfather     Prostate Cancer Paternal Grandfather     Nephrolithiasis Maternal Aunt          SOCIAL HISTORY:   Reviewed independently by me.  Social History     Socioeconomic History    Marital status:    Tobacco Use    Smoking status: Every Day     Current packs/day: 0.00     Average packs/day: 1 pack/day for 26.2 years (26.2 ttl pk-yrs)     Types: Cigarettes     Start date: 1997     Last attempt to quit: 2023     Years since quittin.7     Passive exposure: Current    Smokeless tobacco: Never   Vaping Use    Vaping status: Never Used   Substance and Sexual Activity    Alcohol use: Yes     Alcohol/week: 2.0 standard drinks of alcohol     Types: 2 Standard drinks or equivalent per week    Drug use: Yes     Types: Marijuana    Sexual activity: Yes     Partners: Female     Birth control/protection: Pull-out method   Other Topics Concern    Parent/sibling w/ CABG, MI or angioplasty before 65F 55M? Yes     Comment: Mother     Social Drivers of Health     Financial Resource Strain: Low Risk  (2024)    Financial Resource Strain     Within the past 12 months, have you or your family members you live with been unable to get utilities (heat, electricity) when it was really needed?: No   Food Insecurity: Low Risk  (2024)     "Food Insecurity     Within the past 12 months, did you worry that your food would run out before you got money to buy more?: No     Within the past 12 months, did the food you bought just not last and you didn t have money to get more?: No   Transportation Needs: Low Risk  (7/28/2024)    Transportation Needs     Within the past 12 months, has lack of transportation kept you from medical appointments, getting your medicines, non-medical meetings or appointments, work, or from getting things that you need?: No   Physical Activity: Insufficiently Active (7/28/2024)    Exercise Vital Sign     Days of Exercise per Week: 2 days     Minutes of Exercise per Session: 20 min   Stress: Stress Concern Present (7/28/2024)    [employer] - Occupational Stress Questionnaire     Feeling of Stress : To some extent   Social Connections: Unknown (7/28/2024)    Social Connection and Isolation Panel [NHANES]     Frequency of Social Gatherings with Friends and Family: Once a week   Interpersonal Safety: Low Risk  (7/29/2024)    Interpersonal Safety     Do you feel physically and emotionally safe where you currently live?: Yes     Within the past 12 months, have you been hit, slapped, kicked or otherwise physically hurt by someone?: No     Within the past 12 months, have you been humiliated or emotionally abused in other ways by your partner or ex-partner?: No   Housing Stability: Low Risk  (7/28/2024)    Housing Stability     Do you have housing? : Yes     Are you worried about losing your housing?: No       --------------- PHYSICAL EXAM ---------------  Nursing notes and vitals independently reviewed by me.  VITALS:  Vitals:    05/27/25 0027   BP: (!) 145/74   Pulse: 100   Resp: 20   Temp: 98.2  F (36.8  C)   TempSrc: Temporal   SpO2: 100%   Weight: 63.9 kg (140 lb 14.4 oz)   Height: 1.753 m (5' 9\")       PHYSICAL EXAM:    General:  alert, interactive, no distress  Eyes:  conjunctivae clear, conjugate " gaze  HENT:  atraumatic, nose with no rhinorrhea, oropharynx clear  Neck:  no meningismus  Cardiovascular:  HR 80s during exam, regular rhythm, no murmurs, brisk cap refill  Chest:  no chest wall tenderness  Pulmonary:  no stridor, normal phonation, normal work of breathing, clear lungs bilaterally  Abdomen:  soft, nondistended, nontender  :  no CVA tenderness  Back:  No midline spinal tenderness, Left lower lumbar paraspinal muscle tenderness/spasm. No overlying skin changes.  Musculoskeletal:  no pretibial edema, no calf tenderness. Gross ROM intact to joints of extremities with no obvious deformities.  Skin:  warm, dry, no rash  Neuro:  awake, alert, answers questions appropriately, follows commands, moves all limbs, 5/5 strength to all extremities with sensation to light touch intact, no tremor, no ankle clonus, steady unaccompanied gait  Psych:  calm, normal affect      --------------- RESULTS ---------------  RADIOLOGY:  Reviewed and independently interpreted by me. Please see official radiology report.  Recent Results (from the past 24 hours)   CT Lumbar Spine w/o Contrast    Narrative    EXAM: CT LUMBAR SPINE W/O CONTRAST  LOCATION: [facility]  DATE: 5/26/2025    INDICATION: Hx of disc herniation, now with pain radiation into the left leg, no bladder control issues.  COMPARISON: None.  TECHNIQUE: Routine CT Lumbar Spine without IV contrast. Multiplanar reformats. Dose reduction techniques were used.    FINDINGS:  Nomenclature is based on 5 lumbar type vertebral bodies. No evidence of acute fracture or subluxation of the lumbar spine by CT imaging. The disc spaces are well-maintained. No significant degenerative changes. No extraspinal soft tissue abnormality.   Unremarkable visualized bony pelvis.    T12-L1: No posterior disc bulge or spinal canal narrowing. No neural foraminal narrowing.    L1-L2: No posterior disc bulge or spinal canal narrowing. No neural foraminal  narrowing.    L2-L3: No posterior disc bulge or spinal canal narrowing. No neural foraminal narrowing.    L3-L4: No posterior disc bulge or spinal canal narrowing. No neural foraminal narrowing.    L4-L5: Symmetric disc bulge with mild spinal canal narrowing. No neural foraminal narrowing.    L5-S1: Symmetric disc bulge and mild facet arthropathy without significant spinal canal narrowing. Mild bilateral neural foraminal narrowing.      Impression    IMPRESSION:  1.  No evidence of acute fracture or subluxation of the lumbar spine by CT imaging.                       --------------- ADDITIONAL MDM ---------------  Severe Sepsis/Septic Shock/STEMI/Stroke Measures:  None    MIPS (CTPE, dental pain, [place], sinusitis, asthma/COPD, head trauma):  Not Applicable    History:  - I considered systemic symptoms of the presenting illness.  - Supplemental history from:       -- patient  - External Record(s) reviewed:       -- Inpatient/outpatient record (clinic visit 5/26/25 & 11/25/24), prior labs (blood 10/28/24), prior imaging (CT L-spime 5/26/25, MRI L-spine 9/6/23)       -- see above ED course & MDM for further details    Workup:  - Chart documentation above includes differential considered and my independent interpretation any EKGs, labs tests, and/or imaging  - emergent/severe conditions considered and evaluated for: see above differential & MDM  - In additional to work up documented, I considered the following work up:       -- MRI L-spine, blood       -- see above ED course & MDM for further details    Independent Interpretation:  - Independent interpretation of ECG and images noted in documentation, when applicable.    External Consultation:  - Discussion of management with another provider:       -- ED pharmacist re: meds       -- see above ED course & MDM for additional    Complicating Factors:  - Care impacted by chronic illness:       -- see above MDM, past medical history, & problem list    Disposition  Considerations:  - Discharge       -- I considered escalation of care with admission to the hospital, but ultimately discharged the patient given reassuring exam, comfortable with discharge       -- I recommended the patient continue their current prescription strength medication(s) as charted above in current medications list       -- I prescribed prescription strength medication(s) as charted above       -- I recommended over-the-counter medication(s) as charted above & in discharge instructions           I, Filipe Walker, am serving as a scribe to document services personally performed by Dr. Robinson Dutton based on my observation and the provider's statements to me. I, Robinson Dutton MD attest that Filipe Walker is acting in a scribe capacity, has observed my performance of the services and has documented them in accordance with my direction.      Robinson Dutton MD  05/27/25  Emergency Medicine  [facility] EMERGENCY DEPARTMENT  [address] [place] [id number]  [phone]  Dept: [phone]       Robinson Dutton MD  05/27/25 0221

## 2025-05-27 NOTE — ED TRIAGE NOTES
Left hip\back pain of 7 shooting down left leg. HX of 3 lumbar bulging discs. Last Oxycodone at 10PM. Also took Flexeril at 1930.     Triage Assessment (Adult)       Row Name 05/27/25 0029          Triage Assessment    Airway WDL WDL        Respiratory WDL    Respiratory WDL WDL        Skin Circulation/Temperature WDL    Skin Circulation/Temperature WDL WDL        Cardiac WDL    Cardiac WDL WDL        Peripheral/Neurovascular WDL    Peripheral Neurovascular WDL WDL        Cognitive/Neuro/Behavioral WDL    Cognitive/Neuro/Behavioral WDL WDL

## 2025-05-27 NOTE — PATIENT INSTRUCTIONS
Spine Referral    [facility] will call you to coordinate your care as prescribed by your provider. If you don't hear from a representative within 2 business days, please call [phone]

## 2025-05-27 NOTE — TELEPHONE ENCOUNTER
Nurse Triage SBAR    Is this a 2nd Level Triage? NO    Situation: back pain radiating into L leg     Background: Patient calls after ED visit stating that his pain is getting worse,he took oxycodone at 2200. Patient lost feeling in his L leg and states that he was unable to move for 45 minutes. Currently rates his pain 8/10.    Assessment: Patient calls after ED visit stating that his pain is getting worse,he took oxycodone at 2200. Patient lost feeling in his L leg and states that he was unable to move for 45 minutes. Currently rates his pain 8/10.    Protocol Recommended Disposition:   Go to ED Now (Or PCP Triage), See More Appropriate Guideline    Recommendation:  patient advised to present to ED. Patient states that he does not have a PCP with [facility] as his provider has left the clinic.           Does the patient meet one of the following criteria for ADS visit consideration? 16+ years old, no PCP (internal or external) but seen at [facility]     TIP  Providers, please consider if this condition is appropriate for management at one of our Acute and Diagnostic Services sites.     If patient is a good candidate, please use dotphrase <dot>triageresponse and select Refer to ADS to document.    Reason for Disposition   Back pain radiating (shooting) into leg(s)   Weakness of a leg or foot (e.g., unable to bear weight, dragging foot)    Additional Information   Negative: Passed out (i.e., lost consciousness, collapsed and was not responding)   Negative: Sounds like a life-threatening emergency to the triager   Negative: Shock suspected (e.g., cold/pale/clammy skin, too weak to stand, low BP, rapid pulse)   Negative: Major injury to the back (e.g., MVA, fall > 10 feet or 3 meters, penetrating injury, etc.)   Negative: Followed a tailbone injury   Negative: [1] Pain in the upper back over the ribs (rib cage) AND [2] radiates (travels, goes) into chest   Negative: [1] Pain in the upper back over the ribs (rib  cage) AND [2] worsened by coughing (or clearly increases with breathing)   Negative: Back pain during pregnancy   Negative: Pain mainly in flank (i.e., in the side, over the lower ribs or just below the ribs)   Negative: [1] SEVERE back pain (e.g., excruciating) AND [2] sudden onset AND [3] age > 60 years   Negative: [1] Unable to urinate (or only a few drops) > 4 hours AND [2] bladder feels very full (e.g., palpable bladder or strong urge to urinate)   Negative: [1] Loss of bladder or bowel control (urine or bowel incontinence; wetting self, leaking stool) AND [2] new-onset   Negative: Looks like a broken bone or dislocated joint (e.g., crooked or deformed)   Negative: Sounds like a life-threatening emergency to the triager   Negative: Followed a leg injury   Negative: Leg swelling is main symptom   Negative: Numbness in groin or rectal area (i.e., loss of sensation)   Negative: [1] SEVERE abdominal pain AND [2] present > 1 hour   Negative: [1] Abdominal pain AND [2] age > 60 years    Protocols used: Leg Pain-A-AH, Back Pain-A-AH

## 2025-05-28 ENCOUNTER — VIRTUAL VISIT (OUTPATIENT)
Dept: UROLOGY | Facility: CLINIC | Age: 45
End: 2025-05-28
Payer: COMMERCIAL

## 2025-05-28 ENCOUNTER — TELEPHONE (OUTPATIENT)
Dept: OTHER | Age: 45
End: 2025-05-28

## 2025-05-28 DIAGNOSIS — N20.0 NEPHROLITHIASIS: Primary | ICD-10-CM

## 2025-05-28 PROCEDURE — 98005 SYNCH AUDIO-VIDEO EST LOW 20: CPT | Performed by: NURSE PRACTITIONER

## 2025-05-28 PROCEDURE — 1125F AMNT PAIN NOTED PAIN PRSNT: CPT | Mod: 95 | Performed by: NURSE PRACTITIONER

## 2025-05-28 NOTE — PROGRESS NOTES
Urology Video Office Visit    Video-Visit Details    Type of service:  Video Visit    Video Start Time: 1400    Video End Time:1420    Originating Location (pt. Location): Home    Distant Location (provider location):  Off-site     Platform used for Video Visit: Simple[employer]           Assessment and Plan:     Assessment: 45 year old male with history of nephrolithiasis    Plan:  -Reviewed CT scan with patient. Noted stable nonobstructing bilateral renal stones. No noted ureterolithiasis or hydronephrosis. Discussed with patient that back pain is likely due to spinal concerns.   -Recommend to follow up with 24 hour urine study for further evaluation for risks of stones. Pt amenable to plan. Will send litholink kit  x 1  -If having severe flank pain, fevers, chills, nausea, or vomiting please notify Urology clinic or be seen in the ER.   -RTC in 8 weeks to review results.     Leslie Blackburn CNP  Department of Urology  May 28, 2025    I spent a total of 25 minutes spent on the date of the encounter doing chart review, history and exam, documentation, and further activities as noted above.          Chief Complaint:   Acute Back Pain         History of Present Illness:    Ba Mireles is a pleasant 45 year old male who presents with concerns of acute back pain with history of nephrolithiasis.     Mr. Mireles was seen in the ED on 5/26/25 for concerns of acute back pain. Lumbar CT scan on 5/26/25 revealed L4-L5: Symmetric disc bulge with mild spinal canal narrowing. No neural foraminal narrowing. L5-S1: Symmetric disc bulge and mild facet arthropathy without significant spinal canal narrowing. Mild bilateral neural foraminal narrowing.    He was seen in the ED on 5/27/25 for ongoing low back pain. He was concern for a possible acute kidney stone episode.     CT scan on 5/27/25 (images personally reviewed) revealed multiple bilateral nonobstructing renal stones measuring 2-5mm in size. No noted ureterolithiasis or  hydronephrosis.     He continues to have significant back pain. He did see a chiropractor today which provided minimal relief.     Notes pain will worsen with activity such as walking. Notes pain in his low back which radiates down his left leg. Denies any loss of bowel or bladder.     Denies any flank pain, fevers, chills, nausea, vomiting, hematuria, or dysuria at this time.     Last stone episode was in August of 2023. He had a bilateral URS/LL with Dr. Shipman.   Stone Analysis: CaOx.    Family history of nephrolithiasis with mother and sister. Family history of renal cell carcinoma in paternal and maternal grandfathers.      History of smoking of 25 years, 1ppd .    First stone episode at age 20. He notes he was passing stones about every year but now frequency has increased to 3 stones per year or every 4 months. He has passed most stones spontaneously          Past Medical History:     Past Medical History:   Diagnosis Date    Arthritis 08/16/2014    When I had my hip surgery they told me I have arthritis going already    Kidney stone     first stone since age 20    Left ureteral stone 08/02/2023            Past Surgical History:     Past Surgical History:   Procedure Laterality Date    COMBINED CYSTOSCOPY, RETROGRADES, URETEROSCOPY, LASER HOLMIUM LITHOTRIPSY URETER(S), INSERT STENT Bilateral 08/07/2023    Procedure: Cystoscopy, Bilateral Ureteroscopy, Bilateral Retrograde Pyelogram, Bilateral Stone Basket Extraction, Bilateral Stent placement;  Surgeon: Gus Shipman MD;  Location: [facility] OR    CYSTOSCOPY,URETEROSCOPY,LITHOTRIPSY      at [place]    ORTHOPEDIC SURGERY  2022    Right Wrist    WRIST SURGERY Right             Medications     Current Outpatient Medications   Medication Sig Dispense Refill    azelastine (ASTELIN) 0.1 % nasal spray Spray 1-2 sprays into both nostrils 2 times daily. 30 mL 3    cyclobenzaprine (FLEXERIL) 10 MG tablet Take 1 tablet (10 mg) by mouth 3 times daily as needed for  muscle spasms. 90 tablet 1    fluticasone (FLONASE) 50 MCG/ACT nasal spray Spray 2 sprays into both nostrils daily.      gabapentin (NEURONTIN) 300 MG capsule Take 1 capsule (300 mg) by mouth 3 times daily. 63 capsule 0    hydrocortisone, Perianal, (HYDROCORTISONE) 2.5 % cream Place rectally 2 times daily as needed for hemorrhoids 30 g 1    methylPREDNISolone (MEDROL DOSEPAK) 4 MG tablet therapy pack Follow Package Directions 21 tablet 0    oxyCODONE (ROXICODONE) 5 MG tablet Take 1 tablet (5 mg) by mouth every 6 hours as needed for severe pain. 6 tablet 0     Current Facility-Administered Medications   Medication Dose Route Frequency Provider Last Rate Last Admin    sodium chloride (PF) 0.9% PF flush 3 mL  3 mL Intravenous q1 min prn    3 mL at 10/28/24 1208            Family History:     Family History   Problem Relation Age of Onset    Cancer Mother         kidney    Heart Disease Mother 66    Nephrolithiasis Mother     Gout Mother     Cancer Maternal Grandfather         renal cell    Heart Disease Maternal Grandfather     Nephrolithiasis Maternal Grandfather     Hyperlipidemia Maternal Grandfather     Cancer Paternal Grandfather         lucille cell    Kidney Disease Paternal Grandfather     Prostate Cancer Paternal Grandfather     Nephrolithiasis Maternal Aunt             Social History:     Social History     Socioeconomic History    Marital status:      Spouse name: Not on file    Number of children: Not on file    Years of education: Not on file    Highest education level: Not on file   Occupational History    Not on file   Tobacco Use    Smoking status: Every Day     Current packs/day: 0.00     Average packs/day: 1 pack/day for 26.2 years (26.2 ttl pk-yrs)     Types: Cigarettes     Start date: 1997     Last attempt to quit: 2023     Years since quittin.7     Passive exposure: Current    Smokeless tobacco: Never   Vaping Use    Vaping status: Never Used   Substance and Sexual Activity     Alcohol use: Yes     Alcohol/week: 2.0 standard drinks of alcohol     Types: 2 Standard drinks or equivalent per week    Drug use: Yes     Types: Marijuana    Sexual activity: Yes     Partners: Female     Birth control/protection: Pull-out method   Other Topics Concern    Parent/sibling w/ CABG, MI or angioplasty before 65F 55M? Yes     Comment: Mother   Social History Narrative    Not on file     Social Drivers of Health     Financial Resource Strain: Low Risk  (7/28/2024)    Financial Resource Strain     Within the past 12 months, have you or your family members you live with been unable to get utilities (heat, electricity) when it was really needed?: No   Food Insecurity: Low Risk  (7/28/2024)    Food Insecurity     Within the past 12 months, did you worry that your food would run out before you got money to buy more?: No     Within the past 12 months, did the food you bought just not last and you didn t have money to get more?: No   Transportation Needs: Low Risk  (7/28/2024)    Transportation Needs     Within the past 12 months, has lack of transportation kept you from medical appointments, getting your medicines, non-medical meetings or appointments, work, or from getting things that you need?: No   Physical Activity: Insufficiently Active (7/28/2024)    Exercise Vital Sign     Days of Exercise per Week: 2 days     Minutes of Exercise per Session: 20 min   Stress: Stress Concern Present (7/28/2024)    [employer] - Occupational Stress Questionnaire     Feeling of Stress : To some extent   Social Connections: Unknown (7/28/2024)    Social Connection and Isolation Panel [NHANES]     Frequency of Communication with Friends and Family: Not on file     Frequency of Social Gatherings with Friends and Family: Once a week     Attends Shinto Services: Not on file     Active Member of Clubs or Organizations: Not on file     Attends Club or Organization Meetings: Not on file     Marital  Status: Not on file   Interpersonal Safety: Low Risk  (5/27/2025)    Interpersonal Safety     Do you feel physically and emotionally safe where you currently live?: Yes     Within the past 12 months, have you been hit, slapped, kicked or otherwise physically hurt by someone?: No     Within the past 12 months, have you been humiliated or emotionally abused in other ways by your partner or ex-partner?: No   Housing Stability: Low Risk  (7/28/2024)    Housing Stability     Do you have housing? : Yes     Are you worried about losing your housing?: No            Allergies:   Dust mites, Ragweeds, and Amoxicillin-pot clavulanate         Review of Systems:  From intake questionnaire   Negative 14 system review except as noted on HPI, nurse's note.         Physical Exam:   General Appearance: Well groomed, hygenic  Eyes: No redness, discharge  Respiratory: No cough, no respiratory distress or labored breathing  Musculoskeletal: Grossly normal, full range of motion in upper extremities, no gross deficits  Skin: No discoloration or apparent rashes  Neurologic - No tremors  Psychiatric - Alert and oriented  The rest of a comprehensive physical examination is deferred due to video visit restrictions        Labs:    I personally reviewed all applicable laboratory data and went over findings with patient  Significant for:    CBC RESULTS:  Recent Labs   Lab Test 10/28/24  1208 08/28/23  0841   WBC 8.8 8.0   HGB 14.9 13.7    322        BMP RESULTS:  Recent Labs   Lab Test 10/28/24  1208 07/29/24  1001 08/28/23  0841    139 138   POTASSIUM 4.2 4.3 4.0   CHLORIDE 105 105 103   CO2 26 27 25   ANIONGAP 13 7 10   GLC 85 95 87   BUN 15 14.2 11.5   CR 0.80 0.81 0.79   GFRESTIMATED >90 >90 >90   CHARLY 9.0 8.9 9.0       UA RESULTS:   Recent Labs   Lab Test 10/28/24  1208   SG 1.025   URINEPH 6.0   NITRITE Negative   RBCU 0-2   WBCU 0-5       CALCIUM RESULTS  Lab Results   Component Value Date    CHARLY 9.0 10/28/2024    CHARLY 8.9  07/29/2024    CHARLY 9.0 08/28/2023           Imaging:    I personally reviewed all applicable imaging and went over the below findings with patient.    CT ABDOMEN PELVIS STONE PROTOCOL WO  Order: [id number]  Impression    Nonobstructing nephrolithiasis bilaterally, however no ureteral stones or hydronephrosis.  Narrative    For Patients: As a result of the 21st Century Cures Act, medical imaging exams and procedure reports are released immediately into your electronic medical record. You may view this report before your referring provider. If you have questions, please contact your health care provider.    EXAM: CT ABDOMEN PELVIS STONE PROTOCOL WO  LOCATION: [facility]  DATE: 5/27/2025    INDICATION: Flank pain  COMPARISON: CT chest, abdomen and pelvis 10/20/2024  TECHNIQUE: CT scan of the abdomen and pelvis was performed without oral or IV contrast. Multiplanar reformats were obtained. Dose reduction techniques were used.  CONTRAST: None.    FINDINGS:  LOWER CHEST: Normal.    HEPATOBILIARY: Normal unenhanced liver contours. No radiopaque gallstones or pericholecystic inflammation.    PANCREAS: Normal.    SPLEEN: Normal.    ADRENAL GLANDS: Normal.    KIDNEY/BLADDER: Normal unenhanced renal contours. Nonobstructing nephrolithiasis bilaterally, measuring up to 5 mm bilaterally. No ureteral stones or hydronephrosis. Urinary bladder is unremarkable.    BOWEL: No bowel obstruction. Normal appendix. No free fluid or free air.    LYMPH NODES: No lymphadenopathy.    VASCULATURE: The abdominal aorta is nonaneurysmal.    PELVIC ORGANS: Normal contours.    MUSCULOSKELETAL: Degenerative changes of the spine. No worrisome bone lesions.  Images on Order [id number]    Image Retrieve    The full-size image has not yet been retrieved from an outside organization. To retrieve, click the link below.  Scan on 5/27/2025: CT ABDOMEN PELVIS STONE PROTOCOL WO        Exam End: 05/27/25  6:13 PM    Specimen Collected: 05/27/25  6:13 PM Last  Resulted: 05/27/25  6:50 PM   Received From: [facility]  Result Received: 05/28/25  7:51 AM

## 2025-05-28 NOTE — TELEPHONE ENCOUNTER
Appointment     Reason for Call: Patient is requesting to be scheduled sooner than next available.  Car traveling is painful.  [place] would be best.  [place] would be ok.  ? [place]    Reason for visit: spine pain.  Had 2 MRIs in the last 24 hours he said.  [facility].   Audibly in pain.     Is this a new or return visit: New.  3 bulging discs in lower back    Have you been treated for this in the past? No, pt is not sure and cannot think it through due to pain.      Additional comments: Pt needs an immediate appt and cannot travel far due to pain.  No openings in a reasonable time for him.  Can you help?     Could we send this information to you in [employer] or would you prefer to receive a phone call?:   Patient would prefer a phone call   Okay to leave a detailed message?: Yes at Cell number on file:    Telephone Information:   Mobile [phone]

## 2025-05-28 NOTE — TELEPHONE ENCOUNTER
Hi, we unfortunately do not have an urgent appointment slots that we keep held. In these instances patient should always be scheduled 1st available and placed on cancellation list.     Scheduling, could you please schedule him first available with any provider and then get him on cancellation list?

## 2025-05-28 NOTE — TELEPHONE ENCOUNTER
Date: May 28, 2025    Provider: LISSY     Provider/Other:     Reason for out-going call: NEW PATIENT REFERRAL/CONSULT      Detailed message: Left voicemail to schedule new consult with first available LISSY - offered Monday 6/2/25 at 2pm with Ewelina Pratt. Patient can schedule with anyone.           Number provider for patient:  SPINE CLINIC: [phone]

## 2025-05-28 NOTE — NURSING NOTE
Current patient location: [place]    Is the patient currently in the state of [place]? YES    Visit mode: VIDEO    If the visit is dropped, the patient can be reconnected by:VIDEO VISIT: Text to cell phone:   Telephone Information:   Mobile [phone]       Will anyone else be joining the visit? NO  (If patient encounters technical issues they should call [phone] :[phone])    Are changes needed to the allergy or medication list? Patient reported no changes to chart information since he was seen yesterday in the ER.       Are refills needed on medications prescribed by this physician? Discuss with provider    Rooming Documentation:  Patient having significant pain today.     Reason for visit: Consult    Barb GOMEZ

## 2025-05-28 NOTE — PATIENT INSTRUCTIONS
UROLOGY CLINIC VISIT PATIENT INSTRUCTIONS    It was a pleasure seeing you today! Thank you for giving us the opportunity to care for you. We hope we provided the excellent service you deserve and look forward to serving you again.    Instructions per today's visit: will send litholink kit x 1 to your home. Please let us know if you do not receive the kit in two weeks. Return to clinic in 8 weeks to review results.    If you have any issues, questions, or concerns, please don't hesitate to contact us at [facility] at [phone] or via [employer].    Important Contact Information:  -To each our nurse triage line, please call our contact center at [phone].  -Our clinic hours are Monday through Friday, 8:00 a.m. - 4:30 p.m. Feel free to call during these hours with any questions.  -You can also contact us anytime via [employer], and we will respond during clinic hours.      Leslie Blackburn CNP  Department of Urology

## 2025-05-29 ENCOUNTER — TELEPHONE (OUTPATIENT)
Dept: UROLOGY | Facility: CLINIC | Age: 45
End: 2025-05-29

## 2025-05-29 ENCOUNTER — TELEPHONE (OUTPATIENT)
Dept: FAMILY MEDICINE | Facility: CLINIC | Age: 45
End: 2025-05-29

## 2025-05-29 ENCOUNTER — VIRTUAL VISIT (OUTPATIENT)
Dept: FAMILY MEDICINE | Facility: CLINIC | Age: 45
End: 2025-05-29
Payer: COMMERCIAL

## 2025-05-29 DIAGNOSIS — M79.662 PAIN OF LEFT LOWER LEG: Primary | ICD-10-CM

## 2025-05-29 DIAGNOSIS — R20.2 NUMBNESS AND TINGLING OF LEFT LEG: ICD-10-CM

## 2025-05-29 DIAGNOSIS — R20.0 NUMBNESS AND TINGLING OF LEFT LEG: ICD-10-CM

## 2025-05-29 RX ORDER — HYDROCODONE BITARTRATE AND ACETAMINOPHEN 5; 325 MG/1; MG/1
1 TABLET ORAL
COMMUNITY
Start: 2025-05-27

## 2025-05-29 RX ORDER — TIZANIDINE HYDROCHLORIDE 4 MG/1
4 CAPSULE, GELATIN COATED ORAL
COMMUNITY
Start: 2025-05-27

## 2025-05-29 RX ORDER — LIDOCAINE 4 G/G
PATCH TOPICAL
COMMUNITY
Start: 2025-05-27

## 2025-05-29 RX ORDER — OXYCODONE HYDROCHLORIDE 5 MG/1
5 TABLET ORAL EVERY 6 HOURS PRN
Qty: 12 TABLET | Refills: 0 | Status: SHIPPED | OUTPATIENT
Start: 2025-05-29 | End: 2025-06-01

## 2025-05-29 RX ORDER — HYDROCODONE BITARTRATE AND ACETAMINOPHEN 5; 325 MG/1; MG/1
1 TABLET ORAL EVERY 6 HOURS PRN
Qty: 10 TABLET | Refills: 0 | Status: CANCELLED | OUTPATIENT
Start: 2025-05-29

## 2025-05-29 NOTE — TELEPHONE ENCOUNTER
Sent order for 24 hour urine kit x 1 to [facility] via [employer]. Dr [facility] [employer] message sent to patient with instructions for completion.

## 2025-05-29 NOTE — TELEPHONE ENCOUNTER
I sent in the oxy.   Please remind him that I recommended he go to the emergency room   BENSON Justice MD

## 2025-05-29 NOTE — TELEPHONE ENCOUNTER
General Call    Contacts       Contact Date/Time Type Contact Phone/Fax    05/29/2025 09:35 AM CDT Phone (Incoming) AlineRichi ryan (Self) [phone] (M)          Reason for Call:  Pt states he was seen today for virtual visit with Dr Justice.  He says there was a misunderstanding during the visit.  He says he wants Oxycodone, Norco does not work.  Pt asking that a rx for Oxycodone be [facility] to [facility].      Date of last appointment with provider: today 5/29    Could we send this information to you in [facility] or would you prefer to receive a phone call?:   Patient would prefer a phone call   Okay to leave a detailed message?: Yes at Cell number on file:    Telephone Information:   Mobile [phone]     Amna Gibson on 5/29/2025 at 9:38 AM

## 2025-05-29 NOTE — PROGRESS NOTES
"Richi is a 45 year old who is being evaluated via a billable video visit.    How would you like to obtain your AVS? MyChart  If the video visit is dropped, the invitation should be resent by: Text to cell phone: [phone]  Will anyone else be joining your video visit? No      Assessment & Plan     (M79.622) Pain of left lower leg  (primary encounter diagnosis)  (R20.0,  R20.2) Numbness and tingling of left leg  Comment:     This was a very unusual video visit. He was lying on the floor moaning loudly throughout the whole visit. Says he cannot stand and can barely sit. Has had normal ct lumbar spine imaging and normal abdominal/pelvic ct imaging. It is unclear to me what is going on with him. I do not feel comfortable just giving him pain meds and asking him to wait until his appointment next week since we do not have an explanation for his pain.  I reviewed his normal imaging from previous ER visits.  I recommend he return to the emergency room - needs full neuro eval and possibly imaging of the left leg/hip, repeat labs and addtl labs.  I do not know this patient personally but his presentation is concerning and unusual.       MED REC REQUIRED  Post Medication Reconciliation Status: discharge medications reconciled and changed, per note/orders          Subjective   Richi is a 45 year old, presenting for the following health issues:  Hospital F/U        5/27/2025     9:28 AM   Additional Questions   Roomed by DELIA Foote       Video Start Time: 8:30    HPI      Lying on the floor during the visit   Left leg pain - cramps up and feels it is \"engulfed in flames\" - dullness and tingling in the entire leg   Says he has no feeling in the leg - numb   Pain from tailbone all the way down the leg into his foot   Symptoms started 5 days ago - says something popped in his low back and the pain has been terrible since then  No rash on the leg   No swelling in the leg  - says he feels it is \"deteriorating very fast\" - \"muscles " "are deteriorating and tendons are locked\"     Trying gabapentin, tylenol, muscle relaxer, narcotics.   Says oxycodone doesn't help very much  Just started on the steroid burst     No pain in low back, only in leg     Scheduled to see a spine specialist in [place] on Tuesday.     Only able to get two hours of sleep last night     Review of systems:  No fever/chills   No incontinence of urine or stool   No saddle numbness but reiterates that entire left leg is numb       ED/UC Followup:    Facility:  Multiple facilities  Date of visit: Has had 5 appointments since 5/26.  Reason for visit: Acute on chronic low back pain   Current Status: only position that is comfortable is on his stomach laying on floor   Has pain radiation down left leg, becomes very tight, and starts burning.  Says pain is severe.  Has numbness in the bottom of his left leg.  Unable to feel if his foot is touching the ground.     Says he doesn't have a pcp because they moved away.     5/26/25 emergency room  - ct lumbar spine    5/27/25 emergency room twice this day   Patient Story: Pt was seen at [facility] earlier today for left leg pain starting from his buttock through his leg and foot. Pt stated his eg is totally numb and rating pain 100/10 at this time. Pt is unable to walk due to pain and only semi comfortable laying on his stomach in bed.Pt sated this pain has been ongoing for 2 weeks. He was golfing and felt his back pop with a swing. Pt stated he has kidney stones before and multiple compressed disks.     CT is without signs of obstructing ureteral stone.  Small amount of blood in the urine.  CT at another facility showed some mild lumbar disc herniation.  We will work on optimizing his pain management though I think ultimately we can discharge home with close outpatient follow-up (was referred to spine through [facility]).     5/26/25   Narrative & Impression   EXAM: CT LUMBAR SPINE W/O CONTRAST  LOCATION: [facility]  DATE: 5/26/2025     INDICATION: Hx of disc herniation, now with pain radiation into the left leg, no bladder control issues.  COMPARISON: None.  TECHNIQUE: Routine CT Lumbar Spine without IV contrast. Multiplanar reformats. Dose reduction techniques were used.     FINDINGS:  Nomenclature is based on 5 lumbar type vertebral bodies. No evidence of acute fracture or subluxation of the lumbar spine by CT imaging. The disc spaces are well-maintained. No significant degenerative changes. No extraspinal soft tissue abnormality.   Unremarkable visualized bony pelvis.     T12-L1: No posterior disc bulge or spinal canal narrowing. No neural foraminal narrowing.     L1-L2: No posterior disc bulge or spinal canal narrowing. No neural foraminal narrowing.     L2-L3: No posterior disc bulge or spinal canal narrowing. No neural foraminal narrowing.     L3-L4: No posterior disc bulge or spinal canal narrowing. No neural foraminal narrowing.     L4-L5: Symmetric disc bulge with mild spinal canal narrowing. No neural foraminal narrowing.     L5-S1: Symmetric disc bulge and mild facet arthropathy without significant spinal canal narrowing. Mild bilateral neural foraminal narrowing.                                                                      IMPRESSION:  1.  No evidence of acute fracture or subluxation of the lumbar spine by CT imaging.       CT Abd/pelvis : normal  5/27/25     Pdmp   5/27/25 8 vicodin  5/26/25 6 oxy    He is angry with emergency room care   Says no one cares or did any exam on him           Objective             Physical Exam   GENERAL: writhing on the floor during our visit, audibly moaning, so much that it interrupts our conversation multiple times.   RESP: No audible wheeze, cough, or visible cyanosis.    Legs - he shows me the legs and they appear equal bilaterally, no visible swelling or rash.           Video-Visit Details    Type of service:  Video Visit   Video End Time:8:30  Originating  Location (pt. Location): Home    Distant Location (provider location):  On-site  Platform used for Video Visit: [employer]  Signed Electronically by: Genevieve Justice MD

## 2025-05-29 NOTE — TELEPHONE ENCOUNTER
Call placed to patient   Relayed Dr. Justice's message    Patient verbalized understanding  No further questions/concerns    Navjot Dyer, Clinic RN  [facility]

## 2025-05-31 ENCOUNTER — HOSPITAL ENCOUNTER (OUTPATIENT)
Dept: MRI IMAGING | Facility: CLINIC | Age: 45
Discharge: HOME | End: 2025-05-31
Attending: PHYSICIAN ASSISTANT | Admitting: PHYSICIAN ASSISTANT
Payer: COMMERCIAL

## 2025-05-31 DIAGNOSIS — M54.16 LUMBAR RADICULOPATHY: ICD-10-CM

## 2025-05-31 LAB — RADIOLOGIST FLAGS: ABNORMAL

## 2025-05-31 PROCEDURE — 72148 MRI LUMBAR SPINE W/O DYE: CPT

## 2025-06-02 ENCOUNTER — TELEPHONE (OUTPATIENT)
Dept: PHYSICAL MEDICINE AND REHAB | Facility: CLINIC | Age: 45
End: 2025-06-02

## 2025-06-02 DIAGNOSIS — M54.16 LUMBAR RADICULOPATHY: Primary | ICD-10-CM

## 2025-06-02 RX ORDER — HYDROCODONE BITARTRATE AND ACETAMINOPHEN 5; 325 MG/1; MG/1
1 TABLET ORAL 3 TIMES DAILY PRN
Qty: 21 TABLET | Refills: 0 | Status: SHIPPED | OUTPATIENT
Start: 2025-06-02

## 2025-06-02 RX ORDER — TIZANIDINE HYDROCHLORIDE 4 MG/1
4 CAPSULE, GELATIN COATED ORAL 3 TIMES DAILY PRN
Qty: 90 CAPSULE | Refills: 1 | Status: SHIPPED | OUTPATIENT
Start: 2025-06-02

## 2025-06-02 NOTE — TELEPHONE ENCOUNTER
Called patient regarding MRI results.  MRI shows a far lateral disc herniation on the right L5-S1 with the disc extending to the anterior sacral ala region.  We will pursue the right L5-S1 transforaminal epidural steroid injection tomorrow but I told the patient that given the location of his disc herniation I am not able to guarantee relief.  For that reason I am also going to enter in a referral to neurosurgery.  I entered flexion-extension x-rays in preparation for his surgical consultation.  Patient took his last hydrocodone/acetaminophen yesterday.  I sent in a refill for hydrocodone/acetaminophen 5/325 mg 1 tab 3 times daily as needed, #21 with no refills.  I also refilled the patient's tizanidine 4 mg 3 times daily as needed.  Patient will continue gabapentin titrating up to 900 mg 3 times daily.  He completed his Medrol Dosepak today.

## 2025-06-03 ENCOUNTER — RADIOLOGY INJECTION OFFICE VISIT (OUTPATIENT)
Dept: PHYSICAL MEDICINE AND REHAB | Facility: CLINIC | Age: 45
End: 2025-06-03
Attending: PHYSICIAN ASSISTANT
Payer: COMMERCIAL

## 2025-06-03 VITALS
RESPIRATION RATE: 16 BRPM | SYSTOLIC BLOOD PRESSURE: 134 MMHG | HEART RATE: 85 BPM | DIASTOLIC BLOOD PRESSURE: 82 MMHG | OXYGEN SATURATION: 100 % | TEMPERATURE: 97 F

## 2025-06-03 DIAGNOSIS — M54.16 LUMBAR RADICULOPATHY: ICD-10-CM

## 2025-06-03 PROCEDURE — 64483 NJX AA&/STRD TFRM EPI L/S 1: CPT | Mod: LT | Performed by: PAIN MEDICINE

## 2025-06-03 RX ORDER — LIDOCAINE HYDROCHLORIDE 10 MG/ML
INJECTION, SOLUTION EPIDURAL; INFILTRATION; INTRACAUDAL; PERINEURAL
Status: COMPLETED | OUTPATIENT
Start: 2025-06-03 | End: 2025-06-03

## 2025-06-03 RX ORDER — DEXAMETHASONE SODIUM PHOSPHATE 10 MG/ML
INJECTION, SOLUTION INTRAMUSCULAR; INTRAVENOUS
Status: COMPLETED | OUTPATIENT
Start: 2025-06-03 | End: 2025-06-03

## 2025-06-03 RX ADMIN — DEXAMETHASONE SODIUM PHOSPHATE 10 MG: 10 INJECTION, SOLUTION INTRAMUSCULAR; INTRAVENOUS at 11:30

## 2025-06-03 RX ADMIN — LIDOCAINE HYDROCHLORIDE 2 ML: 10 INJECTION, SOLUTION EPIDURAL; INFILTRATION; INTRACAUDAL; PERINEURAL at 11:30

## 2025-06-03 ASSESSMENT — PAIN SCALES - GENERAL
PAINLEVEL_OUTOF10: MILD PAIN (3)
PAINLEVEL_OUTOF10: SEVERE PAIN (9)

## 2025-06-03 NOTE — PATIENT INSTRUCTIONS
Follow-up visit with MARILYN Loredo in 2 weeks to discuss injection outcome and determine care plan going forward.    Learning About Lumbar Epidural Steroid Injections  What is a lumbar epidural steroid injection?     A lumbar epidural injection is a shot into the epidural space--the area in your back around the spinal cord. The shot may help reduce pain, tingling, or numbness in your back, buttock, or leg. The shot may have a steroid to reduce pain and swelling and a local anesthetic to numb nerves.  How is a lumbar epidural steroid injection done?  The doctor may use an imaging test before or during your injection. This can be an MRI, a CT scan, or an X-ray. These tests can show where your nerve problems are.  After finding the right spot, the doctor may inject a numbing medicine into the skin where you will get the steroid injection. Then the needle for the steroid is put into the numbed area. You may feel some pressure. You could feel some stinging or burning during the injection.  How long does an epidural steroid injection take?  The procedure will take 5 to 15 minutes. You will go home about an hour later.  What can you expect after a lumbar epidural steroid injection?  If your injection had local anesthetic and a steroid, your legs may feel heavy or numb right after. You will probably be able to walk. But you may need to be extra careful. Take care not to lose your balance, and be sure to follow your doctor's instructions.  If your injection contained local anesthetic, you may feel better right away. But this pain relief will last only a few hours. Your pain will probably return. This is because the steroids have not started working yet. Before the steroids start to work, your back may be sore for a few days.  These injections don't always work. When they do, it takes 1 to 5 days. This pain relief can last for several days to a few months or longer.  You may want to do less than normal for a few days. But  you may also be able to return to your daily routine.  Some people are dizzy or feel sick to their stomach after getting this injection. These symptoms usually don't last very long.  If your pain is better, you may be able to keep doing your normal activities or physical therapy. But try not to overdo it, even if your back pain has improved a lot. If your pain is only a little better or if it comes back, your doctor may recommend another injection in a few weeks. If your pain has not changed, talk to your doctor about other treatment choices.  Follow-up care is a key part of your treatment and safety. Be sure to make and go to all appointments, and call your doctor if you are having problems. It's also a good idea to know your test results and keep a list of the medicines you take.       DISCHARGE INSTRUCTIONS    During office hours (8:00 a.m.- 4:00 p.m.) questions or concerns may be answered  by calling [facility] Navigation Nurses at  [phone].  Messages received after hours will be returned the following business day.      In the case of an emergency, please dial 911 or seek assistance at the nearest Emergency Room/Urgent Care facility.     All Patients:    You may experience an increase in your symptoms for the first 2 days (It may take anywhere between 2 days - 2 weeks for the steroid to have maximum effect).    You may use ice on the injection site, as frequently as 20 minutes each hour if needed.    You may take your pain medicine.    You may continue taking your regular medication after your injection. If you have had a medial branch block you may resume pain medication once your pain diary is completed.    You may shower. No swimming, tub bath, or hot tub for 48 hours.  You may remove your bandaid/bandage as soon as you are home.    You may resume light activities, as tolerated.    Resume your usual diet as tolerated.    If you were told to hold any blood thinning medications you may resume taking  them 24 hours after your procedure as prescribed.    It is strongly advised that you do not drive for 1-3 hours post injection.    If you have had oral sedation:  Do not drive for 8 hours post injection.        POSSIBLE STEROID SIDE EFFECTS (If steroid/cortisone was used for your procedure    Swelling of the legs              Skin redness (flushing)     Mouth (oral) irritation   Increased blood sugar (glucose) levels            Sweats                    Mood changes  Headache  Sleeplessness  Weakened immune system for up to 14 days, which could increase the risk of [place] the COVID-19 virus and/or experiencing more severe symptoms of the disease, if exposed.  Decreased effectiveness of vaccines if given within 2 weeks of the steroid.    -If you experience these symptoms, it should only last for a short period         POSSIBLE PROCEDURE SIDE EFFECTS    Increased Pain           Increased numbness/tingling      Nausea/Vomiting          Bruising/bleeding at site      Redness or swelling                                              Difficulty walking      Weakness           Fever greater than 100.5    -Call the Sp[facility] Center if you are concerned    *In the event of a severe headache after an epidural steroid injection that is relieved by lying down, please call the [facility] to speak with a clinical staff member*

## 2025-06-03 NOTE — CONFIDENTIAL NOTE
NEUROSURGERY - NEW PREVISIT PLANNING    Referring Provider: Sugar Pang PA-C    OVN 6/2/2025   Reason For Visit: M54.16 (ICD-10-CM) - Lumbar radiculopathy        IMAGING STATUS/LOCATION DATE/TYPE   MRI PACS 05/31/2025  Lumbar  [facility]   CT PACS 05/26/2025  Lumbar  [facility]   XRAY *scheduled 06/04/2025  Lumbar  [facility]   NOTES STATUS/LOCATION DATE/TYPE   Other specialist OVN: N/A    EMG N/A    INJECTION *scheduled 06/03/2025   PHYSICAL THERAPY Encounters 2023   SURGERY N/A      Does patient have C2C?  Year last updated Action     YES   [x]   2025   Please update at appointment if outdated more than 5 years       NO     []   N/A   Please complete C2C at appointment

## 2025-06-04 ENCOUNTER — ANCILLARY PROCEDURE (OUTPATIENT)
Dept: GENERAL RADIOLOGY | Facility: CLINIC | Age: 45
End: 2025-06-04
Attending: PHYSICIAN ASSISTANT
Payer: COMMERCIAL

## 2025-06-04 DIAGNOSIS — M54.16 LUMBAR RADICULOPATHY: ICD-10-CM

## 2025-06-06 ENCOUNTER — PRE VISIT (OUTPATIENT)
Dept: NEUROSURGERY | Facility: CLINIC | Age: 45
End: 2025-06-06

## 2025-06-06 ENCOUNTER — OFFICE VISIT (OUTPATIENT)
Dept: NEUROSURGERY | Facility: CLINIC | Age: 45
End: 2025-06-06
Attending: PHYSICIAN ASSISTANT
Payer: COMMERCIAL

## 2025-06-06 ENCOUNTER — RESULTS FOLLOW-UP (OUTPATIENT)
Dept: PHYSICAL MEDICINE AND REHAB | Facility: CLINIC | Age: 45
End: 2025-06-06

## 2025-06-06 VITALS
HEART RATE: 71 BPM | HEIGHT: 69 IN | DIASTOLIC BLOOD PRESSURE: 78 MMHG | BODY MASS INDEX: 21.11 KG/M2 | SYSTOLIC BLOOD PRESSURE: 140 MMHG | WEIGHT: 142.5 LBS | OXYGEN SATURATION: 98 %

## 2025-06-06 DIAGNOSIS — M54.16 LUMBAR RADICULOPATHY: ICD-10-CM

## 2025-06-06 PROCEDURE — 1125F AMNT PAIN NOTED PAIN PRSNT: CPT | Performed by: STUDENT IN AN ORGANIZED HEALTH CARE EDUCATION/TRAINING PROGRAM

## 2025-06-06 PROCEDURE — 3077F SYST BP >= 140 MM HG: CPT | Performed by: STUDENT IN AN ORGANIZED HEALTH CARE EDUCATION/TRAINING PROGRAM

## 2025-06-06 PROCEDURE — 99203 OFFICE O/P NEW LOW 30 MIN: CPT | Performed by: STUDENT IN AN ORGANIZED HEALTH CARE EDUCATION/TRAINING PROGRAM

## 2025-06-06 PROCEDURE — 3078F DIAST BP <80 MM HG: CPT | Performed by: STUDENT IN AN ORGANIZED HEALTH CARE EDUCATION/TRAINING PROGRAM

## 2025-06-06 ASSESSMENT — PAIN SCALES - GENERAL: PAINLEVEL_OUTOF10: SEVERE PAIN (8)

## 2025-06-06 NOTE — PROGRESS NOTES
PHYSICAL THERAPY EVALUATION  Type of Visit: Evaluation       Fall Risk Screen:  Have you fallen 2 or more times in the past year?: No  Have you fallen and had an injury in the past year?: No  Is patient receiving Physical Therapy Services?: Yes  Fall screen comments: not fall risk, PT for lumbar radiculopathy    Subjective         Presenting condition or subjective complaint: Right leg anf foot extremely hurt.  Date of onset: 05/26/25    Relevant medical history:     Active Ambulatory Problems     Diagnosis Date Noted    Bilateral nephrolithiasis 08/02/2023    Chronic right-sided low back pain with bilateral sciatica 09/21/2023    Hip pain, left 09/21/2023    Hip pain, right 09/21/2023    Numbness and tingling of both lower extremities 09/21/2023    Ganglion cyst 09/08/2022    History of renal calculi 11/25/2024    Conductive hearing loss of right ear with unrestricted hearing of left ear 05/27/2025    Impacted cerumen 05/27/2025    Otalgia, bilateral 05/27/2025    Sinus pressure 05/27/2025     Resolved Ambulatory Problems     Diagnosis Date Noted    Left ureteral stone 08/02/2023     Past Medical History:   Diagnosis Date    Arthritis 08/16/2014    Kidney stone       Dates & types of surgery: Wrist, kidney, and hip  Past Surgical History:   Procedure Laterality Date    COMBINED CYSTOSCOPY, RETROGRADES, URETEROSCOPY, LASER HOLMIUM LITHOTRIPSY URETER(S), INSERT STENT Bilateral 08/07/2023    Procedure: Cystoscopy, Bilateral Ureteroscopy, Bilateral Retrograde Pyelogram, Bilateral Stone Basket Extraction, Bilateral Stent placement;  Surgeon: Gus Shipman MD;  Location: [facility] OR    CYSTOSCOPY,URETEROSCOPY,LITHOTRIPSY      at [place]    ORTHOPEDIC SURGERY  2022    Right Wrist    WRIST SURGERY Right       Prior diagnostic imaging/testing results: MRI; CT scan; X-ray     Study Result    Narrative & Impression   EXAM: MR LUMBAR SPINE W/O CONTRAST  LOCATION: [facility] [place]  DATE: 5/31/2025      INDICATION: lumbar radiculopathy  COMPARISON: 9/6/2023, CT 5/26/2025.  TECHNIQUE: Routine Lumbar Spine MRI without IV contrast.     FINDINGS:   Five lumbar type vertebral body numbering convention. Preserved vertebral body heights, alignment and marrow signal. Conus medullaris at L1. Normal cauda equina nerve roots. Normal bony pelvis. No acute extraspinal abnormality.     T12-L1: Normal disc, facets for age. No high-grade canal or neural foraminal stenosis.     L1-L2: Normal disc, facets for age. No high-grade canal or neural foraminal stenosis.     L2-L3: Normal disc, facets for age. No high-grade canal or neural foraminal stenosis.      L3-L4: Normal disc, facets for age. No high-grade canal or neural foraminal stenosis.     L4-L5: Preserved disc height with minimal loss of intradiscal T2 signal. Similar central annular fissure. Noncompressive disc bulge. Facet arthropathy. No high-grade canal or neural foraminal stenosis.     L5-S1: Mild loss of disc height with relatively preserved intradiscal T2 signal. Disc bulge, central protrusion contacts but does not impinge upon the traversing S1 nerve roots. Facet arthropathy. No high-grade canal or neural foraminal stenosis. New   since comparison, left lateral extraforaminal zone lobular T2 hyperintense, T1 hypointense lesion abutting and flattening the traversing left L5 nerve root (axial series 6 and 7, images 54-60; axial series 8, images 7-10 and sagittal series 3 and 5,   image 19-21) immediately anterior to the sacral alar cortex. Intrinsic T2 signal within the adjacent nerve appears to also be somewhat increased relative to the right.                                                                      IMPRESSION:  1. Suspect acute or subacute left extraforaminal zone L5-S1 protrusion/extrusion likely causing clinically significant left L5 nerve irritation/impingement; and possibly reactive nerve root thickening/edema.  2. Multilevel lumbar spondylosis,  otherwise as above, without significant change since comparison MRI.        Study Result    Narrative & Impression   EXAM: XR LUMBAR FLEX/EXT 2/3 VIEWS  LOCATION: [facility]  DATE: 6/4/2025     INDICATION: eval for instability  COMPARISON: MRI 5/31/2025. CT 5/26/2025.                                                                      IMPRESSION: Assessment degraded due to lack of frontal and neutral lateral imaging.  Alignment: No substantial subluxation with flexion/extension.  Vertebral body heights: Unchanged.  Fracture: No discernible acute fracture.  Degenerative changes: Mild degenerative changes with vertebral body osteophyte formation, endplate irregularity, and facet arthropathy.  Other: None.     Prior therapy history for the same diagnosis, illness or injury: No      Prior Level of Function  Transfers: Independent  Ambulation: Independent  ADL: Independent    Living Environment  Social support: With family members   Type of home: House; 2-story   Stairs to enter the home: Yes 3 Is there a railing: No     Ramp: No   Stairs inside the home: Yes 12 Is there a railing: Yes     Help at home: Self Cares (home health aide/personal care attendant, family, etc)  Equipment owned: Straight Cane     Employment: Yes Facility maintenance  Hobbies/Interests: Rc racing, Hockey card , being outside    Patient goals for therapy: Walk with no pain. Sleep through the night. Sit in a chair.    Pain assessment: Pain present  See objective evaluation for additional pain details     Objective   LUMBAR SPINE EVALUATION  PAIN: Pain Level at Rest: 5/10  Pain Level with Use: 9/10  Pain Location: Left leg posterior hip to top of foot and toes  Pain Quality: Aching, Burning, Numb, Sharp, Shooting, Stabbing, and Tingling  Pain Frequency: constant  Pain is Exacerbated By: standing, walking sitting  Pain is Relieved By: rolling on exercise ball, ice, heat    POSTURE: Standing Posture: Rounded  "shoulders, Forward head, flexed forward at waist, tendency to standing with knees bent  GAIT:   Weightbearing Status: full  Assistive Device(s): Cane (single end)  Gait Deviations: Antalgic  Stride length decreased  Maia decreased  Patient using cane in left hand originally.   BALANCE/PROPRIOCEPTION: not tested     ROM:   Hip ROM( ) AROM in degrees    Left Right   Hip Flexion (0-120 )     Hip Abduction (0-45 )     Hip External Rotation (0-50 )     Hip Internal Rotation (0-40 )     Hip Extension (0-15 )     Knee flexion (120-150 )     Knee Extension (0 )      PROM in degrees    Left Right   Hip Flexion (0-120 ) 110 limited by pain    Hip Abduction (0-45 )     Hip External Rotation (0-50 ) 35    Hip Internal Rotation (0-40 ) 20    Hip Extension (0-15 )     Knee flexion (120-150 ) WNL    Knee Extension (0 ) WNL    Lumbar ROM Left Right   Lumbar Side Bending Severe loss Severe loss   Lumbar Rotation Severe loss Severe loss   Lumbar Flexion Severe loss with increased leg pain   Lumbar Extension Min mod with decreased back pain,  \"it feels like it takes pressure off the leg, but the pain is still there\"   Pain:   End feel:   STRENGTH (MYOTOMES):   */5 Left Right   Hip Flexion (L2) 4- pain 5   Hip Extension (L3)     Hip Abduction 4- pain 4+   Hip Adduction  4- pain 4+   Hip Internal Rotation     Hip External Rotation     Knee Flexion 4- pain 5   Knee Extension 4- pain 5   Dorsiflexion (L4) 3 pain 5   Great Toe Extension (L5) 2 5   Plantarflexion (S1)       DTR'S:  to be assessed at subsequent visit as appropriate  CORD SIGNS: to be assessed at subsequent visit as appropriate  DERMATOMES: to be assessed at subsequent visit as appropriate  FLEXIBILITY: Decreased piriformis L, Decreased hip flexors L, Decreased quadriceps L, Decreased hamstrings L, Decreased gastroc L  LUMBAR/HIP Special Tests:   Lumbar Special Tests Left Right   Quadrant test     Straight leg raise + -   Crossover response - -   Slump + -   Prone " instability test     Jocelin's     Sit-up test    Trunk extensor endurance test    Repeated flexion    Repeated extension    Other:    SI Tests Left Right   SI Compression     SI Distraction     POSH (Thigh Thrust)     Sacral Thrust     FADIR     SANDRITA Puri's Test     Resisted Abduction     Pubic shotgun     Other:       PALPATION: severe tenderness lower leg from mid calf down, foot hypersensitivity  SPINAL SEGMENTAL CONCLUSIONS:  not tested       Assessment & Plan   CLINICAL IMPRESSIONS  Medical Diagnosis: Lumbar radiculopathy    Treatment Diagnosis: Lumbar radiculopathy   Impression/Assessment: Patient is a 45 year old male with complaints severe L LE radicular pain, numbness and weakness.    The following significant findings have been identified: Pain, Decreased ROM/flexibility, Decreased joint mobility, Decreased strength, Impaired balance, Decreased proprioception, Impaired sensation, Impaired gait, Impaired muscle performance, Decreased activity tolerance, and Impaired posture. These impairments interfere with their ability to perform self care tasks, work tasks, recreational activities, household chores, driving , household mobility, and community mobility as compared to previous level of function.     Clinical Decision Making (Complexity):  Clinical Presentation: Stable/Uncomplicated  Clinical Presentation Rationale: based on medical and personal factors listed in PT evaluation  Clinical Decision Making (Complexity): Low complexity    PLAN OF CARE  Treatment Interventions:  Modalities: Cryotherapy, E-stim, Hot Pack, Mechanical Traction  Interventions: Gait Training, Manual Therapy, Neuromuscular Re-education, Therapeutic Activity, Therapeutic Exercise, Self-Care/Home Management    Long Term Goals     PT Goal 1  Goal Identifier: self management  Goal Description: Patient will be independent with HEP and self management of symptoms  Rationale: to maximize safety and independence with performance of ADLs  and functional tasks  Goal Progress: initial  Target Date: 08/07/25  PT Goal 2  Goal Identifier: walking  Goal Description: Patient will walk without assistive device and pain 4/10 or less  Rationale: to maximize safety and independence within the home  Goal Progress: initial at evaluation 8/10  Target Date: 09/07/25  PT Goal 3  Goal Identifier: sitting  Goal Description: Patient will sit for 30 minutes for a meal with pain 4/10 or less  Rationale: to maximize safety and independence with performance of ADLs and functional tasks  Goal Progress: iniitial  Target Date: 08/07/25  PT Goal 4  Goal Identifier: MACEY  Goal Description: Patient will demonstrate decreased low back pain and improvement of function by scoring 40% or less on the MACEY.  Rationale: to maximize safety and independence with performance of ADLs and functional tasks      Frequency of Treatment: 2x/week  Duration of Treatment: 60 days    Recommended Referrals to Other Professionals: not at this time.  Education Assessment:   Learner/Method: Patient;No Barriers to Learning    Risks and benefits of evaluation/treatment have been explained.   Patient/Family/caregiver agrees with Plan of Care.     Evaluation Time:     PT Eval, Low Complexity Minutes (80129): 20       Signing Clinician: Yo Aquino, PT        [facility]                                                                                   OUTPATIENT PHYSICAL THERAPY      PLAN OF TREATMENT FOR OUTPATIENT REHABILITATION   Patient's Last Name, First Name, Ba Pedro YOB: 1980   Provider's Name   [facility]   Medical Record No.  [id number]     Onset Date: 05/26/25  Start of Care Date: 06/09/25     Medical Diagnosis:  Lumbar radiculopathy      PT Treatment Diagnosis:  Lumbar radiculopathy Plan of Treatment  Frequency/Duration: 2x/week/ 60 days    Certification date from 06/09/25 to 08/07/25         See note  for plan of treatment details and functional goals     Yo Aquino, PT                         I CERTIFY THE NEED FOR THESE SERVICES FURNISHED UNDER        THIS PLAN OF TREATMENT AND WHILE UNDER MY CARE     (Physician attestation of this document indicates review and certification of the therapy plan).              Referring Provider:  Fab Martinez    Initial Assessment  See Epic Evaluation- Start of Care Date: 06/09/25

## 2025-06-06 NOTE — LETTER
"6/6/2025      Ba Mireles  [address] [place] [id number]      Dear Colleague,    Thank you for referring your patient, Ba Mireles, to the [facility]. Please see a copy of my visit note below.    Dear Ms. Pang,     Thank you for the opportunity to participate in the care of Mr. Mireles.    As you know, he is a 45-year-old male who sustained acute left-sided radicular pain.  This happened after he was gardening and golfing.  The patient presented to the emergency room multiple times for severe pain.  Thus far, he has tried ibuprofen, steroid pack, gabapentin, oxycodone, and hydrocodone. He has tried an epidural steroid injection without relief.     His medical history is largely unremarkable. He has undergone multiple surgeries as below:   Past Surgical History:   Procedure Laterality Date     COMBINED CYSTOSCOPY, RETROGRADES, URETEROSCOPY, LASER HOLMIUM LITHOTRIPSY URETER(S), INSERT STENT Bilateral 08/07/2023    Procedure: Cystoscopy, Bilateral Ureteroscopy, Bilateral Retrograde Pyelogram, Bilateral Stone Basket Extraction, Bilateral Stent placement;  Surgeon: Gus Shipman MD;  Location: [facility] OR     CYSTOSCOPY,URETEROSCOPY,LITHOTRIPSY      at [place]     ORTHOPEDIC SURGERY  2022    Right Wrist     WRIST SURGERY Right      His exam is extremely limited.  Any motion of his left lower extremity provokes pain.  Throughout the clinic visit, he was prone on the examination table.  I was able to witness that he does have some movement of his ankle with flexion and extension on the left side.     His MRI L spine demonstrates the following:   \"1. Suspect acute or subacute left extraforaminal zone L5-S1 protrusion/extrusion likely causing clinically significant left L5 nerve irritation/impingement; and possibly reactive nerve root thickening/edema.  2. Multilevel lumbar spondylosis, otherwise as above, without significant change since comparison MRI.\"    In assessment, " "Ba Mireles has a very abnormal location for a left sided disc herniation at L5-S1.  I reviewed the imaging both with the interpreting radiologist and another one of my spine colleagues.  This is a very lateral herniation that is surgically difficult to access regardless of whether it be from an anterior midline, posterior midline, or far lateral approach.      While a surgical approach could be entertained, I think it should only be considered after an extensive trial of conservative management.  We reviewed that he is presently early in his course of treatment, and I would encourage that he participate in physical therapy and continue medical management at this time.  In the event that the disc fails to reabsorb into the disc space, this likely would mean that he would need to undergo a L5-S1 fusion as the amount of facetectomy that would be required to resect disc and decompress his nerve root would be substantial. At this point, he will follow up as needed and keep us informed should his pain fail to ainsley.     Fab \"Tong\" MD Michelle    of Neurosurgery, [facility]        Again, thank you for allowing me to participate in the care of your patient.        Sincerely,        Fab Martinez MD    Electronically signed"

## 2025-06-06 NOTE — PROGRESS NOTES
"Dear Ms. Poly,     Thank you for the opportunity to participate in the care of Mr. Mireles.    As you know, he is a 45-year-old male who sustained acute left-sided radicular pain.  This happened after he was gardening and golfing.  The patient presented to the emergency room multiple times for severe pain.  Thus far, he has tried ibuprofen, steroid pack, gabapentin, oxycodone, and hydrocodone. He has tried an epidural steroid injection without relief.     His medical history is largely unremarkable. He has undergone multiple surgeries as below:   Past Surgical History:   Procedure Laterality Date    COMBINED CYSTOSCOPY, RETROGRADES, URETEROSCOPY, LASER HOLMIUM LITHOTRIPSY URETER(S), INSERT STENT Bilateral 08/07/2023    Procedure: Cystoscopy, Bilateral Ureteroscopy, Bilateral Retrograde Pyelogram, Bilateral Stone Basket Extraction, Bilateral Stent placement;  Surgeon: Gus Shipman MD;  Location: [facility] OR    CYSTOSCOPY,URETEROSCOPY,LITHOTRIPSY      at [place]    ORTHOPEDIC SURGERY  2022    Right Wrist    WRIST SURGERY Right      His exam is extremely limited.  Any motion of his left lower extremity provokes pain.  Throughout the clinic visit, he was prone on the examination table.  I was able to witness that he does have some movement of his ankle with flexion and extension on the left side.     His MRI L spine demonstrates the following:   \"1. Suspect acute or subacute left extraforaminal zone L5-S1 protrusion/extrusion likely causing clinically significant left L5 nerve irritation/impingement; and possibly reactive nerve root thickening/edema.  2. Multilevel lumbar spondylosis, otherwise as above, without significant change since comparison MRI.\"    In assessment, Ba Mireles has a very abnormal location for a left sided disc herniation at L5-S1.  I reviewed the imaging both with the interpreting radiologist and another one of my spine colleagues.  This is a very lateral herniation that is surgically " "difficult to access regardless of whether it be from an anterior midline, posterior midline, or far lateral approach.      While a surgical approach could be entertained, I think it should only be considered after an extensive trial of conservative management.  We reviewed that he is presently early in his course of treatment, and I would encourage that he participate in physical therapy and continue medical management at this time.  In the event that the disc fails to reabsorb into the disc space, this likely would mean that he would need to undergo a L5-S1 fusion as the amount of facetectomy that would be required to resect disc and decompress his nerve root would be substantial. At this point, he will follow up as needed and keep us informed should his pain fail to ainsley.     Fab \"Tong\" MD Michelle    of Neurosurgery, [facility]      "

## 2025-06-06 NOTE — LETTER
June 6, 2025      Ba Mireles  [address] [facility] [address]        To Whom It May Concern:      Ba Mireles was seen in our clinic on 6/6/25. He must adhere to the following activity restrictions:    - No lifting, pushing, or pulling more than 10 lbs  - No bending, or twisting of spine      Sincerely,      Fab Do      Electronically signed

## 2025-06-06 NOTE — NURSING NOTE
"Ba Mireles is a 45 year old male who presents for:  Chief Complaint   Patient presents with    Consult     Patient is here today pinch nerve. Patient has pain in the lower back down the butt, hip, and left leg down to the foot. Top of the foot. The foot hurts to touch. Level 8. Numbness and tingling in left leg and foot.         Initial Vitals:  BP (!) 140/78   Pulse 71   Ht 5' 9\" (1.753 m)   Wt 142 lb 8 oz (64.6 kg)   SpO2 98%   BMI 21.04 kg/m   Estimated body mass index is 21.04 kg/m  as calculated from the following:    Height as of this encounter: 5' 9\" (1.753 m).    Weight as of this encounter: 142 lb 8 oz (64.6 kg). Body surface area is 1.77 meters squared. BP completed using cuff size: large  Severe Pain (8)    Patient BP is slightly elevated today and has declined a recheck after the appointment.     Patricia Courtney    "

## 2025-06-06 NOTE — LETTER
June 6, 2025      Ba Mireles  [address] [facility] [address]        To Whom It May Concern:    Ba Mireles was seen in our clinic on 6/6/25. He may tentatively return to work on 8/1/25 and he must adhere to the following activity restrictions:    - No lifting, pushing, or pulling more than 10 lbs  - No bending, jarring, or twisting of spine      Sincerely,      Fab Do      Electronically signed

## 2025-06-06 NOTE — LETTER
June 6, 2025      Ba Mireles  [address] [facility] [address]        To Whom It May Concern:    Ba Mireles was seen in our clinic on 6/6/25. He must adhere to the following activity restrictions:    - No lifting, pushing, or pulling more than 10 lbs  - No lifting, bending, or twisting of spine        Sincerely,      Fab Do      Electronically signed

## 2025-06-09 ENCOUNTER — THERAPY VISIT (OUTPATIENT)
Dept: PHYSICAL THERAPY | Facility: REHABILITATION | Age: 45
End: 2025-06-09
Attending: STUDENT IN AN ORGANIZED HEALTH CARE EDUCATION/TRAINING PROGRAM
Payer: COMMERCIAL

## 2025-06-09 DIAGNOSIS — M54.16 LUMBAR RADICULOPATHY: ICD-10-CM

## 2025-06-09 PROCEDURE — 97161 PT EVAL LOW COMPLEX 20 MIN: CPT | Mod: GP | Performed by: PHYSICAL THERAPIST

## 2025-06-09 PROCEDURE — 97110 THERAPEUTIC EXERCISES: CPT | Mod: GP | Performed by: PHYSICAL THERAPIST

## 2025-06-09 NOTE — TELEPHONE ENCOUNTER
Last appointment: 05/30/2025  Next appointment: 06/13/2025    Notes/Comments: Refill request from patient. See result/mychart note dated 06/06/2025      Rx request(s) has been reviewed.

## 2025-06-09 NOTE — TELEPHONE ENCOUNTER
Other: Richi is calling because he needs a refill of Gabapentin called into [facility] pharmacy at [phone] he only has 2 tablets left. He is also wondering if he can take 3 tablets of the hydrocodone instead of 2 because he is still in a lot of pain. Please call patient to let him know when the prescriptions have been called in and/or if any questions    Could we send this information to you in [facility] or would you prefer to receive a phone call?:   Patient would prefer a phone call   Okay to leave a detailed message?: Yes at Cell number on file:    Telephone Information:   Mobile [phone]

## 2025-06-10 RX ORDER — GABAPENTIN 300 MG/1
900 CAPSULE ORAL 3 TIMES DAILY
Qty: 270 CAPSULE | Refills: 1 | Status: SHIPPED | OUTPATIENT
Start: 2025-06-10

## 2025-06-11 ENCOUNTER — TELEPHONE (OUTPATIENT)
Dept: PHYSICAL MEDICINE AND REHAB | Facility: CLINIC | Age: 45
End: 2025-06-11
Payer: COMMERCIAL

## 2025-06-11 NOTE — TELEPHONE ENCOUNTER
Per the pharmacy, patient used a [employer] Coupon to help pay for the medication for a few days.  I will need to call [employer] to see why it it still rejecting.

## 2025-06-11 NOTE — TELEPHONE ENCOUNTER
Call received from Eliana at [facility], reporting PA is needed for the patients gabapentin and Percocet medication.     Please initiate PA. Thank you!

## 2025-06-11 NOTE — TELEPHONE ENCOUNTER
Per the pharmacy, patient used a [employer] Coupon to help pay for the medication for a few days.  I will need to call [employer] to see why it it still rejecting.

## 2025-06-11 NOTE — TELEPHONE ENCOUNTER
Retail Pharmacy Prior Authorization Team   Phone: [phone]      Cm key: (Key: [id number])    PA Initiation - SECONDARY INS - KIERA PMAP - 2 OF 2    Medication: OXYCODONE-ACETAMINOPHEN 5-325 MG PO TABS  Insurance Company: Kiera - Phone [phone] Fax [phone]  Pharmacy Filling the Rx: [facility][id number] [place], [place] - [address] [place] [address] [address]  Filling Pharmacy Phone: [phone]  Filling Pharmacy Fax: [phone]  Start Date: 6/11/2025

## 2025-06-11 NOTE — TELEPHONE ENCOUNTER
Call received from pt. Inquiring if Gabapentin PA can be expedited in any way. States he has one dose left and this is medication we do not want patient to have interruption with doses. Pt states his pharmacy closes at 6pm today.

## 2025-06-11 NOTE — TELEPHONE ENCOUNTER
Retail Pharmacy Prior Authorization Team   Phone: [phone]    Cmm key:  (Key: [id number])    PA Initiation - PRIMARY INSURANCE - MARCELO SUBMISSION - 1 OF 2    Medication: GABAPENTIN 300 MG PO CAPS  Insurance Company: [employer] Non-Specialty PA's - Phone [phone] Fax [phone]  Pharmacy Filling the Rx: [facility] [address] [place] [address] AT [facility] [address]  Filling Pharmacy Phone:    Filling Pharmacy Fax:    Start Date: 6/11/2025

## 2025-06-11 NOTE — TELEPHONE ENCOUNTER
Retail Pharmacy Prior Authorization Team   Phone: [phone]      CM KEY:  (Key: [id number])    PA Initiation - PRIMARY INS - [employer] SUBMISSION 1 OF 2    Medication: OXYCODONE-ACETAMINOPHEN 5-325 MG PO TABS  Insurance Company: [employer] Non-Specialty PA's - Phone [phone] Fax [phone]  Pharmacy Filling the Rx: [facility] [address] [place] [address] - [address] [place] [address] [address]  Filling Pharmacy Phone: [phone]  Filling Pharmacy Fax: [phone]  Start Date: 6/11/2025

## 2025-06-11 NOTE — TELEPHONE ENCOUNTER
Retail Pharmacy Prior Authorization Team   Phone: [phone]      [facility] KEY: ([place]: [id number]) - [id number]    PA Initiation - INSURANCE SUBMISSION 2 OF 2    Medication: GABAPENTIN 300 MG PO CAPS  Insurance Company: [employer] - Phone [phone] Fax [phone]  Pharmacy Filling the Rx:    Filling Pharmacy Phone:    Filling Pharmacy Fax:    Start Date: 6/11/2025

## 2025-06-11 NOTE — TELEPHONE ENCOUNTER
Retail Pharmacy Prior Authorization Team   Phone: [phone]    I CALLED [facility] PA DEPARTMENT DUE TO THE CLAIM IS STILL NOT PROCESSING THROUGH THE PATIENT'S INSURANCE.  PER THE MARCELO PA REP, DESPITE THAT THE THERAPY CHANGED AND WAS INCREASED IN DOSE FROM THE PREVIOUS PRESCRIPTION FROM 05/27/25, THAT IS REASON THAT IT IS NOT PROCESSING.  PER THE FIRST REP I SPOKE WITH, SHE STATED THAT THE NEXT DATE IT WILL PROCESS THROUGH.    I CALLED [employer] AGAIN AND SPOKE TO ANOTHER PA REP TO SEE IF SOMETHING WAS POSSIBLY MISSED WITH THE PREVIOUS PA.  THE ONLY INFORMATION I COULD GET THAT WAS HELP WAS THAT IT IS POSSIBLE THE MEDICATION REQUIRED TWO SEPARATE PRIOR AUTHS DUE TO LIMIT UNDER THE PLAN  IN A ROLLING 68 PERIOD.    ANOTHER PA CASE WAS GENERATED AND APPROVED: CASE ID [id number] DATES: 05/12/25-06/11/26.    I CALLED THE [facility] IN [place] TO LEAVE THEM THE APPROVAL INFORMATION AND MY DIRECT NUMBER.        Prior Authorization Approval -  PRIMARY INSURANCE - MARCELO SUBMISSION - 1 OF 2     Medication: GABAPENTIN 300 MG PO CAPS  Authorization Effective Date: 5/12/2025  Authorization Expiration Date: 6/11/2026  Approved Dose/Quantity: 270/30  Reference #: (Key: [id number]), CaseId:[id number]  Insurance Company: [employer] Non-Specialty PA's - Phone [phone] Fax [phone]  Expected CoPay: $    CoPay Card Available: No    Financial Assistance Needed:   Which Pharmacy is filling the prescription: [facility] [address] [place] [address] [address]  Pharmacy Notified: Yes  Patient Notified:

## 2025-06-12 NOTE — TELEPHONE ENCOUNTER
Retail Pharmacy Prior Authorization Team   Phone: [phone]      Prior Authorization Not Needed per Insurance    Medication: GABAPENTIN 300 MG PO CAPS  Insurance Company: Yovani - Phone [phone] Fax [phone]  Expected CoPay: $    Pharmacy Filling the Rx: [id number] - [place], [place] - [address] [place] [address] AT [facility] [address]  Pharmacy Notified: YES - FAXED OUTCOME LETTER TO THE [facility]  Patient Notified: **Instructed pharmacy to notify patient when script is ready to /ship.**

## 2025-06-12 NOTE — TELEPHONE ENCOUNTER
Retail Pharmacy Prior Authorization Team   Phone: [phone]      Prior Authorization Not Needed per Insurance    Medication: OXYCODONE-ACETAMINOPHEN 5-325 MG PO TABS  Insurance Company: Yovani - Phone [phone] Fax [phone]  Expected CoPay: $    Pharmacy [facility]illing the Rx[id number] [place] [facility] [address] [place] [address] AT [facility] [address]  Pharmacy Notified: Yes - faxed outcome letter to pharmacy.  Patient Notified: **Instructed pharmacy to notify patient when script is ready to /ship.**

## 2025-06-12 NOTE — TELEPHONE ENCOUNTER
Retail Pharmacy Prior Authorization Team   Phone: [phone]      Prior Authorization Approval    Medication: OXYCODONE-ACETAMINOPHEN 5-325 MG PO TABS  Authorization Effective Date: 5/12/2025  Authorization Expiration Date: 6/11/2026  Approved Dose/Quantity: 42/7  Reference #: (Key: [id number]), CaseId:[id number]   Insurance Company: [employer] Non-Specialty PA's - Phone [phone] Fax [phone]  Expected CoPay: $    CoPay Card Available: No    Financial Assistance Needed:   Which Pharmacy is filling the prescription: [facility] #[id number], [place] - [address] [place] [address] AT [address]  Pharmacy Notified: Yes  Patient Notified: **Instructed pharmacy to notify patient when script is ready to /ship.**

## 2025-06-16 ENCOUNTER — MYC REFILL (OUTPATIENT)
Dept: PHYSICAL MEDICINE AND REHAB | Facility: CLINIC | Age: 45
End: 2025-06-16
Payer: COMMERCIAL

## 2025-06-16 DIAGNOSIS — M51.16 LUMBAR DISC HERNIATION WITH RADICULOPATHY: ICD-10-CM

## 2025-06-16 NOTE — TELEPHONE ENCOUNTER
SPINE PATIENTS - NEW PROTOCOL PREVISIT    REASON FOR VISIT: Lumbar disc herniation with radiculopathy    PROVIDER: Dr. Misha Winter   DATE OF APPT: 25   NOTES (FOR ALL VISITS) STATUS DETAILS   OFFICE NOTE from referring provider Internal Sugar SANDERS @ [facility] Phys Med:  25   OFFICE NOTE from other specialist Internal Dr Solo Martinez @ [facility] Neurosur25   PHYSICAL THERAPY NOTES Internal [facility]:  25   HOSPITAL ENCOUNTERS Internal [facility]:  25    [facility]:  25    [facility]:  25   INJECTIONS Internal [facility]:  6/3/25   MEDICATION LIST Internal    IMAGING  (FOR ALL VISITS)     MRI (HEAD, NECK, SPINE) Internal [facility]:  MRI  Lumbar Spine 25  MRI Lumbar Spine 23   XRAY (SPINE)  Internal [facility]:  XR Lumbar Flex 25   CT (HEAD, NECK, SPINE) Internal [facility]:  CT Lumbar Spine 25

## 2025-06-17 ENCOUNTER — TELEPHONE (OUTPATIENT)
Dept: PHYSICAL MEDICINE AND REHAB | Facility: CLINIC | Age: 45
End: 2025-06-17

## 2025-06-17 RX ORDER — OXYCODONE AND ACETAMINOPHEN 5; 325 MG/1; MG/1
1-2 TABLET ORAL 3 TIMES DAILY PRN
Qty: 42 TABLET | Refills: 0 | Status: SHIPPED | OUTPATIENT
Start: 2025-06-17

## 2025-06-17 NOTE — TELEPHONE ENCOUNTER
Retail Pharmacy Prior Authorization Team   Phone: [phone]    PA Initiation-URGENT  INSURANCE 1 OF 2   Medication: LIDOCAINE 4 % EX PTCH  Insurance Company: [employer] Non-Specialty PA's - Phone [phone] Fax [phone]  Pharmacy Filling the Rx: [facility] #[id number] [place], [place] - [address] [place] [address] AT [address]  Filling Pharmacy Phone: [phone]  Filling Pharmacy Fax:    Start Date: 6/17/2025

## 2025-06-17 NOTE — TELEPHONE ENCOUNTER
Prior Authorization Retail Medication Request  URGENT   Medication/Dose: Lidocaine (LIDOCARE) 4 % Patch  Diagnosis and ICD code (if different than what is on RX):    Lumbar disc herniation with radiculopathy [M51.16]  - Primary     New/renewal/insurance change PA/secondary ins. PA:  Previously Tried and Failed:    Rationale:      Insurance   Primary: [employer]  Insurance ID:  [id number]       Pharmacy Information (if different than what is on RX)  Name:  [facility]  Phone:  [phone]       Clinic Information  Preferred routing pool for dept communication:

## 2025-06-17 NOTE — TELEPHONE ENCOUNTER
Retail Pharmacy Prior Authorization Team   Phone: [phone]    PRIOR AUTHORIZATION DENIED  INSURANCE 1 OF 2  Medication: LIDOCAINE 4 % EX PTCH  Insurance Company: [employer] Non-Specialty PA's - Phone [phone] Fax [phone]  Denial Date: 6/17/2025  Denial Reason(s):  EXCLUDED FROM PLAN  Appeal Information: EXCLUSIONS ARE NOT ELIGIBLE FOR APPEAL

## 2025-06-17 NOTE — TELEPHONE ENCOUNTER
Retail Pharmacy Prior Authorization Team   Phone: [phone]    PA Initiation: URGENT  INSURANCE 2 OF 2  Medication: LIDOCAINE 4 % EX PTCH  Insurance Company: IN[employer] - Phone [phone] Fax [phone]  Pharmacy Filling the Rx: [facility] #[id number] [place], [place] - [address] [place] [address] AT [address]  Filling Pharmacy Phone: [phone]  Filling Pharmacy Fax:    Start Date: 6/17/2025

## 2025-06-18 ENCOUNTER — MYC REFILL (OUTPATIENT)
Dept: PHYSICAL MEDICINE AND REHAB | Facility: CLINIC | Age: 45
End: 2025-06-18

## 2025-06-18 ENCOUNTER — THERAPY VISIT (OUTPATIENT)
Dept: PHYSICAL THERAPY | Facility: REHABILITATION | Age: 45
End: 2025-06-18
Payer: COMMERCIAL

## 2025-06-18 DIAGNOSIS — M54.16 LUMBAR RADICULOPATHY: ICD-10-CM

## 2025-06-18 DIAGNOSIS — M54.16 LUMBAR RADICULOPATHY: Primary | ICD-10-CM

## 2025-06-18 PROCEDURE — 97110 THERAPEUTIC EXERCISES: CPT | Mod: GP

## 2025-06-18 PROCEDURE — 97012 MECHANICAL TRACTION THERAPY: CPT | Mod: GP

## 2025-06-18 NOTE — TELEPHONE ENCOUNTER
Retail Pharmacy Prior Authorization Team   Phone: [phone]    Prior Authorization Not Needed per Insurance  INSURANCE 2 OF 2    Medication: LIDOCAINE 4 % EX PTCH  Insurance Company: [employer] - Phone [phone] Fax [phone]  Expected CoPay: $  0.00  [id number], [address] AT [facility] [address]  Pharmacy Notified: YES, ready for    Patient Notified: **Instructed pharmacy to notify patient when script is ready to /ship.**

## 2025-06-18 NOTE — PROGRESS NOTES
Ba Mireles is a 45 year old male who is being evaluated via a billable video visit.      How would you like to obtain your AVS? MyChart  Will anyone else be joining your video visit? No      Video Start Time: 2:38 PM  Assessment:   Ba Mireles is a 45 year old y.o. male with past medical history significant for conductive hearing loss of the right ear, bilateral nephrolithiasis  who presents today for follow-up regarding acute severe/debilitating low back pain with radiation into the left lower extremities associated numbness, tingling, weakness.  My review of an MRI lumbar spine shows an acute or subacute left extraforaminal zone disc herniation with significant left L5 nerve root impingement.  Patient is status post a left L5-S1 transforaminal epidural steroid injection Neisha 3, 2025 with no significant improvement.  Patient continues have severe/debilitating pain.  He has severe weakness in the left lower extremity most pronounced in the left ankle dorsiflexors and left EHL.  He has a sensory deficit left L5 dermatome.  Patient is unfortunately unable to return to work in any capacity.  He works in maintenance.  He cannot even tolerate sitting for more than 20 minutes.  - Patient saw Dr. Martinez June 6, 2025.  Dr. Martinez recommended trialing conservative measures with the hope that the disc herniation will heal.  Surgical intervention would require a fusion.  - Patient is getting a second surgical opinion from Dr. Winter tomorrow.       Plan:     A shared decision making plan was used.  The patient's values and choices were respected.  The following represents what was discussed and decided upon by the physician assistant and the patient.      1.  DIAGNOSTIC TESTS: I reviewed the MRI lumbar spine.  - No additional diagnostic test were ordered.    2.  PHYSICAL THERAPY: Patient is currently in physical therapy.  He has had 2 session so far.  Encouraged him to continue with physical therapy and the home  exercises.    3.  MEDICATIONS:  - No changes are made to the patient's medications.  Patient is experiencing some side effects which are likely medication induced including nausea, difficulty focusing, shaking.  I told the patient I do not feel comfortable increasing his medications due to the side effects.  -Patient is taking oxycodone/acetaminophen 5/325 mg 1-2 tabs 3 times daily as needed.  This is somewhat helpful.  - Prednisone has not been helpful.  No additional steroids were ordered.    - Patient will continue pregabalin 150 mg 3 times daily.  This is more effective than gabapentin.  - Patient will continue tizanidine 8 mg 3 times daily as needed.  - Patient can continue lidocaine patches.    4.  INTERVENTIONS: No interventions were ordered.  I reviewed this case with the interventionists.  Unfortunately, there is not another injection approach that I am more optimistic about providing more relief.    5.  PATIENT EDUCATION:  - I told the patient that if Dr. Winter does not recommend surgical intervention I am going to refer the patient to chronic pain management to get their help.    6.  FOLLOW-UP: I am going to have a video visit with the patient in 1 week to monitor pain management.    Subjective:     Ba Mireles is a 45 year old male who presents today for follow-up regarding severe/debilitating left low back pain with radiation into the left lower extremities associated numbness, tingling, weakness.  I last saw the patient June 13, 2025 and we adjusted his pain relieving medications.  He returns today to discuss pain management.  Patient reports no significant change in his pain.  He does feel like medications are somewhat helpful.    Patient complains of severe low back pain with radiation into the left lower extremity with associated numbness, tingling, weakness.  He states that he continues to have severe pain.  He cannot tolerate sitting for more than 20 minutes.  He can only sleep for 4 hours at a  time.  Once the medications wear off he wakes up and then to take some time for the next medicine to kick in so he can fall back asleep.  Patient reports that he continues to lose weight.  He feels like his hands are shaky.  He is having difficulty focusing.  He feels nauseated.    Treatment to date:  - Current physical therapy for low back pain, 1 session so far  - Physical therapy for low back pain September through November 2023  - Chiropractic treatment provided about 2 hours of relief earlier this week  - No spine surgeries  - Left L5-S1 transforaminal epidural steroid injection Neisha 3, 2025 with no relief  - Oxycodone/acetaminophen 1-2 tabs 3 times daily  -Gabapentin 900 mg 3 times daily helpful  -Tizanidine 8 mg 3 times daily somewhat helpful  -Lidocaine patch helpful  -Hydrocodone/acetaminophen with minimal relief  -Medrol Dosepak not helpful  - Tylenol  - Ibuprofen  - Prednisone not helpful  - Pregabalin 150 mg 3 times daily more effective than gabapentin       Objective:   CONSTITUTIONAL:  Vital signs as above.  Patient appears to be in severe pain.   PSYCHIATRIC:  The patient is awake, alert, oriented to person, place and time.  The patient is answering questions appropriately with clear speech.  Normal affect.  HEENT: Normocephalic, atraumatic.  Sclera clear.    SKIN: Exposed skin is clean, dry, intact without rashes.  MUSCULOSKELETAL: Patient is observed lying down.  He is also observed ambulating.     RESULTS:    I reviewed the MRI lumbar spine dated May 31, 2025.  At L5-S1 there is an acute or subacute left extraforaminal zone disc herniation with significant left L5 nerve root impingement and reactive nerve root thickening/edema.    Video-Visit Details    Type of service:  Video Visit    Video End Time (time video stopped): 2:48 PM  Originating Location (pt. Location): Home  Provider location: [facility] [address] [place] [address]    Distant Location  (provider location):  [facility] [place]     Platform used for Video Visit: Rosy

## 2025-06-19 ENCOUNTER — VIRTUAL VISIT (OUTPATIENT)
Dept: PHYSICAL MEDICINE AND REHAB | Facility: CLINIC | Age: 45
End: 2025-06-19
Payer: COMMERCIAL

## 2025-06-19 ENCOUNTER — TELEPHONE (OUTPATIENT)
Dept: NEUROSURGERY | Facility: CLINIC | Age: 45
End: 2025-06-19

## 2025-06-19 VITALS — BODY MASS INDEX: 20.44 KG/M2 | WEIGHT: 138 LBS | HEIGHT: 69 IN

## 2025-06-19 DIAGNOSIS — M51.16 LUMBAR DISC HERNIATION WITH RADICULOPATHY: Primary | ICD-10-CM

## 2025-06-19 RX ORDER — TIZANIDINE HYDROCHLORIDE 4 MG/1
4-8 CAPSULE, GELATIN COATED ORAL 3 TIMES DAILY PRN
Qty: 180 CAPSULE | Refills: 1 | Status: SHIPPED | OUTPATIENT
Start: 2025-06-19

## 2025-06-19 ASSESSMENT — PAIN SCALES - GENERAL: PAINLEVEL_OUTOF10: SEVERE PAIN (8)

## 2025-06-19 NOTE — TELEPHONE ENCOUNTER
Called patient earlier to reschedule him to a later time on June 20th per Dr. Winter's task, but the patient has to be there at 9:30 am because of his ride. Sent task message to Dr. Winter about this. Dr. Winter hasn't responded. -KB

## 2025-06-19 NOTE — TELEPHONE ENCOUNTER
Last appointment:6/13/25  Next appointment: today    Notes/Comments: 6/2/25 #90 with 1 refill 1 caps TID      Rx request(s) has been reviewed.

## 2025-06-19 NOTE — LETTER
6/19/2025      Ba Mireles  [address] [place] [id number]      Dear Colleague,    Thank you for referring your patient, Ba Mireles, to the [facility]. Please see a copy of my visit note below.    Ba Mireles is a 45 year old male who is being evaluated via a billable video visit.      How would you like to obtain your AVS? MyChart  Will anyone else be joining your video visit? No      Video Start Time: 2:38 PM  Assessment:   Ba Mireles is a 45 year old y.o. male with past medical history significant for conductive hearing loss of the right ear, bilateral nephrolithiasis  who presents today for follow-up regarding acute severe/debilitating low back pain with radiation into the left lower extremities associated numbness, tingling, weakness.  My review of an MRI lumbar spine shows an acute or subacute left extraforaminal zone disc herniation with significant left L5 nerve root impingement.  Patient is status post a left L5-S1 transforaminal epidural steroid injection Neisha 3, 2025 with no significant improvement.  Patient continues have severe/debilitating pain.  He has severe weakness in the left lower extremity most pronounced in the left ankle dorsiflexors and left EHL.  He has a sensory deficit left L5 dermatome.  Patient is unfortunately unable to return to work in any capacity.  He works in maintenance.  He cannot even tolerate sitting for more than 20 minutes.  - Patient saw Dr. Martinez June 6, 2025.  Dr. Martinez recommended trialing conservative measures with the hope that the disc herniation will heal.  Surgical intervention would require a fusion.  - Patient is getting a second surgical opinion from Dr. Winter tomorrow.       Plan:     A shared decision making plan was used.  The patient's values and choices were respected.  The following represents what was discussed and decided upon by the physician assistant and the patient.      1.  DIAGNOSTIC TESTS: I reviewed the  MRI lumbar spine.  - No additional diagnostic test were ordered.    2.  PHYSICAL THERAPY: Patient is currently in physical therapy.  He has had 2 session so far.  Encouraged him to continue with physical therapy and the home exercises.    3.  MEDICATIONS:  - No changes are made to the patient's medications.  Patient is experiencing some side effects which are likely medication induced including nausea, difficulty focusing, shaking.  I told the patient I do not feel comfortable increasing his medications due to the side effects.  -Patient is taking oxycodone/acetaminophen 5/325 mg 1-2 tabs 3 times daily as needed.  This is somewhat helpful.  - Prednisone has not been helpful.  No additional steroids were ordered.    - Patient will continue pregabalin 150 mg 3 times daily.  This is more effective than gabapentin.  - Patient will continue tizanidine 8 mg 3 times daily as needed.  - Patient can continue lidocaine patches.    4.  INTERVENTIONS: No interventions were ordered.  I reviewed this case with the interventionists.  Unfortunately, there is not another injection approach that I am more optimistic about providing more relief.    5.  PATIENT EDUCATION:  - I told the patient that if Dr. Winter does not recommend surgical intervention I am going to refer the patient to chronic pain management to get their help.    6.  FOLLOW-UP: I am going to have a video visit with the patient in 1 week to monitor pain management.    Subjective:     Ba Mireles is a 45 year old male who presents today for follow-up regarding severe/debilitating left low back pain with radiation into the left lower extremities associated numbness, tingling, weakness.  I last saw the patient June 13, 2025 and we adjusted his pain relieving medications.  He returns today to discuss pain management.  Patient reports no significant change in his pain.  He does feel like medications are somewhat helpful.    Patient complains of severe low back pain with  radiation into the left lower extremity with associated numbness, tingling, weakness.  He states that he continues to have severe pain.  He cannot tolerate sitting for more than 20 minutes.  He can only sleep for 4 hours at a time.  Once the medications wear off he wakes up and then to take some time for the next medicine to kick in so he can fall back asleep.  Patient reports that he continues to lose weight.  He feels like his hands are shaky.  He is having difficulty focusing.  He feels nauseated.    Treatment to date:  - Current physical therapy for low back pain, 1 session so far  - Physical therapy for low back pain September through November 2023  - Chiropractic treatment provided about 2 hours of relief earlier this week  - No spine surgeries  - Left L5-S1 transforaminal epidural steroid injection Neisha 3, 2025 with no relief  - Oxycodone/acetaminophen 1-2 tabs 3 times daily  -Gabapentin 900 mg 3 times daily helpful  -Tizanidine 8 mg 3 times daily somewhat helpful  -Lidocaine patch helpful  -Hydrocodone/acetaminophen with minimal relief  -Medrol Dosepak not helpful  - Tylenol  - Ibuprofen  - Prednisone not helpful  - Pregabalin 150 mg 3 times daily more effective than gabapentin       Objective:   CONSTITUTIONAL:  Vital signs as above.  Patient appears to be in severe pain.   PSYCHIATRIC:  The patient is awake, alert, oriented to person, place and time.  The patient is answering questions appropriately with clear speech.  Normal affect.  HEENT: Normocephalic, atraumatic.  Sclera clear.    SKIN: Exposed skin is clean, dry, intact without rashes.  MUSCULOSKELETAL: Patient is observed lying down.  He is also observed ambulating.     RESULTS:    I reviewed the MRI lumbar spine dated May 31, 2025.  At L5-S1 there is an acute or subacute left extraforaminal zone disc herniation with significant left L5 nerve root impingement and reactive nerve root thickening/edema.    Video-Visit Details    Type of service:  Video  Visit    Video End Time (time video stopped): 2:48 PM  Originating Location (pt. Location): Home  Provider location: [facility] [address] [place] [address]    Distant Location (provider location):  [facility] [place]     Platform used for Video Visit: Rosy      Again, thank you for allowing me to participate in the care of your patient.        Sincerely,        Sugar Pang PA-C    Electronically signed

## 2025-06-20 ENCOUNTER — PRE VISIT (OUTPATIENT)
Dept: NEUROSURGERY | Facility: CLINIC | Age: 45
End: 2025-06-20

## 2025-06-23 ENCOUNTER — VIRTUAL VISIT (OUTPATIENT)
Dept: SURGERY | Facility: CLINIC | Age: 45
End: 2025-06-23
Payer: COMMERCIAL

## 2025-06-23 ENCOUNTER — ANESTHESIA EVENT (OUTPATIENT)
Dept: SURGERY | Facility: CLINIC | Age: 45
End: 2025-06-23
Payer: COMMERCIAL

## 2025-06-23 VITALS — BODY MASS INDEX: 19.85 KG/M2 | WEIGHT: 134 LBS | HEIGHT: 69 IN

## 2025-06-23 DIAGNOSIS — Z01.818 PRE-OP EVALUATION: Primary | ICD-10-CM

## 2025-06-23 PROCEDURE — 98006 SYNCH AUDIO-VIDEO EST MOD 30: CPT | Performed by: PHYSICIAN ASSISTANT

## 2025-06-23 RX ORDER — IBUPROFEN 200 MG
400-600 TABLET ORAL EVERY 6 HOURS PRN
COMMUNITY

## 2025-06-23 ASSESSMENT — ENCOUNTER SYMPTOMS
SEIZURES: 0
SEIZURES: 0

## 2025-06-23 ASSESSMENT — PATIENT HEALTH QUESTIONNAIRE - PHQ9: SUM OF ALL RESPONSES TO PHQ QUESTIONS 1-9: 22

## 2025-06-23 ASSESSMENT — LIFESTYLE VARIABLES
TOBACCO_USE: 1
TOBACCO_USE: 1

## 2025-06-23 ASSESSMENT — PAIN SCALES - GENERAL: PAINLEVEL_OUTOF10: MODERATE PAIN (5)

## 2025-06-23 NOTE — PATIENT INSTRUCTIONS
"Preparing for Your Surgery      Name:  Ba Mireles \"Richi\"  MRN:  [id number]   :  1980   Today's Date:  2025     The [place] [employer] [address] Construction Project                                Timeline 2025 -2025    This project will affect travel to the [facility] and [facility], as well as the [facility].      Please check the [facility] [address] project website for the most up to date information and give yourself additional time to reach your destination.        Arriving for surgery:  Surgery date:  25  Arrival time:  6:00 am  Surgery time: 8:00 am    Please come to:     Please come to:       [facility]    [address] [place]  [address]     The [facility] [place] Patient/Visitor Ramp is at [address]. Patients and visitors who self-park will receive the reduced hospital parking rate. If the Patient /Visitor Ramp is full, please follow the signs to the [employer] car park located at the [facility] entrance.       parking is available (24 hours/ 7 days a week)      Discounted parking pass options are available for patients and visitors. They can be purchased at the [employer] desk at the [facility] entrance.     -    Stop at the security desk and they will direct surgery patients to the Surgery Check in and Family Lounge. [phone]        - If you need directions, a wheelchair or an escort please stop at the Information/security desk in the lobby.     What can I eat or drink?  -  You may eat and drink normally up to 8 hours prior to arrival time. (Until 10:00 pm on 25)  -  You may have clear liquids until 2 hours prior to arrival time. (Until 4:00 am on 25)    Examples of clear liquids:  Water  Clear broth  Juices (apple, white grape, white cranberry  and cider) without " pulp  Noncarbonated, powder based beverages  (lemonade and Antonio-Aid)  Sodas (Sprite, 7-Up, ginger ale and seltzer)  Coffee or tea (without milk or cream)  Gatorade    -  No Alcohol or cannabis products for at least 24 hours before surgery.     Which medicines can I take?    Hold Ibuprofen (Advil, Motrin) for the rest of today before surgery  Hold Naproxen (Aleve) for 4 days before surgery.      -  PLEASE TAKE these medications the day of surgery:   Pregabalin (Lyrica)   Oxycodone - acetaminophen (Percocet) as needed   Tizanidine as needed   Lidocaine patch- ok to have on      How do I prepare myself?  - Please take 2 showers (one the night prior to surgery and one the morning of surgery) using Scrubcare or Hibiclens soap.    Use this soap only from the neck to your toes. Avoid genital area      Leave the soap on your skin for one minute--then rinse thoroughly.      You may use your own shampoo and conditioner. No other hair products.   - Please remove all jewelry and body piercings.  - No lotions, deodorants or fragrance.  - Bring your ID and insurance card.    -For patients being admitted to the [facility]  Family members are to take the patient belongings with them and place them in the lockers provided in the Family Lounge.  Please limit the items you bring to 1 bag as the lockers are small.      -If you use a CPAP machine, please bring the CPAP machine, tubing, and mask to hospital.    -If you have a Deep Brain Stimulator, Spinal Cord Stimulator, or any Neuro Stimulator device---you must bring the remote control to the hospital.        Covid testing policy as of 12/06/2022  Your surgeon will notify and schedule you for a COVID test if one is needed before surgery--please direct any questions or COVID symptoms to your surgeon      Questions or Concerns:    - For any questions regarding the day of surgery or your hospital stay, please contact the Pre Admission Nursing Office at [phone].       - If you  have health changes between today and your surgery, please call your surgeon.       - For questions after surgery, please call your surgeons office.           Current Visitor Guidelines    2 adult visitors for adult patients in the pre op area    If additional visitors come (beyond a patient care attendant or a group home caregiver), the additional visitors will be asked to wait in the main lobby of the hospital    Visiting hours: 8 a.m. to 8:30 p.m.    Patients confirmed or suspected to have symptoms of COVID 19 or flu:     No visitors allowed for adult patients.   Children (under age 18) can have 1 named visitor.     People who are sick or showing symptoms of COVID 19 or flu:    Are not allowed to visit patients--we can only make exceptions in special situations.       Please follow these guidelines for your visit:          Please maintain social distance          Masking is optional--however at times you may be asked to wear a mask for the safety of yourself and others     Clean your hands with alcohol hand . Do this when you arrive at and leave the building and patient room,    And again after you touch your mask or anything in the room.     Go directly to and from the room you are visiting.     Stay in the patient s room during your visit. Limit going to other places in the hospital as much as possible     Leave bags and jackets at home or in the car.     For everyone s health, please don t come and go during your visit. That includes for smoking   during your visit.

## 2025-06-23 NOTE — PROGRESS NOTES
Richi is a 45 year old who is being evaluated via a billable video visit.    How would you like to obtain your AVS? MyChart  If the video visit is dropped, the invitation should be resent by: Text to cell phone: [phone]      Objective    Vitals - Patient Reported  Pain Score: Moderate Pain (5)  Pain Loc: Upper Leg (Whole left leg)

## 2025-06-23 NOTE — H&P
Pre-Operative H & P     CC:  Preoperative exam to assess for increased cardiopulmonary risk while undergoing surgery and anesthesia.    Date of Encounter: 6/23/2025  Primary Care Physician:  No Ref-Primary, Physician     Reason for visit:   Encounter Diagnosis   Name Primary?    Pre-op evaluation Yes       HPI  Ba Mireles is a 45 year old male who presents for pre-operative H & P in preparation for  Procedure Information       Case: [id number] Date/Time: 06/24/25 0800    Procedure: Left Lumbar 5-Sacral 1 Far-lateral microdiscectomy (Spine)    Anesthesia type: General    Diagnosis: Lumbar disc herniation with radiculopathy [M51.16]    Pre-op diagnosis: Lumbar disc herniation with radiculopathy [M51.16]    Location:  OR  /  OR    Providers: Misha Winter MD            Patient is being evaluated for comorbid conditions of hypertension, hearing loss, tobacco use    Mr. Mireles has known severe left sided low back pain with radiculopathy. He was evaluated by Dr. Winter. He is now scheduled for surgery as above.     History is obtained from the patient and chart review    Hx of abnormal bleeding or anti-platelet use: denies      Past Medical History  Past Medical History:   Diagnosis Date    Arthritis 08/16/2014    When I had my hip surgery they told me I have arthritis going already    Kidney stone     first stone since age 20    Left ureteral stone 08/02/2023       Past Surgical History  Past Surgical History:   Procedure Laterality Date    COMBINED CYSTOSCOPY, RETROGRADES, URETEROSCOPY, LASER HOLMIUM LITHOTRIPSY URETER(S), INSERT STENT Bilateral 08/07/2023    Procedure: Cystoscopy, Bilateral Ureteroscopy, Bilateral Retrograde Pyelogram, Bilateral Stone Basket Extraction, Bilateral Stent placement;  Surgeon: Gus Shipman MD;  Location: [facility] OR    CYSTOSCOPY,URETEROSCOPY,LITHOTRIPSY      at [place]    ORTHOPEDIC SURGERY  2022    Right Wrist    ORTHOPEDIC SURGERY      left hip surgery    WRIST SURGERY  Right        Prior to Admission Medications  Current Outpatient Medications   Medication Sig Dispense Refill    ibuprofen (ADVIL/MOTRIN) 200 MG tablet Take 400-600 mg by mouth every 6 hours as needed for pain.      Lidocaine (LIDOCARE) 4 % Patch Place onto the skin every 24 hours. To prevent lidocaine toxicity, patient should be patch free for 12 hrs daily. 30 patch 1    oxyCODONE-acetaminophen (PERCOCET) 5-325 MG tablet Take 1-2 tablets by mouth 3 times daily as needed for pain. 42 tablet 0    pregabalin (LYRICA) 150 MG capsule Take 1 capsule (150 mg) by mouth 3 times daily. 90 capsule 1    tiZANidine (ZANAFLEX) 4 MG capsule Take 1-2 capsules (4-8 mg) by mouth 3 times daily as needed for muscle spasms. 180 capsule 1    gabapentin (NEURONTIN) 300 MG capsule Take 3 capsules (900 mg) by mouth 3 times daily. (Patient not taking: Reported on 2025) 270 capsule 1       Allergies  Allergies   Allergen Reactions    Dust Mites     Ragweeds     Amoxicillin-Pot Clavulanate Anxiety     Has tolerated amoxicillin well in the past       Social History  Social History     Socioeconomic History    Marital status:      Spouse name: Not on file    Number of children: Not on file    Years of education: Not on file    Highest education level: Not on file   Occupational History    Not on file   Tobacco Use    Smoking status: Every Day     Current packs/day: 0.00     Average packs/day: 1 pack/day for 26.2 years (26.2 ttl pk-yrs)     Types: Cigarettes     Start date: 1997     Last attempt to quit: 2023     Years since quittin.8     Passive exposure: Current    Smokeless tobacco: Never    Tobacco comments:     Recent restarted, less than 1 PPD   Vaping Use    Vaping status: Never Used   Substance and Sexual Activity    Alcohol use: Not Currently     Alcohol/week: 2.0 standard drinks of alcohol     Types: 2 Standard drinks or equivalent per week    Drug use: Not Currently     Types: Marijuana    Sexual activity: Yes      Partners: Female     Birth control/protection: Pull-out method   Other Topics Concern    Parent/sibling w/ CABG, MI or angioplasty before 65F 55M? Yes     Comment: Mother   Social History Narrative    Not on file     Social Drivers of Health     Financial Resource Strain: Low Risk  (7/28/2024)    Financial Resource Strain     Within the past 12 months, have you or your family members you live with been unable to get utilities (heat, electricity) when it was really needed?: No   Food Insecurity: Low Risk  (7/28/2024)    Food Insecurity     Within the past 12 months, did you worry that your food would run out before you got money to buy more?: No     Within the past 12 months, did the food you bought just not last and you didn t have money to get more?: No   Transportation Needs: Low Risk  (7/28/2024)    Transportation Needs     Within the past 12 months, has lack of transportation kept you from medical appointments, getting your medicines, non-medical meetings or appointments, work, or from getting things that you need?: No   Physical Activity: Insufficiently Active (7/28/2024)    Exercise Vital Sign     Days of Exercise per Week: 2 days     Minutes of Exercise per Session: 20 min   Stress: Stress Concern Present (7/28/2024)    [employer] - Occupational Stress Questionnaire     Feeling of Stress : To some extent   Social Connections: Unknown (7/28/2024)    Social Connection and Isolation Panel [NHANES]     Frequency of Communication with Friends and Family: Not on file     Frequency of Social Gatherings with Friends and Family: Once a week     Attends Cheondoism Services: Not on file     Active Member of Clubs or Organizations: Not on file     Attends Club or Organization Meetings: Not on file     Marital Status: Not on file   Interpersonal Safety: Low Risk  (5/27/2025)    Interpersonal Safety     Do you feel physically and emotionally safe where you currently live?: Yes     Within  the past 12 months, have you been hit, slapped, kicked or otherwise physically hurt by someone?: No     Within the past 12 months, have you been humiliated or emotionally abused in other ways by your partner or ex-partner?: No   Housing Stability: Low Risk  (7/28/2024)    Housing Stability     Do you have housing? : Yes     Are you worried about losing your housing?: No       Family History  Family History   Problem Relation Age of Onset    Cancer Mother         kidney    Heart Disease Mother 66    Nephrolithiasis Mother     Gout Mother     Cancer Maternal Grandfather         renal cell    Heart Disease Maternal Grandfather     Nephrolithiasis Maternal Grandfather     Hyperlipidemia Maternal Grandfather     Cancer Paternal Grandfather         lucille cell    Kidney Disease Paternal Grandfather     Prostate Cancer Paternal Grandfather     Nephrolithiasis Maternal Aunt     Anesthesia Reaction No family hx of     Deep Vein Thrombosis (DVT) No family hx of        Review of Systems  The complete review of systems is negative other than noted in the HPI or here.   Anesthesia Evaluation   Pt has had prior anesthetic.     History of anesthetic complications   reports months of pain after a nerve block in the past.    ROS/MED HX  ENT/Pulmonary:     (+)     JOSE risk factors,  hypertension,         tobacco use, Current use,                       Neurologic: Comment: Lumbar radiculopathy   (-) no seizures and no CVA   Cardiovascular:     (+)  hypertension- -   -  - -                                   (-) taking anticoagulants/antiplatelets   METS/Exercise Tolerance: >4 METS Comment: Limited activity due to back pain. Can walk up a flight of stairs without exertional symptoms. Leg symptoms limiting factor over past month. 3 months ago, was getting 20,000-25,000 steps daily   Hematologic:  - neg hematologic  ROS  (-) history of blood clots and history of blood transfusion   Musculoskeletal:  - neg musculoskeletal ROS     GI/Hepatic:   - neg GI/hepatic ROS  (-) GERD   Renal/Genitourinary:  - neg Renal ROS     Endo:  - neg endo ROS  (-) chronic steroid usage   Psychiatric/Substance Use:     (+) psychiatric history depression       Infectious Disease:  - neg infectious disease ROS     Malignancy:  - neg malignancy ROS     Other:            Virtual visit -  No vitals were obtained    Physical Exam  Constitutional: Awake, alert, cooperative, no apparent distress, and appears stated age.  HENT: Normocephalic  Respiratory: non labored breathing   Neurologic: Awake, alert, oriented to name, place and time.   Neuropsychiatric: Calm, cooperative. Normal affect.      Prior Labs/Diagnostic Studies   All labs and imaging pertinent to the visit personally reviewed     EKG/ stress test - if available please see in ROS above   No results found.       No data to display                  The patient's records and results pertinent to the visit personally reviewed by this provider.     Outside records reviewed from: Care Everywhere      Assessment    Ba Mireles is a 45 year old male seen as a PAC referral for risk assessment and optimization for anesthesia.    Plan/Recommendations  Pt will be optimized for the proposed procedure.  See below for details on the assessment, risk, and preoperative recommendations    NEUROLOGY  - No history of TIA, CVA or seizure  -lumbar radiculopathy with the above procedure planned   -Post Op delirium risk factors:  No risk identified    ENT  - No current airway concerns.  Will need to be reassessed day of surgery.  Mallampati: Unable to assess  TM: Unable to assess    CARDIAC  - No history of CAD and Afib  -Richi's BP has been elevated at his last few clinic visits (140/78, 150/103, 162/108). He reports BP was 139/89 at home. He is interested in establishing care with PCP at [facility] and will discuss BP management at that time. We will help to make a PCP appointment for him.   -denies cardiac symptoms   - METS (Metabolic  "Equivalents)  Patient performs 4 or more METS exercise without symptoms             Total Score: 0      RCRI-Very low risk: Class 1 0.4% complication rate             Total Score: 0        PULMONARY  JOSE Low Risk             Total Score: 2    JOSE: Hypertension    JOSE: Male      - Denies asthma or inhaler use  - Tobacco History    History   Smoking Status    Every Day    Types: Cigarettes   Smokeless Tobacco    Never   -offered smoking cessation counseling, patient declined. He reports his plan is to stop smoking after surgery tomorrow.     GI  PONV Low Risk  Total Score: 1           1 AN PONV: Intended Post Op Opioids        /RENAL  - Baseline Creatinine WNL    ENDOCRINE    - BMI: Estimated body mass index is 19.79 kg/m  as calculated from the following:    Height as of this encounter: 1.753 m (5' 9\").    Weight as of this encounter: 60.8 kg (134 lb).  Healthy Weight (BMI 18.5-24.9)  - No history of Diabetes Mellitus    HEME  VTE Low Risk 0.5%             Total Score: 2    VTE: Male      - No history of abnormal bleeding or antiplatelet use.    PSYCH  Depression. Thoughts of self harm. He denies any thoughts of self harm today. He declines any plan to harm himself. He was hesitant, but said he is open to a behavioral health referral. I have placed referral.     Different anesthesia methods/types have been discussed with the patient, but they are aware that the final plan will be decided by the assigned anesthesia provider on the date of service.    The patient is optimized for their procedure. AVS with information on surgery time/arrival time, meds and NPO status given by nursing staff. No further diagnostic testing indicated.    Please refer to the physical examination documented by the anesthesiologist in the anesthesia record on the day of surgery.    Video-Visit Details    Type of service:  Video Visit    Provider received verbal consent for a Video Visit from the patient? Yes     Originating Location (pt. " Location): Home    Distant Location (provider location):  Off-site  Mode of Communication:  Video Conference via [employer]    37 minutes were spent on the date of the encounter performing chart review, history and exam, documentation and/or discussion with other providers about the issues documented above.    Jonelle Asif PA-C  Preoperative Assessment Center  [facility]  Clinic and Surgery Center  Phone: [phone]  Fax: [phone]

## 2025-06-24 ENCOUNTER — ANESTHESIA (OUTPATIENT)
Dept: SURGERY | Facility: CLINIC | Age: 45
End: 2025-06-24
Payer: COMMERCIAL

## 2025-06-24 ENCOUNTER — TELEPHONE (OUTPATIENT)
Dept: BEHAVIORAL HEALTH | Facility: CLINIC | Age: 45
End: 2025-06-24

## 2025-06-24 ENCOUNTER — APPOINTMENT (OUTPATIENT)
Dept: GENERAL RADIOLOGY | Facility: CLINIC | Age: 45
End: 2025-06-24
Attending: NEUROLOGICAL SURGERY
Payer: COMMERCIAL

## 2025-06-24 ENCOUNTER — PATIENT OUTREACH (OUTPATIENT)
Dept: CARE COORDINATION | Facility: CLINIC | Age: 45
End: 2025-06-24

## 2025-06-24 ENCOUNTER — HOSPITAL ENCOUNTER (INPATIENT)
Facility: CLINIC | Age: 45
LOS: 1 days | Discharge: HOME | End: 2025-06-25
Attending: NEUROLOGICAL SURGERY | Admitting: NEUROLOGICAL SURGERY
Payer: COMMERCIAL

## 2025-06-24 DIAGNOSIS — Z98.890 POSTOPERATIVE STATE: Primary | ICD-10-CM

## 2025-06-24 DIAGNOSIS — M54.16 LUMBAR RADICULOPATHY: ICD-10-CM

## 2025-06-24 DIAGNOSIS — M48.061 SPINAL STENOSIS OF LUMBAR REGION WITHOUT NEUROGENIC CLAUDICATION: ICD-10-CM

## 2025-06-24 LAB — GLUCOSE BLDC GLUCOMTR-MCNC: 101 MG/DL (ref 70–99)

## 2025-06-24 PROCEDURE — 8E0WXBF COMPUTER ASSISTED PROCEDURE OF TRUNK REGION, WITH FLUOROSCOPY: ICD-10-PCS | Performed by: NEUROLOGICAL SURGERY

## 2025-06-24 PROCEDURE — 360N000084 HC SURGERY LEVEL 4 W/ FLUORO, PER MIN: Performed by: NEUROLOGICAL SURGERY

## 2025-06-24 PROCEDURE — 272N000001 HC OR GENERAL SUPPLY STERILE: Performed by: NEUROLOGICAL SURGERY

## 2025-06-24 PROCEDURE — 258N000003 HC RX IP 258 OP 636

## 2025-06-24 PROCEDURE — 250N000013 HC RX MED GY IP 250 OP 250 PS 637

## 2025-06-24 PROCEDURE — 250N000011 HC RX IP 250 OP 636: Performed by: NEUROLOGICAL SURGERY

## 2025-06-24 PROCEDURE — 250N000011 HC RX IP 250 OP 636

## 2025-06-24 PROCEDURE — 370N000017 HC ANESTHESIA TECHNICAL FEE, PER MIN: Performed by: NEUROLOGICAL SURGERY

## 2025-06-24 PROCEDURE — 63056 DECOMPRESS SPINAL CORD LMBR: CPT | Mod: GC | Performed by: NEUROLOGICAL SURGERY

## 2025-06-24 PROCEDURE — 250N000024 HC ISOFLURANE, PER MIN: Performed by: NEUROLOGICAL SURGERY

## 2025-06-24 PROCEDURE — 0SB43ZZ EXCISION OF LUMBOSACRAL DISC, PERCUTANEOUS APPROACH: ICD-10-PCS | Performed by: NEUROLOGICAL SURGERY

## 2025-06-24 PROCEDURE — 250N000009 HC RX 250

## 2025-06-24 PROCEDURE — 250N000011 HC RX IP 250 OP 636: Mod: JW | Performed by: NEUROLOGICAL SURGERY

## 2025-06-24 PROCEDURE — 120N000002 HC R&B MED SURG/OB UMMC

## 2025-06-24 PROCEDURE — 999N000141 HC STATISTIC PRE-PROCEDURE NURSING ASSESSMENT: Performed by: NEUROLOGICAL SURGERY

## 2025-06-24 PROCEDURE — 999N000179 XR SURGERY CARM FLUORO LESS THAN 5 MIN W STILLS

## 2025-06-24 PROCEDURE — 01NB3ZZ RELEASE LUMBAR NERVE, PERCUTANEOUS APPROACH: ICD-10-PCS | Performed by: NEUROLOGICAL SURGERY

## 2025-06-24 PROCEDURE — 710N000010 HC RECOVERY PHASE 1, LEVEL 2, PER MIN: Performed by: NEUROLOGICAL SURGERY

## 2025-06-24 RX ORDER — AMOXICILLIN 250 MG
1 CAPSULE ORAL 2 TIMES DAILY
Qty: 34 TABLET | Refills: 0 | Status: SHIPPED | OUTPATIENT
Start: 2025-06-25 | End: 2025-07-12

## 2025-06-24 RX ORDER — NALOXONE HYDROCHLORIDE 0.4 MG/ML
0.4 INJECTION, SOLUTION INTRAMUSCULAR; INTRAVENOUS; SUBCUTANEOUS
Status: DISCONTINUED | OUTPATIENT
Start: 2025-06-24 | End: 2025-06-25 | Stop reason: HOSPADM

## 2025-06-24 RX ORDER — FAMOTIDINE 20 MG/1
20 TABLET, FILM COATED ORAL 2 TIMES DAILY
Status: DISCONTINUED | OUTPATIENT
Start: 2025-06-24 | End: 2025-06-25 | Stop reason: HOSPADM

## 2025-06-24 RX ORDER — PROPOFOL 10 MG/ML
INJECTION, EMULSION INTRAVENOUS PRN
Status: DISCONTINUED | OUTPATIENT
Start: 2025-06-24 | End: 2025-06-24

## 2025-06-24 RX ORDER — NALOXONE HYDROCHLORIDE 0.4 MG/ML
0.2 INJECTION, SOLUTION INTRAMUSCULAR; INTRAVENOUS; SUBCUTANEOUS
Status: DISCONTINUED | OUTPATIENT
Start: 2025-06-24 | End: 2025-06-25 | Stop reason: HOSPADM

## 2025-06-24 RX ORDER — FENTANYL CITRATE 50 UG/ML
25 INJECTION, SOLUTION INTRAMUSCULAR; INTRAVENOUS EVERY 5 MIN PRN
Status: DISCONTINUED | OUTPATIENT
Start: 2025-06-24 | End: 2025-06-24 | Stop reason: HOSPADM

## 2025-06-24 RX ORDER — HYDROMORPHONE HCL IN WATER/PF 6 MG/30 ML
0.2 PATIENT CONTROLLED ANALGESIA SYRINGE INTRAVENOUS EVERY 5 MIN PRN
Status: DISCONTINUED | OUTPATIENT
Start: 2025-06-24 | End: 2025-06-24 | Stop reason: HOSPADM

## 2025-06-24 RX ORDER — DEXAMETHASONE SODIUM PHOSPHATE 4 MG/ML
4 INJECTION, SOLUTION INTRA-ARTICULAR; INTRALESIONAL; INTRAMUSCULAR; INTRAVENOUS; SOFT TISSUE
Status: DISCONTINUED | OUTPATIENT
Start: 2025-06-24 | End: 2025-06-24 | Stop reason: HOSPADM

## 2025-06-24 RX ORDER — ACETAMINOPHEN 325 MG/1
975 TABLET ORAL ONCE
Status: DISCONTINUED | OUTPATIENT
Start: 2025-06-24 | End: 2025-06-24 | Stop reason: HOSPADM

## 2025-06-24 RX ORDER — ONDANSETRON 4 MG/1
4 TABLET, ORALLY DISINTEGRATING ORAL EVERY 30 MIN PRN
Status: DISCONTINUED | OUTPATIENT
Start: 2025-06-24 | End: 2025-06-24 | Stop reason: HOSPADM

## 2025-06-24 RX ORDER — DEXAMETHASONE SODIUM PHOSPHATE 4 MG/ML
INJECTION, SOLUTION INTRA-ARTICULAR; INTRALESIONAL; INTRAMUSCULAR; INTRAVENOUS; SOFT TISSUE PRN
Status: DISCONTINUED | OUTPATIENT
Start: 2025-06-24 | End: 2025-06-24

## 2025-06-24 RX ORDER — ACETAMINOPHEN 325 MG/1
975 TABLET ORAL ONCE
Status: COMPLETED | OUTPATIENT
Start: 2025-06-24 | End: 2025-06-24

## 2025-06-24 RX ORDER — SODIUM CHLORIDE, SODIUM LACTATE, POTASSIUM CHLORIDE, CALCIUM CHLORIDE 600; 310; 30; 20 MG/100ML; MG/100ML; MG/100ML; MG/100ML
INJECTION, SOLUTION INTRAVENOUS CONTINUOUS PRN
Status: DISCONTINUED | OUTPATIENT
Start: 2025-06-24 | End: 2025-06-24

## 2025-06-24 RX ORDER — FENTANYL CITRATE 50 UG/ML
50 INJECTION, SOLUTION INTRAMUSCULAR; INTRAVENOUS EVERY 5 MIN PRN
Status: DISCONTINUED | OUTPATIENT
Start: 2025-06-24 | End: 2025-06-24 | Stop reason: HOSPADM

## 2025-06-24 RX ORDER — LABETALOL HYDROCHLORIDE 5 MG/ML
10 INJECTION, SOLUTION INTRAVENOUS
Status: DISCONTINUED | OUTPATIENT
Start: 2025-06-24 | End: 2025-06-24 | Stop reason: HOSPADM

## 2025-06-24 RX ORDER — LIDOCAINE 4 G/G
2 PATCH TOPICAL EVERY 24 HOURS
Status: DISCONTINUED | OUTPATIENT
Start: 2025-06-24 | End: 2025-06-25 | Stop reason: HOSPADM

## 2025-06-24 RX ORDER — ONDANSETRON 2 MG/ML
INJECTION INTRAMUSCULAR; INTRAVENOUS PRN
Status: DISCONTINUED | OUTPATIENT
Start: 2025-06-24 | End: 2025-06-24

## 2025-06-24 RX ORDER — GABAPENTIN 300 MG/1
900 CAPSULE ORAL 3 TIMES DAILY
Status: DISCONTINUED | OUTPATIENT
Start: 2025-06-24 | End: 2025-06-24

## 2025-06-24 RX ORDER — SODIUM CHLORIDE 9 MG/ML
INJECTION, SOLUTION INTRAVENOUS CONTINUOUS
Status: DISCONTINUED | OUTPATIENT
Start: 2025-06-24 | End: 2025-06-25

## 2025-06-24 RX ORDER — FENTANYL CITRATE 50 UG/ML
INJECTION, SOLUTION INTRAMUSCULAR; INTRAVENOUS PRN
Status: DISCONTINUED | OUTPATIENT
Start: 2025-06-24 | End: 2025-06-24

## 2025-06-24 RX ORDER — BISACODYL 10 MG
10 SUPPOSITORY, RECTAL RECTAL DAILY PRN
Status: DISCONTINUED | OUTPATIENT
Start: 2025-06-27 | End: 2025-06-25 | Stop reason: HOSPADM

## 2025-06-24 RX ORDER — ONDANSETRON 2 MG/ML
4 INJECTION INTRAMUSCULAR; INTRAVENOUS EVERY 30 MIN PRN
Status: DISCONTINUED | OUTPATIENT
Start: 2025-06-24 | End: 2025-06-24 | Stop reason: HOSPADM

## 2025-06-24 RX ORDER — NALOXONE HYDROCHLORIDE 0.4 MG/ML
0.1 INJECTION, SOLUTION INTRAMUSCULAR; INTRAVENOUS; SUBCUTANEOUS
Status: DISCONTINUED | OUTPATIENT
Start: 2025-06-24 | End: 2025-06-24 | Stop reason: HOSPADM

## 2025-06-24 RX ORDER — AMOXICILLIN 250 MG
1 CAPSULE ORAL 2 TIMES DAILY
Status: DISCONTINUED | OUTPATIENT
Start: 2025-06-24 | End: 2025-06-25 | Stop reason: HOSPADM

## 2025-06-24 RX ORDER — EPHEDRINE SULFATE 50 MG/ML
INJECTION, SOLUTION INTRAMUSCULAR; INTRAVENOUS; SUBCUTANEOUS PRN
Status: DISCONTINUED | OUTPATIENT
Start: 2025-06-24 | End: 2025-06-24

## 2025-06-24 RX ORDER — ONDANSETRON 4 MG/1
4 TABLET, ORALLY DISINTEGRATING ORAL EVERY 6 HOURS PRN
Status: DISCONTINUED | OUTPATIENT
Start: 2025-06-24 | End: 2025-06-25 | Stop reason: HOSPADM

## 2025-06-24 RX ORDER — ONDANSETRON 2 MG/ML
4 INJECTION INTRAMUSCULAR; INTRAVENOUS EVERY 6 HOURS PRN
Status: DISCONTINUED | OUTPATIENT
Start: 2025-06-24 | End: 2025-06-25 | Stop reason: HOSPADM

## 2025-06-24 RX ORDER — HYDROMORPHONE HCL IN WATER/PF 6 MG/30 ML
0.4 PATIENT CONTROLLED ANALGESIA SYRINGE INTRAVENOUS EVERY 5 MIN PRN
Status: DISCONTINUED | OUTPATIENT
Start: 2025-06-24 | End: 2025-06-24 | Stop reason: HOSPADM

## 2025-06-24 RX ORDER — METHOCARBAMOL 750 MG/1
750 TABLET, FILM COATED ORAL EVERY 6 HOURS PRN
Status: DISCONTINUED | OUTPATIENT
Start: 2025-06-24 | End: 2025-06-25

## 2025-06-24 RX ORDER — PROCHLORPERAZINE MALEATE 10 MG
10 TABLET ORAL EVERY 6 HOURS PRN
Status: DISCONTINUED | OUTPATIENT
Start: 2025-06-24 | End: 2025-06-25 | Stop reason: HOSPADM

## 2025-06-24 RX ORDER — OXYCODONE HYDROCHLORIDE 10 MG/1
10 TABLET ORAL EVERY 4 HOURS PRN
Status: DISCONTINUED | OUTPATIENT
Start: 2025-06-24 | End: 2025-06-25

## 2025-06-24 RX ORDER — CEFAZOLIN SODIUM 1 G/3ML
1 INJECTION, POWDER, FOR SOLUTION INTRAMUSCULAR; INTRAVENOUS EVERY 8 HOURS
Status: COMPLETED | OUTPATIENT
Start: 2025-06-24 | End: 2025-06-25

## 2025-06-24 RX ORDER — POLYETHYLENE GLYCOL 3350 17 G/17G
17 POWDER, FOR SOLUTION ORAL DAILY
Status: DISCONTINUED | OUTPATIENT
Start: 2025-06-25 | End: 2025-06-25 | Stop reason: HOSPADM

## 2025-06-24 RX ORDER — DIPHENHYDRAMINE HYDROCHLORIDE 50 MG/ML
25 INJECTION, SOLUTION INTRAMUSCULAR; INTRAVENOUS EVERY 6 HOURS PRN
Status: DISCONTINUED | OUTPATIENT
Start: 2025-06-24 | End: 2025-06-24 | Stop reason: HOSPADM

## 2025-06-24 RX ORDER — SODIUM CHLORIDE, SODIUM LACTATE, POTASSIUM CHLORIDE, CALCIUM CHLORIDE 600; 310; 30; 20 MG/100ML; MG/100ML; MG/100ML; MG/100ML
INJECTION, SOLUTION INTRAVENOUS CONTINUOUS
Status: DISCONTINUED | OUTPATIENT
Start: 2025-06-24 | End: 2025-06-24 | Stop reason: HOSPADM

## 2025-06-24 RX ORDER — LIDOCAINE 40 MG/G
CREAM TOPICAL
Status: DISCONTINUED | OUTPATIENT
Start: 2025-06-24 | End: 2025-06-25 | Stop reason: HOSPADM

## 2025-06-24 RX ORDER — CEFAZOLIN SODIUM/WATER 2 G/20 ML
2 SYRINGE (ML) INTRAVENOUS SEE ADMIN INSTRUCTIONS
Status: DISCONTINUED | OUTPATIENT
Start: 2025-06-24 | End: 2025-06-24 | Stop reason: HOSPADM

## 2025-06-24 RX ORDER — CEFAZOLIN SODIUM/WATER 2 G/20 ML
2 SYRINGE (ML) INTRAVENOUS
Status: DISCONTINUED | OUTPATIENT
Start: 2025-06-24 | End: 2025-06-24 | Stop reason: HOSPADM

## 2025-06-24 RX ORDER — SODIUM CHLORIDE, SODIUM GLUCONATE, SODIUM ACETATE, POTASSIUM CHLORIDE AND MAGNESIUM CHLORIDE 526; 502; 368; 37; 30 MG/100ML; MG/100ML; MG/100ML; MG/100ML; MG/100ML
INJECTION, SOLUTION INTRAVENOUS CONTINUOUS PRN
Status: DISCONTINUED | OUTPATIENT
Start: 2025-06-24 | End: 2025-06-24

## 2025-06-24 RX ORDER — OXYCODONE HYDROCHLORIDE 5 MG/1
5 TABLET ORAL EVERY 4 HOURS PRN
Qty: 16 TABLET | Refills: 0 | Status: SHIPPED | OUTPATIENT
Start: 2025-06-24 | End: 2025-06-25

## 2025-06-24 RX ORDER — PREGABALIN 75 MG/1
150 CAPSULE ORAL 3 TIMES DAILY
Status: DISCONTINUED | OUTPATIENT
Start: 2025-06-24 | End: 2025-06-25 | Stop reason: HOSPADM

## 2025-06-24 RX ORDER — LIDOCAINE 40 MG/G
CREAM TOPICAL
Status: DISCONTINUED | OUTPATIENT
Start: 2025-06-24 | End: 2025-06-24 | Stop reason: HOSPADM

## 2025-06-24 RX ORDER — METHYLPREDNISOLONE ACETATE 40 MG/ML
INJECTION, SUSPENSION INTRA-ARTICULAR; INTRALESIONAL; INTRAMUSCULAR; SOFT TISSUE PRN
Status: DISCONTINUED | OUTPATIENT
Start: 2025-06-24 | End: 2025-06-24 | Stop reason: HOSPADM

## 2025-06-24 RX ORDER — LIDOCAINE HYDROCHLORIDE 20 MG/ML
INJECTION, SOLUTION INFILTRATION; PERINEURAL PRN
Status: DISCONTINUED | OUTPATIENT
Start: 2025-06-24 | End: 2025-06-24

## 2025-06-24 RX ORDER — PHENYLEPHRINE HCL IN 0.9% NACL 50MG/250ML
PLASTIC BAG, INJECTION (ML) INTRAVENOUS CONTINUOUS PRN
Status: DISCONTINUED | OUTPATIENT
Start: 2025-06-24 | End: 2025-06-24

## 2025-06-24 RX ORDER — LIDOCAINE HYDROCHLORIDE AND EPINEPHRINE 10; 10 MG/ML; UG/ML
INJECTION, SOLUTION INFILTRATION; PERINEURAL PRN
Status: DISCONTINUED | OUTPATIENT
Start: 2025-06-24 | End: 2025-06-24 | Stop reason: HOSPADM

## 2025-06-24 RX ORDER — DIPHENHYDRAMINE HCL 25 MG
25 CAPSULE ORAL EVERY 6 HOURS PRN
Status: DISCONTINUED | OUTPATIENT
Start: 2025-06-24 | End: 2025-06-24 | Stop reason: HOSPADM

## 2025-06-24 RX ORDER — OXYCODONE HYDROCHLORIDE 5 MG/1
5 TABLET ORAL EVERY 4 HOURS PRN
Status: DISCONTINUED | OUTPATIENT
Start: 2025-06-24 | End: 2025-06-25

## 2025-06-24 RX ADMIN — OXYCODONE HYDROCHLORIDE 10 MG: 10 TABLET ORAL at 21:05

## 2025-06-24 RX ADMIN — MIDAZOLAM 2 MG: 1 INJECTION INTRAMUSCULAR; INTRAVENOUS at 07:56

## 2025-06-24 RX ADMIN — Medication 0.5 MCG/KG/MIN: at 10:20

## 2025-06-24 RX ADMIN — FENTANYL CITRATE 50 MCG: 50 INJECTION, SOLUTION INTRAMUSCULAR; INTRAVENOUS at 12:36

## 2025-06-24 RX ADMIN — OXYCODONE HYDROCHLORIDE 5 MG: 5 TABLET ORAL at 15:55

## 2025-06-24 RX ADMIN — Medication 10 MG: at 10:30

## 2025-06-24 RX ADMIN — Medication 50 MG: at 08:21

## 2025-06-24 RX ADMIN — LIDOCAINE HYDROCHLORIDE 100 MG: 20 INJECTION, SOLUTION INFILTRATION; PERINEURAL at 08:16

## 2025-06-24 RX ADMIN — TIZANIDINE 4 MG: 4 TABLET ORAL at 19:14

## 2025-06-24 RX ADMIN — Medication 20 MG: at 10:05

## 2025-06-24 RX ADMIN — DEXAMETHASONE SODIUM PHOSPHATE 4 MG: 4 INJECTION, SOLUTION INTRAMUSCULAR; INTRAVENOUS at 08:25

## 2025-06-24 RX ADMIN — METHOCARBAMOL 750 MG: 750 TABLET ORAL at 15:55

## 2025-06-24 RX ADMIN — PROPOFOL 150 MG: 10 INJECTION, EMULSION INTRAVENOUS at 08:18

## 2025-06-24 RX ADMIN — FENTANYL CITRATE 50 MCG: 50 INJECTION INTRAMUSCULAR; INTRAVENOUS at 10:49

## 2025-06-24 RX ADMIN — ACETAMINOPHEN 975 MG: 325 TABLET ORAL at 06:00

## 2025-06-24 RX ADMIN — EPHEDRINE SULFATE 5 MG: 5 INJECTION INTRAVENOUS at 09:33

## 2025-06-24 RX ADMIN — FENTANYL CITRATE 200 MCG: 50 INJECTION INTRAMUSCULAR; INTRAVENOUS at 08:16

## 2025-06-24 RX ADMIN — SODIUM CHLORIDE, SODIUM LACTATE, POTASSIUM CHLORIDE, AND CALCIUM CHLORIDE: .6; .31; .03; .02 INJECTION, SOLUTION INTRAVENOUS at 07:56

## 2025-06-24 RX ADMIN — SODIUM CHLORIDE: 0.9 INJECTION, SOLUTION INTRAVENOUS at 14:53

## 2025-06-24 RX ADMIN — HYDROMORPHONE HYDROCHLORIDE 0.4 MG: 0.2 INJECTION, SOLUTION INTRAMUSCULAR; INTRAVENOUS; SUBCUTANEOUS at 12:41

## 2025-06-24 RX ADMIN — SENNOSIDES AND DOCUSATE SODIUM 1 TABLET: 50; 8.6 TABLET ORAL at 19:06

## 2025-06-24 RX ADMIN — METHOCARBAMOL 750 MG: 750 TABLET ORAL at 22:17

## 2025-06-24 RX ADMIN — DEXMEDETOMIDINE HYDROCHLORIDE 8 MCG: 100 INJECTION, SOLUTION INTRAVENOUS at 10:54

## 2025-06-24 RX ADMIN — Medication 2 G: at 08:50

## 2025-06-24 RX ADMIN — Medication 20 MG: at 09:15

## 2025-06-24 RX ADMIN — Medication 150 MG: at 11:32

## 2025-06-24 RX ADMIN — FENTANYL CITRATE 250 MCG: 50 INJECTION INTRAMUSCULAR; INTRAVENOUS at 09:18

## 2025-06-24 RX ADMIN — FENTANYL CITRATE 50 MCG: 50 INJECTION INTRAMUSCULAR; INTRAVENOUS at 07:56

## 2025-06-24 RX ADMIN — SODIUM CHLORIDE, SODIUM GLUCONATE, SODIUM ACETATE, POTASSIUM CHLORIDE AND MAGNESIUM CHLORIDE: 526; 502; 368; 37; 30 INJECTION, SOLUTION INTRAVENOUS at 08:50

## 2025-06-24 RX ADMIN — FENTANYL CITRATE 50 MCG: 50 INJECTION, SOLUTION INTRAMUSCULAR; INTRAVENOUS at 12:06

## 2025-06-24 RX ADMIN — LIDOCAINE 2 PATCH: 4 PATCH TOPICAL at 19:05

## 2025-06-24 RX ADMIN — PHENYLEPHRINE HYDROCHLORIDE 0.5 MCG/KG/MIN: 10 INJECTION INTRAVENOUS at 08:30

## 2025-06-24 RX ADMIN — EPHEDRINE SULFATE 5 MG: 5 INJECTION INTRAVENOUS at 09:45

## 2025-06-24 RX ADMIN — FENTANYL CITRATE 50 MCG: 50 INJECTION, SOLUTION INTRAMUSCULAR; INTRAVENOUS at 12:17

## 2025-06-24 RX ADMIN — FAMOTIDINE 20 MG: 20 TABLET, FILM COATED ORAL at 19:06

## 2025-06-24 RX ADMIN — PROPOFOL 10 MG: 10 INJECTION, EMULSION INTRAVENOUS at 11:28

## 2025-06-24 RX ADMIN — FENTANYL CITRATE 50 MCG: 50 INJECTION INTRAMUSCULAR; INTRAVENOUS at 10:27

## 2025-06-24 RX ADMIN — PREGABALIN 150 MG: 75 CAPSULE ORAL at 19:06

## 2025-06-24 RX ADMIN — FENTANYL CITRATE 100 MCG: 50 INJECTION INTRAMUSCULAR; INTRAVENOUS at 10:41

## 2025-06-24 RX ADMIN — Medication 20 MG: at 10:56

## 2025-06-24 RX ADMIN — CEFAZOLIN 1 G: 1 INJECTION, POWDER, FOR SOLUTION INTRAMUSCULAR; INTRAVENOUS at 16:34

## 2025-06-24 RX ADMIN — ONDANSETRON 4 MG: 2 INJECTION INTRAMUSCULAR; INTRAVENOUS at 11:11

## 2025-06-24 RX ADMIN — PHENYLEPHRINE HYDROCHLORIDE 100 MCG: 10 INJECTION INTRAVENOUS at 08:30

## 2025-06-24 RX ADMIN — FENTANYL CITRATE 50 MCG: 50 INJECTION, SOLUTION INTRAMUSCULAR; INTRAVENOUS at 12:28

## 2025-06-24 RX ADMIN — SODIUM CHLORIDE, SODIUM LACTATE, POTASSIUM CHLORIDE, AND CALCIUM CHLORIDE: .6; .31; .03; .02 INJECTION, SOLUTION INTRAVENOUS at 13:43

## 2025-06-24 RX ADMIN — PREGABALIN 150 MG: 75 CAPSULE ORAL at 14:50

## 2025-06-24 RX ADMIN — Medication 20 MG: at 08:28

## 2025-06-24 ASSESSMENT — ACTIVITIES OF DAILY LIVING (ADL)
ADLS_ACUITY_SCORE: 39
ADLS_ACUITY_SCORE: 29
ADLS_ACUITY_SCORE: 27
ADLS_ACUITY_SCORE: 29
ADLS_ACUITY_SCORE: 39
ADLS_ACUITY_SCORE: 38
ADLS_ACUITY_SCORE: 39
ADLS_ACUITY_SCORE: 29
ADLS_ACUITY_SCORE: 38
ADLS_ACUITY_SCORE: 29
ADLS_ACUITY_SCORE: 38
ADLS_ACUITY_SCORE: 39
ADLS_ACUITY_SCORE: 38
ADLS_ACUITY_SCORE: 29
ADLS_ACUITY_SCORE: 27
ADLS_ACUITY_SCORE: 39

## 2025-06-24 NOTE — TELEPHONE ENCOUNTER
First attempt at reaching patient. Left message asking for a return call to schedule with the TC based on mental health order (9035) that was placed. MyChart message sent. Will postpone for follow up tomorrow.    Teresa Holman  Transition Clinic Coordinator  06/24/25 8:29 AM

## 2025-06-24 NOTE — ANESTHESIA CARE TRANSFER NOTE
Patient: Ba Mireles    Procedure: Procedure(s):  Left Lumbar 5-Sacral 1 Far-lateral microdiscectomy       Diagnosis: Lumbar disc herniation with radiculopathy [M51.16]  Diagnosis Additional Information: No value filed.    Anesthesia Type:   General     Note:    Oropharynx: oropharynx clear of all foreign objects and spontaneously breathing  Level of Consciousness: awake  Oxygen Supplementation: face mask  Level of Supplemental Oxygen (L/min / FiO2): 6  Independent Airway: airway patency satisfactory and stable  Dentition: dentition unchanged  Vital Signs Stable: post-procedure vital signs reviewed and stable  Report to RN Given: handoff report given  Patient transferred to: PACU    Handoff Report: Identifed the Patient, Identified the Reponsible Provider, Reviewed the pertinent medical history, Discussed the surgical course, Reviewed Intra-OP anesthesia mangement and issues during anesthesia, Set expectations for post-procedure period and Allowed opportunity for questions and acknowledgement of understanding      Vitals:  Vitals Value Taken Time   /73 06/24/25 11:51   Temp     Pulse 77 06/24/25 11:57   Resp 12 06/24/25 11:57   SpO2 100 % 06/24/25 11:57   Vitals shown include unfiled device data.    Electronically Signed By: Gabbi Long MD  June 24, 2025  11:57 AM

## 2025-06-24 NOTE — ANESTHESIA PROCEDURE NOTES
Airway       Patient location during procedure: OR       Procedure Start/Stop Times: 6/24/2025 8:23 AM  Staff -        Anesthesiologist:  Jeff Valdez MD       Resident/Fellow: Gabbi Long MD       Performed By: resident and anesthesiologist  Consent for Airway        Urgency: elective  Indications and Patient Condition       Indications for airway management: grecia-procedural       Induction type:intravenous       Mask difficulty assessment: 1 - vent by mask    Final Airway Details       Final airway type: endotracheal airway       Successful airway: ETT - single and Oral  Endotracheal Airway Details        ETT size (mm): 7.5       Cuffed: yes       Successful intubation technique: direct laryngoscopy       DL Blade Type: MAC 3       Grade View of Cords: 1       Adjucts: stylet       Position: Right       Measured from: lips       Secured at (cm): 23       Bite block used: None    Post intubation assessment        Placement verified by: capnometry, equal breath sounds and chest rise        Number of attempts at approach: 1       Number of other approaches attempted: 0       Secured with: other (comment) and tape       Ease of procedure: easy       Dentition: Intact and Unchanged    Medication(s) Administered   Medication Administration Time: 6/24/2025 8:23 AM

## 2025-06-24 NOTE — ANESTHESIA POSTPROCEDURE EVALUATION
Patient: Ba Mireles    Procedure: Procedure(s):  Left Lumbar 5-Sacral 1 Far-lateral microdiscectomy       Anesthesia Type:  General    Note:  Disposition: Inpatient   Postop Pain Control: Uneventful            Sign Out: Well controlled pain   PONV: No   Neuro/Psych: Uneventful            Sign Out: Acceptable/Baseline neuro status   Airway/Respiratory: Uneventful            Sign Out: Acceptable/Baseline resp. status   CV/Hemodynamics: Uneventful            Sign Out: Acceptable CV status; No obvious hypovolemia; No obvious fluid overload   Other NRE: NONE   DID A NON-ROUTINE EVENT OCCUR? No           Last vitals:  Vitals Value Taken Time   /72 06/24/25 13:15   Temp 36.6  C (97.9  F) 06/24/25 13:00   Pulse 82 06/24/25 13:28   Resp 12 06/24/25 13:28   SpO2 95 % 06/24/25 13:28   Vitals shown include unfiled device data.    Electronically Signed By: Erica Recinos MD  June 24, 2025  1:28 PM

## 2025-06-24 NOTE — PHARMACY-ADMISSION MEDICATION HISTORY
Pharmacy Intern Admission Medication History    Admission medication history is complete. The information provided in this note is only as accurate as the sources available at the time of the update.    Information Source(s): Patient, Hospital records, Prescription bottles, and CareEverywhere/SureScripts via in-person    Pertinent Information:   Medication information was obtained from the patient who was a good historian and provided a notebook documenting times of all at home medication administrations.  The medications reported by the patient were consistent with the fill history.     Changes made to PTA medication list:  Added: None  Deleted:   Gabapentin 300 mg capsules: Patient reports that gabapentin is discontinued and has switched to Pregabalin 150 mg capsules per the provider.   Changed: None    Allergies reviewed with patient and updates made in EHR: yes    Medication History Completed By: Shabana Pimentel 6/24/2025 4:33 PM    Current Facility-Administered Medications for the 6/24/25 encounter (Hospital Encounter)   Medication    sodium chloride (PF) 0.9% PF flush 3 mL     PTA Med List   Medication Sig Last Dose/Taking    ibuprofen (ADVIL/MOTRIN) 200 MG tablet Take 400-600 mg by mouth every 6 hours as needed for pain. 6/23/2025 Morning    Lidocaine (LIDOCARE) 4 % Patch Place onto the skin every 24 hours. To prevent lidocaine toxicity, patient should be patch free for 12 hrs daily. Past Week    oxyCODONE-acetaminophen (PERCOCET) 5-325 MG tablet Take 1-2 tablets by mouth 3 times daily as needed for pain. 6/24/2025 at  4:30 AM    pregabalin (LYRICA) 150 MG capsule Take 1 capsule (150 mg) by mouth 3 times daily. 6/24/2025 at 12:42 AM    tiZANidine (ZANAFLEX) 4 MG capsule Take 1-2 capsules (4-8 mg) by mouth 3 times daily as needed for muscle spasms. 6/23/2025 Bedtime

## 2025-06-24 NOTE — ANESTHESIA PREPROCEDURE EVALUATION
Anesthesia Pre-Procedure Evaluation    Patient: Ba Mireles   MRN: [id number] : 1980          Procedure : Procedure(s):  Left Lumbar 5-Sacral 1 Far-lateral microdiscectomy         Past Medical History:   Diagnosis Date    Arthritis 2014    When I had my hip surgery they told me I have arthritis going already    Kidney stone     first stone since age 20    Left ureteral stone 2023      Past Surgical History:   Procedure Laterality Date    COMBINED CYSTOSCOPY, RETROGRADES, URETEROSCOPY, LASER HOLMIUM LITHOTRIPSY URETER(S), INSERT STENT Bilateral 2023    Procedure: Cystoscopy, Bilateral Ureteroscopy, Bilateral Retrograde Pyelogram, Bilateral Stone Basket Extraction, Bilateral Stent placement;  Surgeon: Gus Shipman MD;  Location: [facility] OR    CYSTOSCOPY,URETEROSCOPY,LITHOTRIPSY      at [place]    ORTHOPEDIC SURGERY      Right Wrist    ORTHOPEDIC SURGERY      left hip surgery    WRIST SURGERY Right       Allergies   Allergen Reactions    Dust Mites     Ragweeds     Amoxicillin-Pot Clavulanate Anxiety     Has tolerated amoxicillin well in the past      Social History     Tobacco Use    Smoking status: Every Day     Current packs/day: 0.00     Average packs/day: 1 pack/day for 26.2 years (26.2 ttl pk-yrs)     Types: Cigarettes     Start date: 1997     Last attempt to quit: 2023     Years since quittin.8     Passive exposure: Current    Smokeless tobacco: Never    Tobacco comments:     Recent restarted, less than 1 PPD   Substance Use Topics    Alcohol use: Not Currently     Alcohol/week: 2.0 standard drinks of alcohol     Types: 2 Standard drinks or equivalent per week      Wt Readings from Last 1 Encounters:   25 60.8 kg (134 lb)        Anesthesia Evaluation   Pt has had prior anesthetic.     History of anesthetic complications   reports months of pain after a nerve block in the past.    ROS/MED HX  ENT/Pulmonary:     (+)     JOSE risk factors,  hypertension,   "       tobacco use, Current use,                       Neurologic: Comment: Lumbar radiculopathy   (-) no seizures and no CVA   Cardiovascular:     (+)  hypertension- -   -  - -                                   (-) taking anticoagulants/antiplatelets   METS/Exercise Tolerance: >4 METS Comment: Limited activity due to back pain. Can walk up a flight of stairs without exertional symptoms. Leg symptoms limiting factor over past month. 3 months ago, was getting 20,000-25,000 steps daily   Hematologic:  - neg hematologic  ROS  (-) history of blood clots and history of blood transfusion   Musculoskeletal:  - neg musculoskeletal ROS     GI/Hepatic:  - neg GI/hepatic ROS  (-) GERD   Renal/Genitourinary:  - neg Renal ROS     Endo:  - neg endo ROS  (-) chronic steroid usage   Psychiatric/Substance Use:     (+) psychiatric history depression  H/O chronic opioid use .     Infectious Disease:  - neg infectious disease ROS     Malignancy:  - neg malignancy ROS     Other:              Physical Exam  Airway  Mallampati: II  Neck ROM: full  Upper bite lip test: I  Mouth opening: >= 4 cm    Cardiovascular   Rhythm: regular  Rate: normal rate     Dental   (+) Minor Abnormalities - some fillings, tiny chips      Pulmonary Breath sounds clear to auscultation        Neurological   Other Findings       OUTSIDE LABS:  CBC:   Lab Results   Component Value Date    WBC 8.8 10/28/2024    WBC 8.0 08/28/2023    HGB 14.9 10/28/2024    HGB 13.7 08/28/2023    HCT 43.7 10/28/2024    HCT 41.4 08/28/2023     10/28/2024     08/28/2023     BMP:   Lab Results   Component Value Date     10/28/2024     07/29/2024    POTASSIUM 4.2 10/28/2024    POTASSIUM 4.3 07/29/2024    CHLORIDE 105 10/28/2024    CHLORIDE 105 07/29/2024    CO2 26 10/28/2024    CO2 27 07/29/2024    BUN 15 10/28/2024    BUN 14.2 07/29/2024    CR 0.80 10/28/2024    CR 0.81 07/29/2024    GLC 85 10/28/2024    GLC 95 07/29/2024     COAGS: No results found for: \"PTT\", " "\"INR\", \"FIBR\"  POC: No results found for: \"BGM\", \"HCG\", \"HCGS\"  HEPATIC:   Lab Results   Component Value Date    ALBUMIN 4.5 10/28/2024    PROTTOTAL 7.8 10/28/2024    ALT 19 10/28/2024    AST 31 10/28/2024    ALKPHOS 56 10/28/2024    BILITOTAL 1.0 10/28/2024     OTHER:   Lab Results   Component Value Date    CHARLY 9.0 10/28/2024    TSH 1.62 08/28/2023    SED 10 08/28/2023       Anesthesia Plan    ASA Status:  2      NPO Status: NPO Appropriate   Anesthesia Type: General.  Airway: oral.  Induction: intravenous.  Maintenance: Balanced.   Techniques and Equipment:       - Monitoring Plan: standard ASA monitoring, train of four monitoring     Consents    Anesthesia Plan(s) and associated risks, benefits, and realistic alternatives discussed. Questions answered and patient/representative(s) expressed understanding.     - Discussed: anesthesiologist, resident     - Discussed with:  Patient          Blood Consent:      - Discussed with: patient.     Postoperative Care    Pain management: multimodal analgesia.     Comments:                   Gabbi Long MD    I have reviewed the pertinent notes and labs in the chart from the past 30 days and (re)examined the patient.  Any updates or changes from those notes are reflected in this note.    Clinically Significant Risk Factors Present on Admission                                              "

## 2025-06-24 NOTE — PLAN OF CARE
Arrived from: PACU  Belongings/meds: phone, wallet, tablet  2 RN Skin Assessment Completed by:   Skin Findings: Treasure C and Cailin S  Non-intact findings documented (yes/no/NA): posterior back incision with primapore    Status: POD#0 L 5-S1 lateral microdiscectomy for L disc herniation with radiculopathy.   Vitals: HTN   Neuros: Aox4. Intermittently hyperactive. 5/5 t/o, but LLE dorsi moderate in strength. N to L ankle to toes, better than baseline.   IV: L PIV 85ml/hr NS. R PIV SL.   Diet: Advanced to reg.   GI: LBM PTA  : Voided on unit with PVR in 200s.   Skin: Posterior back incision with scant drainage- marked.   Pain: 4/10 pain, managed with ice and scheduled meds.   Activity: SBA IV pole. No brace needed.   Plan: Manage pain and ambulate.

## 2025-06-24 NOTE — OP NOTE
"NEUROSURGERY OPERATIVE REPORT     DATE OF SURGERY: 06/24/25     PREOPERATIVE DIAGNOSIS:     Left L5-S1 far lateral disc herniation and foraminal stenosis.     POSTOPERATIVE DIAGNOSIS:  Left L5-S1 far lateral disc herniation and foraminal stenosis.     PROCEDURES PERFORMED:  1. Left sided far lateral L5-S1 microdiscectomy using tubular retractor system  2. Use of [employer] METRx Tubes  3. Use of intra-operative microscope, microdissection  4. Use of fluoroscopy     ATTENDING SURGEON: Misha Winter MD     RESIDENT SURGEON:    Montserrat Sosa MD     ANESTHESIA: General endotracheal anesthesia     ESTIMATED BLOOD LOSS: 5 mL     FINDINGS:  Nerve root fully decompressed, with noted compression prior to end of surgery, with all compressive tissue removed. Skin closed with nylon.     INDICATIONS: Ba Mireles is a 45 year old male who presented with severe left L5 radiculopathy in terms of pain, dysesthesias, weakness, and decreased sensation. MRI was found to have a far lateral disc protrusion causing compression on the left L5 nerve root. He failed conservative management and surgery was indicated. Risks, benefits, and alternatives were discussed and he elected to proceed with surgery. Written consent was obtained.     DESCRIPTION OF PROCEDURE:  Ba Mireles was brought to the operating room, and his identity was verified. General endotracheal anesthesia was induced. He was then placed in the prone position on a Baldemar frame. All pressure points were carefully padded, and arms were placed in the \"superman\" position. Preoperative antibiotics were administered.    A lateral fluoroscopic image was used to localize the L5-S1 disc space. After surgical timeout, the skin was prepped and draped in standard sterile fashion. A paramedian incision approximately 3 cm lateral to midline was made at the L5-S1 level on the left side. Sequential dilators were inserted under fluoroscopic guidance, and the METRx tubular " retractor was docked over the facet and secured to the table-mounted flexible arm.    Microscopic visualization was used for the remainder of the procedure.    Using monopolar cautery and Kerrison rongeurs, muscle and soft tissue were cleared off the lateral aspect of the facet and transverse process. A partial lateral resection of the pars and foraminotomy were performed to fully expose the exiting L5 nerve root.    Careful dissection and mobilization of the nerve root were performed using blunt microdissectors and nerve hooks. The nerve root appeared to be compressed, and all fat, ligament and compressive tissue including thickened ligament and fibrous tissue (disc fragment?) around nerve root was carefully removed. Significant time was taken exploring, and decompressing ensuring that the nerve was completely decompressed, as well as the disc space was explored. There was no compression of the nerve from its exit of the thecal sac out through the foramen.   Based on the distance from the L5 transverse process and the ala which were abnormally close to each other, it could be that L5 nerve root could be compressed dynamically depending on the motion of the body.  If this is the case, the patient may require a lumbar fusion which was discussed with the patient prior to this surgery.  But, during this procedure, the L5 nerve root was completely decompressed.     Hemostasis was obtained using bipolar cautery and hemostatic agents. The tubular retractor was removed, and the wound was irrigated copiously. The fascia was approximated with 0 Vicryl, and the dermis was closed using inverted interrupted 2-0 vicryl suture. The skin was closed using running locking 3-0 nylon. Sterile dressings were applied. The patient was extubated and returned to the postanesthesia care unit without incident. At the end of the procedure, sponge and needle counts were correct. Estimated blood loss totaled 5 ml.     Dr. Winter was present for  the entire procedure     Operative note prepared by Montserrat Sosa MD, Neurosurgery Resident, PGY-6      Addendum:    I agree with the operative report as documented in the resident's note.  I was present for the entire procedure.    Misha Winter MD

## 2025-06-24 NOTE — PLAN OF CARE
Goal Outcome Evaluation:    Plan of Care Reviewed With: patient    Overall Patient Progress: improving    Status: POD #0 Left L5-S1 microdiscectomy for hx L5 disc herniation with radiculopathy.  Vitals: VSS RA  Neuros: A/Ox4. Strengths 5/5 t/o. Numbness to L foot, improving per pt.  IV: PIV SL x2  Resp: WNL  Diet: Regular, good intake.  GI: LBM PTA  : Voiding spontaneously to bathroom.  Skin: Lower back with Primapore, extension marked.  Pain: Significant pain, managed with Oxy x2/Robaxin x2/Zanaflex.  Activity: Up ad akin in room. SBA/GB/cane outside room - pt ambulated in halls x2 this shift.  Social: VEE Acosta and friend Carlos visited today, helpful and supportive with cares.  Plan: Continue with POC

## 2025-06-25 ENCOUNTER — APPOINTMENT (OUTPATIENT)
Dept: PHYSICAL THERAPY | Facility: CLINIC | Age: 45
DRG: 520 | End: 2025-06-25
Payer: COMMERCIAL

## 2025-06-25 VITALS
RESPIRATION RATE: 18 BRPM | TEMPERATURE: 98.2 F | HEIGHT: 69 IN | SYSTOLIC BLOOD PRESSURE: 134 MMHG | WEIGHT: 137.13 LBS | DIASTOLIC BLOOD PRESSURE: 91 MMHG | BODY MASS INDEX: 20.31 KG/M2 | OXYGEN SATURATION: 100 % | HEART RATE: 81 BPM

## 2025-06-25 LAB
ANION GAP SERPL CALCULATED.3IONS-SCNC: 9 MMOL/L (ref 7–15)
BUN SERPL-MCNC: 12.2 MG/DL (ref 6–20)
CA-I BLD-MCNC: 4.8 MG/DL (ref 4.4–5.2)
CALCIUM SERPL-MCNC: 9.3 MG/DL (ref 8.8–10.4)
CHLORIDE SERPL-SCNC: 103 MMOL/L (ref 98–107)
CREAT SERPL-MCNC: 0.82 MG/DL (ref 0.67–1.17)
EGFRCR SERPLBLD CKD-EPI 2021: >90 ML/MIN/1.73M2
ERYTHROCYTE [DISTWIDTH] IN BLOOD BY AUTOMATED COUNT: 13 % (ref 10–15)
GLUCOSE BLDC GLUCOMTR-MCNC: 105 MG/DL (ref 70–99)
GLUCOSE SERPL-MCNC: 103 MG/DL (ref 70–99)
HCO3 SERPL-SCNC: 26 MMOL/L (ref 22–29)
HCT VFR BLD AUTO: 37 % (ref 40–53)
HGB BLD-MCNC: 12.6 G/DL (ref 13.3–17.7)
MAGNESIUM SERPL-MCNC: 2 MG/DL (ref 1.7–2.3)
MCH RBC QN AUTO: 31 PG (ref 26.5–33)
MCHC RBC AUTO-ENTMCNC: 34.1 G/DL (ref 31.5–36.5)
MCV RBC AUTO: 91 FL (ref 78–100)
PHOSPHATE SERPL-MCNC: 3.6 MG/DL (ref 2.5–4.5)
PLATELET # BLD AUTO: 250 10E3/UL (ref 150–450)
POTASSIUM SERPL-SCNC: 4.3 MMOL/L (ref 3.4–5.3)
RBC # BLD AUTO: 4.06 10E6/UL (ref 4.4–5.9)
SODIUM SERPL-SCNC: 138 MMOL/L (ref 135–145)
WBC # BLD AUTO: 11.9 10E3/UL (ref 4–11)

## 2025-06-25 PROCEDURE — 83735 ASSAY OF MAGNESIUM: CPT

## 2025-06-25 PROCEDURE — 250N000013 HC RX MED GY IP 250 OP 250 PS 637

## 2025-06-25 PROCEDURE — 80048 BASIC METABOLIC PNL TOTAL CA: CPT

## 2025-06-25 PROCEDURE — 250N000013 HC RX MED GY IP 250 OP 250 PS 637: Performed by: NURSE PRACTITIONER

## 2025-06-25 PROCEDURE — 97530 THERAPEUTIC ACTIVITIES: CPT | Mod: GP | Performed by: PHYSICAL THERAPIST

## 2025-06-25 PROCEDURE — 97161 PT EVAL LOW COMPLEX 20 MIN: CPT | Mod: GP | Performed by: PHYSICAL THERAPIST

## 2025-06-25 PROCEDURE — 84100 ASSAY OF PHOSPHORUS: CPT

## 2025-06-25 PROCEDURE — 97116 GAIT TRAINING THERAPY: CPT | Mod: GP | Performed by: PHYSICAL THERAPIST

## 2025-06-25 PROCEDURE — 85014 HEMATOCRIT: CPT

## 2025-06-25 PROCEDURE — 82330 ASSAY OF CALCIUM: CPT

## 2025-06-25 PROCEDURE — 999N000128 HC STATISTIC PERIPHERAL IV START W/O US GUIDANCE

## 2025-06-25 PROCEDURE — 36415 COLL VENOUS BLD VENIPUNCTURE: CPT

## 2025-06-25 PROCEDURE — 999N000111 HC STATISTIC OT IP EVAL DEFER

## 2025-06-25 PROCEDURE — 250N000011 HC RX IP 250 OP 636

## 2025-06-25 RX ORDER — DIAZEPAM 5 MG/1
5 TABLET ORAL EVERY 6 HOURS PRN
Status: DISCONTINUED | OUTPATIENT
Start: 2025-06-25 | End: 2025-06-25 | Stop reason: HOSPADM

## 2025-06-25 RX ORDER — OXYCODONE HYDROCHLORIDE 10 MG/1
10 TABLET ORAL
Refills: 0 | Status: DISCONTINUED | OUTPATIENT
Start: 2025-06-25 | End: 2025-06-25 | Stop reason: HOSPADM

## 2025-06-25 RX ORDER — DIAZEPAM 5 MG/1
5 TABLET ORAL EVERY 6 HOURS PRN
Qty: 20 TABLET | Refills: 0 | Status: SHIPPED | OUTPATIENT
Start: 2025-06-25

## 2025-06-25 RX ORDER — OXYCODONE HYDROCHLORIDE 5 MG/1
5 TABLET ORAL
Refills: 0 | Status: DISCONTINUED | OUTPATIENT
Start: 2025-06-25 | End: 2025-06-25 | Stop reason: HOSPADM

## 2025-06-25 RX ORDER — OXYCODONE HYDROCHLORIDE 10 MG/1
5-10 TABLET ORAL EVERY 4 HOURS PRN
Qty: 60 TABLET | Refills: 0 | Status: SHIPPED | OUTPATIENT
Start: 2025-06-25

## 2025-06-25 RX ORDER — METHOCARBAMOL 750 MG/1
750 TABLET, FILM COATED ORAL 4 TIMES DAILY
Status: DISCONTINUED | OUTPATIENT
Start: 2025-06-25 | End: 2025-06-25 | Stop reason: HOSPADM

## 2025-06-25 RX ORDER — POLYETHYLENE GLYCOL 3350 17 G/17G
17 POWDER, FOR SOLUTION ORAL DAILY
Qty: 510 G | Refills: 0 | Status: SHIPPED | OUTPATIENT
Start: 2025-06-25 | End: 2025-07-25

## 2025-06-25 RX ADMIN — OXYCODONE HYDROCHLORIDE 10 MG: 10 TABLET ORAL at 01:06

## 2025-06-25 RX ADMIN — PREGABALIN 150 MG: 75 CAPSULE ORAL at 07:35

## 2025-06-25 RX ADMIN — TIZANIDINE 8 MG: 4 TABLET ORAL at 04:40

## 2025-06-25 RX ADMIN — OXYCODONE HYDROCHLORIDE 10 MG: 10 TABLET ORAL at 06:41

## 2025-06-25 RX ADMIN — METHOCARBAMOL 750 MG: 750 TABLET ORAL at 11:35

## 2025-06-25 RX ADMIN — METHOCARBAMOL 750 MG: 750 TABLET ORAL at 05:41

## 2025-06-25 RX ADMIN — CEFAZOLIN 1 G: 1 INJECTION, POWDER, FOR SOLUTION INTRAMUSCULAR; INTRAVENOUS at 00:25

## 2025-06-25 RX ADMIN — DIAZEPAM 5 MG: 5 TABLET ORAL at 08:56

## 2025-06-25 RX ADMIN — FAMOTIDINE 20 MG: 20 TABLET, FILM COATED ORAL at 07:34

## 2025-06-25 RX ADMIN — SENNOSIDES AND DOCUSATE SODIUM 1 TABLET: 50; 8.6 TABLET ORAL at 07:34

## 2025-06-25 RX ADMIN — OXYCODONE HYDROCHLORIDE 10 MG: 10 TABLET ORAL at 11:35

## 2025-06-25 ASSESSMENT — ACTIVITIES OF DAILY LIVING (ADL)
ADLS_ACUITY_SCORE: 33
ADLS_ACUITY_SCORE: 29
ADLS_ACUITY_SCORE: 33

## 2025-06-25 NOTE — PROGRESS NOTES
06/25/25 0915   Appointment Info   Signing Clinician's Name / Credentials (PT) Shira Bailey, PT, DPT   Living Environment   Transportation Anticipated family or friend will provide  (dad)   Living Environment Comments Pt will stay at mom's house at discharge, can enter without navigating stairs and stay on main level initially. pt will have assistance from mom and his daughter (16 yrs old)   Self-Care   Usual Activity Tolerance good  (to moderate)   Equipment Currently Used at Home cane, straight  (long distances, outside house)   Fall history within last six months no   Activity/Exercise/Self-Care Comment reports has not slept more than 4 hrs for past 30 days due to pain. Overall pt notes feeling miserable due to pain and is very frustrated. Pt walks with a cane only longer distances outside the house. Pt was previously IND with ADLs and mobility in his home.   General Information   Onset of Illness/Injury or Date of Surgery 06/24/25   Referring Physician Rakesh Hurley MD   Patient/Family Therapy Goals Statement (PT) Pain improved to allow better sleep.   Pertinent History of Current Problem (include personal factors and/or comorbidities that impact the POC) Pt presents POD 1 left L5-S1 microdiscectomy. Hx L5 disc herniation with radiculopathy   Existing Precautions/Restrictions spinal   Weight-Bearing Status - LUE partial weight-bearing (% in comments)  (10#)   Weight-Bearing Status - RUE partial weight-bearing (% in comments)  (10#)   Cognition   Cognitive Status Comments cooperative throughout but pt noting much pain since anesthesia wore off and much pain pre-op; pt with frustration about his pain status, overall condition   Pain Assessment   Patient Currently in Pain Yes, see Vital Sign flowsheet   Posture    Posture Comments reduced upright posture   Range of Motion (ROM)   ROM Comment anticipated deficits per pain; pt did not trial any dressing modifications, but notes having support from family    Strength (Manual Muscle Testing)   Strength Comments strength appears intact, per functional tasks observed/assessed   Bed Mobility   Comment, (Bed Mobility) pt prefers to not logroll, pt enters and exits bed on 4 pts, relies on UEs to steady, notes it is not straining to back, pt lowers self into sidelying, prefers to not lie on back, notes it is not tolerable   Transfers   Comment, (Transfers) SBA-IND sit<>stand   Gait/Stairs (Locomotion)   Comment, (Gait/Stairs) Ambulated with cane, SBA-MEME, gait stable, pt with pain complaints. Pt gazes down to L foot frequently to check position as he is unable to sense due to sensory impairments, per report.   Balance   Balance Comments stable, no concerns   Sensory Examination   Sensory Perception Comments reports unable to feel L foot, having L LE sensory deficits   Clinical Impression   Criteria for Skilled Therapeutic Intervention Yes, treatment indicated   PT Diagnosis (PT) impaired mobility   Influenced by the following impairments fatigue, pain, reduced activity tolerance, balance, ROM   Functional limitations due to impairments below baseline bed mobility, transfers, gait   Clinical Presentation (PT Evaluation Complexity) stable   Clinical Presentation Rationale clinical judgement, [facility]   Clinical Decision Making (Complexity) low complexity   Planned Therapy Interventions (PT) balance training;bed mobility training;gait training;home exercise program;neuromuscular re-education;patient/family education;postural re-education;ROM (range of motion);stair training;strengthening;stretching;transfer training;risk factor education;home program guidelines;progressive activity/exercise   Risk & Benefits of therapy have been explained evaluation/treatment results reviewed;care plan/treatment goals reviewed;risks/benefits reviewed;current/potential barriers reviewed;participants voiced agreement with care plan;participants included;patient   PT Total Evaluation Time   PT Devante  Low Complexity Minutes (96119) 5   Physical Therapy Goals   PT Frequency One time eval and treatment only   PT Predicted Duration/Target Date for Goal Attainment 06/25/25   PT Goals Bed Mobility;Transfers;Gait   PT: Bed Mobility Independent;Supine to/from sit;Rolling;Bridging;Within precautions;Goal Met  (overall IND,note that pt uses own technique, does not appear to bend, twist, but uses UEs to lower or push in/out of bed; but reports it is not hurting or straining back; pt does not want to lie on back)   PT: Transfers Sit to/from stand;Bed to/from chair;Assistive device;Within precautions  (able to progress to IND STS)   PT: Gait 150 feet;Modified independent;Within precautions;Goal Met  (able to achieve MEME 170 + ft)   Distance in Feet 180   PT Discharge Planning   PT Plan discharging later today   PT Discharge Recommendation (DC Rec) home with assist   PT Rationale for DC Rec Pt is mobilizing up to MEME with cane, is able to safely get in and out of bed on own. Pt will have family support to help with some ADLs, tasks that require heavy lifting, etc.   PT Brief overview of current status MEME with cane   PT Total Distance Amb During Session (feet) 200   Physical Therapy Time and Intention   Timed Code Treatment Minutes 18   Total Session Time (sum of timed and untimed services) 23

## 2025-06-25 NOTE — TELEPHONE ENCOUNTER
Second attempt to contact pt. Writer left a VM with TC contact info and encouraged a phone call back to schedule TC appointment for 9035 order. Coordinator will ayesha referral as complete.

## 2025-06-25 NOTE — PLAN OF CARE
Discharge time/date: 6/25 12h45  Walked or Wheelchair: Wheelchair  PIV removed: yes  Reviewed AVS with patient: yes  Medication due times added to AVS in EPIC: yes  Verbalized understanding of discharge with teachback: yes  Medications retrieved from pharmacy: yes  Supplies sent home: Primapore and Iodine sent home with pts for 15 days.   Belongings from security with patient: NA    Status: POD#1 L 5-S1 lateral microdiscectomy for L disc herniation with radiculopathy.   Vitals: HTN within params. HR good. On Ra.   Neuros: Aox4. Intermittently restless but reactive. 4/5 t/o, LLE dorsi moderate in strength. N to L ankle to toes, better than baseline.   IV: Removed.   Diet: Advanced to reg. Good intake.   : Voided to BR.   Skin: Posterior back incision changed by writer. Approximated.   Pain: Severe pain to back and leg. Valium added. Scheduled meds given and ice applied.   Activity: Indpt in room and SBA cane when out of room.   Plan: discharge home.  Updates: Pt taught how to clean back incision. Pt educated to avoid getting incision wet until follow up with NSG.

## 2025-06-25 NOTE — PLAN OF CARE
Occupational Therapy: Orders received. Chart reviewed and discussed with care team.? Occupational Therapy not indicated due to patient ambulating in hallway with PT with SBA-mod I this date. Patient declining IP OT needs. Patient has support from family at home to assist with I/ADLs upon discharge.? Defer discharge recommendations to physical therapy.? Will complete orders.

## 2025-06-25 NOTE — DISCHARGE SUMMARY
[facility] Discharge Summary and Instructions    Ba Mireles MRN# [id number]   Age: 45 year old YOB: 1980     Date of Admission:  6/24/2025  Date of Discharge::  6/25/2025  Admitting Physician:  Misha Winter MD  Discharge Physician:  Misha Winter MD          Admission Diagnoses:   Lumbar disc herniation with radiculopathy [M51.16]  Lumbar stenosis [M48.061]          Discharge Diagnosis:     Lumbar disc herniation with radiculopathy [M51.16]  Lumbar stenosis [M48.061]     Clinically Significant Risk Factors Present on Admission                             Procedures:   1. Left sided far lateral L5-S1 microdiscectomy using tubular retractor system  2. Use of [employer] METRx Tubes  3. Use of intra-operative microscope, microdissection           Brief History of Illness:   Ba Mireles is a 45 year old male who presented with severe left L5 radiculopathy in terms of pain, dysesthesias, weakness, and decreased sensation. MRI was found to have a far lateral disc protrusion causing compression on the left L5 nerve root. He failed conservative management and surgery was indicated.        Hospital Course:   Patient underwent above-mentioned procedure on 6/24/2025.  Following the procedure the patient was transferred to surgical floor.  On post operative day 1, he was ambulating, voiding without a ibrahim, eating a regular diet, pain was controlled and therefore he was discharged to home. Patient will follow-up with Neurosurgery in 2 weeks.           Discharge Medications:     Current Discharge Medication List        START taking these medications    Details   diazepam (VALIUM) 5 MG tablet Take 1 tablet (5 mg) by mouth every 6 hours as needed for muscle spasms.  Qty: 20 tablet, Refills: 0    Associated Diagnoses: Spinal stenosis of lumbar region without neurogenic claudication      oxyCODONE IR (ROXICODONE) 10 MG tablet Take 0.5-1 tablets (5-10 mg) by mouth every 4 hours as needed for severe  pain.  Qty: 60 tablet, Refills: 0    Associated Diagnoses: Spinal stenosis of lumbar region without neurogenic claudication      polyethylene glycol (MIRALAX) 17 GM/Dose powder Take 17 g by mouth daily.  Qty: 510 g, Refills: 0    Associated Diagnoses: Postoperative state; Spinal stenosis of lumbar region without neurogenic claudication      senna-docusate (SENOKOT-S/PERICOLACE) 8.6-50 MG tablet Take 1 tablet by mouth 2 times daily for 17 days.  Qty: 34 tablet, Refills: 0    Associated Diagnoses: Postoperative state; Spinal stenosis of lumbar region without neurogenic claudication; Lumbar radiculopathy      tiZANidine (ZANAFLEX) 4 MG tablet Take 1-2 tablets (4-8 mg) by mouth 3 times daily as needed for muscle spasms.  Qty: 30 tablet, Refills: 0    Associated Diagnoses: Postoperative state; Spinal stenosis of lumbar region without neurogenic claudication; Lumbar radiculopathy           CONTINUE these medications which have NOT CHANGED    Details   ibuprofen (ADVIL/MOTRIN) 200 MG tablet Take 400-600 mg by mouth every 6 hours as needed for pain.      Lidocaine (LIDOCARE) 4 % Patch Place onto the skin every 24 hours. To prevent lidocaine toxicity, patient should be patch free for 12 hrs daily.  Qty: 30 patch, Refills: 1    Associated Diagnoses: Lumbar disc herniation with radiculopathy      pregabalin (LYRICA) 150 MG capsule Take 1 capsule (150 mg) by mouth 3 times daily.  Qty: 90 capsule, Refills: 1    Associated Diagnoses: Lumbar disc herniation with radiculopathy      tiZANidine (ZANAFLEX) 4 MG capsule Take 1-2 capsules (4-8 mg) by mouth 3 times daily as needed for muscle spasms.  Qty: 180 capsule, Refills: 1    Associated Diagnoses: Lumbar radiculopathy           STOP taking these medications       oxyCODONE-acetaminophen (PERCOCET) 5-325 MG tablet Comments:   Reason for Stopping:                      Exam:   Physical Exam  BP (!) 134/91 (BP Location: Right arm)   Pulse 81   Temp 98.2  F (36.8  C) (Oral)   Resp 18  "  Ht 1.753 m (5' 9\")   Wt 62.2 kg (137 lb 2 oz)   SpO2 100%   BMI 20.25 kg/m    General: Appears comfortable, NAD  Wound: Incision, clean, dry, intact without strikethrough  Neurologic Exam:  - AOx3.  - Follows commands.  - Speech fluent, spontaneous. No aphasia or dysarthria.  - No gaze preference. No apparent hemineglect.  - PERRL, EOMI.  - Face symmetric with sensation intact to light touch.  - Palate elevates symmetrically, uvula midline, tongue protrudes midline.  - Trapezii and sternocleidomastoid muscles 5/5 bilaterally.  - No pronator drift.  Motor: Normal bulk/tone; no tremor, rigidity, or bradykinesia.   Left foot 4+/5 * pain limited.  Left foot/ankle numbness and tingling present (Baseline prior to OR)         Discharge Instructions and Follow-Up:     Discharge diet: Regular   Discharge activity: Do not do any bending, twisting, strenuous exercise, or heavy lifting (greater than 10 pounds) for 4-6 weeks. Be careful and ask for assistance when walking or going up and down stairs. Avoid any activities that could result in trauma to the surgical wound. Do not drive within 3 months of having your last seizure or while using narcotics or other sedating medications, such as sleep aids, muscle relaxants, etc.     Discharge follow-up: Follow-up with Neurosurgery [facility] on 7/10/2025 for wound check/post-hospital evaluation, all additional follow-up visits to be determined by Dr. Misha Winter MD       Wound care:     Please keep wound covered until you are seen in clinic by Dr. Misha Winter.  It is ok to remove and replaced soiled dressings.   Please do not wash the wound until you have been seen in clinic by Dr. Misha Winter.  Please continue bed baths and do not shower until follow-up in clinic with Dr. Winter.         Please call if you have:  1. increased pain, redness, drainage, swelling at your incision  2. fevers > 101.5 F degrees  3. with any questions or concerns.  You may reach the Neurosurgery clinic at " [phone] during regular work hours. ER at [phone].    and ask for the Neurosurgery Resident on call at [phone], during off hours or weekends.         Discharge Disposition:     Discharged to home        JUSTINA Narayanan, CNP  Neurosurgery  Pager [phone]

## 2025-06-26 ENCOUNTER — TELEPHONE (OUTPATIENT)
Dept: NEUROSURGERY | Facility: CLINIC | Age: 45
End: 2025-06-26

## 2025-06-26 ENCOUNTER — TELEPHONE (OUTPATIENT)
Dept: BEHAVIORAL HEALTH | Facility: CLINIC | Age: 45
End: 2025-06-26

## 2025-06-26 ENCOUNTER — PATIENT OUTREACH (OUTPATIENT)
Dept: CARE COORDINATION | Facility: CLINIC | Age: 45
End: 2025-06-26

## 2025-06-26 DIAGNOSIS — M51.16 LUMBAR DISC HERNIATION WITH RADICULOPATHY: Primary | ICD-10-CM

## 2025-06-26 ASSESSMENT — ANXIETY QUESTIONNAIRES
GAD7 TOTAL SCORE: 21
6. BECOMING EASILY ANNOYED OR IRRITABLE: NEARLY EVERY DAY
8. IF YOU CHECKED OFF ANY PROBLEMS, HOW DIFFICULT HAVE THESE MADE IT FOR YOU TO DO YOUR WORK, TAKE CARE OF THINGS AT HOME, OR GET ALONG WITH OTHER PEOPLE?: VERY DIFFICULT
4. TROUBLE RELAXING: NEARLY EVERY DAY
3. WORRYING TOO MUCH ABOUT DIFFERENT THINGS: NEARLY EVERY DAY
GAD7 TOTAL SCORE: 21
2. NOT BEING ABLE TO STOP OR CONTROL WORRYING: NEARLY EVERY DAY
5. BEING SO RESTLESS THAT IT IS HARD TO SIT STILL: NEARLY EVERY DAY
IF YOU CHECKED OFF ANY PROBLEMS ON THIS QUESTIONNAIRE, HOW DIFFICULT HAVE THESE PROBLEMS MADE IT FOR YOU TO DO YOUR WORK, TAKE CARE OF THINGS AT HOME, OR GET ALONG WITH OTHER PEOPLE: VERY DIFFICULT
7. FEELING AFRAID AS IF SOMETHING AWFUL MIGHT HAPPEN: NEARLY EVERY DAY
1. FEELING NERVOUS, ANXIOUS, OR ON EDGE: NEARLY EVERY DAY
GAD7 TOTAL SCORE: 21
7. FEELING AFRAID AS IF SOMETHING AWFUL MIGHT HAPPEN: NEARLY EVERY DAY

## 2025-06-26 NOTE — PROGRESS NOTES
[facility]    Background: Transitional Care Management program identified per system criteria and reviewed by [facility] team for possible outreach.    Assessment: Upon chart review, [facility] Team member will not proceed with patient outreach related to this episode of Transitional Care Management program due to reason below:    Patient has a follow up appointment with an appropriate provider today for hospital discharge    Patient has an appointment today with a PA in Family/Physical Medicine. Patient's appointment today is appropriate for ED/Hospital discharge and follow-up. No [facility] outreach call needed at this time. Chart review only.    Plan: Transitional Care Management episode addressed appropriately per reason noted above.      LAUREANO Lassiter  [phone]  [facility]     *Connected Care Resource Team does NOT follow patient ongoing. Referrals are identified based on internal discharge reports and the outreach is to ensure patient has an understanding of their discharge instructions.

## 2025-06-26 NOTE — TELEPHONE ENCOUNTER
Called patient to schedule surgery with Dr. Misha Winter    Spoke with:  Richi (patient)    Date of Surgery: 7/8/2025    Estimated Arrival time Discussed with Patient:  No    Location of surgery: [facility]     Pre-Op H&P: Patient completed PAC appointment on 6/23/2025    Post-Op Appts: 7/24/2025 with Deja Newman NP     Imaging:  No     Discussed with patient pre-op RN will call 2-3 days prior to surgery with arrival time and instructions:  Yes     Packet sent out: Yes 06/26/25  via [employer]    Vendor/Rep/Monitoring:   Yes  via Phone Call Confirmation #: [id number]    Additional Comments:    - Patient has questions regarding getting a PCA for at home or is wondering if a TCU is doable. Patient has lack of support at home.    - Patient is also wondering if he should continue PT until surgery    All patients questions were answered and patient was instructed to review surgical packet and call back with any questions or concerns.       Cortez Lerma on 6/26/2025 at 12:38 PM

## 2025-06-26 NOTE — TELEPHONE ENCOUNTER
Writer spoke with pt and scheduled [id number] appointment on 06/30/2025 @ 8:30 am.    .Padmini Leiva  06/26/2025  1212

## 2025-06-30 ENCOUNTER — VIRTUAL VISIT (OUTPATIENT)
Dept: BEHAVIORAL HEALTH | Facility: CLINIC | Age: 45
End: 2025-06-30
Payer: COMMERCIAL

## 2025-06-30 ENCOUNTER — TELEPHONE (OUTPATIENT)
Dept: BEHAVIORAL HEALTH | Facility: CLINIC | Age: 45
End: 2025-06-30
Payer: COMMERCIAL

## 2025-06-30 DIAGNOSIS — F41.1 GENERALIZED ANXIETY DISORDER: Primary | ICD-10-CM

## 2025-06-30 ASSESSMENT — COLUMBIA-SUICIDE SEVERITY RATING SCALE - C-SSRS
SUICIDE, SINCE LAST CONTACT: NO
2. HAVE YOU ACTUALLY HAD ANY THOUGHTS OF KILLING YOURSELF?: NO
1. SINCE LAST CONTACT, HAVE YOU WISHED YOU WERE DEAD OR WISHED YOU COULD GO TO SLEEP AND NOT WAKE UP?: NO
TOTAL  NUMBER OF INTERRUPTED ATTEMPTS SINCE LAST CONTACT: NO
6. HAVE YOU EVER DONE ANYTHING, STARTED TO DO ANYTHING, OR PREPARED TO DO ANYTHING TO END YOUR LIFE?: NO
ATTEMPT SINCE LAST CONTACT: NO
TOTAL  NUMBER OF ABORTED OR SELF INTERRUPTED ATTEMPTS SINCE LAST CONTACT: NO

## 2025-06-30 ASSESSMENT — PATIENT HEALTH QUESTIONNAIRE - PHQ9
SUM OF ALL RESPONSES TO PHQ QUESTIONS 1-9: 13
SUM OF ALL RESPONSES TO PHQ QUESTIONS 1-9: 13
10. IF YOU CHECKED OFF ANY PROBLEMS, HOW DIFFICULT HAVE THESE PROBLEMS MADE IT FOR YOU TO DO YOUR WORK, TAKE CARE OF THINGS AT HOME, OR GET ALONG WITH OTHER PEOPLE: SOMEWHAT DIFFICULT

## 2025-06-30 NOTE — PROGRESS NOTES
Transition Clinic - Initial Visit Progress Note    Patient  Name: Ba Mireles, : 1980    Date:  2025       Service Type:  Mental Health Initial Visit       VISIT TIME START: 830  VISIT TIME END: 900     Session Length:   TC Session Length: 30 (16-37 Minutes)    Visit #: 1    Attendees:  TC Attendees: Client alone    Service Modality:  Service Modality: Video Visit:      Provider verified identity through the following two step process.  Patient provided:  Patient  and Patient address    Telemedicine Visit: The patient's condition can be safely assessed and treated via synchronous audio and visual telemedicine encounter.      Reason for Telemedicine Visit: Patient convenience (e.g. access to timely appointments / distance to available provider)    Originating Site (Patient Location): Patient's home    Distant Site (Provider Location): [facility]    Consent:  The patient/guardian has verbally consented to: the potential risks and benefits of telemedicine (video visit) versus in person care; bill my insurance or make self-payment for services provided; and responsibility for payment of non-covered services.     Patient would like the video invitation sent by:  My Chart    Mode of Communication:  Video Conference via [facility]    Distant Location (Provider):  Off-site    As the provider I attest to compliance with applicable laws and regulations related to telemedicine.    Source of Referral:  Other      Informed Consent and Assessment Methods    This provider and patient discussed HIPAA, and the limits of confidentiality; including mandated reporting, the possibility of collaborative discussions with patient's primary care provider and the multidisciplinary team in the MH clinic during consultation.  Discussed the no show policy, and the benefits and possible unintended consequences of therapy.  Patient indicated understanding Transition  "Clinic services are short term, solution focused, until a referral can be made and patient can bridge to long term therapy.  Patient verbally indicated understanding the informed consent.         Presenting Concerns/  Current Stressors:  Ba Mireles is a 45 year old who was referred to the Transition Clinic by \"Medical doctor after surgery recently referred me here fro therapy connection setup, per pt and chart review.      Initial session:  Introduced transition clinic services as transitional, brief, short-term, solution-focused therapy until connected/transition to next LOC.    Ba Mireles reports he recently had surgery and was referred to TC clinic.  Pt reports work stressors and medical issues, as well as an upcomming surgery.  Pt reports anxiety sxs and denies depression sxs.  Pt reports adequate supports and a girlfriend.  Pt reports looking for additional community resources, SW form placed today to TC YUE Persaud.  Pt reports desire for individual therapy long term setup.  CC referral placed today to  coords for individual therapy setup.  This writer will continue to bridge, with follow up appt after holiday week scheduled.  Pt denies current SI, plan, intent, SIB, HI, AH/VH, and/or astrid sxs, at this point in time. CSSR completed.  Pt reports hopeful, looking forward to feeling better physically.  Pt reports medication compliance, adeqauet supports, and denies current substance use .  Pt reports the following protective factors:  willingness to engage in therapy, attached by family/friends/partner, access to variety of clinical interventions, hopeful, identifies reason for living, access to and engagement with healthcare, current engagement in treatment and/or motivation to establish therapeutic relationship, strong bond to family unit, community, job, school, etc, supportive social network or family, fear of death or dying due to pain/suffering, lives in a responsibly safe environment, " responsibilities to others, and reality testing ability forward or future oriented thinking; dedication to family or friends; safe and stable environment; regular sleep; purpose; secure attachment; help seeking behaviors when distressed; adherence with prescribed medication; daily obligations; effective problem solving skills; commitment to well being; healthy fear of risky behaviors or pain, caring, committed to sobriety, educated, employed, goal-focused, good listener, has a previous history of therapy, insightful, intelligent, motivated, open to learning, open to suggestions / feedback, support of family, friends and providers, supportive, wants to learn, willing to ask questions, willing to relate to others, and work history .     Pt processed regarding sxs, medical, stressors, supports, coping, work.  Writer provided active listening, empathy and validation.  Pt reports historical coping skills of distraction.  Writer and pt explored additional coping skills including mindfulness, meditation and self-care.  Pt appeared engaged and receptive to the above interventions.    Plan:    - CC referral to TC coords for individual therapy setup; TC coords to follow up with pt.    Padmini Leiva    6/30/25 10:10 AM  Note  Writer spoke with pt and scheduled appointment      Date: Monday, 7/7/2025  Time: 10:00 am - 11:00 am  Provider: Allison Hi  [facility]  Location: [address] [address]  Phone: [phone]  Type: Teletherapy          - SW form placed today to TC SW YUE Stringer for additional resources regarding medical and psychosocial stressors; Shana to follow up with pt.    -TC follow up session:  7/17/25 at 12:30pm.      ASSESSMENT:    Required Screenings: The following assessments were completed by patient for this visit:  PHQ9:       6/23/2025     9:25 AM 6/30/2025     6:28 AM   PHQ-9 SCORE   PHQ-9 Total Score MyChart  13 (Moderate depression)   PHQ-9 Total Score 22  13        Patient-reported     GAD2:       6/26/2025    10:35 PM   ANDRES-2   Feeling nervous, anxious, or on edge 3   Not being able to stop or control worrying 3   ANDRES-2 Total Score 6        Patient-reported     GAD7:       6/26/2025    10:35 PM   ANDRES-7 SCORE   Total Score 21 (severe anxiety)   Total Score 21        Patient-reported     CAGE-AID:       6/26/2025    10:38 PM   CAGE-AID Total Score   Total Score 0    Total Score MyChart 0 (A total score of 2 or greater is considered clinically significant)       Patient-reported     PROMIS 10-Global Health (all questions and answers displayed):       6/20/2025     8:55 AM 6/26/2025    10:37 PM   PROMIS 10   In general, would you say your health is: Poor Poor   In general, would you say your quality of life is: Poor Poor   In general, how would you rate your physical health? Poor Poor   In general, how would you rate your mental health, including your mood and your ability to think? Poor Poor   In general, how would you rate your satisfaction with your social activities and relationships? Poor Poor   In general, please rate how well you carry out your usual social activities and roles Poor Poor   To what extent are you able to carry out your everyday physical activities such as walking, climbing stairs, carrying groceries, or moving a chair? A little Completely   In the past 7 days, how often have you been bothered by emotional problems such as feeling anxious, depressed, or irritable? Sometimes Always   In the past 7 days, how would you rate your fatigue on average? Very severe Very severe   In the past 7 days, how would you rate your pain on average, where 0 means no pain, and 10 means worst imaginable pain? 8 9   In general, would you say your health is: 1 1   In general, would you say your quality of life is: 1 1   In general, how would you rate your physical health? 1 1   In general, how would you rate your mental health, including your mood and your ability to  think? 1 1   In general, how would you rate your satisfaction with your social activities and relationships? 1 1   In general, please rate how well you carry out your usual social activities and roles. (This includes activities at home, at work and in your community, and responsibilities as a parent, child, spouse, employee, friend, etc.) 1 1   To what extent are you able to carry out your everyday physical activities such as walking, climbing stairs, carrying groceries, or moving a chair? 2 5   In the past 7 days, how often have you been bothered by emotional problems such as feeling anxious, depressed, or irritable? 3 5   In the past 7 days, how would you rate your fatigue on average? 5 5   In the past 7 days, how would you rate your pain on average, where 0 means no pain, and 10 means worst imaginable pain? 8 9   Global Mental Health Score 6  4    Global Physical Health Score 6  9    PROMIS TOTAL - SUBSCORES 12  13        Patient-reported     [place] Suicide Severity Rating Scale (Lifetime/Recent)      5/31/2024     5:38 PM 5/26/2025     3:49 PM 5/27/2025    12:30 AM 6/24/2025     6:39 AM   [place] Suicide Severity Rating (Lifetime/Recent)   Q1 Wished to be Dead (Past Month) 0-->no 0-->no 0-->no 0-->no   Q2 Suicidal Thoughts (Past Month) 0-->no 0-->no 0-->no 0-->no   Q6 Suicide Behavior (Lifetime) 0-->no 0-->no 0-->no 0-->no   Level of Risk per Screen no risks indicated  no risks indicated  no risks indicated  no risks indicated       Data saved with a previous flowsheet row definition     [place] Suicide Severity Rating Scale (Short Version)      5/31/2024     5:38 PM 5/26/2025     3:49 PM 5/27/2025    12:30 AM 6/24/2025     6:39 AM 6/30/2025     9:00 AM   [place] Suicide Severity Rating (Short Version)   Q1 Wished to be Dead (Past Month) 0-->no 0-->no 0-->no 0-->no    Q2 Suicidal Thoughts (Past Month) 0-->no 0-->no 0-->no 0-->no    Q6 Suicide Behavior (Lifetime) 0-->no 0-->no 0-->no 0-->no    Level of Risk  per Screen no risks indicated  no risks indicated  no risks indicated  no risks indicated    1. Wish to be Dead (Since Last Contact)     N   2. Non-Specific Active Suicidal Thoughts (Since Last Contact)     N   Actual Attempt (Since Last Contact)     N   Has subject engaged in non-suicidal self-injurious behavior? (Since Last Contact)     N   Interrupted Attempts (Since Last Contact)     N   Aborted or Self-Interrupted Attempt (Since Last Contact)     N   Preparatory Acts or Behavior (Since Last Contact)     N   Suicide (Since Last Contact)     N   Calculated C-SSRS Risk Score (Since Last Contact)     No Risk Indicated       Data saved with a previous flowsheet row definition     Mental Status Assessment:  Appearance:   Appropriate   Eye Contact:   Good   Psychomotor Behavior: Normal   Attitude:   Cooperative   Orientation:   All  Speech     Rate / Production:  Normal/ Responsive     Volume:   Normal   Mood:    Normal  Affect:    Appropriate   Thought Content:  Clear   Thought Form:  Coherent   Insight:    Good       Safety Issues and Plan for Safety and Risk Management:     Patient denies current fears or concerns for personal safety.  Patient denies current or recent suicidal ideation or behaviors.  Patient denies current or recent homicidal ideation or behaviors.  Patient denies current or recent self injurious behavior or ideation.  Patient denies other safety concerns.  Recommended that patient call 911 or go to the local ED should there be a change in any of these risk factors     Diagnostic Criteria:  Generalized Anxiety Disorder  A. Excessive anxiety and worry about a number of events or activities (such as work or school performance).   B. The person finds it difficult to control the worry.  C. Select 3 or more symptoms (required for diagnosis). Only one item is required in children.   - Restlessness or feeling keyed up or on edge.    - Being easily fatigued.    - Difficulty concentrating or mind going  blank.    - Irritability.    - Muscle tension.    - Sleep disturbance (difficulty falling or staying asleep, or restless unsatisfying sleep).     DSM5 Diagnoses: (Sustained by DSM5 Criteria Listed Above)  Diagnoses: 300.02 (F41.1) Generalized Anxiety Disorder  Psychosocial & Contextual Factors: pt reports medical issues and psychosocial stressors, SW form placed today.      Intervention:   Solution Focused Brief Therapy   Brief Psychotherapy - discussed and prioritizing the most efficient treatment., Cognitive Behavioral Therapy (CBT) - Discussed changing thoughts is the path to changing behaviors and feelings., Mindfulness Therapy - Cultivation of present-oriented, non-judgmental attitude. It helps nurtures great awareness, clarity, and acceptance of reality., Motivational Interviewing - helping to find the motivation to make positive behavior changes., Person-Centered Therapy - Actualizes potential and moves towards increased awareness, spontaneity, trust in self and inner directedness., and Problem-Solving Therapy (PST) - Identify specific problems; brainstorming, evaluation, implementing and reviewing solutions.    Collateral Reports Completed:  TC Collateral: [facility] electronic medical records reviewed.    DATA:  Interactive Complexity: No  Crisis: No    PLAN: (Homework, other):  Provider will continue Diagnostic Assessment. Initial Treatment will focus on: Anxiety - t.  2.   Pt requested the following referrals:     Plan:    - CC referral to TC coords for individual therapy setup; TC coords to follow up with pt.  Padmini Leiva    6/30/25 10:10 AM  Note  Writer spoke with pt and scheduled appointment      Date: Monday, 7/7/2025  Time: 10:00 am - 11:00 am  Provider: Allison Hi  [facility]  Location: [place] [address]  Phone: [phone]  Type: Teletherapy          - SW form placed today to TC YUE Persaud for additional resources regarding  medical and psychosocial stressors; Shana to follow up with pt.    -TC follow up session:  7/17/25 at 12:30pm.     3.   Information in this assessment was obtained from the medical record and provided by patient who is a good historian.   4.   Follow up scheduled:  -TC follow up session:  7/17/25 at 12:30pm.        Patient was given the following to do until next session:  Encourage attend appts, challenge negative thoughts, mindfulness, meditation, positive affirmations and self-care.  5.  Anticipated Discharge: Anticipated Discharge Time: 1 - 3 Months   6. Is this the patient's last discharge: No      Procedure Code:  Psychotherapy (with patient) - 30  [CPT 09348]    [facility]    Suicide Risk and Safety Concerns were assessed for. Patient meets the following risk assessment and triage: Patient has no change in safety concerns. Committed to safety and agreed to follow previously developed safety plan.      Safety Plan:  Reviewed safety plan with pt, pt agrees to follow, safety plan in this note and in pts mychart.  If unable to follow safety plan call 911.    Fabian Barriga Safety Plan    STEP 1: WARNING SIGNS  stress  2. overwhlemed    STEP 2: INTERNAL COPING STRATEGIES - THINGS I CAN DO TO TAKE MY MIND OFF   MY PROBLEMS WITHOUT CONTACTING ANOTHER PERSON  socialization  2. distraction    STEP 3: PEOPLE AND SOCIAL SETTINGS THAT PROVIDE DISTRACTION  STEP 4: PEOPLE WHOM I CAN ASK FOR HELP DURING A CRISIS    1. Name: Contact:      Félix Mireles (Father)  [phone]     2. Place:. Place: outside/nature  3. Name: Contact: CRISIS:   [facility] Crisis Line:  [phone]    STEP 5: PROFESSIONALS OR AGENCIES I CAN CONTACT DURING A CRISIS:  [facility] Crisis Line:  [phone]  2. Emergency dept  3. 911    STEP 6: MAKING THE ENVIRONMENT SAFER (PLAN FOR LETHAL MEANS SAFETY)  1. Restrict access to means  2.  Stay with friends/family    Nirmala Safety Plan. Ita Peralta and Josef Barriga. Used  "with permission of the  authors.      Grounding Techniques:  Try to notice where you are, your surroundings including the people, the sounds like the TV or  radio.  Concentrate on your breathing. Take a deep cleansing breath from your diaphragm. Count the  breaths as you exhale. Make sure you breath slowly.  Hold something that you find comforting, for some it may be a stuffed animal or a blanket. Notice  how it feels in your hands. Is it hard or soft?  During a non-crisis time make a list of positive affirmations. Print them out and keep them handy  for times of intense anxiety. At those times, read them aloud.  Try the [employer] game:  Name 5 things you can see in the room with you  Name 4 things you can feel (\"chair on my back\" or \"feet on floor\")  Name 3 things you can hear right now (\"people talking\" or \"tv\")  Name 2 things you can smell right now (or, 2 things you like the smell of)  Name 1 good thing about yourself  Create A Safe Place  Image a safe place - it can be a real or imaginary place:  What do you see - especially colors?  What sounds do you hear?  What sensations do you feel?  What smells do you smell?  What people or animals would you want in your safe place?  Imagine a protective bubble, wall or boundary around your safe place.  Imagine a door or gate with a guard at your safe place.  Image a lock and key to your safe place and only you can unlock it.  You can draw or make a collage that represents your safe place.  Choose a souvenir of your safe place - a color, an object, a song.  Keep your image of your safe place so you can come back to it when you need to.  Reduce Extreme Emotion QUICKLY: Changing Your Body Chemistry  T: Change your body Temperature to change your autonomic nervous system  Use Ice Water to calm yourself down FAST  Put your face in a bowl of ice water (this is the best way; have the person keep his/her face in ice  water for 30-45 seconds - initial research is showing that the " "longer s/he can hold her/his face in  the water, the better the response), or  Splash ice water on your face, or hold an ice pack on your face  I: Intensely exercise to calm down a body revved up by emotion  Examples: running, walking fast, jumping, playing basketball, weight lifting, swimming, calisthenics,  etc.  Engage in exercises that DO NOT include violent behaviors. Exercises that utilize violent behaviors  tend to function as \"behavioral rehearsal,\" and rather than calming the person down, may actually  \"rev\" the person up more, increasing the likelihood of violence, and lessening the likelihood that  they will \"burn off\" energy  Fabian[employer] Safety Plan (continued)  P: Progressively relax your muscles  Starting with your hands, moving to your forearms, upper arms, shoulders, neck, forehead, eyes,  cheeks and lips, tongue and teeth, chest, upper back, stomach, buttocks, thighs, calves, ankles,  feet  Tense (10 seconds,   of the way), then relax each muscle (all the way)  Notice the tension  Notice the difference when relaxed (by tensing first, and then relaxing, you are able to get a more  thorough relaxation than by simply relaxing)  P: Paced breathing to relax  The standard technique is to begin with counting the number of steps one takes for a typical inhale,  then counting the steps one takes for a typical exhale, and then lengthening the amount of steps  for the exhalation by one or two steps. OR  Repeat this pattern for 1-2 minutes  Inhale for four (4) seconds  Exhale for six (6) to eight (8) seconds  Research demonstrated that one can change one's overall level of anxiety by doing this exercise  for even a few minutes per day    [employer] Safety Plan. Ita Peralta and Josef Barriga. Used with permission of the  authors.         "

## 2025-06-30 NOTE — TELEPHONE ENCOUNTER
----- Message from MARLON SOLANO sent at 6/30/2025  9:10 AM CDT -----  Transition Clinic Next Level of Care Referral Request    MRN: [id number]  Patient Name:  Ba Mireles  Phone:  [phone] (home)   E-mail Address: [email or web address]    Type of patient appointment needed: Individual therapy    Service Modality:  Service Modality: Virtual    Clinician Gender Preference: female    Clinical Comments: Pt requests TC coord follow up call with kanwal/time confrimation.  Thanks! :)

## 2025-06-30 NOTE — TELEPHONE ENCOUNTER
Writer spoke with pt and scheduled appointment     Date: Monday, 7/7/2025  Time: 10:00 am - 11:00 am  Provider: Allison Hi  [facility]  Location: [facility], [address] [place] [address] [place] [address]  Phone: [phone]  Type: Teletherapy    Scheduling instructions  We do require an active email and phone number for new intakes. Schedule online at [email or web address]. We offer dietitian services too! She offers ONLY TELEHEALTH appointments.  Patient instructions  Greetings! Once we receive the referral from [facility], our scheduling department will call you to confirm the appointment and send out intake paperwork of which will need to be completed prior to the appointment. We look forward to working with you.    Email sent.  Padmini Leiva  06/30/2025  [phone]

## 2025-07-01 ENCOUNTER — TELEPHONE (OUTPATIENT)
Dept: BEHAVIORAL HEALTH | Facility: CLINIC | Age: 45
End: 2025-07-01
Payer: COMMERCIAL

## 2025-07-05 ENCOUNTER — NURSE TRIAGE (OUTPATIENT)
Dept: NURSING | Facility: CLINIC | Age: 45
End: 2025-07-05
Payer: COMMERCIAL

## 2025-07-05 NOTE — TELEPHONE ENCOUNTER
Patient calling. He took tizanidine 8 mg at 1500 and again at 1742, and he's wondering if he needs to be seen for taking them too closely together. He should have taken his prescribed pregabalin 150 mg.     Per Up-to-Date, since it is a single occurrence. He has already spoken with his pharmacist, who told him to skip a dose and restart his regimen tomorrow.     Patient transferred to Poison Control per his request, instead of PCP.     Hillary Rose RN  [facility]  July 5, 2025, 6:15 PM    Reason for Disposition   [1] DOUBLE DOSE (an extra dose or lesser amount) of prescription drug AND [2] NO symptoms  (Exception: A double dose of antibiotics.)    Additional Information   Negative: [1] Intentional drug overdose AND [2] suicidal thoughts or ideas   Negative: Drug overdose and triager unable to answer question   Negative: Caller requesting a renewal or refill of a medicine patient is currently taking   Negative: Caller requesting information unrelated to medicine   Negative: Caller requesting information about COVID-19 Vaccine   Negative: Caller requesting information about Emergency Contraception   Negative: Caller requesting information about Combined Birth Control Pills   Negative: Caller requesting information about Progestin Birth Control Pills   Negative: Caller requesting information about Post-Op pain or medicines   Negative: Caller requesting a prescription antibiotic (such as Penicillin) for Strep throat and has a positive culture result   Negative: Caller requesting a prescription anti-viral med (such as Tamiflu) and has influenza (flu) symptoms   Negative: Immunization reaction suspected   Negative: Rash while taking a medicine or within 3 days of stopping it   Negative: [1] Asthma and [2] having symptoms of asthma (cough, wheezing, etc.)   Negative: [1] Symptom of illness (e.g., headache, abdominal pain, earache, vomiting) AND [2] more than mild   Negative: Breastfeeding questions about  mother's medicines and diet   Negative: MORE THAN A DOUBLE DOSE of a prescription or over-the-counter (OTC) drug   Negative: [1] DOUBLE DOSE (an extra dose or lesser amount) of prescription drug AND [2] any symptoms (e.g., dizziness, nausea, pain, sleepiness)   Negative: [1] DOUBLE DOSE (an extra dose or lesser amount) of over-the-counter (OTC) drug AND [2] any symptoms (e.g., dizziness, nausea, pain, sleepiness)   Negative: Took another person's prescription drug    Protocols used: Medication Question Call-A-

## 2025-07-07 ENCOUNTER — TELEPHONE (OUTPATIENT)
Dept: NEUROLOGY | Facility: CLINIC | Age: 45
End: 2025-07-07
Payer: COMMERCIAL

## 2025-07-07 ENCOUNTER — ANESTHESIA EVENT (OUTPATIENT)
Dept: SURGERY | Facility: CLINIC | Age: 45
End: 2025-07-07
Payer: COMMERCIAL

## 2025-07-07 ASSESSMENT — LIFESTYLE VARIABLES: TOBACCO_USE: 1

## 2025-07-07 ASSESSMENT — ENCOUNTER SYMPTOMS: SEIZURES: 0

## 2025-07-07 NOTE — ANESTHESIA PREPROCEDURE EVALUATION
Anesthesia Pre-Procedure Evaluation    Patient: Ba Mireles   MRN: [id number] : 1980          Procedure : Procedure(s):  o-arm/stealth assisted Lumbar 5-Sacral 1 Transforaminal Lumbar Interbody Fusion with Bone Morphogenic Protein and Interbody cage         Past Medical History:   Diagnosis Date    Arthritis 2014    When I had my hip surgery they told me I have arthritis going already    Kidney stone     first stone since age 20    Left ureteral stone 2023      Past Surgical History:   Procedure Laterality Date    COMBINED CYSTOSCOPY, RETROGRADES, URETEROSCOPY, LASER HOLMIUM LITHOTRIPSY URETER(S), INSERT STENT Bilateral 2023    Procedure: Cystoscopy, Bilateral Ureteroscopy, Bilateral Retrograde Pyelogram, Bilateral Stone Basket Extraction, Bilateral Stent placement;  Surgeon: Gus Shipman MD;  Location: [facility]    CYSTOSCOPY,URETEROSCOPY,LITHOTRIPSY      at [place]    DISCECTOMY LUMBAR POSTERIOR MICROSCOPIC ONE LEVEL N/A 2025    Procedure: Left Lumbar 5-Sacral 1 Far-lateral microdiscectomy;  Surgeon: Misha Winter MD;  Location: [facility]    ORTHOPEDIC SURGERY      Right Wrist    ORTHOPEDIC SURGERY      left hip surgery    WRIST SURGERY Right       Allergies   Allergen Reactions    Dust Mites     Ragweeds     Amoxicillin-Pot Clavulanate Anxiety     Has tolerated amoxicillin well in the past      Social History     Tobacco Use    Smoking status: Every Day     Current packs/day: 0.00     Average packs/day: 1 pack/day for 26.2 years (26.2 ttl pk-yrs)     Types: Cigarettes     Start date: 1997     Last attempt to quit: 2023     Years since quittin.8     Passive exposure: Current    Smokeless tobacco: Never    Tobacco comments:     Recent restarted, less than 1 PPD   Substance Use Topics    Alcohol use: Not Currently     Alcohol/week: 2.0 standard drinks of alcohol     Types: 2 Standard drinks or equivalent per week      Wt Readings from Last 1 Encounters:    06/24/25 62.2 kg (137 lb 2 oz)        Anesthesia Evaluation   Pt has had prior anesthetic.     History of anesthetic complications   reports months of pain after a nerve block in the past.    ROS/MED HX  ENT/Pulmonary:     (+)     JOSE risk factors,  hypertension,         tobacco use, Current use,  26  Pack-Year Hx,                      Neurologic: Comment: Lumbar radiculopathy s/p microdiscectomy 6/25 that was unstable. Thus, current fusion indicated.   (-) no seizures and no CVA   Cardiovascular:     (+)  hypertension- -   -  - -                                   (-) taking anticoagulants/antiplatelets   METS/Exercise Tolerance: >4 METS Comment: Limited activity due to back pain. Can walk up a flight of stairs without exertional symptoms. Leg symptoms limiting factor over past month. 3 months ago, was getting 20,000-25,000 steps daily   Hematologic:  - neg hematologic  ROS  (-) history of blood clots and history of blood transfusion   Musculoskeletal:  - neg musculoskeletal ROS     GI/Hepatic:  - neg GI/hepatic ROS  (-) GERD   Renal/Genitourinary:  - neg Renal ROS     Endo:  - neg endo ROS  (-) chronic steroid usage   Psychiatric/Substance Use:     (+) psychiatric history depression       Infectious Disease:  - neg infectious disease ROS     Malignancy:  - neg malignancy ROS     Other:              Physical Exam  Airway  Mallampati: I  TM distance: >3 FB  Neck ROM: full  Mouth opening: >= 4 cm    Cardiovascular   Rhythm: regular  Rate: normal rate     Dental   (+) Minor Abnormalities - some fillings, tiny chips      Pulmonary Breath sounds clear to auscultation        Neurological   He appears awake and alert.    Other Findings       OUTSIDE LABS:  CBC:   Lab Results   Component Value Date    WBC 11.9 (H) 06/25/2025    WBC 8.8 10/28/2024    HGB 12.6 (L) 06/25/2025    HGB 14.9 10/28/2024    HCT 37.0 (L) 06/25/2025    HCT 43.7 10/28/2024     06/25/2025     10/28/2024     BMP:   Lab Results   Component  "Value Date     06/25/2025     10/28/2024    POTASSIUM 4.3 06/25/2025    POTASSIUM 4.2 10/28/2024    CHLORIDE 103 06/25/2025    CHLORIDE 105 10/28/2024    CO2 26 06/25/2025    CO2 26 10/28/2024    BUN 12.2 06/25/2025    BUN 15 10/28/2024    CR 0.82 06/25/2025    CR 0.80 10/28/2024     (H) 06/25/2025     (H) 06/25/2025     COAGS: No results found for: \"PTT\", \"INR\", \"FIBR\"  POC: No results found for: \"BGM\", \"HCG\", \"HCGS\"  HEPATIC:   Lab Results   Component Value Date    ALBUMIN 4.5 10/28/2024    PROTTOTAL 7.8 10/28/2024    ALT 19 10/28/2024    AST 31 10/28/2024    ALKPHOS 56 10/28/2024    BILITOTAL 1.0 10/28/2024     OTHER:   Lab Results   Component Value Date    CHARLY 9.3 06/25/2025    PHOS 3.6 06/25/2025    MAG 2.0 06/25/2025    TSH 1.62 08/28/2023    SED 10 08/28/2023       Anesthesia Plan    ASA Status:  2      NPO Status: NPO Appropriate   Anesthesia Type: General.  Airway: oral.  Induction: intravenous.  Maintenance: TIVA.   Techniques and Equipment:     - Airway:   Arterial Line Kit: Radial     - Monitoring Plan: standard ASA monitoring, train of four monitoring, processed EEG monitor     Consents    Anesthesia Plan(s) and associated risks, benefits, and realistic alternatives discussed. Questions answered and patient/representative(s) expressed understanding.     - Discussed:     - Discussed with:  Patient               Postoperative Care    Pain management: non-narcotic analgesics, plan for postoperative opioid use, multimodal analgesia.     Comments:                   Jaguar Burger MD    I have reviewed the pertinent notes and labs in the chart from the past 30 days and (re)examined the patient.  Any updates or changes from those notes are reflected in this note.    Clinically Significant Risk Factors Present on Admission                                              "

## 2025-07-08 ENCOUNTER — ANESTHESIA (OUTPATIENT)
Dept: SURGERY | Facility: CLINIC | Age: 45
End: 2025-07-08
Payer: COMMERCIAL

## 2025-07-08 ENCOUNTER — APPOINTMENT (OUTPATIENT)
Dept: GENERAL RADIOLOGY | Facility: CLINIC | Age: 45
DRG: 402 | End: 2025-07-08
Attending: PHYSICIAN ASSISTANT
Payer: COMMERCIAL

## 2025-07-08 ENCOUNTER — APPOINTMENT (OUTPATIENT)
Dept: GENERAL RADIOLOGY | Facility: CLINIC | Age: 45
DRG: 402 | End: 2025-07-08
Attending: NEUROLOGICAL SURGERY
Payer: COMMERCIAL

## 2025-07-08 ENCOUNTER — HOSPITAL ENCOUNTER (INPATIENT)
Facility: CLINIC | Age: 45
DRG: 402 | End: 2025-07-08
Attending: NEUROLOGICAL SURGERY | Admitting: NEUROLOGICAL SURGERY
Payer: COMMERCIAL

## 2025-07-08 DIAGNOSIS — Z98.1 S/P LUMBAR FUSION: Primary | ICD-10-CM

## 2025-07-08 DIAGNOSIS — Z71.89 OTHER SPECIFIED COUNSELING: Chronic | ICD-10-CM

## 2025-07-08 DIAGNOSIS — M54.16 LUMBAR RADICULOPATHY: ICD-10-CM

## 2025-07-08 PROBLEM — M51.26 LUMBAR DISC HERNIATION: Status: ACTIVE | Noted: 2025-07-08

## 2025-07-08 LAB — GLUCOSE BLDC GLUCOMTR-MCNC: 94 MG/DL (ref 70–99)

## 2025-07-08 PROCEDURE — 01NR0ZZ RELEASE SACRAL NERVE, OPEN APPROACH: ICD-10-PCS | Performed by: NEUROLOGICAL SURGERY

## 2025-07-08 PROCEDURE — 250N000013 HC RX MED GY IP 250 OP 250 PS 637

## 2025-07-08 PROCEDURE — 250N000011 HC RX IP 250 OP 636: Performed by: NEUROLOGICAL SURGERY

## 2025-07-08 PROCEDURE — 258N000003 HC RX IP 258 OP 636

## 2025-07-08 PROCEDURE — 250N000025 HC SEVOFLURANE, PER MIN: Performed by: NEUROLOGICAL SURGERY

## 2025-07-08 PROCEDURE — 250N000011 HC RX IP 250 OP 636: Performed by: PHYSICIAN ASSISTANT

## 2025-07-08 PROCEDURE — 0SG30AJ FUSION OF LUMBOSACRAL JOINT WITH INTERBODY FUSION DEVICE, POSTERIOR APPROACH, ANTERIOR COLUMN, OPEN APPROACH: ICD-10-PCS | Performed by: NEUROLOGICAL SURGERY

## 2025-07-08 PROCEDURE — 250N000009 HC RX 250

## 2025-07-08 PROCEDURE — 61783 SCAN PROC SPINAL: CPT | Mod: GC | Performed by: NEUROLOGICAL SURGERY

## 2025-07-08 PROCEDURE — 01NB0ZZ RELEASE LUMBAR NERVE, OPEN APPROACH: ICD-10-PCS | Performed by: NEUROLOGICAL SURGERY

## 2025-07-08 PROCEDURE — 999N000182 XR SURGERY OARM

## 2025-07-08 PROCEDURE — 72080 X-RAY EXAM THORACOLMB 2/> VW: CPT | Mod: 26 | Performed by: RADIOLOGY

## 2025-07-08 PROCEDURE — 0QS004Z REPOSITION LUMBAR VERTEBRA WITH INTERNAL FIXATION DEVICE, OPEN APPROACH: ICD-10-PCS | Performed by: NEUROLOGICAL SURGERY

## 2025-07-08 PROCEDURE — 370N000017 HC ANESTHESIA TECHNICAL FEE, PER MIN: Performed by: NEUROLOGICAL SURGERY

## 2025-07-08 PROCEDURE — 360N000086 HC SURGERY LEVEL 6 W/ FLUORO, PER MIN: Performed by: NEUROLOGICAL SURGERY

## 2025-07-08 PROCEDURE — 22840 INSERT SPINE FIXATION DEVICE: CPT | Mod: 58 | Performed by: NEUROLOGICAL SURGERY

## 2025-07-08 PROCEDURE — C1713 ANCHOR/SCREW BN/BN,TIS/BN: HCPCS | Performed by: NEUROLOGICAL SURGERY

## 2025-07-08 PROCEDURE — 258N000003 HC RX IP 258 OP 636: Performed by: PHYSICIAN ASSISTANT

## 2025-07-08 PROCEDURE — 22214 INCIS 1 VERTEBRAL SEG LUMBAR: CPT | Mod: 58 | Performed by: NEUROLOGICAL SURGERY

## 2025-07-08 PROCEDURE — 250N000013 HC RX MED GY IP 250 OP 250 PS 637: Performed by: PHYSICIAN ASSISTANT

## 2025-07-08 PROCEDURE — 250N000011 HC RX IP 250 OP 636

## 2025-07-08 PROCEDURE — 999N000179 XR SURGERY CARM FLUORO LESS THAN 5 MIN W STILLS

## 2025-07-08 PROCEDURE — 999N000141 HC STATISTIC PRE-PROCEDURE NURSING ASSESSMENT: Performed by: NEUROLOGICAL SURGERY

## 2025-07-08 PROCEDURE — L8699 PROSTHETIC IMPLANT NOS: HCPCS | Performed by: NEUROLOGICAL SURGERY

## 2025-07-08 PROCEDURE — 0SG3071 FUSION OF LUMBOSACRAL JOINT WITH AUTOLOGOUS TISSUE SUBSTITUTE, POSTERIOR APPROACH, POSTERIOR COLUMN, OPEN APPROACH: ICD-10-PCS | Performed by: NEUROLOGICAL SURGERY

## 2025-07-08 PROCEDURE — 0ST40ZZ RESECTION OF LUMBOSACRAL DISC, OPEN APPROACH: ICD-10-PCS | Performed by: NEUROLOGICAL SURGERY

## 2025-07-08 PROCEDURE — 22633 ARTHRD CMBN 1NTRSPC LUMBAR: CPT | Mod: 58 | Performed by: NEUROLOGICAL SURGERY

## 2025-07-08 PROCEDURE — 710N000010 HC RECOVERY PHASE 1, LEVEL 2, PER MIN: Performed by: NEUROLOGICAL SURGERY

## 2025-07-08 PROCEDURE — C1762 CONN TISS, HUMAN(INC FASCIA): HCPCS | Performed by: NEUROLOGICAL SURGERY

## 2025-07-08 PROCEDURE — 999N000065 XR THORACIC LUMBAR STANDING 2 VIEWS

## 2025-07-08 PROCEDURE — 20930 SP BONE ALGRFT MORSEL ADD-ON: CPT | Mod: GC | Performed by: NEUROLOGICAL SURGERY

## 2025-07-08 PROCEDURE — 22853 INSJ BIOMECHANICAL DEVICE: CPT | Mod: 58 | Performed by: NEUROLOGICAL SURGERY

## 2025-07-08 PROCEDURE — 8E0WXBZ COMPUTER ASSISTED PROCEDURE OF TRUNK REGION: ICD-10-PCS | Performed by: NEUROLOGICAL SURGERY

## 2025-07-08 PROCEDURE — 4A11X4G MONITORING OF PERIPHERAL NERVOUS ELECTRICAL ACTIVITY, INTRAOPERATIVE, EXTERNAL APPROACH: ICD-10-PCS | Performed by: NEUROLOGICAL SURGERY

## 2025-07-08 PROCEDURE — 120N000002 HC R&B MED SURG/OB UMMC

## 2025-07-08 PROCEDURE — L0648 LSO SAG R AN/POS PNL PRE OTS: HCPCS

## 2025-07-08 PROCEDURE — 20936 SP BONE AGRFT LOCAL ADD-ON: CPT | Mod: GC | Performed by: NEUROLOGICAL SURGERY

## 2025-07-08 PROCEDURE — 272N000001 HC OR GENERAL SUPPLY STERILE: Performed by: NEUROLOGICAL SURGERY

## 2025-07-08 DEVICE — IMP SCR SET BONE EVEREST PA TI 2901-10001: Type: IMPLANTABLE DEVICE | Site: SPINE LUMBAR | Status: FUNCTIONAL

## 2025-07-08 DEVICE — IMP SCR SPINE EVEREST PEDICLE 6.5X45MM TI THRD 2911-06545: Type: IMPLANTABLE DEVICE | Site: SPINE LUMBAR | Status: FUNCTIONAL

## 2025-07-08 DEVICE — IMPLANTABLE DEVICE: Type: IMPLANTABLE DEVICE | Site: SPINE LUMBAR | Status: FUNCTIONAL

## 2025-07-08 DEVICE — GRAFT BONE INFUSE BMP MED 7510400: Type: IMPLANTABLE DEVICE | Site: SPINE LUMBAR | Status: FUNCTIONAL

## 2025-07-08 DEVICE — GRAFT BONE MAGNIFUSE 1CMX10CM 7509111: Type: IMPLANTABLE DEVICE | Site: SPINE LUMBAR | Status: FUNCTIONAL

## 2025-07-08 RX ORDER — HYDROMORPHONE HCL IN WATER/PF 6 MG/30 ML
0.2 PATIENT CONTROLLED ANALGESIA SYRINGE INTRAVENOUS
Status: DISCONTINUED | OUTPATIENT
Start: 2025-07-08 | End: 2025-07-10

## 2025-07-08 RX ORDER — FAMOTIDINE 20 MG/1
20 TABLET, FILM COATED ORAL 2 TIMES DAILY
Status: DISCONTINUED | OUTPATIENT
Start: 2025-07-08 | End: 2025-07-12 | Stop reason: HOSPADM

## 2025-07-08 RX ORDER — NALOXONE HYDROCHLORIDE 0.4 MG/ML
0.2 INJECTION, SOLUTION INTRAMUSCULAR; INTRAVENOUS; SUBCUTANEOUS
Status: DISCONTINUED | OUTPATIENT
Start: 2025-07-08 | End: 2025-07-12 | Stop reason: HOSPADM

## 2025-07-08 RX ORDER — AMOXICILLIN 250 MG
1 CAPSULE ORAL 2 TIMES DAILY
Status: DISCONTINUED | OUTPATIENT
Start: 2025-07-08 | End: 2025-07-09

## 2025-07-08 RX ORDER — FENTANYL CITRATE 50 UG/ML
50 INJECTION, SOLUTION INTRAMUSCULAR; INTRAVENOUS EVERY 5 MIN PRN
Status: DISCONTINUED | OUTPATIENT
Start: 2025-07-08 | End: 2025-07-08 | Stop reason: HOSPADM

## 2025-07-08 RX ORDER — CEFAZOLIN SODIUM/WATER 2 G/20 ML
2 SYRINGE (ML) INTRAVENOUS SEE ADMIN INSTRUCTIONS
Status: DISCONTINUED | OUTPATIENT
Start: 2025-07-08 | End: 2025-07-08 | Stop reason: HOSPADM

## 2025-07-08 RX ORDER — SODIUM CHLORIDE, SODIUM LACTATE, POTASSIUM CHLORIDE, CALCIUM CHLORIDE 600; 310; 30; 20 MG/100ML; MG/100ML; MG/100ML; MG/100ML
INJECTION, SOLUTION INTRAVENOUS CONTINUOUS
Status: DISCONTINUED | OUTPATIENT
Start: 2025-07-08 | End: 2025-07-08 | Stop reason: HOSPADM

## 2025-07-08 RX ORDER — FENTANYL CITRATE 50 UG/ML
25 INJECTION, SOLUTION INTRAMUSCULAR; INTRAVENOUS EVERY 5 MIN PRN
Status: DISCONTINUED | OUTPATIENT
Start: 2025-07-08 | End: 2025-07-08 | Stop reason: HOSPADM

## 2025-07-08 RX ORDER — BISACODYL 10 MG
10 SUPPOSITORY, RECTAL RECTAL DAILY PRN
Status: DISCONTINUED | OUTPATIENT
Start: 2025-07-11 | End: 2025-07-12 | Stop reason: HOSPADM

## 2025-07-08 RX ORDER — HYDROMORPHONE HCL IN WATER/PF 6 MG/30 ML
0.2 PATIENT CONTROLLED ANALGESIA SYRINGE INTRAVENOUS EVERY 5 MIN PRN
Status: DISCONTINUED | OUTPATIENT
Start: 2025-07-08 | End: 2025-07-08 | Stop reason: HOSPADM

## 2025-07-08 RX ORDER — CEFAZOLIN SODIUM 1 G/3ML
1 INJECTION, POWDER, FOR SOLUTION INTRAMUSCULAR; INTRAVENOUS EVERY 8 HOURS
Status: DISCONTINUED | OUTPATIENT
Start: 2025-07-08 | End: 2025-07-10

## 2025-07-08 RX ORDER — GLYCOPYRROLATE 0.2 MG/ML
INJECTION, SOLUTION INTRAMUSCULAR; INTRAVENOUS PRN
Status: DISCONTINUED | OUTPATIENT
Start: 2025-07-08 | End: 2025-07-08

## 2025-07-08 RX ORDER — NALOXONE HYDROCHLORIDE 0.4 MG/ML
0.4 INJECTION, SOLUTION INTRAMUSCULAR; INTRAVENOUS; SUBCUTANEOUS
Status: DISCONTINUED | OUTPATIENT
Start: 2025-07-08 | End: 2025-07-12 | Stop reason: HOSPADM

## 2025-07-08 RX ORDER — PROPOFOL 10 MG/ML
INJECTION, EMULSION INTRAVENOUS CONTINUOUS PRN
Status: DISCONTINUED | OUTPATIENT
Start: 2025-07-08 | End: 2025-07-08

## 2025-07-08 RX ORDER — DEXAMETHASONE SODIUM PHOSPHATE 4 MG/ML
INJECTION, SOLUTION INTRA-ARTICULAR; INTRALESIONAL; INTRAMUSCULAR; INTRAVENOUS; SOFT TISSUE PRN
Status: DISCONTINUED | OUTPATIENT
Start: 2025-07-08 | End: 2025-07-08

## 2025-07-08 RX ORDER — LIDOCAINE HYDROCHLORIDE 20 MG/ML
INJECTION, SOLUTION INFILTRATION; PERINEURAL PRN
Status: DISCONTINUED | OUTPATIENT
Start: 2025-07-08 | End: 2025-07-08

## 2025-07-08 RX ORDER — DEXAMETHASONE SODIUM PHOSPHATE 4 MG/ML
4 INJECTION, SOLUTION INTRA-ARTICULAR; INTRALESIONAL; INTRAMUSCULAR; INTRAVENOUS; SOFT TISSUE
Status: DISCONTINUED | OUTPATIENT
Start: 2025-07-08 | End: 2025-07-08 | Stop reason: HOSPADM

## 2025-07-08 RX ORDER — ONDANSETRON 2 MG/ML
4 INJECTION INTRAMUSCULAR; INTRAVENOUS EVERY 30 MIN PRN
Status: DISCONTINUED | OUTPATIENT
Start: 2025-07-08 | End: 2025-07-08 | Stop reason: HOSPADM

## 2025-07-08 RX ORDER — KETAMINE HYDROCHLORIDE 10 MG/ML
INJECTION INTRAMUSCULAR; INTRAVENOUS PRN
Status: DISCONTINUED | OUTPATIENT
Start: 2025-07-08 | End: 2025-07-08

## 2025-07-08 RX ORDER — POLYETHYLENE GLYCOL 3350 17 G/17G
17 POWDER, FOR SOLUTION ORAL DAILY
Status: DISCONTINUED | OUTPATIENT
Start: 2025-07-09 | End: 2025-07-09

## 2025-07-08 RX ORDER — LIDOCAINE 4 G/G
1-2 PATCH TOPICAL EVERY 24 HOURS
Status: DISCONTINUED | OUTPATIENT
Start: 2025-07-08 | End: 2025-07-10

## 2025-07-08 RX ORDER — OXYCODONE HYDROCHLORIDE 5 MG/1
5 TABLET ORAL EVERY 4 HOURS PRN
Status: DISCONTINUED | OUTPATIENT
Start: 2025-07-08 | End: 2025-07-09

## 2025-07-08 RX ORDER — METHOCARBAMOL 750 MG/1
750 TABLET, FILM COATED ORAL EVERY 6 HOURS PRN
Status: DISCONTINUED | OUTPATIENT
Start: 2025-07-08 | End: 2025-07-09

## 2025-07-08 RX ORDER — ACETAMINOPHEN 325 MG/1
975 TABLET ORAL ONCE
Status: COMPLETED | OUTPATIENT
Start: 2025-07-08 | End: 2025-07-08

## 2025-07-08 RX ORDER — LIDOCAINE 40 MG/G
CREAM TOPICAL
Status: DISCONTINUED | OUTPATIENT
Start: 2025-07-08 | End: 2025-07-08 | Stop reason: HOSPADM

## 2025-07-08 RX ORDER — SODIUM CHLORIDE 9 MG/ML
INJECTION, SOLUTION INTRAVENOUS CONTINUOUS
Status: DISCONTINUED | OUTPATIENT
Start: 2025-07-08 | End: 2025-07-09

## 2025-07-08 RX ORDER — LIDOCAINE 40 MG/G
CREAM TOPICAL
Status: DISCONTINUED | OUTPATIENT
Start: 2025-07-08 | End: 2025-07-12 | Stop reason: HOSPADM

## 2025-07-08 RX ORDER — LIDOCAINE HYDROCHLORIDE AND EPINEPHRINE 10; 10 MG/ML; UG/ML
INJECTION, SOLUTION INFILTRATION; PERINEURAL PRN
Status: DISCONTINUED | OUTPATIENT
Start: 2025-07-08 | End: 2025-07-08 | Stop reason: HOSPADM

## 2025-07-08 RX ORDER — OXYCODONE HYDROCHLORIDE 10 MG/1
10 TABLET ORAL EVERY 4 HOURS PRN
Status: DISCONTINUED | OUTPATIENT
Start: 2025-07-08 | End: 2025-07-09

## 2025-07-08 RX ORDER — PROCHLORPERAZINE MALEATE 10 MG
10 TABLET ORAL EVERY 6 HOURS PRN
Status: DISCONTINUED | OUTPATIENT
Start: 2025-07-08 | End: 2025-07-12 | Stop reason: HOSPADM

## 2025-07-08 RX ORDER — VANCOMYCIN HYDROCHLORIDE 1 G/20ML
INJECTION, POWDER, LYOPHILIZED, FOR SOLUTION INTRAVENOUS PRN
Status: DISCONTINUED | OUTPATIENT
Start: 2025-07-08 | End: 2025-07-08 | Stop reason: HOSPADM

## 2025-07-08 RX ORDER — ONDANSETRON 4 MG/1
4 TABLET, ORALLY DISINTEGRATING ORAL EVERY 30 MIN PRN
Status: DISCONTINUED | OUTPATIENT
Start: 2025-07-08 | End: 2025-07-08 | Stop reason: HOSPADM

## 2025-07-08 RX ORDER — ONDANSETRON 2 MG/ML
INJECTION INTRAMUSCULAR; INTRAVENOUS PRN
Status: DISCONTINUED | OUTPATIENT
Start: 2025-07-08 | End: 2025-07-08

## 2025-07-08 RX ORDER — ONDANSETRON 2 MG/ML
4 INJECTION INTRAMUSCULAR; INTRAVENOUS EVERY 6 HOURS PRN
Status: DISCONTINUED | OUTPATIENT
Start: 2025-07-08 | End: 2025-07-12 | Stop reason: HOSPADM

## 2025-07-08 RX ORDER — HYDROMORPHONE HCL IN WATER/PF 6 MG/30 ML
0.4 PATIENT CONTROLLED ANALGESIA SYRINGE INTRAVENOUS EVERY 5 MIN PRN
Status: DISCONTINUED | OUTPATIENT
Start: 2025-07-08 | End: 2025-07-08 | Stop reason: HOSPADM

## 2025-07-08 RX ORDER — SODIUM CHLORIDE, SODIUM LACTATE, POTASSIUM CHLORIDE, CALCIUM CHLORIDE 600; 310; 30; 20 MG/100ML; MG/100ML; MG/100ML; MG/100ML
INJECTION, SOLUTION INTRAVENOUS CONTINUOUS PRN
Status: DISCONTINUED | OUTPATIENT
Start: 2025-07-08 | End: 2025-07-08

## 2025-07-08 RX ORDER — CEFAZOLIN SODIUM/WATER 2 G/20 ML
2 SYRINGE (ML) INTRAVENOUS
Status: DISCONTINUED | OUTPATIENT
Start: 2025-07-08 | End: 2025-07-08 | Stop reason: HOSPADM

## 2025-07-08 RX ORDER — HYDROMORPHONE HCL IN WATER/PF 6 MG/30 ML
0.4 PATIENT CONTROLLED ANALGESIA SYRINGE INTRAVENOUS
Status: DISCONTINUED | OUTPATIENT
Start: 2025-07-08 | End: 2025-07-10

## 2025-07-08 RX ORDER — ONDANSETRON 4 MG/1
4 TABLET, ORALLY DISINTEGRATING ORAL EVERY 6 HOURS PRN
Status: DISCONTINUED | OUTPATIENT
Start: 2025-07-08 | End: 2025-07-12 | Stop reason: HOSPADM

## 2025-07-08 RX ORDER — PROPOFOL 10 MG/ML
INJECTION, EMULSION INTRAVENOUS PRN
Status: DISCONTINUED | OUTPATIENT
Start: 2025-07-08 | End: 2025-07-08

## 2025-07-08 RX ORDER — FENTANYL CITRATE 50 UG/ML
INJECTION, SOLUTION INTRAMUSCULAR; INTRAVENOUS PRN
Status: DISCONTINUED | OUTPATIENT
Start: 2025-07-08 | End: 2025-07-08

## 2025-07-08 RX ORDER — SODIUM CHLORIDE, SODIUM GLUCONATE, SODIUM ACETATE, POTASSIUM CHLORIDE AND MAGNESIUM CHLORIDE 526; 502; 368; 37; 30 MG/100ML; MG/100ML; MG/100ML; MG/100ML; MG/100ML
INJECTION, SOLUTION INTRAVENOUS CONTINUOUS PRN
Status: DISCONTINUED | OUTPATIENT
Start: 2025-07-08 | End: 2025-07-08

## 2025-07-08 RX ORDER — NALOXONE HYDROCHLORIDE 0.4 MG/ML
0.1 INJECTION, SOLUTION INTRAMUSCULAR; INTRAVENOUS; SUBCUTANEOUS
Status: DISCONTINUED | OUTPATIENT
Start: 2025-07-08 | End: 2025-07-08 | Stop reason: HOSPADM

## 2025-07-08 RX ORDER — PREGABALIN 75 MG/1
150 CAPSULE ORAL 3 TIMES DAILY
Status: DISCONTINUED | OUTPATIENT
Start: 2025-07-08 | End: 2025-07-09

## 2025-07-08 RX ADMIN — Medication 10 MG: at 12:01

## 2025-07-08 RX ADMIN — Medication 10 MG: at 09:12

## 2025-07-08 RX ADMIN — PROPOFOL 120 MG: 10 INJECTION, EMULSION INTRAVENOUS at 08:09

## 2025-07-08 RX ADMIN — MIDAZOLAM 2 MG: 1 INJECTION INTRAMUSCULAR; INTRAVENOUS at 08:05

## 2025-07-08 RX ADMIN — HYDROMORPHONE HYDROCHLORIDE 0.4 MG: 0.2 INJECTION, SOLUTION INTRAMUSCULAR; INTRAVENOUS; SUBCUTANEOUS at 13:16

## 2025-07-08 RX ADMIN — PROPOFOL 150 MCG/KG/MIN: 10 INJECTION, EMULSION INTRAVENOUS at 08:27

## 2025-07-08 RX ADMIN — FENTANYL CITRATE 50 MCG: 50 INJECTION, SOLUTION INTRAMUSCULAR; INTRAVENOUS at 12:58

## 2025-07-08 RX ADMIN — SODIUM CHLORIDE, SODIUM LACTATE, POTASSIUM CHLORIDE, AND CALCIUM CHLORIDE: .6; .31; .03; .02 INJECTION, SOLUTION INTRAVENOUS at 08:02

## 2025-07-08 RX ADMIN — HYDROMORPHONE HYDROCHLORIDE 0.4 MG: 0.2 INJECTION, SOLUTION INTRAMUSCULAR; INTRAVENOUS; SUBCUTANEOUS at 23:58

## 2025-07-08 RX ADMIN — HYDROMORPHONE HYDROCHLORIDE 0.4 MG: 0.2 INJECTION, SOLUTION INTRAMUSCULAR; INTRAVENOUS; SUBCUTANEOUS at 13:39

## 2025-07-08 RX ADMIN — HYDROMORPHONE HYDROCHLORIDE 0.5 MG: 1 INJECTION, SOLUTION INTRAMUSCULAR; INTRAVENOUS; SUBCUTANEOUS at 12:22

## 2025-07-08 RX ADMIN — FENTANYL CITRATE 50 MCG: 50 INJECTION, SOLUTION INTRAMUSCULAR; INTRAVENOUS at 12:54

## 2025-07-08 RX ADMIN — Medication 10 MG: at 10:01

## 2025-07-08 RX ADMIN — FAMOTIDINE 20 MG: 20 TABLET, FILM COATED ORAL at 20:13

## 2025-07-08 RX ADMIN — CEFAZOLIN 1 G: 1 INJECTION, POWDER, FOR SOLUTION INTRAMUSCULAR; INTRAVENOUS at 17:31

## 2025-07-08 RX ADMIN — DEXMEDETOMIDINE HYDROCHLORIDE 8 MCG: 100 INJECTION, SOLUTION INTRAVENOUS at 11:05

## 2025-07-08 RX ADMIN — GLYCOPYRROLATE 0.2 MG: 0.2 INJECTION, SOLUTION INTRAMUSCULAR; INTRAVENOUS at 08:49

## 2025-07-08 RX ADMIN — DEXMEDETOMIDINE HYDROCHLORIDE 8 MCG: 100 INJECTION, SOLUTION INTRAVENOUS at 08:54

## 2025-07-08 RX ADMIN — ONDANSETRON 4 MG: 2 INJECTION INTRAMUSCULAR; INTRAVENOUS at 12:04

## 2025-07-08 RX ADMIN — SUCCINYLCHOLINE CHLORIDE 80 MG: 20 INJECTION, SOLUTION INTRAMUSCULAR; INTRAVENOUS; PARENTERAL at 08:13

## 2025-07-08 RX ADMIN — OXYCODONE HYDROCHLORIDE 10 MG: 10 TABLET ORAL at 18:48

## 2025-07-08 RX ADMIN — Medication 10 MG: at 11:05

## 2025-07-08 RX ADMIN — OXYCODONE HYDROCHLORIDE 10 MG: 10 TABLET ORAL at 14:15

## 2025-07-08 RX ADMIN — PREGABALIN 150 MG: 75 CAPSULE ORAL at 20:13

## 2025-07-08 RX ADMIN — FENTANYL CITRATE 100 MCG: 50 INJECTION INTRAMUSCULAR; INTRAVENOUS at 08:09

## 2025-07-08 RX ADMIN — Medication 2 G: at 08:29

## 2025-07-08 RX ADMIN — DEXMEDETOMIDINE HYDROCHLORIDE 8 MCG: 100 INJECTION, SOLUTION INTRAVENOUS at 10:03

## 2025-07-08 RX ADMIN — SODIUM CHLORIDE, SODIUM GLUCONATE, SODIUM ACETATE, POTASSIUM CHLORIDE AND MAGNESIUM CHLORIDE: 526; 502; 368; 37; 30 INJECTION, SOLUTION INTRAVENOUS at 08:02

## 2025-07-08 RX ADMIN — SODIUM CHLORIDE: 0.9 INJECTION, SOLUTION INTRAVENOUS at 13:31

## 2025-07-08 RX ADMIN — LIDOCAINE HYDROCHLORIDE 60 MG: 20 INJECTION, SOLUTION INFILTRATION; PERINEURAL at 08:09

## 2025-07-08 RX ADMIN — HYDROMORPHONE HYDROCHLORIDE 0.4 MG: 0.2 INJECTION, SOLUTION INTRAMUSCULAR; INTRAVENOUS; SUBCUTANEOUS at 17:24

## 2025-07-08 RX ADMIN — SODIUM CHLORIDE 0.05 MCG/KG/MIN: 0.9 INJECTION, SOLUTION INTRAVENOUS at 08:19

## 2025-07-08 RX ADMIN — HYDROMORPHONE HYDROCHLORIDE 0.5 MG: 1 INJECTION, SOLUTION INTRAMUSCULAR; INTRAVENOUS; SUBCUTANEOUS at 12:31

## 2025-07-08 RX ADMIN — SENNOSIDES AND DOCUSATE SODIUM 1 TABLET: 50; 8.6 TABLET ORAL at 20:13

## 2025-07-08 RX ADMIN — DEXAMETHASONE SODIUM PHOSPHATE 4 MG: 4 INJECTION, SOLUTION INTRAMUSCULAR; INTRAVENOUS at 08:52

## 2025-07-08 RX ADMIN — ACETAMINOPHEN 975 MG: 325 TABLET ORAL at 06:37

## 2025-07-08 RX ADMIN — METHOCARBAMOL 750 MG: 750 TABLET ORAL at 18:48

## 2025-07-08 RX ADMIN — HYDROMORPHONE HYDROCHLORIDE 0.4 MG: 0.2 INJECTION, SOLUTION INTRAMUSCULAR; INTRAVENOUS; SUBCUTANEOUS at 21:37

## 2025-07-08 RX ADMIN — DEXMEDETOMIDINE HYDROCHLORIDE 8 MCG: 100 INJECTION, SOLUTION INTRAVENOUS at 12:01

## 2025-07-08 ASSESSMENT — ACTIVITIES OF DAILY LIVING (ADL)
ADLS_ACUITY_SCORE: 38
ADLS_ACUITY_SCORE: 37
ADLS_ACUITY_SCORE: 36
ADLS_ACUITY_SCORE: 36
ADLS_ACUITY_SCORE: 38
ADLS_ACUITY_SCORE: 38
ADLS_ACUITY_SCORE: 39
ADLS_ACUITY_SCORE: 36
ADLS_ACUITY_SCORE: 39
ADLS_ACUITY_SCORE: 38
ADLS_ACUITY_SCORE: 38
ADLS_ACUITY_SCORE: 39
ADLS_ACUITY_SCORE: 39
ADLS_ACUITY_SCORE: 38
ADLS_ACUITY_SCORE: 38
ADLS_ACUITY_SCORE: 39
ADLS_ACUITY_SCORE: 38
ADLS_ACUITY_SCORE: 38

## 2025-07-08 NOTE — LETTER
[facility] [address]  [address] [facility] [facility] [address]  Phone: [phone]    July 12, 2025        Ba Mireles  [address] [facility] [address]    To whom it may concern:    RE: Barodriguez Mireles was seen and treated at our hospital between 7/8/2025 and 7/12/2025 and will need to be off work due to his activity limitations at least for two weeks. More detailed restrictions and complete time off work will be provided at 2-week ayesha.     Please contact me for questions or concerns.    Sincerely,  Marcelino Manriquez MD   Neurosurgery

## 2025-07-08 NOTE — BRIEF OP NOTE
[facility]    Brief Operative Note    Pre-operative diagnosis: Lumbar disc herniation with radiculopathy [M51.16]  Post-operative diagnosis Same as pre-operative diagnosis    Procedure: o-arm/stealth assisted Lumbar 5-Sacral 1 Transforaminal Lumbar Interbody Fusion with Bone Morphogenic Protein and Interbody cage, N/A - Spine    Surgeon: Surgeons and Role:     * Misha Winter MD - Primary     * Montserrat Sosa MD - Resident - Assisting  Anesthesia: General   Estimated Blood Loss: 50 mL    Drains: Hemovac  Specimens: * No specimens in log *  Findings:   L5-S1 TLIF completed, skin closed with nylon..  Complications: None.  Implants:   Implant Name Type Inv. Item Serial No.  Lot No. LRB No. Used Action   IMP SCR SPINE EVEREST PEDICLE 6.5X45MM TI THRD [id number] - SNA [employer]ware/[place] IMP SCR SPINE EVEREST PEDICLE 6.5X45MM TI THRD [id number] NA K2M NA N/A 2 Implanted   SCREW BN 45MM 7.5MM PA NS VRST SPNE [id number] - [employer]NA Metallic Hardware/[place] SCREW BN 45MM 7.5MM PA NS VRST SPNE [id number] NA [employer] NA N/A 2 Implanted   IMP SCR SET BONE EVEREST PA TI [id number] - SNA Metallic Hardware/[place] IMP SCR SET BONE EVEREST PA TI [id number] NA K2M NA N/A 4 Implanted   GRAFT BONE INFUSE BMP MED [id number] - [id number] Bone/Tissue Synthetic GRAFT BONE INFUSE BMP MED [id number]  [employer] [id number] N/A 1 Implanted   CAGE SPNL 24X8.5X8MM CASCADIA 12D TI POR [id number] - [id number] Metallic Hardware/[place] CAGE SPNL 24X8.5X8MM CASCADIA 12D TI POR [id number]  VALDO SP [id number] N/A 2 Implanted   GRAFT BONE MAGNIFUSE [id number] Bone/Tissue/Biologic GRAFT BONE MAGNIFUSE [id number] [employer]-DANEK  N/A 1 Implanted   IMP ANGI SPINE SERGIO PEDICLE STR 5.5X45MM [id number] - SNA Metallic Hardware/[place] IMP ANGI SPINE SERGIO PEDICLE STR 5.5X45MM -[id number] NA K2M NA N/A 2 Implanted

## 2025-07-08 NOTE — TELEPHONE ENCOUNTER
BRIEF TELEPHONE NOTE     Called patient regarding his pre-operative questions, as no clinic staff had reached out to patient regarding surgery tomorrow. Confirmed NPO at midnight. Patient denied any blood thinner use. Confirmed patient will take CHG shower as instructed.     Patient additionally requesting a social work consult be placed during admission. Will order when admitted.    Patient confirms he knows where to present tomorrow morning and will plan to arrive by 6am.    China Rodrigez MD  Neurosurgery PGY-2  Personal pager #[phone]  Please page #[phone] (urgent)/OSBALDO (non-urgent) the on-call neurosurgery resident per [facility] with questions

## 2025-07-08 NOTE — OP NOTE
PATIENT NAME:   Ba Mireles  PATIENT MRN:   [id number]  PATIENT ACCOUNT:  [id number]  PATIENT YOB: 1980    NEUROSURGERY OPERATIVE REPORT     DATE OF SURGERY: 07/08/25     PREOPERATIVE DIAGNOSIS:  1. ***     POSTOPERATIVE DIAGNOSIS:  1. ***     PROCEDURES PERFORMED:  1. ***     STAFF SURGEON: ***, MD     RESIDENT SURGEONS: Montserrat Sosa MD     ANESTHESIA: General endotracheal anesthesia     ESTIMATED BLOOD LOSS: *** mL     IMPLANTS: ***    EXPLANTS: ***     DRAINS: ***     FINDINGS:  ***     COMPLICATIONS: ***     INDICATIONS: ***     DESCRIPTION OF PROCEDURE:  Ba Mireles was brought to the operating room, and his identity was verified. The patient was transferred in the *** position to the operating table. General endotracheal anesthesia*** was induced. The bed was turned *** degrees. Both arms were tucked. The patient's head was ***. Preoperative antibiotics were administered.     The patient was cleaned, prepared, and draped in sterile fashion. *** mL of local anesthetic was used. Timeout was performed.     ***     Thereafter, we closed in layers with inverted, interrupted 2-0 vicryl for the galea and 3-0 running nylon for the skin. Both exiting drains were secured to the skin at their exit sites using 3-0 nylon suture. Dg Ag bulbs were attached to the bilateral drains and placed to thumbprint suction. Sterile dressings were applied. The patient was extubated and returned to the postanesthesia care unit without incident. At the end of the procedure, sponge and needle counts were correct. Estimated blood loss totaled *** ml.      *** was present for the critical portions of the procedure and immediately available for the remainder.     Operative note prepared by Montserrat Sosa MD, Neurosurgery Resident, PGY-7    surgery. Written consent was obtained.     DESCRIPTION OF PROCEDURE:  Ba Mireles was brought to the operating room, and his identity was verified. General endotracheal anesthesia was induced. Neuromonioring leads were placed. He was transferred to the Dg table in prone position. All pressure points were well padded, and arms were placed in superman position. Preoperative antibiotics were administered.     The patient was cleaned, prepared, and draped in sterile fashion. 10 mL of local anesthetic was used. Timeout was performed.     A #10 blade scalpel was used to make a midline incision. Monopolar cautery was used to dissect down to the posterior elements of the spine. Monopolar and hodges dissection were used in subperiosteal fashion to expose the posterior elements of the spine. The stealth frame was clamped to the spine, and an o-arm spin was obtained with good accuracy.    Screws were then placed in the following fashion bilaterally at L5 and S1. The entry point was noted on stealth and a divot was drilled. A  whole was then drilled under stealth guidance. This was tapped, then the screw was placed, all under stealth guidance. Pedicle feeling probes were used to ensure no breaching with each step. An O-arm spin was then obtained showing good position of the screws.    We then proceeded to perform the bilateral santana bernard osteotomies with osteotomes and Kerrison rongeurs and the inferior/superior articular processes were removed. Bone was saved from this step as autograft harvest. Bilateral TLIF distracters were then placed on the screw heads and distracted across L5 and S1. The disc was entered using a #15 blade, and the discectomy completed. The endplates were prepped using chava and curettes bilaterally, with pituitary forceps removing loose tissue. The TLIF implants were then sized, prepped with BMP and autograft and packed into the interspace for L5-S1 interbody arthrodesis.. X-ray  confirmed proper placement of the bilateral cages.    The rods were then sized and placed. The screws were slightly compressed for proper planned alignment, and the set screws were final tightened over the rods. The area was irrigated copiously. Meticulous hemostasis was achieved. The exposed bony elements were decorticated, and autograft and Magnifuse allograft were placed over the decorticated area bilaterally for posterior arthrodesis. A hemovac drain was then placed in the subfascial space, and tunneled away from the incision.     Thereafter, we closed in layers with interrupted 0 vicryl suture for the fascia, followed by inverted interrupted 2-0 vicryl suture for the dermal layer, followed by running, locking, 3-0 nylon for the skin. Sterile dressings were applied. The hemovac drain was secured and connected. Monitoring remained stable throughout. The patient was extubated and returned to the postanesthesia care unit without incident. At the end of the procedure, sponge and needle counts were correct. Estimated blood loss totaled 50 ml.     Dr. Winter was present for the entire procedure.     Operative note prepared by Montserrat Sosa MD, Neurosurgery Resident, PGY-7       Addendum:    I agree with the operative report as documented in the resident's note.  I was present for the entire procedure.    Misha Winter MD

## 2025-07-08 NOTE — PHARMACY-ADMISSION MEDICATION HISTORY
Pharmacy Intern Admission Medication History    Admission medication history is complete. The information provided in this note is only as accurate as the sources available at the time of the update.    Information Source(s): Patient and CareEverywhere/SureScripts via in-person    Pertinent Information:   Patient is a moderate historian. States that he usually has a notebook of when he takes his medication at home but did not bring it with him.   Took tizanidine 4 mg tablets and ran out so switched to tizanidine 4 mg capsules.    Changes made to PTA medication list:  Added: None  Deleted:   Ibuprofen 200 mg tablet - patient reports no longer taking  Changed: None    Allergies reviewed with patient and updates made in EHR: yes    Medication History Completed By: Rosetta Henriquez 7/8/2025 6:25 PM    PTA Med List   Medication Sig Note Last Dose/Taking    diazepam (VALIUM) 5 MG tablet Take 1 tablet (5 mg) by mouth every 6 hours as needed for muscle spasms.  7/7/2025 Bedtime    Lidocaine (LIDOCARE) 4 % Patch Place onto the skin every 24 hours. To prevent lidocaine toxicity, patient should be patch free for 12 hrs daily.  7/7/2025    oxyCODONE IR (ROXICODONE) 10 MG tablet Take 0.5-1 tablets (5-10 mg) by mouth every 4 hours as needed for severe pain.  7/8/2025 at  1:00 AM    polyethylene glycol (MIRALAX) 17 GM/Dose powder Take 17 g by mouth daily. 7/3/2025: Hold DOS 7/7/2025 Noon    pregabalin (LYRICA) 150 MG capsule Take 1 capsule (150 mg) by mouth 3 times daily.  7/8/2025 at  1:00 AM    senna-docusate (SENOKOT-S/PERICOLACE) 8.6-50 MG tablet Take 1 tablet by mouth 2 times daily for 17 days. 7/3/2025: Hold DOS 7/7/2025 Noon    tiZANidine (ZANAFLEX) 4 MG capsule Take 1-2 capsules (4-8 mg) by mouth 3 times daily as needed for muscle spasms.  7/7/2025 at 10:00 PM

## 2025-07-08 NOTE — ANESTHESIA CARE TRANSFER NOTE
Patient: Ba Mireles    Procedure: Procedure(s):  o-arm/stealth assisted Lumbar 5-Sacral 1 Transforaminal Lumbar Interbody Fusion with Bone Morphogenic Protein and Interbody cage       Diagnosis: Lumbar disc herniation with radiculopathy [M51.16]  Diagnosis Additional Information: No value filed.    Anesthesia Type:   General     Note:    Oropharynx: oropharynx clear of all foreign objects and spontaneously breathing  Level of Consciousness: awake  Oxygen Supplementation: face mask  Level of Supplemental Oxygen (L/min / FiO2): 4  Independent Airway: airway patency satisfactory and stable  Dentition: dentition unchanged  Vital Signs Stable: post-procedure vital signs reviewed and stable  Report to RN Given: handoff report given  Patient transferred to: PACU    Handoff Report: Identifed the Patient, Identified the Reponsible Provider, Reviewed the pertinent medical history, Discussed the surgical course, Reviewed Intra-OP anesthesia mangement and issues during anesthesia, Set expectations for post-procedure period and Allowed opportunity for questions and acknowledgement of understanding      Vitals:  Vitals Value Taken Time   /81 07/08/25 12:49   Temp     Pulse 62 07/08/25 12:54   Resp 14 07/08/25 12:54   SpO2 100 % 07/08/25 12:54   Vitals shown include unfiled device data.    Electronically Signed By: Jaguar Burger MD  July 8, 2025  12:54 PM

## 2025-07-08 NOTE — ANESTHESIA POSTPROCEDURE EVALUATION
Patient: Ba Mireles    Procedure: Procedure(s):  o-arm/stealth assisted Lumbar 5-Sacral 1 Transforaminal Lumbar Interbody Fusion with Bone Morphogenic Protein and Interbody cage       Anesthesia Type:  General    Note:  Disposition: Admission   Postop Pain Control: Uneventful            Sign Out: Well controlled pain   PONV: No   Neuro/Psych: Uneventful            Sign Out: Acceptable/Baseline neuro status   Airway/Respiratory: Uneventful            Sign Out: Acceptable/Baseline resp. status   CV/Hemodynamics: Uneventful            Sign Out: Acceptable CV status; No obvious hypovolemia; No obvious fluid overload   Other NRE: NONE   DID A NON-ROUTINE EVENT OCCUR? No           Last vitals:  Vitals Value Taken Time   /89 07/08/25 13:55   Temp 36.6  C (97.8  F) 07/08/25 13:55   Pulse 59 07/08/25 13:56   Resp 14 07/08/25 13:56   SpO2 100 % 07/08/25 13:56   Vitals shown include unfiled device data.    Electronically Signed By: Ailyn Jean Baptiste MD  July 8, 2025  1:57 PM

## 2025-07-08 NOTE — ANESTHESIA PROCEDURE NOTES
Airway       Patient location during procedure: OR       Procedure Start/Stop Times: 7/8/2025 8:14 AM  Staff -        Anesthesiologist:  Rosales Calero MD       Resident/Fellow: Jaguar Burger MD       Performed By: resident and with residents       Procedure performed by resident/fellow/CRNA in presence of a teaching physician.    Consent for Airway        Urgency: elective  Indications and Patient Condition       Indications for airway management: grecia-procedural       Induction type:intravenous       Mask difficulty assessment: 1 - vent by mask    Final Airway Details       Final airway type: endotracheal airway       Successful airway: ETT - single  Endotracheal Airway Details        ETT size (mm): 7.5       Cuffed: yes       Successful intubation technique: direct laryngoscopy       DL Blade Type: MAC 4       Grade View of Cords: 1       Adjucts: stylet       Position: Right       Measured from: gums/teeth       Secured at (cm): 22       Bite block used: None    Post intubation assessment        Placement verified by: capnometry, equal breath sounds and chest rise        Number of attempts at approach: 1       Number of other approaches attempted: 0       Secured with: tape       Ease of procedure: easy       Dentition: Unchanged    Medication(s) Administered   Medication Administration Time: 7/8/2025 8:14 AM

## 2025-07-09 ENCOUNTER — APPOINTMENT (OUTPATIENT)
Dept: GENERAL RADIOLOGY | Facility: CLINIC | Age: 45
DRG: 402 | End: 2025-07-09
Attending: NURSE PRACTITIONER
Payer: COMMERCIAL

## 2025-07-09 ENCOUNTER — APPOINTMENT (OUTPATIENT)
Dept: GENERAL RADIOLOGY | Facility: CLINIC | Age: 45
DRG: 402 | End: 2025-07-09
Payer: COMMERCIAL

## 2025-07-09 ENCOUNTER — APPOINTMENT (OUTPATIENT)
Dept: PHYSICAL THERAPY | Facility: CLINIC | Age: 45
DRG: 402 | End: 2025-07-09
Attending: PHYSICIAN ASSISTANT
Payer: COMMERCIAL

## 2025-07-09 LAB
ANION GAP SERPL CALCULATED.3IONS-SCNC: 10 MMOL/L (ref 7–15)
BASOPHILS # BLD AUTO: 0.1 10E3/UL (ref 0–0.2)
BASOPHILS NFR BLD AUTO: 1 %
BUN SERPL-MCNC: 14.7 MG/DL (ref 6–20)
CALCIUM SERPL-MCNC: 9.1 MG/DL (ref 8.8–10.4)
CHLORIDE SERPL-SCNC: 104 MMOL/L (ref 98–107)
CREAT SERPL-MCNC: 0.85 MG/DL (ref 0.67–1.17)
EGFRCR SERPLBLD CKD-EPI 2021: >90 ML/MIN/1.73M2
EOSINOPHIL # BLD AUTO: 0.1 10E3/UL (ref 0–0.7)
EOSINOPHIL NFR BLD AUTO: 1 %
ERYTHROCYTE [DISTWIDTH] IN BLOOD BY AUTOMATED COUNT: 12.6 % (ref 10–15)
GLUCOSE SERPL-MCNC: 98 MG/DL (ref 70–99)
HCO3 SERPL-SCNC: 22 MMOL/L (ref 22–29)
HCT VFR BLD AUTO: 34.9 % (ref 40–53)
HGB BLD-MCNC: 11.8 G/DL (ref 13.3–17.7)
IMM GRANULOCYTES # BLD: 0 10E3/UL
IMM GRANULOCYTES NFR BLD: 0 %
LYMPHOCYTES # BLD AUTO: 3.3 10E3/UL (ref 0.8–5.3)
LYMPHOCYTES NFR BLD AUTO: 29 %
MCH RBC QN AUTO: 30.3 PG (ref 26.5–33)
MCHC RBC AUTO-ENTMCNC: 33.8 G/DL (ref 31.5–36.5)
MCV RBC AUTO: 90 FL (ref 78–100)
MONOCYTES # BLD AUTO: 1.4 10E3/UL (ref 0–1.3)
MONOCYTES NFR BLD AUTO: 12 %
NEUTROPHILS # BLD AUTO: 6.7 10E3/UL (ref 1.6–8.3)
NEUTROPHILS NFR BLD AUTO: 58 %
NRBC # BLD AUTO: 0 10E3/UL
NRBC BLD AUTO-RTO: 0 /100
PLATELET # BLD AUTO: 320 10E3/UL (ref 150–450)
POTASSIUM SERPL-SCNC: 4 MMOL/L (ref 3.4–5.3)
RBC # BLD AUTO: 3.9 10E6/UL (ref 4.4–5.9)
SODIUM SERPL-SCNC: 136 MMOL/L (ref 135–145)
WBC # BLD AUTO: 11.6 10E3/UL (ref 4–11)

## 2025-07-09 PROCEDURE — 81001 URINALYSIS AUTO W/SCOPE: CPT

## 2025-07-09 PROCEDURE — 250N000013 HC RX MED GY IP 250 OP 250 PS 637: Performed by: PHYSICIAN ASSISTANT

## 2025-07-09 PROCEDURE — 97161 PT EVAL LOW COMPLEX 20 MIN: CPT | Mod: GP | Performed by: PHYSICAL THERAPIST

## 2025-07-09 PROCEDURE — 97530 THERAPEUTIC ACTIVITIES: CPT | Mod: GP | Performed by: PHYSICAL THERAPIST

## 2025-07-09 PROCEDURE — 73630 X-RAY EXAM OF FOOT: CPT | Mod: LT

## 2025-07-09 PROCEDURE — 73610 X-RAY EXAM OF ANKLE: CPT | Mod: LT

## 2025-07-09 PROCEDURE — 36415 COLL VENOUS BLD VENIPUNCTURE: CPT

## 2025-07-09 PROCEDURE — 73630 X-RAY EXAM OF FOOT: CPT | Mod: 26 | Performed by: STUDENT IN AN ORGANIZED HEALTH CARE EDUCATION/TRAINING PROGRAM

## 2025-07-09 PROCEDURE — 85018 HEMOGLOBIN: CPT

## 2025-07-09 PROCEDURE — 120N000002 HC R&B MED SURG/OB UMMC

## 2025-07-09 PROCEDURE — 73610 X-RAY EXAM OF ANKLE: CPT | Mod: 26 | Performed by: STUDENT IN AN ORGANIZED HEALTH CARE EDUCATION/TRAINING PROGRAM

## 2025-07-09 PROCEDURE — 82565 ASSAY OF CREATININE: CPT

## 2025-07-09 PROCEDURE — 250N000011 HC RX IP 250 OP 636: Performed by: PHYSICIAN ASSISTANT

## 2025-07-09 PROCEDURE — 250N000013 HC RX MED GY IP 250 OP 250 PS 637

## 2025-07-09 PROCEDURE — 97116 GAIT TRAINING THERAPY: CPT | Mod: GP | Performed by: PHYSICAL THERAPIST

## 2025-07-09 PROCEDURE — 999N000111 HC STATISTIC OT IP EVAL DEFER

## 2025-07-09 PROCEDURE — 250N000013 HC RX MED GY IP 250 OP 250 PS 637: Performed by: NURSE PRACTITIONER

## 2025-07-09 RX ORDER — PREGABALIN 100 MG/1
200 CAPSULE ORAL 3 TIMES DAILY
Status: DISCONTINUED | OUTPATIENT
Start: 2025-07-10 | End: 2025-07-12 | Stop reason: HOSPADM

## 2025-07-09 RX ORDER — OXYCODONE HYDROCHLORIDE 10 MG/1
10 TABLET ORAL
Refills: 0 | Status: DISCONTINUED | OUTPATIENT
Start: 2025-07-09 | End: 2025-07-11

## 2025-07-09 RX ORDER — OXYCODONE HYDROCHLORIDE 5 MG/1
5 TABLET ORAL
Refills: 0 | Status: DISCONTINUED | OUTPATIENT
Start: 2025-07-09 | End: 2025-07-11

## 2025-07-09 RX ORDER — DIAZEPAM 5 MG/1
5 TABLET ORAL EVERY 6 HOURS PRN
Status: DISCONTINUED | OUTPATIENT
Start: 2025-07-09 | End: 2025-07-12 | Stop reason: HOSPADM

## 2025-07-09 RX ORDER — TAMSULOSIN HYDROCHLORIDE 0.4 MG/1
0.4 CAPSULE ORAL DAILY
Status: DISCONTINUED | OUTPATIENT
Start: 2025-07-10 | End: 2025-07-10

## 2025-07-09 RX ORDER — POLYETHYLENE GLYCOL 3350 17 G/17G
17 POWDER, FOR SOLUTION ORAL 3 TIMES DAILY
Status: DISCONTINUED | OUTPATIENT
Start: 2025-07-09 | End: 2025-07-12 | Stop reason: HOSPADM

## 2025-07-09 RX ORDER — ACETAMINOPHEN 325 MG/1
975 TABLET ORAL EVERY 8 HOURS PRN
Status: DISCONTINUED | OUTPATIENT
Start: 2025-07-09 | End: 2025-07-12 | Stop reason: HOSPADM

## 2025-07-09 RX ORDER — GABAPENTIN 100 MG/1
200 CAPSULE ORAL 3 TIMES DAILY
Status: DISCONTINUED | OUTPATIENT
Start: 2025-07-09 | End: 2025-07-09

## 2025-07-09 RX ORDER — AMOXICILLIN 250 MG
2 CAPSULE ORAL 2 TIMES DAILY
Status: DISCONTINUED | OUTPATIENT
Start: 2025-07-09 | End: 2025-07-12 | Stop reason: HOSPADM

## 2025-07-09 RX ORDER — METHOCARBAMOL 750 MG/1
750 TABLET, FILM COATED ORAL 4 TIMES DAILY
Status: DISCONTINUED | OUTPATIENT
Start: 2025-07-09 | End: 2025-07-11

## 2025-07-09 RX ADMIN — CEFAZOLIN 1 G: 1 INJECTION, POWDER, FOR SOLUTION INTRAMUSCULAR; INTRAVENOUS at 08:41

## 2025-07-09 RX ADMIN — SENNOSIDES AND DOCUSATE SODIUM 2 TABLET: 50; 8.6 TABLET ORAL at 08:41

## 2025-07-09 RX ADMIN — HYDROMORPHONE HYDROCHLORIDE 0.4 MG: 0.2 INJECTION, SOLUTION INTRAMUSCULAR; INTRAVENOUS; SUBCUTANEOUS at 04:15

## 2025-07-09 RX ADMIN — HYDROMORPHONE HYDROCHLORIDE 0.4 MG: 0.2 INJECTION, SOLUTION INTRAMUSCULAR; INTRAVENOUS; SUBCUTANEOUS at 02:09

## 2025-07-09 RX ADMIN — OXYCODONE HYDROCHLORIDE 10 MG: 10 TABLET ORAL at 00:32

## 2025-07-09 RX ADMIN — OXYCODONE HYDROCHLORIDE 10 MG: 10 TABLET ORAL at 17:27

## 2025-07-09 RX ADMIN — METHOCARBAMOL 750 MG: 750 TABLET ORAL at 20:19

## 2025-07-09 RX ADMIN — OXYCODONE HYDROCHLORIDE 10 MG: 10 TABLET ORAL at 14:35

## 2025-07-09 RX ADMIN — METHOCARBAMOL 750 MG: 750 TABLET ORAL at 03:52

## 2025-07-09 RX ADMIN — PREGABALIN 150 MG: 75 CAPSULE ORAL at 13:26

## 2025-07-09 RX ADMIN — FAMOTIDINE 20 MG: 20 TABLET, FILM COATED ORAL at 08:41

## 2025-07-09 RX ADMIN — HYDROMORPHONE HYDROCHLORIDE 0.4 MG: 0.2 INJECTION, SOLUTION INTRAMUSCULAR; INTRAVENOUS; SUBCUTANEOUS at 23:59

## 2025-07-09 RX ADMIN — HYDROMORPHONE HYDROCHLORIDE 0.4 MG: 0.2 INJECTION, SOLUTION INTRAMUSCULAR; INTRAVENOUS; SUBCUTANEOUS at 18:24

## 2025-07-09 RX ADMIN — PREGABALIN 150 MG: 75 CAPSULE ORAL at 20:19

## 2025-07-09 RX ADMIN — ACETAMINOPHEN 975 MG: 325 TABLET ORAL at 04:58

## 2025-07-09 RX ADMIN — HYDROMORPHONE HYDROCHLORIDE 0.4 MG: 0.2 INJECTION, SOLUTION INTRAMUSCULAR; INTRAVENOUS; SUBCUTANEOUS at 21:50

## 2025-07-09 RX ADMIN — SENNOSIDES AND DOCUSATE SODIUM 2 TABLET: 50; 8.6 TABLET ORAL at 20:19

## 2025-07-09 RX ADMIN — OXYCODONE HYDROCHLORIDE 10 MG: 10 TABLET ORAL at 21:16

## 2025-07-09 RX ADMIN — ONDANSETRON 4 MG: 4 TABLET, ORALLY DISINTEGRATING ORAL at 17:27

## 2025-07-09 RX ADMIN — OXYCODONE HYDROCHLORIDE 10 MG: 10 TABLET ORAL at 04:57

## 2025-07-09 RX ADMIN — FAMOTIDINE 20 MG: 20 TABLET, FILM COATED ORAL at 20:19

## 2025-07-09 RX ADMIN — POLYETHYLENE GLYCOL 3350 17 G: 17 POWDER, FOR SOLUTION ORAL at 11:35

## 2025-07-09 RX ADMIN — CEFAZOLIN 1 G: 1 INJECTION, POWDER, FOR SOLUTION INTRAMUSCULAR; INTRAVENOUS at 17:20

## 2025-07-09 RX ADMIN — METHOCARBAMOL 750 MG: 750 TABLET ORAL at 11:32

## 2025-07-09 RX ADMIN — DIAZEPAM 5 MG: 5 TABLET ORAL at 20:19

## 2025-07-09 RX ADMIN — OXYCODONE HYDROCHLORIDE 10 MG: 10 TABLET ORAL at 11:32

## 2025-07-09 RX ADMIN — METHOCARBAMOL 750 MG: 750 TABLET ORAL at 15:51

## 2025-07-09 RX ADMIN — POLYETHYLENE GLYCOL 3350 17 G: 17 POWDER, FOR SOLUTION ORAL at 20:19

## 2025-07-09 RX ADMIN — PREGABALIN 150 MG: 75 CAPSULE ORAL at 08:40

## 2025-07-09 RX ADMIN — HYDROMORPHONE HYDROCHLORIDE 0.4 MG: 0.2 INJECTION, SOLUTION INTRAMUSCULAR; INTRAVENOUS; SUBCUTANEOUS at 12:37

## 2025-07-09 RX ADMIN — CEFAZOLIN 1 G: 1 INJECTION, POWDER, FOR SOLUTION INTRAMUSCULAR; INTRAVENOUS at 00:35

## 2025-07-09 RX ADMIN — OXYCODONE HYDROCHLORIDE 10 MG: 10 TABLET ORAL at 08:40

## 2025-07-09 RX ADMIN — POLYETHYLENE GLYCOL 3350 17 G: 17 POWDER, FOR SOLUTION ORAL at 13:26

## 2025-07-09 RX ADMIN — ONDANSETRON 4 MG: 4 TABLET, ORALLY DISINTEGRATING ORAL at 00:32

## 2025-07-09 ASSESSMENT — ACTIVITIES OF DAILY LIVING (ADL)
ADLS_ACUITY_SCORE: 36
ADLS_ACUITY_SCORE: 36
ADLS_ACUITY_SCORE: 35
ADLS_ACUITY_SCORE: 36
ADLS_ACUITY_SCORE: 35
ADLS_ACUITY_SCORE: 35
ADLS_ACUITY_SCORE: 36
ADLS_ACUITY_SCORE: 35
ADLS_ACUITY_SCORE: 36
ADLS_ACUITY_SCORE: 35
ADLS_ACUITY_SCORE: 36
ADLS_ACUITY_SCORE: 35

## 2025-07-09 NOTE — PLAN OF CARE
"BP (!) 141/102 (BP Location: Right arm)   Pulse 78   Temp 98.5  F (36.9  C) (Oral)   Resp 18   Ht 1.753 m (5' 9\")   Wt 61.5 kg (135 lb 9.3 oz)   SpO2 100%   BMI 20.02 kg/m      Shift: 2795-7839  Reason for Admission: s/p Lumbar 5-Sacral 1 Fusion with Bone Morphogenic Protein and Interbody cage on 7/8/2025 with Dr. Winter.   VS/Tele: VSS. No tele orders.  Neuros: A/Ox4, able to make needs known. L. Anterior thigh numbness noted from last night still ongoing. Pain and tingling in BLE. Otherwise neuros intact  Respiratory: Sats >95% on RA  GI/: No BMs this shift. Voiding not saving  Nutrition: Tolerating regular diet  Drains/Lines: R. PIV SL. Hemovac on lower back  Activity: Pt up ad akin. Worked with PT and OT today  Pain/Nausea: C/o of severe pain that Oxycodone isn't covering, PRN IV Dilaudid given x1. Denies nausea  Skin: Back incision- covered with prima pore, marked.   Social: Family visited briefly, pt stated seeing his mother caused him to become more anxious and agitated d/t stressors from home.   New this shift: Spiritual consult ordered- pt appears to be very concerned about current living situation and other life stressors. Has a difficult time coping with everything that has happened.   Plan of care: Will continue to monitor and follow POC.     "

## 2025-07-09 NOTE — PLAN OF CARE
Occupational Therapy: Orders received. Chart reviewed and discussed with care team.? Occupational Therapy not indicated due to pt denies concerns related to BADLs. Pt mobilizing well both with PT and IND'ly. Pt educated on figure four technique and shower recommendations for AE. Pt expresses understanding, notes being primarily limited by pain.? Defer discharge recommendations to PT.? Will complete orders.

## 2025-07-09 NOTE — CONSULTS
[facility] Orthopedic Surgery Consultation    Ba Mireles MRN# [id number]   Age: 45 year old YOB: 1980   Date of Admission: 7/8/2025    Reason for consult: Left foot and ankle pain   Requesting physician: Misha Winter MD   Level of consult: Consult, follow and place orders          Assessment and Plan:   Assessment:  Ba Mireles is a 45 year old male with PMH significant for lumbar disc herniation and radiculopathy status post L5-S1 lumbar fusion who orthopedic surgery was consulted on regarding excessive foot pain.  X-ray of the foot is not concerning for any fractures or dislocations. no plan for surgical intervention emergently.  Patient's pain appears nerve related.  We would recommend symptomatic management of the pain.  Additionally could consider consultation with pain management colleagues.    Plan:  - Anticoagulation/DVT prophylaxis: Per primary team  - Antibiotics: Per primary team.  - Imaging: X-rays of the foot completed.  - Activity: Per primary team.  - Weight bearing: Per primary team.  - Pain control: Per primary team  - Diet: Per primary team.  - Follow-up: Can follow-up outpatient.  - Disposition: Cleared from orthopedics.    Discussed with Dr. Umair DURAN PGY4. Orthopedic surgery staff is Dr. Xiong.    --  Kezia Curran MD, PETER  Orthopedic Surgery PGY-1         History of Present Illness:   Patient is a 45-year-old male with history of lumbar disc herniation with radiculopathy as s/p lumbar 5-6 S1 lumbar interbody fusion with bone morphogenic protein interbody cage.  We are being consulted today for excessive foot pain in his left foot patient reported that this is the pain is not new and has.  He states that his pain started for that entirety of the time.  He describes the pain as or even light sensation at the dorsal aspect of his foot particularly around the great toe.  Patient has tried different modalities including pain medication and surgical interventions with the  neurosurgery team including spinal cortisone shots without any relief. Does not recall any trauma to the foot or any injuries to the foot.      A 10 point review of systems was otherwise negative other than as noted in history above.         Past Medical History:     Past Medical History:   Diagnosis Date    Arthritis 08/16/2014    When I had my hip surgery they told me I have arthritis going already    Kidney stone     first stone since age 20    Left ureteral stone 08/02/2023       Patient denies any personal history of bleeding disorders, clotting disorders, or adverse reactions to anesthesia.         Past Surgical History:     Past Surgical History:   Procedure Laterality Date    COMBINED CYSTOSCOPY, RETROGRADES, URETEROSCOPY, LASER HOLMIUM LITHOTRIPSY URETER(S), INSERT STENT Bilateral 08/07/2023    Procedure: Cystoscopy, Bilateral Ureteroscopy, Bilateral Retrograde Pyelogram, Bilateral Stone Basket Extraction, Bilateral Stent placement;  Surgeon: Gus Shipman MD;  Location: [facility] OR    CYSTOSCOPY,URETEROSCOPY,LITHOTRIPSY      at [facility]    DISCECTOMY LUMBAR POSTERIOR MICROSCOPIC ONE LEVEL N/A 6/24/2025    Procedure: Left Lumbar 5-Sacral 1 Far-lateral microdiscectomy;  Surgeon: Misha Winter MD;  Location:  OR    OPTICAL TRACKING SYSTEM FUSION POSTERIOR SPINE LUMBAR N/A 7/8/2025    Procedure: o-arm/stealth assisted Lumbar 5-Sacral 1 Transforaminal Lumbar Interbody Fusion with Bone Morphogenic Protein and Interbody cage;  Surgeon: Misha Winter MD;  Location:  OR    ORTHOPEDIC SURGERY  2022    Right Wrist    ORTHOPEDIC SURGERY      left hip surgery    WRIST SURGERY Right             Social History:     Social History     Socioeconomic History    Marital status:      Spouse name: Not on file    Number of children: Not on file    Years of education: Not on file    Highest education level: Not on file   Occupational History    Not on file   Tobacco Use    Smoking status: Every Day     Current  packs/day: 0.00     Average packs/day: 1 pack/day for 26.2 years (26.2 ttl pk-yrs)     Types: Cigarettes     Start date: 1997     Last attempt to quit: 2023     Years since quittin.8     Passive exposure: Current    Smokeless tobacco: Never    Tobacco comments:     Recent restarted, less than 1 PPD   Vaping Use    Vaping status: Never Used   Substance and Sexual Activity    Alcohol use: Not Currently     Alcohol/week: 2.0 standard drinks of alcohol     Types: 2 Standard drinks or equivalent per week    Drug use: Not Currently     Types: Marijuana    Sexual activity: Yes     Partners: Female     Birth control/protection: Pull-out method   Other Topics Concern    Parent/sibling w/ CABG, MI or angioplasty before 65F 55M? Yes     Comment: Mother   Social History Narrative    Not on file     Social Drivers of Health     Financial Resource Strain: Low Risk  (2025)    Financial Resource Strain     Within the past 12 months, have you or your family members you live with been unable to get utilities (heat, electricity) when it was really needed?: No   Food Insecurity: Low Risk  (2025)    Food Insecurity     Within the past 12 months, did you worry that your food would run out before you got money to buy more?: No     Within the past 12 months, did the food you bought just not last and you didn t have money to get more?: No   Transportation Needs: Low Risk  (2025)    Transportation Needs     Within the past 12 months, has lack of transportation kept you from medical appointments, getting your medicines, non-medical meetings or appointments, work, or from getting things that you need?: No   Physical Activity: Insufficiently Active (2024)    Exercise Vital Sign     Days of Exercise per Week: 2 days     Minutes of Exercise per Session: 20 min   Stress: Stress Concern Present (2024)    [employer] [employer] - Occupational Stress Questionnaire     Feeling of Stress : To some  extent   Social Connections: Unknown (7/28/2024)    Social Connection and Isolation Panel [NHANES]     Frequency of Communication with Friends and Family: Not on file     Frequency of Social Gatherings with Friends and Family: Once a week     Attends Orthodoxy Services: Not on file     Active Member of Clubs or Organizations: Not on file     Attends Club or Organization Meetings: Not on file     Marital Status: Not on file   Interpersonal Safety: Low Risk  (7/8/2025)    Interpersonal Safety     Do you feel physically and emotionally safe where you currently live?: Yes     Within the past 12 months, have you been hit, slapped, kicked or otherwise physically hurt by someone?: No     Within the past 12 months, have you been humiliated or emotionally abused in other ways by your partner or ex-partner?: No   Housing Stability: Low Risk  (7/8/2025)    Housing Stability     Do you have housing? : Yes     Are you worried about losing your housing?: No            Family History:     Family History   Problem Relation Age of Onset    Cancer Mother         kidney    Heart Disease Mother 66    Nephrolithiasis Mother     Gout Mother     Cancer Maternal Grandfather         renal cell    Heart Disease Maternal Grandfather     Nephrolithiasis Maternal Grandfather     Hyperlipidemia Maternal Grandfather     Cancer Paternal Grandfather         lucille cell    Kidney Disease Paternal Grandfather     Prostate Cancer Paternal Grandfather     Nephrolithiasis Maternal Aunt     Anesthesia Reaction No family hx of     Deep Vein Thrombosis (DVT) No family hx of        Patient denies known family history of bleeding, clotting, or anesthesia-related complications.            Allergies:     Allergies   Allergen Reactions    Dust Mites     Ragweeds     Amoxicillin-Pot Clavulanate Anxiety     Has tolerated amoxicillin well in the past             Medications:     Prior to Admission medications    Medication Sig Last Dose Taking? Auth Provider Long  Term End Date   diazepam (VALIUM) 5 MG tablet Take 1 tablet (5 mg) by mouth every 6 hours as needed for muscle spasms. 7/7/2025 Bedtime Yes Doug Lainez APRN CNP     Lidocaine (LIDOCARE) 4 % Patch Place onto the skin every 24 hours. To prevent lidocaine toxicity, patient should be patch free for 12 hrs daily. 7/7/2025 Yes Sugar Pang PA-C     oxyCODONE IR (ROXICODONE) 10 MG tablet Take 0.5-1 tablets (5-10 mg) by mouth every 4 hours as needed for severe pain. 7/8/2025 at  1:00 AM Yes Doug Lainez APRN CNP No    polyethylene glycol (MIRALAX) 17 GM/Dose powder Take 17 g by mouth daily. 7/7/2025 Noon Yes Padma Mcdermott MD  7/25/25   pregabalin (LYRICA) 150 MG capsule Take 1 capsule (150 mg) by mouth 3 times daily. 7/8/2025 at  1:00 AM Yes Sugar Pang PA-C Yes    senna-docusate (SENOKOT-S/PERICOLACE) 8.6-50 MG tablet Take 1 tablet by mouth 2 times daily for 17 days. 7/7/2025 Noon Yes Padma Mcdermott MD  7/12/25   tiZANidine (ZANAFLEX) 4 MG capsule Take 1-2 capsules (4-8 mg) by mouth 3 times daily as needed for muscle spasms. 7/7/2025 at 10:00 PM Yes Sugar Pang PA-C     tiZANidine (ZANAFLEX) 4 MG tablet Take 1-2 tablets (4-8 mg) by mouth 3 times daily as needed for muscle spasms.   Padma Mcdermott MD                Physical Exam:     Vitals:    07/09/25 0300 07/09/25 0400 07/09/25 0500 07/09/25 0717   BP:    (!) 141/102   BP Location:    Right arm   Cuff Size:       Pulse:    78   Resp:    18   Temp:    98.5  F (36.9  C)   TempSrc:    Oral   SpO2: 99% 100% 100% 100%   Weight:       Height:           General: alert and oriented, answers questions appropriately  Neuro: EOM grossly intact  HEENT: atraumatic  Lungs: breathing comfortably on RA  Heart/Cardiovascular: well perfused      Left Lower Extremity:   - No gross deformity, skin intact.  - No significant tenderness to palpation over thigh, knee  - No pain with ROM hip, knee.  Patient unable to dorsiflex the foot.  - Unable to complete a motor exam due to pain  and discomfort.  - SILT saphenous, sural, and tibial nerve distributions.  Patient complains of severe pain in the distribution of SPN.  - DP/PT pulses palpable, toes warm and well perfused.  - Able to straight leg raise.          Imaging:   All imaging independently reviewed.     X-rays of the left foot and ankle showed no evidence of dislocations or fractures.         Labs:   CBC:  Lab Results   Component Value Date    WBC 11.6 (H) 07/09/2025    HGB 11.8 (L) 07/09/2025     07/09/2025       BMP:  Lab Results   Component Value Date     07/09/2025    POTASSIUM 4.0 07/09/2025    CHLORIDE 104 07/09/2025    CO2 22 07/09/2025    BUN 14.7 07/09/2025    CR 0.85 07/09/2025    ANIONGAP 10 07/09/2025    CHARLY 9.1 07/09/2025    GLC 98 07/09/2025       Inflammatory Markers:  Lab Results   Component Value Date    WBC 11.6 (H) 07/09/2025    SED 10 08/28/2023

## 2025-07-09 NOTE — PROGRESS NOTES
"Care Management     Length of Stay (days): 1    Expected Discharge Date: 07/10/2025     Concerns to be Addressed:    OT and Physical Therapy informed SW that pt asked to speak with SW.   Patient plan of care discussed at interdisciplinary rounds: Yes    Anticipated Discharge Disposition:   Home as recommended by OT and Physical Therapy              Anticipated Discharge Services:  Home as recommended by OT and Physical Therapy  Anticipated Discharge DME:  Not applicable at this time    Patient/family educated on Medicare website which has current facility and service quality ratings:  Not applicable  Education Provided on the Discharge Plan:  Discharge was discussed  Patient/Family in Agreement with the Plan:  See below    Referrals Placed by CM/SW:  None  Private pay costs discussed:   Not applicable at this time    Discussed  Partnership in Safe Discharge Planning  document with patient/family: Not applicable at this time    Handoff Completed: No, handoff not indicated or clinically appropriate    Additional Information:  OT and Physical Therapy informed  that pt asked to speak with SW.    GARRET met with pt.  Pt asked whom he could make a formal complaint to in regards to nursing care.  SW informed  pt that he could contact Patient Relations by calling the  and asking to be transferred to Patient Relations.  Pt voiced appreciation for this information.  Pt states that he lives with his parents.  Pt states that he is safe at home.  Pt states that he is not comfortable with his mother caring for him (per nursing notes, pt is up ad akin in the room, SW noted pt to be turning and positioning in bed indep and to go from supine to sitting at the edge of the bed indep) at home which is causing him stress.  Pt states that he is crabby and his mother gets \"crabby\" at him.  Pt would like somewhere else to stay as he recuperates.  SW asked pt if he has friends or family that he can stay with short term.  Pt states that " "he can't stay with his significant other because \"she's mad at me.\"  Pt states that he does not want to stay with his sisters.  SW informed pt that he does not qualify for a rehab stay so that is not an option.  SW informed pt that the hospital does not have apartments for him to stay in.  SW informed pt that SW could offer a homeless shelter.  Pt states that he does not want to go to a homeless shelter.  Pt states that he would like to remain hospitalized for recuperation and until he feels comfortable with going home.  SW informed pt that he cannot remain in the hospital when the MD's determine that he is ready for discharge.  Pt states that he is in a lot of pain and then begins groaning, moaning, placing his hands at his hips/back and waist.  Pt asked if he good get home care at home.  SW informed pt that SW would refer him to the 6A RN CC who will determine whether or not pt has skilled home care needs.  Pt then tells SW that he has a 16 year old daughter at home living with him.  Per discussion, pt states that he works part time at a ice [employer] in [place] as a .  Pt states that one of his rolls is a [employer] .  Pt states that there is a full time  position open that he has applied for and he is hopeful to get the position.  Pt smiled when talking about employment.      Next Steps: SW informed pt that SW is available to arrange for homeless shelter placement should pt choose this option.  The 6A RN CC will determine whether or not pt qualify's for skilled home care visits.    YUE Huynh  Social Work, 6A  Phone:  [phone]  Pager:  [phone]  7/9/2025       PILI Corbett     "

## 2025-07-09 NOTE — PROGRESS NOTES
"CLINICAL NUTRITION SERVICES - ASSESSMENT NOTE    Registered Dietitian Interventions:  One time Ensure Plus High Protein in chocolate to trial     Handouts left at bedside, not discussed per patient request.   High-Calorie, High-Protein Nutrition Therapy  Tips for Adding Protein     Future/Additional Recommendations:  Monitor weight/intake trends at next follow-up, as able.     REASON FOR ASSESSMENT   Ba Mireles is a/an 45 year old male assessed by the dietitian for:  Positive admission nutrition risk screen  Provider order - Patient is Post op Complex Spine.    INFORMATION OBTAINED   Assessed patient in room. Pt politely declined visit due to not feeling well enough to focus.     NUTRITION HISTORY   Unable to assess     CURRENT NUTRITION ORDERS   Diet: Regular    CURRENT INTAKE/TOLERANCE   Unable to assess from patient report.     Per flowsheets, intake documentation likely missing some meals/snacks, but on average 100% intake of meals documented.     LABS   Nutrition-relevant labs: Reviewed    MEDICATIONS   Nutrition-relevant medications: Reviewed  - Famotidine  - Miralax  - Senokot  - PRNs Available: Dilaudid, Zofran, Oxycodone, Compazine    ANTHROPOMETRICS   Height: 175.3 cm (5' 9\")  Weight: 61.5 kg (135 lb 9.3 oz)  IBW: 72.7 kg   BMI: Body mass index is 20.02 kg/m . - Normal BMI  Weight History:   Pt with 21 lb (13 %) weight loss over 10 months.  Wt Readings from Last Encounters:   07/08/25 61.5 kg (135 lb 9.3 oz)   06/24/25 62.2 kg (137 lb 2 oz)   06/23/25 60.8 kg (134 lb)   06/20/25 61.2 kg (135 lb)   06/19/25 62.6 kg (138 lb)   06/13/25 62.6 kg (138 lb)   06/06/25 64.6 kg (142 lb 8 oz)   05/30/25 64.2 kg (141 lb 8 oz)   05/27/25 63.5 kg (140 lb)   05/27/25 63.9 kg (140 lb 14.4 oz)   05/26/25 63.9 kg (140 lb 14.4 oz)   05/26/25 63.9 kg (140 lb 14.4 oz)   11/25/24 70.1 kg (154 lb 8 oz)   10/28/24 70.8 kg (156 lb)   10/28/24 69.8 kg (153 lb 14.4 oz)   10/17/24 70.9 kg (156 lb 3.2 oz)   07/29/24 68.8 kg " (151 lb 9.6 oz)   07/24/24 68.9 kg (152 lb)   07/24/24 68.5 kg (151 lb 1.6 oz)     ASSESSED NUTRITION NEEDS   Dosing Weight: 62, based on actual wt  Estimated Energy Needs: 7539-3712 kcals/day (30 - 35 kcals/kg)  Justification: Increased needs  Estimated Protein Needs: 75-95 grams protein/day (1.2 - 1.5 grams of pro/kg)  Justification: Increased needs  Estimated Fluid Needs: 5289-8591 mL/day (25 - 30 mL/kg)  Justification: Maintenance    SYSTEM AND PHYSICAL FINDINGS    GI symptoms: Reviewed; Per flowsheets, last BM noted PTA.  Skin/wounds: Reviewed; Edson score 19 with nutrition marked as adequate per flowsheets. Surgical incisions noted.     MALNUTRITION   % Intake: Unable to assess  % Weight Loss: Weight loss does not meet criteria   *Subcutaneous Fat Loss: Orbital: Moderate and Buccal: Moderate  *Muscle Loss: Temples (temporalis muscle): Mild and Interosseous muscles: Mild  Fluid Accumulation/Edema: None noted  Malnutrition Diagnosis: Moderate malnutrition in the context of acute illness or injury  Malnutrition Present on Admission: Yes  *Visually assessed     NUTRITION DIAGNOSIS   Inadequate oral intake related to increased needs as evidenced by post op increased needs.    INTERVENTIONS   Discussed intervention/plan with patient and/or family.  Manage composition of oral intake  Medical food supplement therapy    GOALS   Total avg nutritional intake to meet a minimum of 30 kcal/kg and 1.2 g PRO/kg daily (per dosing wt 62 kg).    MONITORING/EVALUATION   Progress toward goals will be monitored and evaluated per policy.

## 2025-07-09 NOTE — PROGRESS NOTES
S: Pt was seen today at  for eval/fitting for a LS corset as ordered.    O/G: The objective/goal is to reduce pain and motion of the lumbar spine.    A: At this time I have fit pt with a Aspen Lo Pro  with Velcro closures.  The pt was instructed on donning/doffing; care and cleaning of the LSO was explained.  Care was taken in selection of the proper size LSO to insure an intimate fit, provide clearance at the rectus femoris, when sitting and to avoid impingement at the breast anteriorly and scapula posteriorly.  Upon completion of the initial fitting the pt had no complaints, and the fit of the orthosis appeared to be satisfactory at this time.    P: the pt was instructed to call if any problems or questions arise.   MIKE Alegre.

## 2025-07-09 NOTE — PROGRESS NOTES
07/09/25 0900   Appointment Info   Signing Clinician's Name / Credentials (PT) Shira Bailey, PT, DPT   Living Environment   Living Environment Comments notes having some intermittent support, staying with family (mom per pt has physical challenges, daughter is capable but often with friends, father-in-law is not as helpful), declines having to manage stairs   Self-Care   Usual Activity Tolerance good  (to moderate)   Current Activity Tolerance good  (to moderate)   Equipment Currently Used at Home cane, straight  (as needed)   Fall history within last six months no   Activity/Exercise/Self-Care Comment used cane as needed to manage pain   General Information   Onset of Illness/Injury or Date of Surgery 07/08/25   Referring Physician Марина Salguero PA-C   Patient/Family Therapy Goals Statement (PT) Improved pain.   Pertinent History of Current Problem (include personal factors and/or comorbidities that impact the POC) Pt is presentig POD # 1 s/p o-arm/stealth assisted Lumbar 5-Sacral 1 Transforaminal Lumbar Interbody Fusion with Bone Morphogenic Protein and Interbody cage.   Existing Precautions/Restrictions spinal  (LSO, May don and doff brace at edge of bed)   Weight-Bearing Status - LUE partial weight-bearing (% in comments)  (10#)   Weight-Bearing Status - RUE partial weight-bearing (% in comments)  (10#)   Cognition   Cognitive Status Comments cooperative but notes concerns with focus and completing paperwork   Pain Assessment   Patient Currently in Pain   (yet significant per pt, but pt noted feeling able to participate in out of bed activity tasks)   Posture    Posture Comments reduced upright posture   Range of Motion (ROM)   ROM Comment increased pain, anticipate some deficits in L LE, has increased pain with ankle DF   Bed Mobility   Comment, (Bed Mobility) IND supine<>sit   Transfers   Comment, (Transfers) IND sit<>stand   Gait/Stairs (Locomotion)   Comment, (Gait/Stairs) overall IND, stable yet  gait antalgic and with deficits. Pt presents w/ reduced step length, increased stance time, increased pain and reduced ROM noted during L LE swing phase   Sensory Examination   Sensory Perception Comments per pt has intact sensation to sole of L foot, but numbness to anterior surface of foot (dorsum) and it goes up his L leg anteriorly up to the thigh   Clinical Impression   Criteria for Skilled Therapeutic Intervention Yes, treatment indicated   PT Diagnosis (PT) impaired mobility   Influenced by the following impairments fatigue, pain, reduced activity tolerance, ROM   Functional limitations due to impairments increased pain with mobility   Clinical Presentation (PT Evaluation Complexity) stable   Clinical Presentation Rationale clinical judgement, [facility]   Clinical Decision Making (Complexity) low complexity   Planned Therapy Interventions (PT) balance training;bed mobility training;gait training;home exercise program;neuromuscular re-education;patient/family education;postural re-education;ROM (range of motion);stair training;strengthening;stretching;transfer training;risk factor education;home program guidelines;progressive activity/exercise   Risk & Benefits of therapy have been explained evaluation/treatment results reviewed;care plan/treatment goals reviewed;risks/benefits reviewed;current/potential barriers reviewed;participants voiced agreement with care plan;participants included;patient   PT Total Evaluation Time   PT Eval, Low Complexity Minutes (13382) 5   Physical Therapy Goals   PT Frequency One time eval and treatment only   PT Goals Stairs   PT: Gait Modified independent;Independent;Within precautions;150 feet;Goal Met   PT: Stairs 3 stairs;Within precautions;Modified independent;Goal Met   PT Discharge Planning   PT Plan no further PT needs, will sign-off   PT Discharge Recommendation (DC Rec) home with assist  (pt notes having prior OP PT visits set-up; pt may benefit for gait/ROM supports, defer to  primary team when pt appropriate and recovered safely for OP PT for manual therapy and more intensive interventions as needed)   PT Rationale for DC Rec Pt is mobilizing with IND, declined cane trial today but has a cane pt can use at home. Pt has some slight family support; is reporting needing help with managing some tasks that are more cognitively demanding and concerns with having this; will encourage OT to discuss with pt. Pt feels able to manage ADLs and mobility on own with modifications, per discussion.   PT Brief overview of current status IND up mobilizing in hallway   PT Total Distance Amb During Session (feet) 320   Physical Therapy Time and Intention   Timed Code Treatment Minutes 25   Total Session Time (sum of timed and untimed services) 30        No

## 2025-07-09 NOTE — PROGRESS NOTES
[facility], [place]   Neurosurgery Progress Note:    Date of service: 7/9/2025    Assessment: Ba Mireles is a 45 year old male s/p o-arm/stealth assisted Lumbar 5-Sacral 1 Transforaminal Lumbar Interbody Fusion with Bone Morphogenic Protein and Interbody cage on 7/8/2025 with Dr. Winter.     Clinically Significant Risk Factors Present on Admission                            Plan:  - Neuro checks/vital signs: Every 4 hours  - Pain control: PRN IV Dilaudid, PO oxycodone and scheduled Robaxin  - Activity: up as tolerated  - Restrictions/Bracing: LSO brace when OOB  - Diet: regular  - Drain: Hemovac drain in place  -  ABX:  Ancef while HV drain in place  - Anti-coagulation: hold  - DVT prophylaxis: SCDs  - Imaging:  UXR completed on 7/9/2025  - PT/OT: ordered.     Wound Cares:  Please cleanse wound daily with betadine and replace dressing daily.  Please keep wound covered at all times except when changing dressing until you are seen in clinic by Dr. Misha Winter.  It is ok to remove and replaced soiled dressings.  Please continue bed baths and do not shower until follow-up in clinic with Dr. Winter.      Interval History:  Patient report significant left ankle to great toe pain, worse with flexion and extension.  PT/OT ordered.  HV drain to remain in place.      Objective:   Temp:  [97.7  F (36.5  C)-98.8  F (37.1  C)] 98.5  F (36.9  C)  Pulse:  [54-84] 78  Resp:  [11-22] 18  BP: (106-141)/() 141/102  SpO2:  [99 %-100 %] 100 %  I/O last 3 completed shifts:  In: 2350 [P.O.:1400; I.V.:850; IV Piggyback:100]  Out: 818 [Urine:725; Drains:93]    Gen: Appears comfortable, NAD  Wound: Incision, clean, dry, intact without strikethrough  Neurologic:  - Alert & Oriented to person, place, time, and situation  - Follows commands briskly  - Speech fluent, spontaneous. No aphasia or dysarthria.  - No gaze preference. No apparent hemineglect.  - PERRL, EOMI  - Strong eye closure, jaw clench, and cheek  puff  - Face symmetric with sensation intact to light touch  - Palate elevates symmetrically, uvula midline, tongue protrudes midline  - Trapezii and sternocleidomastoid muscles 5/5 bilaterally  - No pronator drift     Del Tr Bi WE WF Gr   R 5 5 5 5 5 5   L 5 5 5 5 5 5    HF KE KF DF PF EHL   R 5 5 5 5 5 5   L 5 5 5 4 4 4     Reflexes 2+ throughout    Sensation intact and symmetric to light touch throughout, except of left thigh and left foot decreased sensation.     LABS  Recent Labs   Lab Test 07/09/25  0605 06/25/25  0617 10/28/24  1208   WBC 11.6* 11.9* 8.8   HGB 11.8* 12.6* 14.9   MCV 90 91 93    250 331       Recent Labs   Lab Test 07/09/25  0605 07/08/25  0557 06/25/25 0617 06/24/25  0557 10/28/24  1208     --  138  --  144   POTASSIUM 4.0  --  4.3  --  4.2   CHLORIDE 104  --  103  --  105   CO2 22  --  26  --  26   BUN 14.7  --  12.2  --  15   CR 0.85  --  0.82  --  0.80   ANIONGAP 10  --  9  --  13   CHARLY 9.1  --  9.3  --  9.0   GLC 98 94 103*   < > 85    < > = values in this interval not displayed.       IMAGING  XR Thor/Lumb Standing 2 Views (Scoli)    Narrative    EXAM: XR THORACIC LUMBAR STANDING 2 VIEWS  LOCATION: [facility]  DATE: 7/8/2025    INDICATION: s p L5 S1 TLIF  COMPARISON: 6/4/2025 lumbar spine radiographs.. 5/31/2025 MRI..      Impression    IMPRESSION: 12 rib-bearing thoracic vertebrae and 5 lumbar vertebrae. L5-S1 posterior and interbody fusion hardware. No fracture. Multilevel thoracic and lumbar spondylosis. No extraspinal abnormality.

## 2025-07-09 NOTE — PLAN OF CARE
Goal Outcome Evaluation:      Plan of Care Reviewed With: patient    Overall Patient Progress: no changeOverall Patient Progress: no change    Outcome Evaluation: pt in moderate to severe pain managed with oxycodone and dilaudid.        Status: S/p lumbar 5-sacral 1 fusion with bone morphogenic protein and interbody cage on 07/08/2025 w/ Dr. Winter.   Vitals: VSS - RA.  Neuros: A&Ox4. 5/5 strengths t/o. L anterior thigh numbess.   IV: L PIV SL.   Labs/Electrolytes: Reviewed.   Resp: WNL - LSC. Denies SOB.   Diet: Regular - good appetite.   GI: LBM - PTA. Passing gas. Intermittent nausea - given zofran.   : Voiding spon in BR - reported burning sensation when urinating - needs UA.    Skin: Lower back incision - covered w/ primapore. Hemovac x1.   Pain: Moderate to severe lower back and L foot pain managed with PRN oxycodone and dilaudid.   Activity: Up ad akin. Wears TLSO brace when OOB.   Plan/Updates this shift: Pt is concered about current living situation and other life stressors. Continue POC. Needs UA.

## 2025-07-09 NOTE — PROGRESS NOTES
Care Management     Length of Stay (days): 1    Expected Discharge Date: 07/10/2025     Concerns to be Addressed:   Discharge planning    Patient plan of care discussed at interdisciplinary rounds: Yes    Anticipated Discharge Disposition:  Home              Anticipated Discharge Services:  None  Anticipated Discharge DME:  Patient has cane       Referrals Placed by CM/SW:  None  Private pay costs discussed: Not applicable    Discussed  Partnership in Safe Discharge Planning  document with patient/family: No     Handoff Completed: No, handoff not indicated or clinically appropriate    Additional Information:   informed RNCC that Patient asked if he would qualify for Home Care. Chart reviewed, Patient evaluated by Physical Therapy and determined Patient does not require ongoing therapy. Patient independent with ambulation in hallway, walked approx 320 feet during therapy session.  I have met with Patient to inform him he does not meet homebound criteria or have skilled RN/PT/OT needs. Patient expressed frustration and stated he just wants to sleep, declining further conversation.    Next Steps:  No further follow up    Madelin Leavitt RN  6A Nurse Care Coordinator   [facility] Acute Care Management  Phone: [phone]  Vocera: 6A Neuro RNCC

## 2025-07-09 NOTE — PLAN OF CARE
Goal Outcome Evaluation:      Plan of Care Reviewed With: patient    Overall Patient Progress: improvingOverall Patient Progress: improving    Outcome Evaluation: Pain partially managed, Standing x-rays complete    Arrived from: PACU   Belongings/meds: Clothes, phone, iPad, black backpack   2 RN Skin Assessment Completed by: Mauri SAMUELS and Melinda DAVIS   Skin Findings: Posterior back incision covered with primapore, dressing marked, hemovac x1, abrasions to bilateral feet, cyst to R ankle   Non-intact findings documented (yes/no/NA): Yes    Status: S/p L5-S1 fusion with BMP  Vitals: VSS   Neuros: A&Ox4, strengths 5/5 throughout, numbness to L foot and intermittently LLE   IV: PIV SL   Labs/Electrolytes: WNL   Resp: LSC on RA   Diet: Regular, tolerating well   GI: LBM PTA, BS+, senna given   : Voiding spontaneously   Pain: Back pain partially managed with PRN meds   Activity: SBA, LSO when OOB, okay to put on brace while sitting at edge of bed, ambulated in halls x2 this shift   SCDs on?: No   Plan/Updates this shift: Continue with POC.     Patient ambulation status: SBA   Patient able to stand for X-ray:Yes  Standing/upright x-ray complete: Yes  Patient using oral analgesics:no, PRN IV Dilaudid   Voiding spontaneously:yes  Drains discontinued:no, hemovac x1   Incision clean and dry:no, dressing marked   Bowel status: PTA

## 2025-07-09 NOTE — PLAN OF CARE
VSS on RA.  Incisional back pain and intermittent sharp/burning L foot pain partially relieved with Tylenol, Oxy, Robaxin, and IV Dilaudid.  Neuros intact except numbness to L upper anterior thigh which is new since surgery- updated.  Back incision covered with primapore-marked previously, no extension this shift.  Hemovac x 1 to back; 18 ml out. PIV SL btwn abx.  On a regular diet with good PO.  ODT Zofran given x 1 for nausea.  Voiding spontaneously.  Last BM PTA, +flatus.  Up independent in room, SBA in halls.  LSO brace on when OOB.  Ambulated halls x 2 this shift.    PT/OT consults today.  Continue with POC.      Goal Outcome Evaluation:      Plan of Care Reviewed With: patient    Overall Patient Progress: improving

## 2025-07-09 NOTE — CONSULTS
"SPIRITUAL HEALTH SERVICES Consult Note  ([place]) 6A  Referral Source/Reason for Visit: Routine Consult per Staff Request  Summary and Recommendations -    I offered emotional support to Richi who has multiple stressors in his life currently, He voiced his desire to be heard and listened to and I offered empathy and validated his feelings.  We discussed ways that Richi has felt supported in the past during difficult times. Richi was able to name his Faith community, [employer] and his men's group as supportive resources.  I offered to pray for Richi and he welcomed that.    PLAN: I oriented Richi to Spiritual Health Services and he knows how to request another visit if he should need one while he is still admitted. Spiritual Health Services remain available on request. Please place a standard consult order on Epic.    Liliane Dutton  Chaplain Resident    Spiritual Health Services is available 24/7 for emergent requests and consults, either by paging the on-call  or by entering an ASAP/STAT consult in Rundown, which will also page the on-call .    Assessment    Saw pt Ba Mireles \"Richi\"    Patient/Family Understanding of Illness and Goals of Care -   Richi had an injury while playing golf on Memorial Weekend. He described getting cortozone shots and surgery and noted that he is currently hospitalized and healing up from a \"fusion of my spine.\"    Distress and Loss -   Richi described multiple stressors in his life currently:  Pain from his back injury  Inability to get more than four hours of sleep for the last few months  A toxic work environment  Some stress in family and friend relationships  Divorce from an alcoholic spouse.    Strengths, Coping, and Resources -   Richi notes that he feels supported by the men's group he is a part of as well as [employer].  Richi said that his Faith, [employer], has been a supportive spiritual community for him.  Richi and his 16 year old daughter currently live with his " "mother.    Meaning, Beliefs, and Spirituality -   Richi shared that he hadn't really liked Mosque when he was \"forced to go\" as a kid. As an adult when he experienced Taoism at [facility] it was an \"entirely different\" and positive experience for him.  Richi especially connects with and benefits from the music at his Mosque. We talked about how music has a way of connecting us with our hearts, with others and with something bigger. I asked Richi  if he can listen to music while he is here in the hospital and he said, \"yes.\"  I offered to pray for Richi and he welcomed that.  "

## 2025-07-10 VITALS
RESPIRATION RATE: 16 BRPM | OXYGEN SATURATION: 100 % | DIASTOLIC BLOOD PRESSURE: 82 MMHG | TEMPERATURE: 98.7 F | HEART RATE: 80 BPM | WEIGHT: 135.58 LBS | BODY MASS INDEX: 20.08 KG/M2 | HEIGHT: 69 IN | SYSTOLIC BLOOD PRESSURE: 135 MMHG

## 2025-07-10 LAB
ALBUMIN UR-MCNC: NEGATIVE MG/DL
ANION GAP SERPL CALCULATED.3IONS-SCNC: 8 MMOL/L (ref 7–15)
APPEARANCE UR: CLEAR
BASOPHILS # BLD AUTO: 0.1 10E3/UL (ref 0–0.2)
BASOPHILS NFR BLD AUTO: 1 %
BILIRUB UR QL STRIP: NEGATIVE
BUN SERPL-MCNC: 13 MG/DL (ref 6–20)
CALCIUM SERPL-MCNC: 8.5 MG/DL (ref 8.8–10.4)
CHLORIDE SERPL-SCNC: 103 MMOL/L (ref 98–107)
COLOR UR AUTO: NORMAL
CREAT SERPL-MCNC: 0.86 MG/DL (ref 0.67–1.17)
EGFRCR SERPLBLD CKD-EPI 2021: >90 ML/MIN/1.73M2
EOSINOPHIL # BLD AUTO: 0.1 10E3/UL (ref 0–0.7)
EOSINOPHIL NFR BLD AUTO: 1 %
ERYTHROCYTE [DISTWIDTH] IN BLOOD BY AUTOMATED COUNT: 12.8 % (ref 10–15)
GLUCOSE SERPL-MCNC: 92 MG/DL (ref 70–99)
GLUCOSE UR STRIP-MCNC: NEGATIVE MG/DL
HCO3 SERPL-SCNC: 26 MMOL/L (ref 22–29)
HCT VFR BLD AUTO: 34.2 % (ref 40–53)
HGB BLD-MCNC: 11.8 G/DL (ref 13.3–17.7)
HGB UR QL STRIP: NEGATIVE
IMM GRANULOCYTES # BLD: 0 10E3/UL
IMM GRANULOCYTES NFR BLD: 0 %
KETONES UR STRIP-MCNC: NEGATIVE MG/DL
LEUKOCYTE ESTERASE UR QL STRIP: NEGATIVE
LYMPHOCYTES # BLD AUTO: 2.1 10E3/UL (ref 0.8–5.3)
LYMPHOCYTES NFR BLD AUTO: 23 %
MCH RBC QN AUTO: 31.7 PG (ref 26.5–33)
MCHC RBC AUTO-ENTMCNC: 34.5 G/DL (ref 31.5–36.5)
MCV RBC AUTO: 92 FL (ref 78–100)
MONOCYTES # BLD AUTO: 1.2 10E3/UL (ref 0–1.3)
MONOCYTES NFR BLD AUTO: 13 %
NEUTROPHILS # BLD AUTO: 5.6 10E3/UL (ref 1.6–8.3)
NEUTROPHILS NFR BLD AUTO: 62 %
NITRATE UR QL: NEGATIVE
NRBC # BLD AUTO: 0 10E3/UL
NRBC BLD AUTO-RTO: 0 /100
PH UR STRIP: 6 [PH] (ref 5–7)
PLATELET # BLD AUTO: 249 10E3/UL (ref 150–450)
POTASSIUM SERPL-SCNC: 4 MMOL/L (ref 3.4–5.3)
RBC # BLD AUTO: 3.72 10E6/UL (ref 4.4–5.9)
RBC URINE: 1 /HPF
SODIUM SERPL-SCNC: 137 MMOL/L (ref 135–145)
SP GR UR STRIP: 1.01 (ref 1–1.03)
SQUAMOUS EPITHELIAL: <1 /HPF
UROBILINOGEN UR STRIP-MCNC: NORMAL MG/DL
WBC # BLD AUTO: 9 10E3/UL (ref 4–11)
WBC URINE: 0 /HPF

## 2025-07-10 PROCEDURE — 250N000013 HC RX MED GY IP 250 OP 250 PS 637: Performed by: PHYSICIAN ASSISTANT

## 2025-07-10 PROCEDURE — 85025 COMPLETE CBC W/AUTO DIFF WBC: CPT

## 2025-07-10 PROCEDURE — 120N000002 HC R&B MED SURG/OB UMMC

## 2025-07-10 PROCEDURE — 250N000011 HC RX IP 250 OP 636: Performed by: PHYSICIAN ASSISTANT

## 2025-07-10 PROCEDURE — 82310 ASSAY OF CALCIUM: CPT

## 2025-07-10 PROCEDURE — 250N000012 HC RX MED GY IP 250 OP 636 PS 637: Performed by: NURSE PRACTITIONER

## 2025-07-10 PROCEDURE — 250N000011 HC RX IP 250 OP 636: Performed by: NURSE PRACTITIONER

## 2025-07-10 PROCEDURE — 250N000013 HC RX MED GY IP 250 OP 250 PS 637

## 2025-07-10 PROCEDURE — 36415 COLL VENOUS BLD VENIPUNCTURE: CPT

## 2025-07-10 PROCEDURE — 250N000013 HC RX MED GY IP 250 OP 250 PS 637: Performed by: NURSE PRACTITIONER

## 2025-07-10 RX ORDER — METHYLPREDNISOLONE 8 MG/1
8 TABLET ORAL AT BEDTIME
Status: COMPLETED | OUTPATIENT
Start: 2025-07-10 | End: 2025-07-11

## 2025-07-10 RX ORDER — METHYLPREDNISOLONE 4 MG/1
4 TABLET ORAL
Status: DISCONTINUED | OUTPATIENT
Start: 2025-07-11 | End: 2025-07-12 | Stop reason: HOSPADM

## 2025-07-10 RX ORDER — METHYLPREDNISOLONE 8 MG/1
8 TABLET ORAL ONCE
Status: COMPLETED | OUTPATIENT
Start: 2025-07-10 | End: 2025-07-10

## 2025-07-10 RX ORDER — HYDROMORPHONE HCL IN WATER/PF 6 MG/30 ML
0.4 PATIENT CONTROLLED ANALGESIA SYRINGE INTRAVENOUS
Status: DISCONTINUED | OUTPATIENT
Start: 2025-07-10 | End: 2025-07-10

## 2025-07-10 RX ORDER — HYDROMORPHONE HCL IN WATER/PF 6 MG/30 ML
0.2 PATIENT CONTROLLED ANALGESIA SYRINGE INTRAVENOUS
Status: DISCONTINUED | OUTPATIENT
Start: 2025-07-10 | End: 2025-07-10

## 2025-07-10 RX ORDER — METHYLPREDNISOLONE 4 MG/1
4 TABLET ORAL ONCE
Status: COMPLETED | OUTPATIENT
Start: 2025-07-10 | End: 2025-07-10

## 2025-07-10 RX ORDER — LIDOCAINE 4 G/G
1 PATCH TOPICAL DAILY
Status: DISCONTINUED | OUTPATIENT
Start: 2025-07-10 | End: 2025-07-12 | Stop reason: HOSPADM

## 2025-07-10 RX ORDER — HEPARIN SODIUM 5000 [USP'U]/.5ML
5000 INJECTION, SOLUTION INTRAVENOUS; SUBCUTANEOUS EVERY 8 HOURS
Status: DISCONTINUED | OUTPATIENT
Start: 2025-07-10 | End: 2025-07-12 | Stop reason: HOSPADM

## 2025-07-10 RX ORDER — HYDROMORPHONE HCL IN WATER/PF 6 MG/30 ML
0.4 PATIENT CONTROLLED ANALGESIA SYRINGE INTRAVENOUS EVERY 4 HOURS PRN
Status: DISCONTINUED | OUTPATIENT
Start: 2025-07-10 | End: 2025-07-11

## 2025-07-10 RX ORDER — HYDROMORPHONE HCL IN WATER/PF 6 MG/30 ML
0.2 PATIENT CONTROLLED ANALGESIA SYRINGE INTRAVENOUS EVERY 4 HOURS PRN
Status: DISCONTINUED | OUTPATIENT
Start: 2025-07-10 | End: 2025-07-11

## 2025-07-10 RX ORDER — METHYLPREDNISOLONE 4 MG/1
4 TABLET ORAL AT BEDTIME
Status: DISCONTINUED | OUTPATIENT
Start: 2025-07-12 | End: 2025-07-12 | Stop reason: HOSPADM

## 2025-07-10 RX ADMIN — ACETAMINOPHEN 975 MG: 325 TABLET ORAL at 18:16

## 2025-07-10 RX ADMIN — CEFAZOLIN 1 G: 1 INJECTION, POWDER, FOR SOLUTION INTRAMUSCULAR; INTRAVENOUS at 00:53

## 2025-07-10 RX ADMIN — OXYCODONE HYDROCHLORIDE 10 MG: 10 TABLET ORAL at 12:24

## 2025-07-10 RX ADMIN — METHYLPREDNISOLONE 8 MG: 8 TABLET ORAL at 09:22

## 2025-07-10 RX ADMIN — DIAZEPAM 5 MG: 5 TABLET ORAL at 22:59

## 2025-07-10 RX ADMIN — FAMOTIDINE 20 MG: 20 TABLET, FILM COATED ORAL at 20:25

## 2025-07-10 RX ADMIN — METHYLPREDNISOLONE 4 MG: 4 TABLET ORAL at 14:11

## 2025-07-10 RX ADMIN — PREGABALIN 200 MG: 100 CAPSULE ORAL at 14:11

## 2025-07-10 RX ADMIN — HEPARIN SODIUM 5000 UNITS: 5000 INJECTION, SOLUTION INTRAVENOUS; SUBCUTANEOUS at 18:16

## 2025-07-10 RX ADMIN — METHOCARBAMOL 750 MG: 750 TABLET ORAL at 20:25

## 2025-07-10 RX ADMIN — TAMSULOSIN HYDROCHLORIDE 0.4 MG: 0.4 CAPSULE ORAL at 00:50

## 2025-07-10 RX ADMIN — OXYCODONE HYDROCHLORIDE 10 MG: 10 TABLET ORAL at 08:40

## 2025-07-10 RX ADMIN — METHOCARBAMOL 750 MG: 750 TABLET ORAL at 12:24

## 2025-07-10 RX ADMIN — HYDROMORPHONE HYDROCHLORIDE 0.4 MG: 0.2 INJECTION, SOLUTION INTRAMUSCULAR; INTRAVENOUS; SUBCUTANEOUS at 07:18

## 2025-07-10 RX ADMIN — OXYCODONE HYDROCHLORIDE 10 MG: 10 TABLET ORAL at 15:47

## 2025-07-10 RX ADMIN — OXYCODONE HYDROCHLORIDE 10 MG: 10 TABLET ORAL at 21:55

## 2025-07-10 RX ADMIN — METHOCARBAMOL 750 MG: 750 TABLET ORAL at 08:41

## 2025-07-10 RX ADMIN — POLYETHYLENE GLYCOL 3350 17 G: 17 POWDER, FOR SOLUTION ORAL at 08:43

## 2025-07-10 RX ADMIN — METHOCARBAMOL 750 MG: 750 TABLET ORAL at 15:47

## 2025-07-10 RX ADMIN — OXYCODONE HYDROCHLORIDE 10 MG: 10 TABLET ORAL at 05:39

## 2025-07-10 RX ADMIN — DIAZEPAM 5 MG: 5 TABLET ORAL at 17:06

## 2025-07-10 RX ADMIN — METHYLPREDNISOLONE 8 MG: 8 TABLET ORAL at 21:57

## 2025-07-10 RX ADMIN — HYDROMORPHONE HYDROCHLORIDE 0.4 MG: 0.2 INJECTION, SOLUTION INTRAMUSCULAR; INTRAVENOUS; SUBCUTANEOUS at 04:49

## 2025-07-10 RX ADMIN — OXYCODONE HYDROCHLORIDE 10 MG: 10 TABLET ORAL at 18:46

## 2025-07-10 RX ADMIN — PREGABALIN 200 MG: 100 CAPSULE ORAL at 20:25

## 2025-07-10 RX ADMIN — HYDROMORPHONE HYDROCHLORIDE 0.4 MG: 0.2 INJECTION, SOLUTION INTRAMUSCULAR; INTRAVENOUS; SUBCUTANEOUS at 18:15

## 2025-07-10 RX ADMIN — LIDOCAINE 1 PATCH: 4 PATCH TOPICAL at 05:35

## 2025-07-10 RX ADMIN — ACETAMINOPHEN 975 MG: 325 TABLET ORAL at 08:42

## 2025-07-10 RX ADMIN — FAMOTIDINE 20 MG: 20 TABLET, FILM COATED ORAL at 08:42

## 2025-07-10 RX ADMIN — ONDANSETRON 4 MG: 4 TABLET, ORALLY DISINTEGRATING ORAL at 07:17

## 2025-07-10 RX ADMIN — POLYETHYLENE GLYCOL 3350 17 G: 17 POWDER, FOR SOLUTION ORAL at 14:11

## 2025-07-10 RX ADMIN — SENNOSIDES AND DOCUSATE SODIUM 2 TABLET: 50; 8.6 TABLET ORAL at 08:43

## 2025-07-10 RX ADMIN — ONDANSETRON 4 MG: 4 TABLET, ORALLY DISINTEGRATING ORAL at 21:10

## 2025-07-10 RX ADMIN — PREGABALIN 200 MG: 100 CAPSULE ORAL at 08:41

## 2025-07-10 RX ADMIN — METHYLPREDNISOLONE 4 MG: 4 TABLET ORAL at 17:06

## 2025-07-10 RX ADMIN — OXYCODONE HYDROCHLORIDE 10 MG: 10 TABLET ORAL at 00:51

## 2025-07-10 RX ADMIN — HEPARIN SODIUM 5000 UNITS: 5000 INJECTION, SOLUTION INTRAVENOUS; SUBCUTANEOUS at 10:08

## 2025-07-10 RX ADMIN — DIAZEPAM 5 MG: 5 TABLET ORAL at 10:17

## 2025-07-10 ASSESSMENT — ACTIVITIES OF DAILY LIVING (ADL)
ADLS_ACUITY_SCORE: 37
ADLS_ACUITY_SCORE: 35
ADLS_ACUITY_SCORE: 37
ADLS_ACUITY_SCORE: 35

## 2025-07-10 NOTE — PROGRESS NOTES
CLINICAL NUTRITION SERVICES    Reason for Assessment:  High protein nutrition education     Diet History:  Per pt, has not received formal nutrition education on high-protein guidelines in the recent past.      Nutrition Diagnosis:  Food- and nutrition-related knowledge deficit r/t no recent high protein nutrition education AEB pt report of not receiving nutrition education on diet guidelines in the recent past.      Interventions:  Sent 1x trial of:   - Ensure Clear (Berry) now  - Magic Cup (Chocolate) today w/dinner    - Nutrition Education  1. Provided verbal and written nutrition education on high protein nutrition guidelines. Nutrition education included need for increased protein needs post-op, and heart-healthy eating in the long-term.    2. Handouts given: Tips to Increase the Protein in Your Diet; Protein Menu Items.    Goals:   Patient will verbalize at least three nutrition-related aspects of high-protein diet guidelines.  Pt aware of protein goal (75+ g/d to meet increased needs)    Follow-up:    Patient to ask any further nutrition-related questions before discharge. In addition, pt may request outpatient RD appointment, if desired.    Addis Perdue   Clinical Dietitian, RD, MS

## 2025-07-10 NOTE — PLAN OF CARE
Goal Outcome Evaluation:      Status: S/p lumbar 5-sacral 1 fusion with bone morphogenic protein and interbody cage on 07/08/2025 w/ Dr. Winter.   Vitals: VSS - RA.  Neuros: A&Ox4. 5/5 strengths t/o. L anterior thigh numbness resolved. L. Dorsal foot numbness and tingling, pain with plantar flexion  IV: L PIV SL.   Labs/Electrolytes: Reviewed.   Resp: WNL - LSC. Denies SOB.   Diet: Regular - good appetite.   GI: LBM - PTA. Passing gas. Intermittent nausea - given zofran.   : Voiding spon in BR   Skin: Lower back incision - covered w/ primapore (no drainage) Hemovac with minimal bloody output.   Pain: Moderate to severe lower back and L foot pain managed with PRN oxycodone and IV dilaudid, lidocaine patch applied to L.Thigh   Activity: Up ad akin. Wears TLSO brace when OOB, ambulated in hallway  Plan/Updates this shift: Pt is concered about current living situation and other life stressors. Continue POC. Needs UA.

## 2025-07-10 NOTE — PROGRESS NOTES
[facility], [place]   Neurosurgery Progress Note:    Date of service: 7/9/2025    Assessment: Ba Mireles is a 45 year old male s/p o-arm/stealth assisted Lumbar 5-Sacral 1 Transforaminal Lumbar Interbody Fusion with Bone Morphogenic Protein and Interbody cage on 7/8/2025 with Dr. Winter.     Clinically Significant Risk Factors Present on Admission                            Plan:  - Neuro checks/vital signs: Every 4 hours  - Pain control: PRN IV Dilaudid, PO oxycodone and scheduled Robaxin  - Lyrica increased to 200 mg TID (max dose)  - Medrol dose pack  - Activity: up as tolerated  - Restrictions/Bracing: LSO brace when OOB  - Diet: regular  - Drain: Hemovac drain in place, plan to remove  -  ABX:  Ancef while HV drain in place  - DVT prophylaxis: SCDs & subcutaneous heparin  - Imaging:  UXR completed on 7/9/2025  - PT/OT: recommending home with outpatient therapies   - Ortho consult - appreciate recommendations    Wound Cares:  Please cleanse wound daily with betadine and replace dressing daily.  Please keep wound covered at all times except when changing dressing until you are seen in clinic by Dr. Misha Winter.  It is ok to remove and replaced soiled dressings.  Please continue bed baths and do not shower until follow-up in clinic with Dr. Winter.      Interval History:  Patient reports continued significant left ankle to great toe pain, worse with flexion and extension. Ankle XR negative.  Ortho consult done, no intervention at this time.  Plan to initiate medrol dose pack for on-going pain.        Objective:   Temp:  [99.1  F (37.3  C)-100.7  F (38.2  C)] 99.9  F (37.7  C)  Pulse:  [81-84] 84  Resp:  [17] 17  BP: ()/(58-73) 98/58  SpO2:  [97 %-99 %] 99 %  I/O last 3 completed shifts:  In: 140 [P.O.:140]  Out: 365 [Urine:350; Drains:15]    Gen: Appears comfortable, NAD  Wound: Incision, clean, dry, intact without strikethrough  Neurologic:  - Alert & Oriented to person, place,  time, and situation  - Follows commands briskly  - Speech fluent, spontaneous. No aphasia or dysarthria.  - No gaze preference. No apparent hemineglect.  - PERRL, EOMI  - Strong eye closure, jaw clench, and cheek puff  - Face symmetric with sensation intact to light touch  - Palate elevates symmetrically, uvula midline, tongue protrudes midline  - Trapezii and sternocleidomastoid muscles 5/5 bilaterally  - No pronator drift     Del Tr Bi WE WF Gr   R 5 5 5 5 5 5   L 5 5 5 5 5 5    HF KE KF DF PF EHL   R 5 5 5 5 5 5   L 5 5 5 4 4 4     Reflexes 2+ throughout    Sensation intact and symmetric to light touch throughout, except of left thigh and left foot decreased sensation.     LABS  Recent Labs   Lab Test 07/10/25  0714 07/09/25  0605 06/25/25  0617   WBC 9.0 11.6* 11.9*   HGB 11.8* 11.8* 12.6*   MCV 92 90 91    320 250       Recent Labs   Lab Test 07/10/25  0714 07/09/25  0605 07/08/25  0557 06/25/25  0617    136  --  138   POTASSIUM 4.0 4.0  --  4.3   CHLORIDE 103 104  --  103   CO2 26 22  --  26   BUN 13.0 14.7  --  12.2   CR 0.86 0.85  --  0.82   ANIONGAP 8 10  --  9   CHARLY 8.5* 9.1  --  9.3   GLC 92 98 94 103*       IMAGING  XR Thor/Lumb Standing 2 Views (Scoli)    Narrative    EXAM: XR THORACIC LUMBAR STANDING 2 VIEWS  LOCATION: [facility]  DATE: 7/8/2025    INDICATION: s p L5 S1 TLIF  COMPARISON: 6/4/2025 lumbar spine radiographs.. 5/31/2025 MRI..      Impression    IMPRESSION: 12 rib-bearing thoracic vertebrae and 5 lumbar vertebrae. L5-S1 posterior and interbody fusion hardware. No fracture. Multilevel thoracic and lumbar spondylosis. No extraspinal abnormality.

## 2025-07-10 NOTE — PLAN OF CARE
Vitals: VSS on RA  Neuros: Alert and oriented x4. 5/5 throughout. Numbness/tinging/pain to left foot-baseline per pt. Denied N/T to thigh.   IV: PIV SL'd  Resp: WNL on RA, IS use encouraged.   Diet: Regular, good intake.  GI: BS+x4, last BM 7/10   : Voiding.  Skin: Back incision covered, CDI.  Pain: Utilizing scheduled robaxin and lyrica and prn oxycodone, valium, dilaudid, and tylenol for pain to back and left foot. Pt c/o increased pain to left foot tonight, all medications utilized, NSG notifed, valium given per NSG- update if ineffective.   Activity: Up independently with TLSO.  SCDs on?: new pump ordered.   Social: No visitors this shift.   Plan: Medrol dose pack started for ongoing pain.   Updates this shift: Hemovac removed by NSG.  Education completed: Discussed pain management plan with patient.            Goal Outcome Evaluation:      Plan of Care Reviewed With: patient    Overall Patient Progress: improvingOverall Patient Progress: improving    Outcome Evaluation: Pain managed with scheduled and prn medications. Up ambulating in hallway.

## 2025-07-10 NOTE — PROGRESS NOTES
Neurosurgery Brief Progress Note    Hemovac drain removal    Drain not deemed to be necessary with low output. Drain site was prepped in usual sterile fashion with chloraprep. The drain was taken off suction. The sutures securing the drain were cut and the site was re-prepped. The drain was removed with tip intact. Wound was cleansed with betadine and new dressing applied.  No immediate complication was observed and the patient tolerated the procedure well.     JUSTINA Narayanan, CNP  Neurosurgery  Pager [phone]

## 2025-07-10 NOTE — CONSULTS
Consult acknowledge, addressed by previous RNCC. Please see notes for details.     Jamila Bronson, RN, BSN  6A Nurse Care Coordinator  [facility] Acute Care Management  Phone: [phone]   Vocera: 6A Neuro RNCC

## 2025-07-11 LAB
ANION GAP SERPL CALCULATED.3IONS-SCNC: 7 MMOL/L (ref 7–15)
BASOPHILS # BLD AUTO: 0.1 10E3/UL (ref 0–0.2)
BASOPHILS NFR BLD AUTO: 1 %
BUN SERPL-MCNC: 20.9 MG/DL (ref 6–20)
CALCIUM SERPL-MCNC: 9.1 MG/DL (ref 8.8–10.4)
CHLORIDE SERPL-SCNC: 103 MMOL/L (ref 98–107)
CREAT SERPL-MCNC: 0.78 MG/DL (ref 0.67–1.17)
EGFRCR SERPLBLD CKD-EPI 2021: >90 ML/MIN/1.73M2
EOSINOPHIL # BLD AUTO: 0 10E3/UL (ref 0–0.7)
EOSINOPHIL NFR BLD AUTO: 0 %
ERYTHROCYTE [DISTWIDTH] IN BLOOD BY AUTOMATED COUNT: 12.6 % (ref 10–15)
GLUCOSE SERPL-MCNC: 112 MG/DL (ref 70–99)
HCO3 SERPL-SCNC: 26 MMOL/L (ref 22–29)
HCT VFR BLD AUTO: 31 % (ref 40–53)
HGB BLD-MCNC: 10.4 G/DL (ref 13.3–17.7)
IMM GRANULOCYTES # BLD: 0.1 10E3/UL
IMM GRANULOCYTES NFR BLD: 1 %
LYMPHOCYTES # BLD AUTO: 1.4 10E3/UL (ref 0.8–5.3)
LYMPHOCYTES NFR BLD AUTO: 15 %
MCH RBC QN AUTO: 31.3 PG (ref 26.5–33)
MCHC RBC AUTO-ENTMCNC: 33.5 G/DL (ref 31.5–36.5)
MCV RBC AUTO: 93 FL (ref 78–100)
MONOCYTES # BLD AUTO: 1.1 10E3/UL (ref 0–1.3)
MONOCYTES NFR BLD AUTO: 11 %
NEUTROPHILS # BLD AUTO: 7.1 10E3/UL (ref 1.6–8.3)
NEUTROPHILS NFR BLD AUTO: 73 %
NRBC # BLD AUTO: 0 10E3/UL
NRBC BLD AUTO-RTO: 0 /100
PLATELET # BLD AUTO: 243 10E3/UL (ref 150–450)
POTASSIUM SERPL-SCNC: 4.6 MMOL/L (ref 3.4–5.3)
RBC # BLD AUTO: 3.32 10E6/UL (ref 4.4–5.9)
SODIUM SERPL-SCNC: 136 MMOL/L (ref 135–145)
WBC # BLD AUTO: 9.8 10E3/UL (ref 4–11)

## 2025-07-11 PROCEDURE — 82310 ASSAY OF CALCIUM: CPT

## 2025-07-11 PROCEDURE — 85004 AUTOMATED DIFF WBC COUNT: CPT

## 2025-07-11 PROCEDURE — 250N000013 HC RX MED GY IP 250 OP 250 PS 637: Performed by: NURSE PRACTITIONER

## 2025-07-11 PROCEDURE — 250N000013 HC RX MED GY IP 250 OP 250 PS 637: Performed by: PHYSICIAN ASSISTANT

## 2025-07-11 PROCEDURE — 120N000002 HC R&B MED SURG/OB UMMC

## 2025-07-11 PROCEDURE — 250N000012 HC RX MED GY IP 250 OP 636 PS 637: Performed by: NURSE PRACTITIONER

## 2025-07-11 PROCEDURE — 250N000011 HC RX IP 250 OP 636: Performed by: NURSE PRACTITIONER

## 2025-07-11 PROCEDURE — 250N000013 HC RX MED GY IP 250 OP 250 PS 637

## 2025-07-11 PROCEDURE — 36415 COLL VENOUS BLD VENIPUNCTURE: CPT

## 2025-07-11 RX ORDER — METHOCARBAMOL 500 MG/1
1000 TABLET, FILM COATED ORAL 4 TIMES DAILY
Status: DISCONTINUED | OUTPATIENT
Start: 2025-07-11 | End: 2025-07-12 | Stop reason: HOSPADM

## 2025-07-11 RX ORDER — DEXAMETHASONE SODIUM PHOSPHATE 4 MG/ML
2 INJECTION, SOLUTION INTRA-ARTICULAR; INTRALESIONAL; INTRAMUSCULAR; INTRAVENOUS; SOFT TISSUE ONCE
Status: COMPLETED | OUTPATIENT
Start: 2025-07-11 | End: 2025-07-11

## 2025-07-11 RX ORDER — OXYCODONE HYDROCHLORIDE 10 MG/1
10 TABLET ORAL
Refills: 0 | Status: DISCONTINUED | OUTPATIENT
Start: 2025-07-11 | End: 2025-07-12 | Stop reason: HOSPADM

## 2025-07-11 RX ADMIN — LIDOCAINE 1 PATCH: 4 PATCH TOPICAL at 05:35

## 2025-07-11 RX ADMIN — DIAZEPAM 5 MG: 5 TABLET ORAL at 05:12

## 2025-07-11 RX ADMIN — OXYCODONE HYDROCHLORIDE 10 MG: 10 TABLET ORAL at 03:04

## 2025-07-11 RX ADMIN — ACETAMINOPHEN 975 MG: 325 TABLET ORAL at 02:50

## 2025-07-11 RX ADMIN — METHOCARBAMOL 1000 MG: 500 TABLET ORAL at 12:13

## 2025-07-11 RX ADMIN — ACETAMINOPHEN 975 MG: 325 TABLET ORAL at 19:44

## 2025-07-11 RX ADMIN — METHOCARBAMOL 750 MG: 750 TABLET ORAL at 08:10

## 2025-07-11 RX ADMIN — HYDROMORPHONE HYDROCHLORIDE 0.4 MG: 0.2 INJECTION, SOLUTION INTRAMUSCULAR; INTRAVENOUS; SUBCUTANEOUS at 04:23

## 2025-07-11 RX ADMIN — SENNOSIDES AND DOCUSATE SODIUM 2 TABLET: 50; 8.6 TABLET ORAL at 08:10

## 2025-07-11 RX ADMIN — METHYLPREDNISOLONE 4 MG: 4 TABLET ORAL at 08:09

## 2025-07-11 RX ADMIN — FAMOTIDINE 20 MG: 20 TABLET, FILM COATED ORAL at 19:41

## 2025-07-11 RX ADMIN — PREGABALIN 200 MG: 100 CAPSULE ORAL at 13:37

## 2025-07-11 RX ADMIN — PREGABALIN 200 MG: 100 CAPSULE ORAL at 08:07

## 2025-07-11 RX ADMIN — OXYCODONE HYDROCHLORIDE 10 MG: 10 TABLET ORAL at 22:07

## 2025-07-11 RX ADMIN — HEPARIN SODIUM 5000 UNITS: 5000 INJECTION, SOLUTION INTRAVENOUS; SUBCUTANEOUS at 18:46

## 2025-07-11 RX ADMIN — POLYETHYLENE GLYCOL 3350 17 G: 17 POWDER, FOR SOLUTION ORAL at 13:37

## 2025-07-11 RX ADMIN — OXYCODONE HYDROCHLORIDE 15 MG: 5 TABLET ORAL at 11:06

## 2025-07-11 RX ADMIN — FAMOTIDINE 20 MG: 20 TABLET, FILM COATED ORAL at 08:10

## 2025-07-11 RX ADMIN — METHYLPREDNISOLONE 8 MG: 8 TABLET ORAL at 22:07

## 2025-07-11 RX ADMIN — HEPARIN SODIUM 5000 UNITS: 5000 INJECTION, SOLUTION INTRAVENOUS; SUBCUTANEOUS at 09:20

## 2025-07-11 RX ADMIN — METHYLPREDNISOLONE 4 MG: 4 TABLET ORAL at 12:13

## 2025-07-11 RX ADMIN — OXYCODONE HYDROCHLORIDE 10 MG: 10 TABLET ORAL at 08:07

## 2025-07-11 RX ADMIN — HEPARIN SODIUM 5000 UNITS: 5000 INJECTION, SOLUTION INTRAVENOUS; SUBCUTANEOUS at 02:47

## 2025-07-11 RX ADMIN — METHOCARBAMOL 1000 MG: 500 TABLET ORAL at 15:57

## 2025-07-11 RX ADMIN — OXYCODONE HYDROCHLORIDE 10 MG: 10 TABLET ORAL at 15:58

## 2025-07-11 RX ADMIN — METHYLPREDNISOLONE 4 MG: 4 TABLET ORAL at 18:45

## 2025-07-11 RX ADMIN — PREGABALIN 200 MG: 100 CAPSULE ORAL at 19:42

## 2025-07-11 RX ADMIN — ACETAMINOPHEN 975 MG: 325 TABLET ORAL at 11:07

## 2025-07-11 RX ADMIN — METHOCARBAMOL 1000 MG: 500 TABLET ORAL at 19:42

## 2025-07-11 RX ADMIN — DEXAMETHASONE SODIUM PHOSPHATE 2 MG: 4 INJECTION, SOLUTION INTRAMUSCULAR; INTRAVENOUS at 09:18

## 2025-07-11 ASSESSMENT — ACTIVITIES OF DAILY LIVING (ADL)
ADLS_ACUITY_SCORE: 37
ADLS_ACUITY_SCORE: 35
ADLS_ACUITY_SCORE: 37
ADLS_ACUITY_SCORE: 35
ADLS_ACUITY_SCORE: 37
ADLS_ACUITY_SCORE: 35

## 2025-07-11 NOTE — PLAN OF CARE
Status: S/p lumbar 5-sacral 1 fusion with bone morphogenic protein and interbody cage on 07/08/2025 w/ Dr. Winter.   Vitals: VSS on RA  Neuros: Intact, N/T L Foot, Careful w/ touching L foot, 5/5 strengths throughout  IV: PIV SL  Resp: Lungs clear bilaterally, denies SOB  Diet: Regular, good intake  GI: LBM- 7/10, passing gas  : Voids spontaneously independently   Skin: Lower back incision and hemovac wound, primapore changed and swabbed with iodine   Pain: Back pain 1-2/10, L Foot pain 6-10/10, managed with prn oxycodone and scheduled meds  Activity: Up ad akin, wears LSO brace when OOB  SCDs on?: No  Plan: Continue with POC, discharge tomorrow

## 2025-07-11 NOTE — PROGRESS NOTES
[facility], [place]   Neurosurgery Progress Note:    Date of service: 7/11/2025    Assessment: Ba Mireles is a 45 year old male s/p o-arm/stealth assisted Lumbar 5-Sacral 1 Transforaminal Lumbar Interbody Fusion with Bone Morphogenic Protein and Interbody cage on 7/8/2025 with Dr. Winter.     Clinically Significant Risk Factors Present on Admission                            Plan:  - Neuro checks/vital signs: Every 4 hours  - Pain control: PRN PO oxycodone and scheduled Robaxin  - Lyrica increased to 200 mg TID (max dose)  - Medrol dose pack  - Activity: up as tolerated  - Restrictions/Bracing: LSO brace when OOB  - Diet: regular  - DVT prophylaxis: SCDs & subcutaneous heparin  - Imaging:  UXR completed on 7/9/2025      Wound Cares:  Please cleanse wound daily with betadine and replace dressing daily.  Please keep wound covered at all times except when changing dressing until you are seen in clinic by Dr. Misha Winter.  It is ok to remove and replaced soiled dressings.  Please continue bed baths and do not shower until follow-up in clinic with Dr. Winter.      Interval History:  Patient reports continued significant left ankle to great toe pain.  Patient report small BM overnight.  Continue to adjust pain regimen and increase activity as able      Objective:   Temp:  [98.3  F (36.8  C)-99.5  F (37.5  C)] 98.3  F (36.8  C)  Pulse:  [] 77  Resp:  [15-16] 16  BP: ()/(68-82) 126/77  SpO2:  [98 %-100 %] 100 %  I/O last 3 completed shifts:  In: -   Out: 950 [Urine:950]    Gen: Appears comfortable, NAD  Wound: Incision, clean, dry, intact without strikethrough  Neurologic:  - Alert & Oriented to person, place, time, and situation  - Follows commands briskly  - Speech fluent, spontaneous. No aphasia or dysarthria.  - No gaze preference. No apparent hemineglect.  - PERRL, EOMI  - Strong eye closure, jaw clench, and cheek puff  - Face symmetric with sensation intact to light touch  -  Palate elevates symmetrically, uvula midline, tongue protrudes midline  - Trapezii and sternocleidomastoid muscles 5/5 bilaterally  - No pronator drift     Del Tr Bi WE WF Gr   R 5 5 5 5 5 5   L 5 5 5 5 5 5    HF KE KF DF PF EHL   R 5 5 5 5 5 5   L 5 5 5 4 4 4     Reflexes 2+ throughout    Sensation intact and symmetric to light touch throughout, except of left thigh and left foot decreased sensation.     LABS  Recent Labs   Lab Test 07/11/25  0647 07/10/25  0714 07/09/25  0605   WBC 9.8 9.0 11.6*   HGB 10.4* 11.8* 11.8*   MCV 93 92 90    249 320       Recent Labs   Lab Test 07/11/25  0647 07/10/25  0714 07/09/25  0605    137 136   POTASSIUM 4.6 4.0 4.0   CHLORIDE 103 103 104   CO2 26 26 22   BUN 20.9* 13.0 14.7   CR 0.78 0.86 0.85   ANIONGAP 7 8 10   CHARLY 9.1 8.5* 9.1   * 92 98

## 2025-07-11 NOTE — PLAN OF CARE
Goal Outcome Evaluation:      Plan of Care Reviewed With: patient    Overall Patient Progress: no changeOverall Patient Progress: no change    Outcome Evaluation: ambulating in hallway.  C/o L foot pain, receiving pain meds      Vitals: VSS on RA  Neuros: A&Ox4.  5/5 throughout.  Numbness/tingling/pain to left foot, baseline per patient.    IV: PIV SL  Resp: WDL  Diet: regular  GI: LBM 7/10  : voiding   Skin: back incision covered with primpore, CDI  Pain: C/o increased left foot pain since last evening.  PRN medications given.  NSG is aware.   Activity: up independent, with TLSO  SCDs on?: no, refused

## 2025-07-11 NOTE — CONSULTS
"SPIRITUAL HEALTH SERVICES Consult Note  ([place]) 6A  Referral Source/Reason for Visit: Routine Consult  Summary and Recommendations -    Today Richi was lamenting that yesterday \"I was finally doing better\" but today has been rougher for him with his foot pain.  I asked Richi what helps him find calm when he is feeling agitated. He wasn't sure but suggested that it did help to talk with someone about what is going on with him. Richi seems to benefit from conversation and being able to process his feelings and experiences. He has expressed that being heard and listened to is important to him.  Richi has some support from family and friends. He reached out to his mother, significant other and to his Protestant. He plans to also call his friend from his men's group.    PLAN: Spiritual [facility] Services remain available on request. Please place a standard consult order on Epic.    Liliane Dutton  Chaplain Resident    Spiritual Health Services is available 24/7 for emergent requests and consults, either by paging the on-call  or by entering an ASAP/STAT consult in [employer], which will also page the on-call .    Assessment    Saw pt Ba Mireles \"Richi\"    Patient/Family Understanding of Illness and Goals of Care -   Richi said he came to the hospital for a \"fusion of my spine.\" Richi observed that his back feels better and his leg pain, that he had prior to surgery, is gone. Richi noted that yesterday he finally felt better but now his left foot has been in a lot of pain.     Distress and Loss -   Today Richi was lamenting that yesterday \"I was finally doing better\" but today has been rougher for him with his foot pain.  Richi felt what he called \"roid rage\" from the steroids his is getting. He said they make it hard for him to rest and he has struggled getting sleep.  Richi discussed other pressures in his life including his job, finances, relationships and other things.    Strengths, Coping, and Resources -   I " "asked Richi what helps him find calm when he is feeling agitated. He wasn't sure but suggested that it did help to talk about what is going on with someone.  Richi shared that he had a good phone conversation with his mother and said she does a lot to support him. He also had a conversation with his significant other.  After our previous visit, Richi said he was reading from his [employer] book and was surprised that what he read was similar to what we had been discussing and found that encouraging. He observed that \"it happens a lot when I read the [employer] book.\"  Richi plans to reach out to one of his friends from his men's group.    Meaning, Beliefs, and Spirituality -   Richi noted that he had contacted someone from his Hinduism for help/support and was waiting to hear back from them.  "

## 2025-07-12 VITALS
HEART RATE: 86 BPM | OXYGEN SATURATION: 99 % | DIASTOLIC BLOOD PRESSURE: 93 MMHG | TEMPERATURE: 98.9 F | BODY MASS INDEX: 20.08 KG/M2 | HEIGHT: 69 IN | SYSTOLIC BLOOD PRESSURE: 144 MMHG | WEIGHT: 135.58 LBS | RESPIRATION RATE: 18 BRPM

## 2025-07-12 LAB
ANION GAP SERPL CALCULATED.3IONS-SCNC: 10 MMOL/L (ref 7–15)
BASOPHILS # BLD AUTO: 0 10E3/UL (ref 0–0.2)
BASOPHILS NFR BLD AUTO: 0 %
BUN SERPL-MCNC: 14.7 MG/DL (ref 6–20)
CALCIUM SERPL-MCNC: 9.9 MG/DL (ref 8.8–10.4)
CHLORIDE SERPL-SCNC: 106 MMOL/L (ref 98–107)
CREAT SERPL-MCNC: 0.77 MG/DL (ref 0.67–1.17)
EGFRCR SERPLBLD CKD-EPI 2021: >90 ML/MIN/1.73M2
EOSINOPHIL # BLD AUTO: 0 10E3/UL (ref 0–0.7)
EOSINOPHIL NFR BLD AUTO: 0 %
ERYTHROCYTE [DISTWIDTH] IN BLOOD BY AUTOMATED COUNT: 13 % (ref 10–15)
GLUCOSE SERPL-MCNC: 97 MG/DL (ref 70–99)
HCO3 SERPL-SCNC: 26 MMOL/L (ref 22–29)
HCT VFR BLD AUTO: 37.1 % (ref 40–53)
HGB BLD-MCNC: 12.1 G/DL (ref 13.3–17.7)
IMM GRANULOCYTES # BLD: 0.1 10E3/UL
IMM GRANULOCYTES NFR BLD: 1 %
LYMPHOCYTES # BLD AUTO: 2.9 10E3/UL (ref 0.8–5.3)
LYMPHOCYTES NFR BLD AUTO: 25 %
MCH RBC QN AUTO: 30.9 PG (ref 26.5–33)
MCHC RBC AUTO-ENTMCNC: 32.6 G/DL (ref 31.5–36.5)
MCV RBC AUTO: 95 FL (ref 78–100)
MONOCYTES # BLD AUTO: 0.9 10E3/UL (ref 0–1.3)
MONOCYTES NFR BLD AUTO: 7 %
NEUTROPHILS # BLD AUTO: 7.9 10E3/UL (ref 1.6–8.3)
NEUTROPHILS NFR BLD AUTO: 67 %
NRBC # BLD AUTO: 0 10E3/UL
NRBC BLD AUTO-RTO: 0 /100
PLATELET # BLD AUTO: 363 10E3/UL (ref 150–450)
POTASSIUM SERPL-SCNC: 4.5 MMOL/L (ref 3.4–5.3)
RBC # BLD AUTO: 3.92 10E6/UL (ref 4.4–5.9)
SODIUM SERPL-SCNC: 142 MMOL/L (ref 135–145)
WBC # BLD AUTO: 11.8 10E3/UL (ref 4–11)

## 2025-07-12 PROCEDURE — 36415 COLL VENOUS BLD VENIPUNCTURE: CPT

## 2025-07-12 PROCEDURE — 85025 COMPLETE CBC W/AUTO DIFF WBC: CPT

## 2025-07-12 PROCEDURE — 84132 ASSAY OF SERUM POTASSIUM: CPT

## 2025-07-12 PROCEDURE — 250N000012 HC RX MED GY IP 250 OP 636 PS 637: Performed by: NURSE PRACTITIONER

## 2025-07-12 PROCEDURE — 250N000011 HC RX IP 250 OP 636: Performed by: NURSE PRACTITIONER

## 2025-07-12 PROCEDURE — 250N000013 HC RX MED GY IP 250 OP 250 PS 637

## 2025-07-12 PROCEDURE — 250N000013 HC RX MED GY IP 250 OP 250 PS 637: Performed by: NURSE PRACTITIONER

## 2025-07-12 PROCEDURE — 250N000013 HC RX MED GY IP 250 OP 250 PS 637: Performed by: PHYSICIAN ASSISTANT

## 2025-07-12 RX ORDER — OXYCODONE HYDROCHLORIDE 5 MG/1
5 TABLET ORAL EVERY 6 HOURS PRN
Qty: 40 TABLET | Refills: 0 | Status: ON HOLD | OUTPATIENT
Start: 2025-07-12 | End: 2025-07-16

## 2025-07-12 RX ORDER — METHOCARBAMOL 1000 MG/1
1000 TABLET, FILM COATED ORAL 4 TIMES DAILY
Qty: 30 TABLET | Refills: 0 | Status: SHIPPED | OUTPATIENT
Start: 2025-07-12 | End: 2025-07-13

## 2025-07-12 RX ORDER — METHYLPREDNISOLONE 4 MG/1
TABLET ORAL
Qty: 12 TABLET | Refills: 0 | Status: SHIPPED | OUTPATIENT
Start: 2025-07-12 | End: 2025-07-16

## 2025-07-12 RX ORDER — GABAPENTIN 300 MG/1
900 CAPSULE ORAL 3 TIMES DAILY
Qty: 126 CAPSULE | Refills: 0 | Status: ON HOLD | OUTPATIENT
Start: 2025-07-12 | End: 2025-07-16

## 2025-07-12 RX ORDER — FAMOTIDINE 20 MG/1
20 TABLET, FILM COATED ORAL 2 TIMES DAILY
Qty: 14 TABLET | Refills: 0 | Status: SHIPPED | OUTPATIENT
Start: 2025-07-12 | End: 2025-07-19

## 2025-07-12 RX ADMIN — OXYCODONE HYDROCHLORIDE 10 MG: 10 TABLET ORAL at 06:43

## 2025-07-12 RX ADMIN — FAMOTIDINE 20 MG: 20 TABLET, FILM COATED ORAL at 07:47

## 2025-07-12 RX ADMIN — ACETAMINOPHEN 975 MG: 325 TABLET ORAL at 03:07

## 2025-07-12 RX ADMIN — DIAZEPAM 5 MG: 5 TABLET ORAL at 00:17

## 2025-07-12 RX ADMIN — METHOCARBAMOL 1000 MG: 500 TABLET ORAL at 07:47

## 2025-07-12 RX ADMIN — METHYLPREDNISOLONE 4 MG: 4 TABLET ORAL at 07:47

## 2025-07-12 RX ADMIN — LIDOCAINE 1 PATCH: 4 PATCH TOPICAL at 07:37

## 2025-07-12 RX ADMIN — PREGABALIN 200 MG: 100 CAPSULE ORAL at 08:13

## 2025-07-12 RX ADMIN — OXYCODONE HYDROCHLORIDE 10 MG: 10 TABLET ORAL at 03:06

## 2025-07-12 RX ADMIN — HEPARIN SODIUM 5000 UNITS: 5000 INJECTION, SOLUTION INTRAVENOUS; SUBCUTANEOUS at 03:10

## 2025-07-12 ASSESSMENT — ACTIVITIES OF DAILY LIVING (ADL)
ADLS_ACUITY_SCORE: 37

## 2025-07-12 NOTE — PLAN OF CARE
"Status: POD#4 for L5-S1 TLIF.   Vitals: VSS  Neuros: Alert, oriented x4. Strengths 5/5. Mod-severe numbness/tingling/\"stabbing\" sensation in Left foot  IV: PIV SL  Resp: RA  Diet: Regular  GI: multiple BMs throughout the shift. LBM 7/12  : voiding spontaneously   Skin: no new deficits   Pain: minimal back pain, mod-severe pain in Left foot managed per MAR  Activity: up ad akin  Social: family at bedside   Plan: Potential discharge today     "

## 2025-07-12 NOTE — PROGRESS NOTES
[facility], [place]   Neurosurgery Progress Note:    Date of service: 7/12/2025      Interval History:  Patient reports continued significant left ankle to great toe pain.  Started on medrol dose pack and 1x IV steroids. Has met all standard discharge criterion besides pain control.      Assessment: Ba Mireles is a 45 year old male s/p o-arm/stealth assisted Lumbar 5-Sacral 1 Transforaminal Lumbar Interbody Fusion with Bone Morphogenic Protein and Interbody cage on 7/8/2025 with Dr. Winter.     Clinically Significant Risk Factors Present on Admission                            Plan:  - Neuro checks/vital signs: Every 4 hours  - Pain control: PRN PO oxycodone and scheduled Robaxin  - Lyrica increased to 200 mg TID (max dose)  - Medrol dose pack  - Activity: up as tolerated  - Restrictions/Bracing: LSO brace when OOB  - Diet: regular  - DVT prophylaxis: SCDs & subcutaneous heparin  - Imaging:  UXR completed on 7/9/2025    - Medically ready for discharge once pain controlled      Wound Cares:  Please cleanse wound daily with betadine and replace dressing daily.  Please keep wound covered at all times except when changing dressing until you are seen in clinic by Dr. Misha Winter.  It is ok to remove and replaced soiled dressings.  Please continue bed baths and do not shower until follow-up in clinic with Dr. Winter.      Objective:   Temp:  [97.6  F (36.4  C)-98.3  F (36.8  C)] 97.6  F (36.4  C)  Pulse:  [77-80] 80  Resp:  [16-17] 16  BP: (126-142)/(77-91) 133/91  SpO2:  [100 %] 100 %  I/O last 3 completed shifts:  In: -   Out: 500 [Urine:500]    Gen: Appears comfortable, NAD  Wound: Incision, clean, dry, intact without strikethrough  Neurologic:  - Alert & Oriented to person, place, time, and situation  - Follows commands briskly  - Speech fluent, spontaneous. No aphasia or dysarthria.  - No gaze preference. No apparent hemineglect.  - PERRL, EOMI  - Strong eye closure, jaw clench, and cheek  puff  - Face symmetric with sensation intact to light touch  - Palate elevates symmetrically, uvula midline, tongue protrudes midline  - Trapezii and sternocleidomastoid muscles 5/5 bilaterally  - No pronator drift     Del Tr Bi WE WF Gr   R 5 5 5 5 5 5   L 5 5 5 5 5 5    HF KE KF DF PF EHL   R 5 5 5 5 5 5   L 5 5 5 4 4 4+     Reflexes 2+ throughout    Sensation intact and symmetric to light touch throughout, except LEFT foot decreased sensation.     LABS  Recent Labs   Lab Test 07/11/25  0647 07/10/25  0714 07/09/25  0605   WBC 9.8 9.0 11.6*   HGB 10.4* 11.8* 11.8*   MCV 93 92 90    249 320       Recent Labs   Lab Test 07/11/25  0647 07/10/25  0714 07/09/25  0605    137 136   POTASSIUM 4.6 4.0 4.0   CHLORIDE 103 103 104   CO2 26 26 22   BUN 20.9* 13.0 14.7   CR 0.78 0.86 0.85   ANIONGAP 7 8 10   CHARLY 9.1 8.5* 9.1   * 92 98        I have seen this patient with the resident and formulated a plan and agree with this note.  AMP

## 2025-07-12 NOTE — DISCHARGE SUMMARY
[facility] Discharge Summary and Instructions    Ba Mireles MRN# [id number]   Age: 45 year old YOB: 1980     Date of Admission:  7/8/2025  Date of Discharge::  7/12/2025  Admitting Physician:  Misha Winter MD  Discharge Physician:  Misha Winter MD          Admission Diagnoses:   Lumbar disc herniation with radiculopathy [M51.16]  Lumbar disc herniation [M51.26]          Discharge Diagnosis:     Lumbar disc herniation with radiculopathy [M51.16]  Lumbar disc herniation [M51.26]     Clinically Significant Risk Factors Present on Admission                               # Moderate Malnutrition: based on nutrition assessment and treatment provided per dietitian's recommendations.               Procedures:   o-arm/stealth assisted Lumbar 5-Sacral 1 Transforaminal Lumbar Interbody Fusion with Bone Morphogenic Protein and Interbody cage           Brief History of Illness:   Ba Mireles underwent left L5-S1 far-lateral discectomy for decompression of left L5 nerve root but unfortunately, it was realized that there was dynamic instability as there was movement between the L5 transverse process and the ala causing dynamic compression. Although the L5 nerve root was decompressed, there appears to be dynamic compression of the L5g anglion due to the disc height causing severe pain.     Given his weakness of the left dorsiflexion and severity of pain, he will need L5-S1 transforaminal lumbar interbody fusion (TLIF) with titanium cage and bone morphogenic protein. The nature of the procedure was explained to the patient as well as its risks which include bleeding, infection, nerve injury, weakness, numbness. CSF leak, blood clots (DVT/PE), hardware failure, pseudo-arthrodesis, and complications from anesthesia. Patient has elected to undergo above-mentioned procedure.           Hospital Course:     Ba Mireles underwent the above-mentioned procedure on 7/8/25.  Following the procedure the patient  was transferred to the floor.     On post operative day 1, the patient was ambulating, voiding without a ibrahim, eating a regular diet, pain was well controlled and underwent upright XR imaging. The imaging revealing appropriate placement of the hardware without evidence of hardware complication.    The patient reported initial relief of his radicular pain but then started endorsing LEFT L5 distribution hyperalgesia, for which pre-gabalin was restarted and a medrol dosepack and 1x IV dexamethasone was given. The medrol dosepack was started 7/11 and will continue for 6 days.    The patient's course was uncomplicated.    On post operative day 5, the patient was ambulating, voiding without a ibrahim, eating a regular diet, pain was well controlled and therefore was discharged home.          Discharge Medications:     Discharge Medication List as of 7/12/2025 10:45 AM        START taking these medications    Details   diclofenac (VOLTAREN) 1 % topical gel Apply 2 g topically 4 times daily for 14 days., Disp-112 g, R-0, E-Prescribe      famotidine (PEPCID) 20 MG tablet Take 1 tablet (20 mg) by mouth 2 times daily for 7 days., Disp-14 tablet, R-0, E-Prescribe      gabapentin (NEURONTIN) 300 MG capsule Take 3 capsules (900 mg) by mouth 3 times daily for 14 days., Disp-126 capsule, R-0, E-Prescribe      magnesium hydroxide (MILK OF MAGNESIA) 400 MG/5ML suspension Take 30 mLs by mouth daily., Disp-354 mL, R-0, E-Prescribe      methocarbamol 1000 MG TABS Take 1,000 mg by mouth 4 times daily., Disp-30 tablet, R-0, E-Prescribe      methylPREDNISolone (MEDROL DOSEPAK) 4 MG tablet therapy pack Take 1 tablet (4 mg) by mouth 3 times daily for 2 days, THEN 1 tablet (4 mg) 2 times daily for 2 days, THEN 1 tablet (4 mg) every morning for 2 days. Follow Package Directions., Disp-12 tablet, R-0, E-Prescribe           CONTINUE these medications which have CHANGED    Details   oxyCODONE (ROXICODONE) 5 MG tablet Take 1 tablet (5 mg) by mouth  "every 6 hours as needed., Disp-40 tablet, R-0, Local Print           CONTINUE these medications which have NOT CHANGED    Details   diazepam (VALIUM) 5 MG tablet Take 1 tablet (5 mg) by mouth every 6 hours as needed for muscle spasms., Disp-20 tablet, R-0, E-Prescribe      Lidocaine (LIDOCARE) 4 % Patch Place onto the skin every 24 hours. To prevent lidocaine toxicity, patient should be patch free for 12 hrs daily.Disp-30 patch, [id number]      polyethylene glycol (MIRALAX) 17 GM/Dose powder Take 17 g by mouth daily., Disp-510 g, R-0, E-Prescribe      senna-docusate (SENOKOT-S/PERICOLACE) 8.6-50 MG tablet Take 1 tablet by mouth 2 times daily for 17 days., Disp-34 tablet, R-0, E-Prescribe      tiZANidine (ZANAFLEX) 4 MG capsule Take 1-2 capsules (4-8 mg) by mouth 3 times daily as needed for muscle spasms., Disp-180 capsule, R-1, E-Prescribe      tiZANidine (ZANAFLEX) 4 MG tablet Take 1-2 tablets (4-8 mg) by mouth 3 times daily as needed for muscle spasms., Disp-30 tablet, R-0, E-Prescribe           STOP taking these medications       pregabalin (LYRICA) 150 MG capsule Comments:   Reason for Stopping:             Exam:   Physical Exam  BP (!) 144/93 (BP Location: Right arm, Patient Position: Sitting, Cuff Size: Adult Regular)   Pulse 86   Temp 98.9  F (37.2  C)   Resp 18   Ht 1.753 m (5' 9\")   Wt 61.5 kg (135 lb 9.3 oz)   SpO2 99%   BMI 20.02 kg/m      Gen: Appears comfortable, NAD  Wound: clean, dry, intact   Neurologic:  - Alert & Oriented to person, place, time, and situation  - Follows commands briskly  - Speech fluent, spontaneous. No aphasia or dysarthria  - No gaze preference. No apparent hemineglect  - PERRL, EOMI  - Face sensation intact to light touch, and activates symmetrically  - Hearing intact to finger rustle bilaterally  - Palate elevates symmetrically with uvula midline  - Tongue protrudes midline    - Trapezii muscles 5/5 bilaterally  - No pronator drift     Del Bi Tri  FE   R 5 5 5 5 " 5   L 5 5 5 5 5    HF KE DF EHL PF   R 5 5 5 5 5   L 5 5 4 4 4+     Reflexes 2+ throughout  No Joy's or Clonus, with down-going toes    LEFT foot hyperalgesia (not in radicular distribution), otherwise sensation intact and symmetric to light touch throughout         Discharge Instructions and Follow-Up:     Discharge diet: Regular     Discharge activity: You may advance activity as tolerated. No strenuous exercise or heay lifting greater than 10 lbs for 4 weeks or until seen and cleared in clinic.  Please continue to wear brace until cleared by Neurosurgical provider.      Discharge follow-up: Follow-up with Neurosurgery [facility] in 2 weeks for wound check/post-hospital evaluation.    All additional follow-up visits with Misha Winter MD to be determined by Dr. Misha Winter MD at a later date.     Wound care: Ok to shower; however, no scrubbing of the wound and no soaking of the wound, meaning no bathtubs or swimming pools. Pat dry only. Leave wound open to air.  Patient to have wound checked 2 weeks following surgery.    Wound location: Lumbar spine  Closure technique: Nylon    Please keep wound covered until you are seen in clinic by Dr. Misha Winter.  It is ok to remove and replaced soiled dressings.   Please do not wash the wound until you have been seen in clinic by Dr. Misha Winter.  Please continue bed baths and do not shower until follow-up in clinic with Dr. Winter.         Please call if you have:  1. increased pain, redness, drainage, swelling at your incision  2. fevers > 101.5 F degrees  3. with any questions or concerns.  You may reach the Neurosurgery clinic at [phone] during regular work hours. ER at [phone].    and ask for the Neurosurgery Resident on call at [phone], during off hours or weekends.         Discharge Disposition:     Discharged to home        Compa Sevilla MD  Neurosurgery     Marcelino Manriquez MD   Neurosurgery   On call pager #[phone]

## 2025-07-12 NOTE — PLAN OF CARE
Discharge time/date: 11:30 7/12  Walked or Wheelchair: Wheelchair  PIV removed: Yes  Reviewed AVS with patient: Yes  Medication due times added to AVS in EPIC: Yes  Verbalized understanding of discharge with teachback: Yes  Medications retrieved from pharmacy: Yes  Supplies sent home: Yes  Belongings from security with patient: no belongings with security

## 2025-07-13 ENCOUNTER — MYC MEDICAL ADVICE (OUTPATIENT)
Dept: NEUROSURGERY | Facility: CLINIC | Age: 45
End: 2025-07-13
Payer: COMMERCIAL

## 2025-07-13 ENCOUNTER — HOSPITAL ENCOUNTER (EMERGENCY)
Facility: CLINIC | Age: 45
Discharge: HOME | End: 2025-07-13
Attending: EMERGENCY MEDICINE
Payer: COMMERCIAL

## 2025-07-13 VITALS
RESPIRATION RATE: 17 BRPM | OXYGEN SATURATION: 100 % | HEART RATE: 84 BPM | SYSTOLIC BLOOD PRESSURE: 147 MMHG | TEMPERATURE: 97.8 F | DIASTOLIC BLOOD PRESSURE: 89 MMHG | HEIGHT: 69 IN | WEIGHT: 135 LBS | BODY MASS INDEX: 19.99 KG/M2

## 2025-07-13 DIAGNOSIS — Z98.1 S/P LUMBAR SPINAL FUSION: ICD-10-CM

## 2025-07-13 DIAGNOSIS — G89.18 POST-OP PAIN: ICD-10-CM

## 2025-07-13 DIAGNOSIS — M54.16 LUMBAR RADICULOPATHY: Primary | ICD-10-CM

## 2025-07-13 LAB
ANION GAP SERPL CALCULATED.3IONS-SCNC: 11 MMOL/L (ref 7–15)
BASOPHILS # BLD AUTO: 0.1 10E3/UL (ref 0–0.2)
BASOPHILS NFR BLD AUTO: 1 %
BUN SERPL-MCNC: 22.2 MG/DL (ref 6–20)
CALCIUM SERPL-MCNC: 9.5 MG/DL (ref 8.8–10.4)
CHLORIDE SERPL-SCNC: 103 MMOL/L (ref 98–107)
CREAT SERPL-MCNC: 0.74 MG/DL (ref 0.67–1.17)
CRP SERPL-MCNC: 15.3 MG/L
EGFRCR SERPLBLD CKD-EPI 2021: >90 ML/MIN/1.73M2
EOSINOPHIL # BLD AUTO: 0.1 10E3/UL (ref 0–0.7)
EOSINOPHIL NFR BLD AUTO: 1 %
ERYTHROCYTE [DISTWIDTH] IN BLOOD BY AUTOMATED COUNT: 13.1 % (ref 10–15)
ERYTHROCYTE [SEDIMENTATION RATE] IN BLOOD BY WESTERGREN METHOD: 39 MM/HR (ref 0–15)
GLUCOSE SERPL-MCNC: 94 MG/DL (ref 70–99)
HCO3 SERPL-SCNC: 25 MMOL/L (ref 22–29)
HCT VFR BLD AUTO: 35.4 % (ref 40–53)
HGB BLD-MCNC: 12.1 G/DL (ref 13.3–17.7)
IMM GRANULOCYTES # BLD: 0 10E3/UL
IMM GRANULOCYTES NFR BLD: 0 %
LYMPHOCYTES # BLD AUTO: 2.5 10E3/UL (ref 0.8–5.3)
LYMPHOCYTES NFR BLD AUTO: 26 %
MCH RBC QN AUTO: 31.7 PG (ref 26.5–33)
MCHC RBC AUTO-ENTMCNC: 34.2 G/DL (ref 31.5–36.5)
MCV RBC AUTO: 93 FL (ref 78–100)
MONOCYTES # BLD AUTO: 0.8 10E3/UL (ref 0–1.3)
MONOCYTES NFR BLD AUTO: 9 %
NEUTROPHILS # BLD AUTO: 6.2 10E3/UL (ref 1.6–8.3)
NEUTROPHILS NFR BLD AUTO: 64 %
NRBC # BLD AUTO: 0 10E3/UL
NRBC BLD AUTO-RTO: 0 /100
PLATELET # BLD AUTO: 406 10E3/UL (ref 150–450)
POTASSIUM SERPL-SCNC: 3.9 MMOL/L (ref 3.4–5.3)
RBC # BLD AUTO: 3.82 10E6/UL (ref 4.4–5.9)
SODIUM SERPL-SCNC: 139 MMOL/L (ref 135–145)
WBC # BLD AUTO: 9.7 10E3/UL (ref 4–11)

## 2025-07-13 PROCEDURE — 85652 RBC SED RATE AUTOMATED: CPT

## 2025-07-13 PROCEDURE — 85004 AUTOMATED DIFF WBC COUNT: CPT

## 2025-07-13 PROCEDURE — 80048 BASIC METABOLIC PNL TOTAL CA: CPT

## 2025-07-13 PROCEDURE — 250N000011 HC RX IP 250 OP 636

## 2025-07-13 PROCEDURE — 250N000013 HC RX MED GY IP 250 OP 250 PS 637

## 2025-07-13 PROCEDURE — 99285 EMERGENCY DEPT VISIT HI MDM: CPT | Mod: 25 | Performed by: EMERGENCY MEDICINE

## 2025-07-13 PROCEDURE — 86140 C-REACTIVE PROTEIN: CPT

## 2025-07-13 PROCEDURE — 96374 THER/PROPH/DIAG INJ IV PUSH: CPT | Mod: 59 | Performed by: EMERGENCY MEDICINE

## 2025-07-13 PROCEDURE — 99284 EMERGENCY DEPT VISIT MOD MDM: CPT | Mod: 25 | Performed by: EMERGENCY MEDICINE

## 2025-07-13 PROCEDURE — 36415 COLL VENOUS BLD VENIPUNCTURE: CPT

## 2025-07-13 RX ORDER — POLYETHYLENE GLYCOL 3350 17 G/17G
1 POWDER, FOR SOLUTION ORAL DAILY PRN
Status: ON HOLD | COMMUNITY
End: 2025-07-16

## 2025-07-13 RX ORDER — OXYCODONE HYDROCHLORIDE 5 MG/1
5 TABLET ORAL ONCE
Refills: 0 | Status: COMPLETED | OUTPATIENT
Start: 2025-07-13 | End: 2025-07-13

## 2025-07-13 RX ORDER — ONDANSETRON 4 MG/1
4 TABLET, ORALLY DISINTEGRATING ORAL EVERY 8 HOURS PRN
Qty: 10 TABLET | Refills: 0 | Status: SHIPPED | OUTPATIENT
Start: 2025-07-13 | End: 2025-07-16

## 2025-07-13 RX ORDER — DIAZEPAM 5 MG/1
5 TABLET ORAL EVERY 8 HOURS PRN
Qty: 30 TABLET | Refills: 0 | Status: ON HOLD | OUTPATIENT
Start: 2025-07-13 | End: 2025-07-16

## 2025-07-13 RX ORDER — ACETAMINOPHEN 500 MG
1000 TABLET ORAL EVERY 6 HOURS PRN
Status: ON HOLD | COMMUNITY
End: 2025-07-16

## 2025-07-13 RX ORDER — ONDANSETRON 2 MG/ML
4 INJECTION INTRAMUSCULAR; INTRAVENOUS ONCE
Status: DISCONTINUED | OUTPATIENT
Start: 2025-07-13 | End: 2025-07-13 | Stop reason: HOSPADM

## 2025-07-13 RX ORDER — METHOCARBAMOL 500 MG/1
1000 TABLET, FILM COATED ORAL 4 TIMES DAILY
Status: ON HOLD | COMMUNITY
End: 2025-07-16

## 2025-07-13 RX ADMIN — OXYCODONE HYDROCHLORIDE 5 MG: 5 TABLET ORAL at 20:04

## 2025-07-13 RX ADMIN — HYDROMORPHONE HYDROCHLORIDE 1 MG: 1 INJECTION, SOLUTION INTRAMUSCULAR; INTRAVENOUS; SUBCUTANEOUS at 18:05

## 2025-07-13 ASSESSMENT — ACTIVITIES OF DAILY LIVING (ADL)
ADLS_ACUITY_SCORE: 56
ADLS_ACUITY_SCORE: 57
ADLS_ACUITY_SCORE: 57

## 2025-07-13 NOTE — TELEPHONE ENCOUNTER
Ba is a 45-year-old male who recently underwent L5-S1 TLIF with Dr. Winter on 7/8/2025.  Postoperatively he had improvement in his back pain and radicular pain but started developing left ankle pain.  He did well from the surgical standpoint but he required further evaluation by ortho for his ankle pain which was largely negative.  He did well with the therapies despite the pain and was discharged home.    He messaged neurosurgery service on 7/13/2025 in the morning regarding ongoing ankle pain.  He said that he was taking oxycodone every 6 hours and taking the remainder of his other medications but the pain is not under control.  He only had 3 tablets of Valium due to some insurance issues and was requesting for more Valium which should help with his pain.    I explained to him that the ankle pain is likely not related to the spine surgery but advised him to increase the frequency of oxycodone 5 mg every 4 hours and use the remainder of the medications for the pain.  In case of worsening pain I recommended that he could go to the emergency for evaluation and pain control.    Elvis Rivera MD  Neurosurgery Resident  Pager: [phone]

## 2025-07-13 NOTE — ED PROVIDER NOTES
EMERGENCY DEPARTMENT ENCOUNTER      NAME: Ba Mireles  AGE: 45 year old male  YOB: 1980  MRN: [id number]  EVALUATION DATE & TIME: No admission date for patient encounter.    PCP: Clinic, [facility]    ED PROVIDER: Suni Mccormack PA-C      Chief Complaint   Patient presents with    Leg Pain    Back Pain         FINAL IMPRESSION:  1. Post-op pain    2. S/P lumbar spinal fusion          ED COURSE & MEDICAL DECISION MAKIN:15 PM Met with patient for initial interview. Plan for care discussed.  6:00 PM I staffed the patient with Dr. Wanda Steele.   6:30 PM I spoke with Dr. Colin, Neurosurgery.  6:33 PM Re-discussed case with Dr. Steele.   6:50 PM Reevaluated and updated patient. Long discussion regarding disposition including admission for pain control vs discharge home with outpatient spine team as surgeon will be in office per Dr. Colin. Reevaluated and updated patient who appears to be lying comfortably in bed. Patient feels comfortable attempting ambulation trial. I discussed the plan for discharge with the patient, and patient is agreeable. We discussed supportive cares at home and reasons for return to the ER including new or worsening symptoms. All questions and concerns addressed. Patient to be discharged by RN after ambulation trial.   7:05 PM Spoke with care manager regarding possible home health care, discussed would take up to 48 hours to get patient home care.   8:00 PM RN alerted patient was able to pass ambulation trial and feels safe and comfortable returning home and following up with Dr. Winter tomorrow.   8:09 PM Reevaluated and updated patient. Patient appears quite upset I am not prescribing Valium, which was never discussed previously. Re-discussed option of admission for pain control if he feels he will be unable to control pain at home without Valium. Patient banging his cane on the floor and requesting discharge home, patient declined admission.     45 year  old male with a history of chronic low back pain with left-sided sciatica s/p lumbar fusion (7/8/25) presents to the Emergency Department for evaluation of recurrent low back pain with left sided sciatica after being discharged from hospital yesterday. Patient describes an episode where a resident had evaluated him in the hospital after jostling his chair leading to exacerbation of back pain.  He denies any new numbness or weakness, saddle anesthesia, urinary or bowel incontinence or retention.  He denies any fevers or chills.  He reports pain has been uncontrollable at home with oral analgesia prompting his return to the ED today. Upon exam, patient is afebrile, hemodynamically stable, but appears uncomfortable. Reproducible midline and bilateral paraspinal lumbar back pain to palpation without overlying skin changes. Surgical incision CDI with sutures in place. Mild decreased sensation along medial aspect of left foot, which is chronic per patient, no new numbness. 5/5 strength and DTR remain intact. Gait intact. Discussed case with neurosurgery, Dr. Colin, who recommends pain management and outpatient follow up with Dr. Winter tomorrow or early next week or admission for pain management. Reports no indication for MRI at this time, no signs of cauda equina.    CBC without leukocytosis. BMP unremarkable. ESR elevated at 39 and CRP elevated at 15.30 consistent with recent surgery. Patient was treated with Dilaudid upon arrival with improvement in symptoms. Patient was made aware of the above findings.     Patient appears quite upset I am not prescribing Valium, which was never discussed previously. Re-discussed option of admission for pain control if he feels he will be unable to control pain at home without Valium. Patient banging his cane on the floor and requesting discharge home, patient declined admission. Ultimately patient was discharged home with prescription Zofran, strict return precautions, and close follow  up with their Spine Team tomorrow for reevaluation and ongoing management. The patient was advised to return to the ER if any new or worsening symptoms develop. I had interpreted patient's agreement with plan after wanting to attempt ambulation trial and discharge home. However, upon discharge, patient appears quite upset I am not prescribing Valium, which was never discussed previously. Re-discussed option of admission for pain control if he feels he will be unable to control pain at home without Valium. Patient banging his cane on the floor and requesting discharge home, patient declined admission.     MIPS (CTPE, Dental pain, Ladd, Sinusitis, Asthma/COPD, Head Trauma): Not Applicable    SEPSIS: None    MEDICATIONS GIVEN IN THE EMERGENCY:  Medications   ondansetron (ZOFRAN) injection 4 mg (has no administration in time range)   HYDROmorphone (DILAUDID) injection 1 mg (1 mg Intravenous $Given 7/13/25 1805)   oxyCODONE (ROXICODONE) tablet 5 mg (5 mg Oral $Given 7/13/25 2004)       NEW PRESCRIPTIONS STARTED AT TODAY'S ER VISIT  New Prescriptions    ONDANSETRON (ZOFRAN ODT) 4 MG ODT TAB    Take 1 tablet (4 mg) by mouth every 8 hours as needed for nausea.          =================================================================    HPI    Patient information was obtained from: patient and patient's mother    Use of : N/A        Ba HERRERA Aline is a 45 year old male with a pertinent history of nephrolithiasis, hip pain, numbness and tingling in both lower extremities, lumbar stenosis, and lumbar disc herniation who presents to this ED via walk-in for evaluation of left foot pain and back pain.    Patient had a spinal fusion surgery on 7/8 and while staying in the hospital post-op he got stuck in a chair and called in for someone to help. A resident doctor came into the room and raised the chair and lowered the foot rest in a quick manner which aggravated the patient's pain. Reports his left foot pain was  "almost gone to that point, but immediately came back after the resident moved him in the chair. Reports now if his left foot is touched, \"shockwaves\" go all the way through his body. He has no pain at all on the bottom of his left foot, just on the top. He feels like his left foot is \"fat as hell\". He has increasing back pain as well which radiates a little to the abdomen. Reports new onset pain of his inguinal region as well. He has had 2 bowel movements today. He is talking all of his medications as prescribed. Reports feeling \"hot cold, hot cold\". Last took oxycodone at 2:46 PM and Tylenol at 12:49 PM today.     Chart Review:  - [facility] from 7/8/25 - 7/12/25. Underwent left L5-S1 far-lateral discectomy for decompression of left L5 nerve root but unfortunately, it was realized that there was dynamic instability as there was movement between the L5 transverse process and the ala causing dynamic compression. Although the L5 nerve root was decompressed, there appears to be dynamic compression of the L5g anglion due to the disc height causing severe pain. On post operative day 1, the patient was ambulating, voiding without a ibrahim, eating a regular diet, pain was well controlled and underwent upright XR imaging. The imaging revealing appropriate placement of the hardware without evidence of hardware complication. The patient reported initial relief of his radicular pain but then started endorsing LEFT L5 distribution hyperalgesia, for which pre-gabalin was restarted and a medrol dosepack and 1x IV dexamethasone was given. The medrol dosepack was started 7/11 and will continue for 6 days. The patient's course was uncomplicated. On post operative day 5, the patient was ambulating, voiding without a ibrahim, eating a regular diet, pain was well controlled and therefore was discharged home.      REVIEW OF SYSTEMS   Review of Systems See HPI    PAST MEDICAL HISTORY:  Past Medical History: "   Diagnosis Date    Arthritis 08/16/2014    When I had my hip surgery they told me I have arthritis going already    Kidney stone     first stone since age 20    Left ureteral stone 08/02/2023       PAST SURGICAL HISTORY:  Past Surgical History:   Procedure Laterality Date    COMBINED CYSTOSCOPY, RETROGRADES, URETEROSCOPY, LASER HOLMIUM LITHOTRIPSY URETER(S), INSERT STENT Bilateral 08/07/2023    Procedure: Cystoscopy, Bilateral Ureteroscopy, Bilateral Retrograde Pyelogram, Bilateral Stone Basket Extraction, Bilateral Stent placement;  Surgeon: Gus Shipman MD;  Location: [facility] OR    CYSTOSCOPY,URETEROSCOPY,LITHOTRIPSY      at [place]    DISCECTOMY LUMBAR POSTERIOR MICROSCOPIC ONE LEVEL N/A 6/24/2025    Procedure: Left Lumbar 5-Sacral 1 Far-lateral microdiscectomy;  Surgeon: Misha Winter MD;  Location:  OR    OPTICAL TRACKING SYSTEM FUSION POSTERIOR SPINE LUMBAR N/A 7/8/2025    Procedure: o-arm/stealth assisted Lumbar 5-Sacral 1 Transforaminal Lumbar Interbody Fusion with Bone Morphogenic Protein and Interbody cage;  Surgeon: Misha Winter MD;  Location: [facility] OR    ORTHOPEDIC SURGERY  2022    Right Wrist    ORTHOPEDIC SURGERY      left hip surgery    WRIST SURGERY Right            CURRENT MEDICATIONS:    acetaminophen (TYLENOL) 500 MG tablet  diazepam (VALIUM) 5 MG tablet  famotidine (PEPCID) 20 MG tablet  gabapentin (NEURONTIN) 300 MG capsule  Lidocaine (LIDOCARE) 4 % Patch  magnesium hydroxide (MILK OF MAGNESIA) 400 MG/5ML suspension  methocarbamol (ROBAXIN) 500 MG tablet  methylPREDNISolone (MEDROL DOSEPAK) 4 MG tablet therapy pack  ondansetron (ZOFRAN ODT) 4 MG ODT tab  oxyCODONE (ROXICODONE) 5 MG tablet  polyethylene glycol (MIRALAX) 17 g packet  tiZANidine (ZANAFLEX) 4 MG tablet        ALLERGIES:  Allergies   Allergen Reactions    Dust Mites     Ragweeds     Amoxicillin-Pot Clavulanate Anxiety     Has tolerated amoxicillin well in the past       FAMILY HISTORY:  Family History   Problem Relation Age of  Onset    Cancer Mother         kidney    Heart Disease Mother 66    Nephrolithiasis Mother     Gout Mother     Cancer Maternal Grandfather         renal cell    Heart Disease Maternal Grandfather     Nephrolithiasis Maternal Grandfather     Hyperlipidemia Maternal Grandfather     Cancer Paternal Grandfather         lucille cell    Kidney Disease Paternal Grandfather     Prostate Cancer Paternal Grandfather     Nephrolithiasis Maternal Aunt     Anesthesia Reaction No family hx of     Deep Vein Thrombosis (DVT) No family hx of        SOCIAL HISTORY:   Social History     Socioeconomic History    Marital status:    Tobacco Use    Smoking status: Every Day     Current packs/day: 0.00     Average packs/day: 1 pack/day for 26.2 years (26.2 ttl pk-yrs)     Types: Cigarettes     Start date: 1997     Last attempt to quit: 2023     Years since quittin.8     Passive exposure: Current    Smokeless tobacco: Never    Tobacco comments:     Recent restarted, less than 1 PPD   Vaping Use    Vaping status: Never Used   Substance and Sexual Activity    Alcohol use: Not Currently     Alcohol/week: 2.0 standard drinks of alcohol     Types: 2 Standard drinks or equivalent per week    Drug use: Not Currently     Types: Marijuana    Sexual activity: Yes     Partners: Female     Birth control/protection: Pull-out method   Other Topics Concern    Parent/sibling w/ CABG, MI or angioplasty before 65F 55M? Yes     Comment: Mother     Social Drivers of Health     Financial Resource Strain: Low Risk  (2025)    Financial Resource Strain     Within the past 12 months, have you or your family members you live with been unable to get utilities (heat, electricity) when it was really needed?: No   Food Insecurity: Low Risk  (2025)    Food Insecurity     Within the past 12 months, did you worry that your food would run out before you got money to buy more?: No     Within the past 12 months, did the food you bought just not last  "and you didn t have money to get more?: No   Transportation Needs: Low Risk  (7/8/2025)    Transportation Needs     Within the past 12 months, has lack of transportation kept you from medical appointments, getting your medicines, non-medical meetings or appointments, work, or from getting things that you need?: No   Physical Activity: Insufficiently Active (7/28/2024)    Exercise Vital Sign     Days of Exercise per Week: 2 days     Minutes of Exercise per Session: 20 min   Stress: Stress Concern Present (7/28/2024)    [place] [employer] - Occupational Stress Questionnaire     Feeling of Stress : To some extent   Social Connections: Unknown (7/28/2024)    Social Connection and Isolation Panel [NHANES]     Frequency of Social Gatherings with Friends and Family: Once a week   Interpersonal Safety: Low Risk  (7/8/2025)    Interpersonal Safety     Do you feel physically and emotionally safe where you currently live?: Yes     Within the past 12 months, have you been hit, slapped, kicked or otherwise physically hurt by someone?: No     Within the past 12 months, have you been humiliated or emotionally abused in other ways by your partner or ex-partner?: No   Housing Stability: Low Risk  (7/8/2025)    Housing Stability     Do you have housing? : Yes     Are you worried about losing your housing?: No       VITALS:  BP (!) 147/89   Pulse 84   Temp 97.8  F (36.6  C) (Oral)   Resp 19   Ht 1.753 m (5' 9\")   Wt 61.2 kg (135 lb)   SpO2 100%   BMI 19.94 kg/m      PHYSICAL EXAM    Constitutional:  Alert, appears uncomfortable. Cooperative.  EYES: Conjunctivae clear.  HENT:  Atraumatic, normocephalic.  Respiratory:  Respirations even, unlabored, in no acute respiratory distress.  Cardiovascular:  Regular rate, good peripheral perfusion.  GI: Soft, flat, non-distended.  Musculoskeletal: Reproducible midline and bilateral paraspinal lumbar back pain to palpation without overlying skin changes. Surgical " incision CDI with sutures in place. Mild decreased sensation along medial aspect of left foot, which is chronic per patient, no new decreased sensation/numbness. 5/5 strength left lower extremity and DTR patellar and Egnar's remain intact. Gait intact.   Integument: Warm, Dry.   Neurologic:  Alert & oriented. No focal deficits noted.  Psych: Normal mood and affect.      LAB:  All pertinent labs reviewed and interpreted.  Results for orders placed or performed during the hospital encounter of 07/13/25   Basic metabolic panel   Result Value Ref Range    Sodium 139 135 - 145 mmol/L    Potassium 3.9 3.4 - 5.3 mmol/L    Chloride 103 98 - 107 mmol/L    Carbon Dioxide (CO2) 25 22 - 29 mmol/L    Anion Gap 11 7 - 15 mmol/L    Urea Nitrogen 22.2 (H) 6.0 - 20.0 mg/dL    Creatinine 0.74 0.67 - 1.17 mg/dL    GFR Estimate >90 >60 mL/min/1.73m2    Calcium 9.5 8.8 - 10.4 mg/dL    Glucose 94 70 - 99 mg/dL   Erythrocyte sedimentation rate auto   Result Value Ref Range    Erythrocyte Sedimentation Rate 39 (H) 0 - 15 mm/hr   Result Value Ref Range    CRP Inflammation 15.30 (H) <5.00 mg/L   CBC with platelets and differential   Result Value Ref Range    WBC Count 9.7 4.0 - 11.0 10e3/uL    RBC Count 3.82 (L) 4.40 - 5.90 10e6/uL    Hemoglobin 12.1 (L) 13.3 - 17.7 g/dL    Hematocrit 35.4 (L) 40.0 - 53.0 %    MCV 93 78 - 100 fL    MCH 31.7 26.5 - 33.0 pg    MCHC 34.2 31.5 - 36.5 g/dL    RDW 13.1 10.0 - 15.0 %    Platelet Count 406 150 - 450 10e3/uL    % Neutrophils 64 %    % Lymphocytes 26 %    % Monocytes 9 %    % Eosinophils 1 %    % Basophils 1 %    % Immature Granulocytes 0 %    NRBCs per 100 WBC 0 <1 /100    Absolute Neutrophils 6.2 1.6 - 8.3 10e3/uL    Absolute Lymphocytes 2.5 0.8 - 5.3 10e3/uL    Absolute Monocytes 0.8 0.0 - 1.3 10e3/uL    Absolute Eosinophils 0.1 0.0 - 0.7 10e3/uL    Absolute Basophils 0.1 0.0 - 0.2 10e3/uL    Absolute Immature Granulocytes 0.0 <=0.4 10e3/uL    Absolute NRBCs 0.0 10e3/uL       RADIOLOGY:  Reviewed  all pertinent imaging. Please see official radiology report.  No orders to display     I, Jaguar Bullock, am serving as a scribe to document services personally performed by Suni Mccormack PA-C based on my observation and the provider's statements to me. I, Suni Mccormack PA-C, attest that Jaguar Bullock is acting in a scribe capacity, has observed my performance of the services and has documented them in accordance with my direction.    Suni Mccormack PA-C  [facility] EMERGENCY ROOM  [address] [id number]  [phone]      Suni Mccormack PA-C  07/13/25 2005       Suni Mccormack PA-C  07/13/25 2016

## 2025-07-13 NOTE — ED NOTES
Introduced self to patient.  Whiteboard updated.  Pain assessed.  Plan of care and length of time discussed with patient.  Will continue to monitor.  Charisse Moreno RN.......7/13/2025 6:04 PM

## 2025-07-13 NOTE — ED TRIAGE NOTES
PT notes a spinal fusion on 7/8. PT states he was discharged yesterday and since going home his pain has not been managed. PT reports left back pain and it radiates down his left leg.     PT is agitated and restless in triage. PT states the pain is 10/10.       Triage Assessment (Adult)       Row Name 07/13/25 1546          Triage Assessment    Airway WDL WDL        Respiratory WDL    Respiratory WDL WDL        Skin Circulation/Temperature WDL    Skin Circulation/Temperature WDL WDL        Cardiac WDL    Cardiac WDL X;rhythm     Pulse Rate & Regularity tachycardic        Peripheral/Neurovascular WDL    Peripheral Neurovascular WDL WDL     Capillary Refill, General less than/equal to 3 secs        Cognitive/Neuro/Behavioral WDL    Cognitive/Neuro/Behavioral WDL WDL        [place] Coma Scale    Best Eye Response 4-->(E4) spontaneous     Best Motor Response 6-->(M6) obeys commands     Best Verbal Response 5-->(V5) oriented     [place] Coma Scale Score 15

## 2025-07-13 NOTE — PHARMACY-ADMISSION MEDICATION HISTORY
Pharmacist Admission Medication History    Admission medication history is complete. The information provided in this note is only as accurate as the sources available at the time of the update.    Information Source(s): Patient, Family member, CareEverywhere/SureScripts, and medication list via in-person    Pertinent Information: Started Medrol Dosepack on 7/12, had 2 doses today.    Changes made to PTA medication list:  Added: None  Deleted: Voltaren gel  Changed: None    Allergies reviewed with patient and updates made in EHR: yes    Medication History Completed By: Jack Sanford RPH 7/13/2025 6:26 PM    PTA Med List   Medication Sig Note Last Dose/Taking    acetaminophen (TYLENOL) 500 MG tablet Take 1,000 mg by mouth every 6 hours as needed for mild pain.  7/13/2025 at 12:21 PM    diazepam (VALIUM) 5 MG tablet Take 1 tablet (5 mg) by mouth every 6 hours as needed for muscle spasms.  Past Week    famotidine (PEPCID) 20 MG tablet Take 1 tablet (20 mg) by mouth 2 times daily for 7 days.  7/13/2025 Morning    gabapentin (NEURONTIN) 300 MG capsule Take 3 capsules (900 mg) by mouth 3 times daily for 14 days.  7/13/2025 at  7:30 AM    Lidocaine (LIDOCARE) 4 % Patch Place onto the skin every 24 hours. To prevent lidocaine toxicity, patient should be patch free for 12 hrs daily.  7/13/2025 at  3:30 PM    magnesium hydroxide (MILK OF MAGNESIA) 400 MG/5ML suspension Take 30 mLs by mouth daily as needed for constipation or heartburn.  Taking As Needed    methocarbamol (ROBAXIN) 500 MG tablet Take 1,000 mg by mouth 4 times daily.  7/13/2025 at 11:25 AM    methylPREDNISolone (MEDROL DOSEPAK) 4 MG tablet therapy pack Take 1 tablet (4 mg) by mouth 3 times daily for 2 days, THEN 1 tablet (4 mg) 2 times daily for 2 days, THEN 1 tablet (4 mg) every morning for 2 days. Follow Package Directions. 7/13/2025: Started 7/12 7/13/2025 at 12:00 PM    oxyCODONE (ROXICODONE) 5 MG tablet Take 1 tablet (5 mg) by mouth every 6 hours as needed.   7/13/2025 at  2:46 PM    polyethylene glycol (MIRALAX) 17 g packet Take 1 packet by mouth daily as needed for constipation.  Taking As Needed    tiZANidine (ZANAFLEX) 4 MG tablet Take 1-2 tablets (4-8 mg) by mouth 3 times daily as needed for muscle spasms.  7/13/2025 at 12:49 PM

## 2025-07-14 ENCOUNTER — HOSPITAL ENCOUNTER (OUTPATIENT)
Facility: CLINIC | Age: 45
Setting detail: OBSERVATION
Discharge: HOME | End: 2025-07-16
Attending: EMERGENCY MEDICINE | Admitting: NEUROLOGICAL SURGERY
Payer: COMMERCIAL

## 2025-07-14 ENCOUNTER — APPOINTMENT (OUTPATIENT)
Dept: MRI IMAGING | Facility: CLINIC | Age: 45
End: 2025-07-14
Attending: EMERGENCY MEDICINE
Payer: COMMERCIAL

## 2025-07-14 ENCOUNTER — PATIENT OUTREACH (OUTPATIENT)
Dept: CARE COORDINATION | Facility: CLINIC | Age: 45
End: 2025-07-14

## 2025-07-14 ENCOUNTER — TELEPHONE (OUTPATIENT)
Dept: BEHAVIORAL HEALTH | Facility: CLINIC | Age: 45
End: 2025-07-14

## 2025-07-14 DIAGNOSIS — M51.26 LUMBAR DISC HERNIATION: Primary | ICD-10-CM

## 2025-07-14 DIAGNOSIS — Z98.1 ARTHRODESIS STATUS: ICD-10-CM

## 2025-07-14 DIAGNOSIS — M54.50 ACUTE LOW BACK PAIN, UNSPECIFIED BACK PAIN LATERALITY, UNSPECIFIED WHETHER SCIATICA PRESENT: ICD-10-CM

## 2025-07-14 DIAGNOSIS — M48.061 SPINAL STENOSIS OF LUMBAR REGION WITHOUT NEUROGENIC CLAUDICATION: ICD-10-CM

## 2025-07-14 DIAGNOSIS — Z98.1 S/P LUMBAR FUSION: ICD-10-CM

## 2025-07-14 DIAGNOSIS — M54.16 LUMBAR RADICULOPATHY: Primary | ICD-10-CM

## 2025-07-14 DIAGNOSIS — Z98.890 POSTOPERATIVE STATE: ICD-10-CM

## 2025-07-14 LAB
ANION GAP SERPL CALCULATED.3IONS-SCNC: 11 MMOL/L (ref 7–15)
BASOPHILS # BLD AUTO: 0.1 10E3/UL (ref 0–0.2)
BASOPHILS NFR BLD AUTO: 1 %
BUN SERPL-MCNC: 21.4 MG/DL (ref 6–20)
CALCIUM SERPL-MCNC: 9.7 MG/DL (ref 8.8–10.4)
CHLORIDE SERPL-SCNC: 105 MMOL/L (ref 98–107)
CREAT SERPL-MCNC: 1.19 MG/DL (ref 0.67–1.17)
CRP SERPL-MCNC: 7.51 MG/L
EGFRCR SERPLBLD CKD-EPI 2021: 78 ML/MIN/1.73M2
EOSINOPHIL # BLD AUTO: 0.1 10E3/UL (ref 0–0.7)
EOSINOPHIL NFR BLD AUTO: 1 %
ERYTHROCYTE [DISTWIDTH] IN BLOOD BY AUTOMATED COUNT: 12.9 % (ref 10–15)
GLUCOSE SERPL-MCNC: 89 MG/DL (ref 70–99)
HCO3 SERPL-SCNC: 25 MMOL/L (ref 22–29)
HCT VFR BLD AUTO: 32.8 % (ref 40–53)
HGB BLD-MCNC: 11.3 G/DL (ref 13.3–17.7)
IMM GRANULOCYTES # BLD: 0 10E3/UL
IMM GRANULOCYTES NFR BLD: 0 %
LYMPHOCYTES # BLD AUTO: 3.1 10E3/UL (ref 0.8–5.3)
LYMPHOCYTES NFR BLD AUTO: 33 %
MCH RBC QN AUTO: 31.5 PG (ref 26.5–33)
MCHC RBC AUTO-ENTMCNC: 34.5 G/DL (ref 31.5–36.5)
MCV RBC AUTO: 91 FL (ref 78–100)
MONOCYTES # BLD AUTO: 1 10E3/UL (ref 0–1.3)
MONOCYTES NFR BLD AUTO: 10 %
NEUTROPHILS # BLD AUTO: 5.2 10E3/UL (ref 1.6–8.3)
NEUTROPHILS NFR BLD AUTO: 55 %
NRBC # BLD AUTO: 0 10E3/UL
NRBC BLD AUTO-RTO: 0 /100
PLATELET # BLD AUTO: 353 10E3/UL (ref 150–450)
POTASSIUM SERPL-SCNC: 4.1 MMOL/L (ref 3.4–5.3)
PROCALCITONIN SERPL IA-MCNC: 0.05 NG/ML
RBC # BLD AUTO: 3.59 10E6/UL (ref 4.4–5.9)
SODIUM SERPL-SCNC: 141 MMOL/L (ref 135–145)
WBC # BLD AUTO: 9.4 10E3/UL (ref 4–11)

## 2025-07-14 PROCEDURE — 96375 TX/PRO/DX INJ NEW DRUG ADDON: CPT

## 2025-07-14 PROCEDURE — 84145 PROCALCITONIN (PCT): CPT | Performed by: EMERGENCY MEDICINE

## 2025-07-14 PROCEDURE — 72158 MRI LUMBAR SPINE W/O & W/DYE: CPT

## 2025-07-14 PROCEDURE — 84100 ASSAY OF PHOSPHORUS: CPT | Performed by: NEUROLOGICAL SURGERY

## 2025-07-14 PROCEDURE — 72158 MRI LUMBAR SPINE W/O & W/DYE: CPT | Mod: 26 | Performed by: RADIOLOGY

## 2025-07-14 PROCEDURE — 250N000011 HC RX IP 250 OP 636: Performed by: EMERGENCY MEDICINE

## 2025-07-14 PROCEDURE — 99285 EMERGENCY DEPT VISIT HI MDM: CPT | Performed by: EMERGENCY MEDICINE

## 2025-07-14 PROCEDURE — 96374 THER/PROPH/DIAG INJ IV PUSH: CPT

## 2025-07-14 PROCEDURE — 99285 EMERGENCY DEPT VISIT HI MDM: CPT | Mod: 25 | Performed by: EMERGENCY MEDICINE

## 2025-07-14 PROCEDURE — 86140 C-REACTIVE PROTEIN: CPT | Performed by: EMERGENCY MEDICINE

## 2025-07-14 PROCEDURE — 36415 COLL VENOUS BLD VENIPUNCTURE: CPT | Performed by: EMERGENCY MEDICINE

## 2025-07-14 PROCEDURE — 85025 COMPLETE CBC W/AUTO DIFF WBC: CPT | Performed by: EMERGENCY MEDICINE

## 2025-07-14 PROCEDURE — 80048 BASIC METABOLIC PNL TOTAL CA: CPT | Performed by: EMERGENCY MEDICINE

## 2025-07-14 PROCEDURE — 83735 ASSAY OF MAGNESIUM: CPT | Performed by: NEUROLOGICAL SURGERY

## 2025-07-14 RX ORDER — HYDROMORPHONE HYDROCHLORIDE 1 MG/ML
0.5 INJECTION, SOLUTION INTRAMUSCULAR; INTRAVENOUS; SUBCUTANEOUS
Status: COMPLETED | OUTPATIENT
Start: 2025-07-14 | End: 2025-07-14

## 2025-07-14 RX ORDER — GADOBUTROL 604.72 MG/ML
6 INJECTION INTRAVENOUS ONCE
Status: COMPLETED | OUTPATIENT
Start: 2025-07-14 | End: 2025-07-15

## 2025-07-14 RX ORDER — DEXAMETHASONE SODIUM PHOSPHATE 10 MG/ML
4 INJECTION, SOLUTION INTRAMUSCULAR; INTRAVENOUS ONCE
Status: COMPLETED | OUTPATIENT
Start: 2025-07-14 | End: 2025-07-15

## 2025-07-14 RX ORDER — KETOROLAC TROMETHAMINE 15 MG/ML
15 INJECTION, SOLUTION INTRAMUSCULAR; INTRAVENOUS ONCE
Status: COMPLETED | OUTPATIENT
Start: 2025-07-14 | End: 2025-07-14

## 2025-07-14 RX ADMIN — KETOROLAC TROMETHAMINE 15 MG: 15 INJECTION, SOLUTION INTRAMUSCULAR; INTRAVENOUS at 22:26

## 2025-07-14 RX ADMIN — HYDROMORPHONE HYDROCHLORIDE 0.5 MG: 1 INJECTION, SOLUTION INTRAMUSCULAR; INTRAVENOUS; SUBCUTANEOUS at 22:27

## 2025-07-14 ASSESSMENT — ACTIVITIES OF DAILY LIVING (ADL)
ADLS_ACUITY_SCORE: 56
ADLS_ACUITY_SCORE: 56

## 2025-07-14 NOTE — ED NOTES
Pt requested to talk to Provider Suni, Provider talking to pt. Pt requested additional prescriptions, Zofran was prescribed.   Pt is aox4. Able to ambulate.

## 2025-07-14 NOTE — DISCHARGE INSTRUCTIONS
Continue post-op instructions as previously advised.     You may apply ice or heat to the area (do not apply ice directly to the skin). You may use a topical pain relieving patch such as Lidoderm or Icy Hot. You may use Lidocaine patch as needed - only keep in place for 12 hrs out of every 24 hrs (leaving this in place longer than 12 hours can cause toxicity). You can continue to use Ibuprofen (do not exceed 3200 mg in 24 hrs) and/or Tylenol (do not exceed 4000 mg in 24 hrs) as needed. You may take oxycodone as needed for severe pain - do NOT drive on this medication as it can cause drowsiness. Additionally, please take a stool softener as this medication can cause constipation. This medication can be addicting, please limit your use and discard any remaining tablets.     Tylenol (Acetaminophen) Discharge Instructions:  You may take over-the-counter, Tylenol (acetaminophen) every 4-6 hours as needed for pain.  Take no more than 4000 mg of Tylenol in a 24-hour period for adults, maximum dosing for children is based on weight - please look at medication label.    Avoid taking more than 1 acetaminophen-containing product at a time and be aware that many over-the-counter medications contain a combination of acetaminophen and other products.  If you are taking Tylenol in addition to a combination product please keep track of your daily acetaminophen dose to make sure you do not exceed the recommended 4000 mg.  Taking too much acetaminophen can cause permanent damage to your liver.    Ibuprofen/Naproxen Discharge Instructions:  You may take ibuprofen (600 mg) every 6 hours as needed for pain control.  The maximum dose of (ibuprofen is 3200 mg ) in a 24-hour period for adults, maximum dosing for children is based on weight - please look at medication label.     Take this medication with food to prevent stomach irritation.  With long-term use this medication can irritate the stomach causing pain and lead to development of a  stomach ulcer.  If you notice stomach pain or vomiting of coffee-ground colored vomit or blood, please be seen by a healthcare provider.  Attempt to use this medication for the shortest time possible.      Follow up with your Spine Team tomorrow for reevaluation and ongoing management strategies.    Return to ER if any new or worsening symptoms develop including fever/chills, worsening pain, new numbness/tingling/weakness, loss of bowel or bladder function, chest pain, shortness of breath, abdominal pain, or any other new or concerning symptoms.    Take Care!  -Suni Mccormack PA-C

## 2025-07-14 NOTE — TELEPHONE ENCOUNTER
Writer spoke with pt and cancelled upcoming TC therapy appointments as pt has long term care set up with [facility].    Padmini Leiva  07/14/2025  [phone]

## 2025-07-15 ENCOUNTER — APPOINTMENT (OUTPATIENT)
Dept: PHYSICAL THERAPY | Facility: CLINIC | Age: 45
End: 2025-07-15
Payer: COMMERCIAL

## 2025-07-15 ENCOUNTER — APPOINTMENT (OUTPATIENT)
Dept: CT IMAGING | Facility: CLINIC | Age: 45
End: 2025-07-15
Payer: COMMERCIAL

## 2025-07-15 PROBLEM — M54.50 ACUTE LOW BACK PAIN, UNSPECIFIED BACK PAIN LATERALITY, UNSPECIFIED WHETHER SCIATICA PRESENT: Status: ACTIVE | Noted: 2025-07-15

## 2025-07-15 PROBLEM — Z98.890 POSTOPERATIVE STATE: Status: ACTIVE | Noted: 2025-07-15

## 2025-07-15 LAB
ANION GAP SERPL CALCULATED.3IONS-SCNC: 13 MMOL/L (ref 7–15)
BUN SERPL-MCNC: 17 MG/DL (ref 6–20)
CALCIUM SERPL-MCNC: 9.5 MG/DL (ref 8.8–10.4)
CHLORIDE SERPL-SCNC: 104 MMOL/L (ref 98–107)
CREAT SERPL-MCNC: 0.74 MG/DL (ref 0.67–1.17)
EGFRCR SERPLBLD CKD-EPI 2021: >90 ML/MIN/1.73M2
ERYTHROCYTE [DISTWIDTH] IN BLOOD BY AUTOMATED COUNT: 12.5 % (ref 10–15)
ERYTHROCYTE [SEDIMENTATION RATE] IN BLOOD BY WESTERGREN METHOD: 46 MM/HR (ref 0–15)
GLUCOSE SERPL-MCNC: 107 MG/DL (ref 70–99)
HCO3 SERPL-SCNC: 22 MMOL/L (ref 22–29)
HCT VFR BLD AUTO: 37.8 % (ref 40–53)
HGB BLD-MCNC: 13 G/DL (ref 13.3–17.7)
MAGNESIUM SERPL-MCNC: 1.9 MG/DL (ref 1.7–2.3)
MAGNESIUM SERPL-MCNC: 1.9 MG/DL (ref 1.7–2.3)
MCH RBC QN AUTO: 31.4 PG (ref 26.5–33)
MCHC RBC AUTO-ENTMCNC: 34.4 G/DL (ref 31.5–36.5)
MCV RBC AUTO: 91 FL (ref 78–100)
PHOSPHATE SERPL-MCNC: 3.1 MG/DL (ref 2.5–4.5)
PHOSPHATE SERPL-MCNC: 4.3 MG/DL (ref 2.5–4.5)
PLATELET # BLD AUTO: 386 10E3/UL (ref 150–450)
POTASSIUM SERPL-SCNC: 4.1 MMOL/L (ref 3.4–5.3)
RBC # BLD AUTO: 4.14 10E6/UL (ref 4.4–5.9)
SODIUM SERPL-SCNC: 139 MMOL/L (ref 135–145)
WBC # BLD AUTO: 9.1 10E3/UL (ref 4–11)

## 2025-07-15 PROCEDURE — 250N000011 HC RX IP 250 OP 636

## 2025-07-15 PROCEDURE — 72132 CT LUMBAR SPINE W/DYE: CPT

## 2025-07-15 PROCEDURE — 84100 ASSAY OF PHOSPHORUS: CPT

## 2025-07-15 PROCEDURE — 258N000003 HC RX IP 258 OP 636

## 2025-07-15 PROCEDURE — 250N000013 HC RX MED GY IP 250 OP 250 PS 637

## 2025-07-15 PROCEDURE — 83735 ASSAY OF MAGNESIUM: CPT

## 2025-07-15 PROCEDURE — 97530 THERAPEUTIC ACTIVITIES: CPT | Mod: GP

## 2025-07-15 PROCEDURE — 36415 COLL VENOUS BLD VENIPUNCTURE: CPT

## 2025-07-15 PROCEDURE — 97161 PT EVAL LOW COMPLEX 20 MIN: CPT | Mod: GP

## 2025-07-15 PROCEDURE — 999N000111 HC STATISTIC OT IP EVAL DEFER

## 2025-07-15 PROCEDURE — 255N000002 HC RX 255 OP 636: Performed by: EMERGENCY MEDICINE

## 2025-07-15 PROCEDURE — 85018 HEMOGLOBIN: CPT

## 2025-07-15 PROCEDURE — G0378 HOSPITAL OBSERVATION PER HR: HCPCS

## 2025-07-15 PROCEDURE — 80048 BASIC METABOLIC PNL TOTAL CA: CPT

## 2025-07-15 PROCEDURE — 85652 RBC SED RATE AUTOMATED: CPT | Performed by: EMERGENCY MEDICINE

## 2025-07-15 PROCEDURE — 250N000011 HC RX IP 250 OP 636: Performed by: EMERGENCY MEDICINE

## 2025-07-15 PROCEDURE — 96376 TX/PRO/DX INJ SAME DRUG ADON: CPT | Mod: 59

## 2025-07-15 PROCEDURE — 250N000013 HC RX MED GY IP 250 OP 250 PS 637: Performed by: NEUROLOGICAL SURGERY

## 2025-07-15 PROCEDURE — A9585 GADOBUTROL INJECTION: HCPCS | Performed by: EMERGENCY MEDICINE

## 2025-07-15 PROCEDURE — 72132 CT LUMBAR SPINE W/DYE: CPT | Mod: 26 | Performed by: RADIOLOGY

## 2025-07-15 PROCEDURE — 97116 GAIT TRAINING THERAPY: CPT | Mod: GP

## 2025-07-15 PROCEDURE — 96361 HYDRATE IV INFUSION ADD-ON: CPT

## 2025-07-15 PROCEDURE — 99254 IP/OBS CNSLTJ NEW/EST MOD 60: CPT | Performed by: PHYSICIAN ASSISTANT

## 2025-07-15 PROCEDURE — 36415 COLL VENOUS BLD VENIPUNCTURE: CPT | Performed by: EMERGENCY MEDICINE

## 2025-07-15 RX ORDER — METHOCARBAMOL 500 MG/1
500 TABLET, FILM COATED ORAL 4 TIMES DAILY
Status: DISCONTINUED | OUTPATIENT
Start: 2025-07-15 | End: 2025-07-15 | Stop reason: ALTCHOICE

## 2025-07-15 RX ORDER — OXYCODONE HYDROCHLORIDE 5 MG/1
5 TABLET ORAL EVERY 4 HOURS PRN
Refills: 0 | Status: DISCONTINUED | OUTPATIENT
Start: 2025-07-15 | End: 2025-07-15 | Stop reason: ALTCHOICE

## 2025-07-15 RX ORDER — HYDROMORPHONE HCL IN WATER/PF 6 MG/30 ML
0.2 PATIENT CONTROLLED ANALGESIA SYRINGE INTRAVENOUS
Refills: 0 | Status: DISCONTINUED | OUTPATIENT
Start: 2025-07-15 | End: 2025-07-15 | Stop reason: ALTCHOICE

## 2025-07-15 RX ORDER — IOPAMIDOL 755 MG/ML
82 INJECTION, SOLUTION INTRAVASCULAR ONCE
Status: COMPLETED | OUTPATIENT
Start: 2025-07-15 | End: 2025-07-15

## 2025-07-15 RX ORDER — MAGNESIUM OXIDE 400 MG/1
400 TABLET ORAL EVERY 4 HOURS
Status: COMPLETED | OUTPATIENT
Start: 2025-07-15 | End: 2025-07-15

## 2025-07-15 RX ORDER — OXYCODONE HYDROCHLORIDE 10 MG/1
10 TABLET ORAL 4 TIMES DAILY PRN
Refills: 0 | Status: DISCONTINUED | OUTPATIENT
Start: 2025-07-19 | End: 2025-07-16 | Stop reason: HOSPADM

## 2025-07-15 RX ORDER — KETOROLAC TROMETHAMINE 15 MG/ML
15 INJECTION, SOLUTION INTRAMUSCULAR; INTRAVENOUS ONCE
Status: COMPLETED | OUTPATIENT
Start: 2025-07-15 | End: 2025-07-15

## 2025-07-15 RX ORDER — SODIUM CHLORIDE, SODIUM LACTATE, POTASSIUM CHLORIDE, CALCIUM CHLORIDE 600; 310; 30; 20 MG/100ML; MG/100ML; MG/100ML; MG/100ML
INJECTION, SOLUTION INTRAVENOUS CONTINUOUS
Status: ACTIVE | OUTPATIENT
Start: 2025-07-15 | End: 2025-07-15

## 2025-07-15 RX ORDER — ONDANSETRON 4 MG/1
4 TABLET, ORALLY DISINTEGRATING ORAL EVERY 6 HOURS PRN
Status: DISCONTINUED | OUTPATIENT
Start: 2025-07-15 | End: 2025-07-16 | Stop reason: HOSPADM

## 2025-07-15 RX ORDER — KETOROLAC TROMETHAMINE 30 MG/ML
30 INJECTION, SOLUTION INTRAMUSCULAR; INTRAVENOUS EVERY 6 HOURS
Status: DISCONTINUED | OUTPATIENT
Start: 2025-07-15 | End: 2025-07-15

## 2025-07-15 RX ORDER — ONDANSETRON 2 MG/ML
4 INJECTION INTRAMUSCULAR; INTRAVENOUS EVERY 6 HOURS PRN
Status: DISCONTINUED | OUTPATIENT
Start: 2025-07-15 | End: 2025-07-16 | Stop reason: HOSPADM

## 2025-07-15 RX ORDER — METHOCARBAMOL 750 MG/1
750 TABLET, FILM COATED ORAL EVERY 6 HOURS PRN
Status: DISCONTINUED | OUTPATIENT
Start: 2025-07-15 | End: 2025-07-16 | Stop reason: HOSPADM

## 2025-07-15 RX ORDER — LIDOCAINE 40 MG/G
CREAM TOPICAL
Status: DISCONTINUED | OUTPATIENT
Start: 2025-07-15 | End: 2025-07-16 | Stop reason: HOSPADM

## 2025-07-15 RX ORDER — OXYCODONE HYDROCHLORIDE 10 MG/1
10 TABLET ORAL EVERY 4 HOURS PRN
Refills: 0 | Status: DISCONTINUED | OUTPATIENT
Start: 2025-07-15 | End: 2025-07-16 | Stop reason: HOSPADM

## 2025-07-15 RX ORDER — NALOXONE HYDROCHLORIDE 0.4 MG/ML
0.2 INJECTION, SOLUTION INTRAMUSCULAR; INTRAVENOUS; SUBCUTANEOUS
Status: DISCONTINUED | OUTPATIENT
Start: 2025-07-15 | End: 2025-07-16 | Stop reason: HOSPADM

## 2025-07-15 RX ORDER — GABAPENTIN 300 MG/1
900 CAPSULE ORAL
Status: DISCONTINUED | OUTPATIENT
Start: 2025-07-16 | End: 2025-07-16 | Stop reason: HOSPADM

## 2025-07-15 RX ORDER — AMOXICILLIN 250 MG
1 CAPSULE ORAL 2 TIMES DAILY
Status: DISCONTINUED | OUTPATIENT
Start: 2025-07-15 | End: 2025-07-16 | Stop reason: HOSPADM

## 2025-07-15 RX ORDER — OXYCODONE HYDROCHLORIDE 10 MG/1
10 TABLET ORAL
Refills: 0 | Status: DISCONTINUED | OUTPATIENT
Start: 2025-07-17 | End: 2025-07-16 | Stop reason: HOSPADM

## 2025-07-15 RX ORDER — GABAPENTIN 300 MG/1
1200 CAPSULE ORAL AT BEDTIME
Status: DISCONTINUED | OUTPATIENT
Start: 2025-07-15 | End: 2025-07-16 | Stop reason: HOSPADM

## 2025-07-15 RX ORDER — HYDROMORPHONE HCL IN WATER/PF 6 MG/30 ML
0.4 PATIENT CONTROLLED ANALGESIA SYRINGE INTRAVENOUS
Refills: 0 | Status: DISCONTINUED | OUTPATIENT
Start: 2025-07-15 | End: 2025-07-15 | Stop reason: ALTCHOICE

## 2025-07-15 RX ORDER — OXYCODONE HYDROCHLORIDE 10 MG/1
10 TABLET ORAL DAILY PRN
Refills: 0 | Status: DISCONTINUED | OUTPATIENT
Start: 2025-07-25 | End: 2025-07-16 | Stop reason: HOSPADM

## 2025-07-15 RX ORDER — ACETAMINOPHEN 325 MG/1
975 TABLET ORAL EVERY 8 HOURS
Status: DISCONTINUED | OUTPATIENT
Start: 2025-07-15 | End: 2025-07-16 | Stop reason: HOSPADM

## 2025-07-15 RX ORDER — ACETAMINOPHEN 325 MG/1
650 TABLET ORAL EVERY 6 HOURS
Status: DISCONTINUED | OUTPATIENT
Start: 2025-07-15 | End: 2025-07-15 | Stop reason: ALTCHOICE

## 2025-07-15 RX ORDER — GABAPENTIN 300 MG/1
300 CAPSULE ORAL 3 TIMES DAILY
Status: DISCONTINUED | OUTPATIENT
Start: 2025-07-15 | End: 2025-07-15 | Stop reason: ALTCHOICE

## 2025-07-15 RX ORDER — OXYCODONE HYDROCHLORIDE 10 MG/1
10 TABLET ORAL 2 TIMES DAILY PRN
Refills: 0 | Status: DISCONTINUED | OUTPATIENT
Start: 2025-07-23 | End: 2025-07-16 | Stop reason: HOSPADM

## 2025-07-15 RX ORDER — GABAPENTIN 300 MG/1
900 CAPSULE ORAL EVERY MORNING
Status: DISCONTINUED | OUTPATIENT
Start: 2025-07-16 | End: 2025-07-16 | Stop reason: HOSPADM

## 2025-07-15 RX ORDER — OXYCODONE HYDROCHLORIDE 10 MG/1
10 TABLET ORAL 3 TIMES DAILY PRN
Refills: 0 | Status: DISCONTINUED | OUTPATIENT
Start: 2025-07-21 | End: 2025-07-16 | Stop reason: HOSPADM

## 2025-07-15 RX ORDER — POLYETHYLENE GLYCOL 3350 17 G/17G
17 POWDER, FOR SOLUTION ORAL 2 TIMES DAILY
Status: DISCONTINUED | OUTPATIENT
Start: 2025-07-15 | End: 2025-07-16 | Stop reason: HOSPADM

## 2025-07-15 RX ORDER — PROCHLORPERAZINE MALEATE 10 MG
10 TABLET ORAL EVERY 6 HOURS PRN
Status: DISCONTINUED | OUTPATIENT
Start: 2025-07-15 | End: 2025-07-16 | Stop reason: HOSPADM

## 2025-07-15 RX ORDER — OXYCODONE HYDROCHLORIDE 10 MG/1
10 TABLET ORAL EVERY 4 HOURS PRN
Refills: 0 | Status: DISCONTINUED | OUTPATIENT
Start: 2025-07-15 | End: 2025-07-15 | Stop reason: ALTCHOICE

## 2025-07-15 RX ORDER — NALOXONE HYDROCHLORIDE 0.4 MG/ML
0.4 INJECTION, SOLUTION INTRAMUSCULAR; INTRAVENOUS; SUBCUTANEOUS
Status: DISCONTINUED | OUTPATIENT
Start: 2025-07-15 | End: 2025-07-16 | Stop reason: HOSPADM

## 2025-07-15 RX ORDER — CALCIUM CARBONATE 500 MG/1
500 TABLET, CHEWABLE ORAL DAILY PRN
Status: DISCONTINUED | OUTPATIENT
Start: 2025-07-15 | End: 2025-07-16 | Stop reason: HOSPADM

## 2025-07-15 RX ADMIN — SODIUM CHLORIDE, SODIUM LACTATE, POTASSIUM CHLORIDE, AND CALCIUM CHLORIDE 1000 ML: .6; .31; .03; .02 INJECTION, SOLUTION INTRAVENOUS at 01:20

## 2025-07-15 RX ADMIN — HYDROMORPHONE HYDROCHLORIDE 0.4 MG: 0.2 INJECTION, SOLUTION INTRAMUSCULAR; INTRAVENOUS; SUBCUTANEOUS at 08:51

## 2025-07-15 RX ADMIN — POLYETHYLENE GLYCOL 3350 17 G: 17 POWDER, FOR SOLUTION ORAL at 20:06

## 2025-07-15 RX ADMIN — OXYCODONE HYDROCHLORIDE 10 MG: 10 TABLET ORAL at 16:32

## 2025-07-15 RX ADMIN — HYDROMORPHONE HYDROCHLORIDE 0.2 MG: 0.2 INJECTION, SOLUTION INTRAMUSCULAR; INTRAVENOUS; SUBCUTANEOUS at 06:51

## 2025-07-15 RX ADMIN — GADOBUTROL 6 ML: 604.72 INJECTION INTRAVENOUS at 00:10

## 2025-07-15 RX ADMIN — OXYCODONE HYDROCHLORIDE 10 MG: 10 TABLET ORAL at 01:19

## 2025-07-15 RX ADMIN — GABAPENTIN 300 MG: 300 CAPSULE ORAL at 13:53

## 2025-07-15 RX ADMIN — ACETAMINOPHEN 975 MG: 325 TABLET ORAL at 22:15

## 2025-07-15 RX ADMIN — ONDANSETRON 4 MG: 4 TABLET, ORALLY DISINTEGRATING ORAL at 01:19

## 2025-07-15 RX ADMIN — OXYCODONE HYDROCHLORIDE 10 MG: 10 TABLET ORAL at 05:35

## 2025-07-15 RX ADMIN — KETOROLAC TROMETHAMINE 15 MG: 15 INJECTION, SOLUTION INTRAMUSCULAR; INTRAVENOUS at 01:35

## 2025-07-15 RX ADMIN — METHOCARBAMOL 500 MG: 500 TABLET ORAL at 08:14

## 2025-07-15 RX ADMIN — OXYCODONE HYDROCHLORIDE 10 MG: 10 TABLET ORAL at 20:07

## 2025-07-15 RX ADMIN — METHOCARBAMOL 500 MG: 500 TABLET ORAL at 11:33

## 2025-07-15 RX ADMIN — METHOCARBAMOL 500 MG: 500 TABLET ORAL at 16:30

## 2025-07-15 RX ADMIN — GABAPENTIN 1200 MG: 300 CAPSULE ORAL at 20:07

## 2025-07-15 RX ADMIN — HYDROMORPHONE HYDROCHLORIDE 1 MG: 1 INJECTION, SOLUTION INTRAMUSCULAR; INTRAVENOUS; SUBCUTANEOUS at 00:29

## 2025-07-15 RX ADMIN — SODIUM CHLORIDE, SODIUM LACTATE, POTASSIUM CHLORIDE, AND CALCIUM CHLORIDE 1000 ML: .6; .31; .03; .02 INJECTION, SOLUTION INTRAVENOUS at 03:47

## 2025-07-15 RX ADMIN — MAGNESIUM OXIDE TAB 400 MG (241.3 MG ELEMENTAL MG) 400 MG: 400 (241.3 MG) TAB at 13:53

## 2025-07-15 RX ADMIN — IOPAMIDOL 82 ML: 755 INJECTION, SOLUTION INTRAVENOUS at 09:21

## 2025-07-15 RX ADMIN — OXYCODONE HYDROCHLORIDE 10 MG: 10 TABLET ORAL at 11:33

## 2025-07-15 RX ADMIN — HYDROMORPHONE HYDROCHLORIDE 0.2 MG: 0.2 INJECTION, SOLUTION INTRAMUSCULAR; INTRAVENOUS; SUBCUTANEOUS at 03:48

## 2025-07-15 RX ADMIN — MAGNESIUM OXIDE TAB 400 MG (241.3 MG ELEMENTAL MG) 400 MG: 400 (241.3 MG) TAB at 08:51

## 2025-07-15 RX ADMIN — ACETAMINOPHEN 650 MG: 325 TABLET ORAL at 01:18

## 2025-07-15 RX ADMIN — ACETAMINOPHEN 650 MG: 325 TABLET ORAL at 13:53

## 2025-07-15 RX ADMIN — GABAPENTIN 300 MG: 300 CAPSULE ORAL at 08:14

## 2025-07-15 RX ADMIN — DEXAMETHASONE SODIUM PHOSPHATE 4 MG: 10 INJECTION INTRAMUSCULAR; INTRAVENOUS at 00:23

## 2025-07-15 RX ADMIN — ACETAMINOPHEN 650 MG: 325 TABLET ORAL at 06:49

## 2025-07-15 RX ADMIN — METHOCARBAMOL 750 MG: 750 TABLET ORAL at 20:07

## 2025-07-15 ASSESSMENT — ACTIVITIES OF DAILY LIVING (ADL)
ADLS_ACUITY_SCORE: 56
ADLS_ACUITY_SCORE: 56
ADLS_ACUITY_SCORE: 58
ADLS_ACUITY_SCORE: 58
ADLS_ACUITY_SCORE: 35
ADLS_ACUITY_SCORE: 35
ADLS_ACUITY_SCORE: 58
ADLS_ACUITY_SCORE: 35
ADLS_ACUITY_SCORE: 56
ADLS_ACUITY_SCORE: 58
ADLS_ACUITY_SCORE: 56
ADLS_ACUITY_SCORE: 56
ADLS_ACUITY_SCORE: 58

## 2025-07-15 NOTE — PROGRESS NOTES
[facility], [place]   07/15/2025  Neurosurgery Progress Note:    Interval History:   NAEON. MRI lumbar spine done with postoperative changes.     Assessment:  Ba Mireles is a 46yo male with past medical history of arthritis, kidney stones, and smoking who recently underwent LEFT L5-S1 far-lateral discectomy on 6/24/25 for decompression of left L5 nerve root (L5 radiculopathy) that unfortunately did not treat his dynamic instability despite adequate static decompression, which required a subsequent L5-S1 TLIF with BMP and Interbody Cage with Dr. Winter on 7/8/25.     He recovered well from surgery and reported initial relief of his radicular pain but then started endorsing LEFT L5 distribution hyperalgesia, for which pre-gabalin was restarted and a medrol dosepack and 1x IV dexamethasone was given. The medrol dosepack was started 7/11 and will continue for 6 days.     Unfortunately, he re-presented to the ED due to continued uncontrolled low back and LEFT L5 radicular pain.     No recent fevers, chills, nausea, vomiting, chest pain, shortness of breath, and denies headaches, new weakness, LOC, new numbness/weakness/paresthesias in extremities, changes in sensation, taste, smell, nor trouble speaking or other neurologic symptoms.          Plan:  Optimize pain control  Discharge planning ongoing  Observation overnight   Regular diet  Bowel regimen  PRN antiemetics    Staff: Dr. Winter     -----------------------------------  Quiana Villalba MD  Neurosurgery PGY-1  Personal pager #[phone]  Please page #[phone] (urgent)/OSBALDO (non-urgent) on-call neurosurgery resident with questions   -----------------------------------    Objective:   Temp:  [97.8  F (36.6  C)-98  F (36.7  C)] 97.9  F (36.6  C)  Pulse:  [98] 98  Resp:  [18] 18  BP: (123-152)/(74-96) 152/96  SpO2:  [94 %-100 %] 100 %  I/O last 3 completed shifts:  In: 100 [I.V.:100]  Out: 450 [Urine:450]    Gen: Appears comfortable, NAD  Incision:  clean, dry, intact  Neurologic:  Alert & Oriented to person, place, time, and situation  Follows commands briskly  Speech fluent, spontaneous. No aphasia or dysarthria.  No gaze preference. No apparent hemineglect.  PERRL, EOMI  Face symmetric with sensation intact to light touch  Palate elevates symmetrically, uvula midline, tongue protrudes midline  Trapezii muscles 5/5 bilaterally  No pronator drift     Del Tr Bi WE WF Gr   R 5 5 5 5 5 5   L 5 5 5 5 5 5    HF KE KF DF PF EHL   R 5 5 5 5 5 5   L 5 5 5 5 5 5     Reflexes 2+ throughout  Sensation intact and symmetric to light touch throughout    LABS:  Recent Labs   Lab 07/15/25  0644 07/14/25  2133 07/13/25  1804    141 139   POTASSIUM 4.1 4.1 3.9   CHLORIDE 104 105 103   CO2 22 25 25   * 89 94   BUN 17.0 21.4* 22.2*   CR 0.74 1.19* 0.74   WBC 9.1 9.4 9.7   HGB 13.0* 11.3* 12.1*    353 406       IMAGING:  Recent Results (from the past 24 hours)   Lumbar spine MRI w & w/o contrast - surgery <10yrs    Narrative    EXAM: MR LUMBAR SPINE W/O and W CONTRAST  LOCATION: [facility]  DATE: 7/15/2025    INDICATION: recent lumbar surgery, increased pain  COMPARISON: 05/31/2025  CONTRAST: 6ml gadavist  TECHNIQUE: Routine Lumbar Spine MRI without and with IV contrast.    FINDINGS:   Nomenclature is based on 5 lumbar type vertebral bodies.     New anterior and posterior fusion at L5-S1 with associated laminectomy defect. New bilateral L5-S1 foraminotomies. New fluid collection within the operative bed extends into the epidural space and measures 4.5 cm AP by 4.3 cm transverse by 3.5 cm   craniocaudal. Minimal fluid is also seen extending posteriorly along the midline incision site within the paraspinal and subcutaneous soft tissues. There is edema and enhancement involving the paraspinal soft tissues extending from L3-L4 to S1. There is   new enhancing soft tissue in the epidural space which is more pronounced  ventrally and measures up to 9 mm in thickness.    Normal vertebral body heights, alignment and marrow signal. Normal distal spinal cord with conus medullaris at T12. There is crowding of the distal nerve roots Simple appearing cysts in the right kidney measure up to 1.4 cm in diameter and require no   further follow-up. Unremarkable visualized bony pelvis.    T12-L1: Normal disc height and signal. No herniation. Normal facets. No spinal canal or neural foraminal stenosis.     L1-L2: Normal disc height and signal. No herniation. Normal facets. No spinal canal or neural foraminal stenosis.    L2-L3: Normal disc height and signal. No herniation. Normal facets. No spinal canal or neural foraminal stenosis.     L3-L4: Normal disc height and signal. No herniation. Normal facets. No spinal canal or neural foraminal stenosis.    L4-L5: Stable mild disc desiccation with relative preservation of disc height. Stable mild disc bulge with superimposed small central annular tear which along with the enhancing soft tissue in the epidural space now results in moderate central canal   stenosis. Stable mild facet hypertrophic changes with stable mild bilateral foraminal narrowing.    L5-S1: New marked central canal stenosis with complete effacement of the CSF in the thecal sac predominantly due to the fluid collection and enhancing soft tissue in the epidural space. Small central/right central disc protrusion has decreased in size   with decreasing mild narrowing of the right lateral recess. The disc herniation abuts but does not compress the traversing right S1 nerve root. The neural foramina have been decompressed since the prior study although fluid is now seen within the neural   foramina at the operative site.      Impression    IMPRESSION:  1.  New anterior and posterior fusion at L5-S1 with associated laminectomy defect and bilateral foraminotomies.  2.  New fluid collection within the operative bed extending into the  epidural space is likely postoperative in nature.  3.  New enhancing soft tissue in the epidural space at L4-L5 and L5-S1 is also likely postoperative in nature.  4.  New marked central canal stenosis at L5-S1 predominantly due to the fluid collection and enhancing soft tissue in the epidural space.  5.  New moderate central canal stenosis at L4-L5 predominantly due to stable underlying disc bulge and new enhancing soft tissue in the epidural space.  6.  Decreasing size of small central/right central disc protrusion at L5-S1 with decreasing mild narrowing of the right lateral recess.    7.  Findings discussed with Dr. Hall at 12:34 AM CST on 07/15/2025.

## 2025-07-15 NOTE — ED PROVIDER NOTES
ED Provider Note  [facility] EMERGENCY DEPARTMENT ([facility])    7/14/25       ED PROVIDER NOTE     History     Chief Complaint   Patient presents with    Back Pain     HPI  Ba Mireles is a 45 year old male with a notable history of  nephrolithiasis, hip pain, lumbar stenosis, and lumbar disc herniation s/p lumbar fusion (7/8/2025) who presents to the ED for evaluation of uncontrolled back pain.  The patient underwent a lumbar fusion on 7/8.  Patient states that ever since surgery they have had worsening pain to the low back as well as worsening pain to the left foot/lower extremity.  They state that the pain to the left foot and the lower extremity was present prior to surgery but has been worsening and is a burning pain to the top of her foot and lower leg.  Pain is primarily throughout the low back and movement or touch makes it worse.  Patient states they have felt hot and cold at times.  They presented to an outside ED yesterday for symptoms and blood work done showing a CRP of 15.3 and ESR of 39.  They received Dilaudid and oxycodone.      Per chart review: patient was seen at [facility] yesterday with the same complaint. They managed his pain in the ED last night and neurosurgery wanted to see him in clinic.     Past Medical History  Past Medical History:   Diagnosis Date    Arthritis 08/16/2014    When I had my hip surgery they told me I have arthritis going already    Kidney stone     first stone since age 20    Left ureteral stone 08/02/2023     Past Surgical History:   Procedure Laterality Date    COMBINED CYSTOSCOPY, RETROGRADES, URETEROSCOPY, LASER HOLMIUM LITHOTRIPSY URETER(S), INSERT STENT Bilateral 08/07/2023    Procedure: Cystoscopy, Bilateral Ureteroscopy, Bilateral Retrograde Pyelogram, Bilateral Stone Basket Extraction, Bilateral Stent placement;  Surgeon: Gus Shipman MD;  Location: [facility]     CYSTOSCOPY,URETEROSCOPY,LITHOTRIPSY      at [place]    DISCECTOMY LUMBAR POSTERIOR MICROSCOPIC ONE LEVEL N/A 6/24/2025    Procedure: Left Lumbar 5-Sacral 1 Far-lateral microdiscectomy;  Surgeon: Misha Winter MD;  Location: [facility] OR    OPTICAL TRACKING SYSTEM FUSION POSTERIOR SPINE LUMBAR N/A 7/8/2025    Procedure: o-arm/stealth assisted Lumbar 5-Sacral 1 Transforaminal Lumbar Interbody Fusion with Bone Morphogenic Protein and Interbody cage;  Surgeon: Misha Winter MD;  Location: [facility] OR    ORTHOPEDIC SURGERY  2022    Right Wrist    ORTHOPEDIC SURGERY      left hip surgery    WRIST SURGERY Right      acetaminophen (TYLENOL) 500 MG tablet  diazepam (VALIUM) 5 MG tablet  diazepam (VALIUM) 5 MG tablet  famotidine (PEPCID) 20 MG tablet  gabapentin (NEURONTIN) 300 MG capsule  Lidocaine (LIDOCARE) 4 % Patch  magnesium hydroxide (MILK OF MAGNESIA) 400 MG/5ML suspension  methocarbamol (ROBAXIN) 500 MG tablet  methylPREDNISolone (MEDROL DOSEPAK) 4 MG tablet therapy pack  ondansetron (ZOFRAN ODT) 4 MG ODT tab  oxyCODONE (ROXICODONE) 5 MG tablet  polyethylene glycol (MIRALAX) 17 g packet  tiZANidine (ZANAFLEX) 4 MG tablet      Allergies   Allergen Reactions    Dust Mites     Ragweeds     Amoxicillin-Pot Clavulanate Anxiety     Has tolerated amoxicillin well in the past     Family History  Family History   Problem Relation Age of Onset    Cancer Mother         kidney    Heart Disease Mother 66    Nephrolithiasis Mother     Gout Mother     Cancer Maternal Grandfather         renal cell    Heart Disease Maternal Grandfather     Nephrolithiasis Maternal Grandfather     Hyperlipidemia Maternal Grandfather     Cancer Paternal Grandfather         lucille cell    Kidney Disease Paternal Grandfather     Prostate Cancer Paternal Grandfather     Nephrolithiasis Maternal Aunt     Anesthesia Reaction No family hx of     Deep Vein Thrombosis (DVT) No family hx of      Social History   Social History     Tobacco Use    Smoking status: Every Day      Current packs/day: 0.00     Average packs/day: 1 pack/day for 26.2 years (26.2 ttl pk-yrs)     Types: Cigarettes     Start date: 1997     Last attempt to quit: 2023     Years since quittin.8     Passive exposure: Current    Smokeless tobacco: Never    Tobacco comments:     Recent restarted, less than 1 PPD   Vaping Use    Vaping status: Never Used   Substance Use Topics    Alcohol use: Not Currently     Alcohol/week: 2.0 standard drinks of alcohol     Types: 2 Standard drinks or equivalent per week    Drug use: Not Currently     Types: Marijuana      A medically appropriate review of systems was performed with pertinent positives and negatives noted in the HPI, and all other systems negative.    Physical Exam   BP: 123/74  Pulse: 98  Temp: 98  F (36.7  C)  Resp: 18  SpO2: 100 %  Physical Exam  Constitutional:       General: He is in acute distress.      Appearance: He is not toxic-appearing.   HENT:      Head: Normocephalic and atraumatic.      Nose: Nose normal.      Mouth/Throat:      Mouth: Mucous membranes are moist.      Pharynx: Oropharynx is clear.   Eyes:      Conjunctiva/sclera: Conjunctivae normal.      Pupils: Pupils are equal, round, and reactive to light.   Cardiovascular:      Rate and Rhythm: Normal rate and regular rhythm.   Pulmonary:      Effort: Pulmonary effort is normal. No respiratory distress.      Breath sounds: No wheezing.   Musculoskeletal:         General: Normal range of motion.      Comments: Post op incision to the back is clean, dry, intact without erythema   Skin:     General: Skin is warm and dry.   Neurological:      General: No focal deficit present.      Mental Status: He is alert and oriented to person, place, and time.           ED Course, Procedures, & Data      Procedures                Results for orders placed or performed during the hospital encounter of 25   Lumbar spine MRI w & w/o contrast - surgery <10yrs    Impression    IMPRESSION:  1.  New  anterior and posterior fusion at L5-S1 with associated laminectomy defect and bilateral foraminotomies.  2.  New fluid collection within the operative bed extending into the epidural space is likely postoperative in nature.  3.  New enhancing soft tissue in the epidural space at L4-L5 and L5-S1 is also likely postoperative in nature.  4.  New marked central canal stenosis at L5-S1 predominantly due to the fluid collection and enhancing soft tissue in the epidural space.  5.  New moderate central canal stenosis at L4-L5 predominantly due to stable underlying disc bulge and new enhancing soft tissue in the epidural space.  6.  Decreasing size of small central/right central disc protrusion at L5-S1 with decreasing mild narrowing of the right lateral recess.    7.  Findings discussed with Dr. Hall at 12:34 AM CST on 07/15/2025.   Basic metabolic panel   Result Value Ref Range    Sodium 141 135 - 145 mmol/L    Potassium 4.1 3.4 - 5.3 mmol/L    Chloride 105 98 - 107 mmol/L    Carbon Dioxide (CO2) 25 22 - 29 mmol/L    Anion Gap 11 7 - 15 mmol/L    Urea Nitrogen 21.4 (H) 6.0 - 20.0 mg/dL    Creatinine 1.19 (H) 0.67 - 1.17 mg/dL    GFR Estimate 78 >60 mL/min/1.73m2    Calcium 9.7 8.8 - 10.4 mg/dL    Glucose 89 70 - 99 mg/dL   Result Value Ref Range    CRP Inflammation 7.51 (H) <5.00 mg/L   Result Value Ref Range    Procalcitonin 0.05 <0.50 ng/mL   CBC with platelets and differential   Result Value Ref Range    WBC Count 9.4 4.0 - 11.0 10e3/uL    RBC Count 3.59 (L) 4.40 - 5.90 10e6/uL    Hemoglobin 11.3 (L) 13.3 - 17.7 g/dL    Hematocrit 32.8 (L) 40.0 - 53.0 %    MCV 91 78 - 100 fL    MCH 31.5 26.5 - 33.0 pg    MCHC 34.5 31.5 - 36.5 g/dL    RDW 12.9 10.0 - 15.0 %    Platelet Count 353 150 - 450 10e3/uL    % Neutrophils 55 %    % Lymphocytes 33 %    % Monocytes 10 %    % Eosinophils 1 %    % Basophils 1 %    % Immature Granulocytes 0 %    NRBCs per 100 WBC 0 <1 /100    Absolute Neutrophils 5.2 1.6 - 8.3 10e3/uL    Absolute  Lymphocytes 3.1 0.8 - 5.3 10e3/uL    Absolute Monocytes 1.0 0.0 - 1.3 10e3/uL    Absolute Eosinophils 0.1 0.0 - 0.7 10e3/uL    Absolute Basophils 0.1 0.0 - 0.2 10e3/uL    Absolute Immature Granulocytes 0.0 <=0.4 10e3/uL    Absolute NRBCs 0.0 10e3/uL     Medications   ketorolac (TORADOL) injection 15 mg (15 mg Intravenous $Given 7/14/25 2226)   HYDROmorphone (PF) (DILAUDID) injection 0.5 mg (0.5 mg Intravenous $Given 7/14/25 2227)   dexAMETHasone PF (DECADRON) injection 4 mg (4 mg Intravenous $Given 7/15/25 0023)   gadobutrol (GADAVIST) injection 6 mL (6 mLs Intravenous $Given 7/15/25 0010)   HYDROmorphone (DILAUDID) injection 1 mg (1 mg Intravenous $Given 7/15/25 0029)          Critical care was not performed.     Medical Decision Making  The patient's presentation was of high complexity (an acute health issue posing potential threat to life or bodily function).    The patient's evaluation involved:  review of external note(s) from 3+ sources (see separate area of note for details)  review of 3+ test result(s) ordered prior to this encounter (see separate area of note for details)  ordering and/or review of 3+ test(s) in this encounter (see separate area of note for details)  discussion of management or test interpretation with another health professional (see separate area of note for details)    The patient's management necessitated high risk (a decision regarding hospitalization).    Assessment & Plan    This is a 45-year-old male with history of chronic low back pain with sciatica and history of recent lumbar fusion here with increased back pain.  He was uncomfortable appearing and there was concern for potential postoperative complication.  Neurosurgery was consulted and evaluated the patient and they recommended a lumbar spine MRI as well as labs including inflammatory markers.  Labs Showed downtrending CRP, normal white blood cell count, normal procalcitonin.  MRI showed a new collection in the surgical site  causing some increased canal stenosis.  Imaging was discussed with neurosurgery and they recommended being n.p.o. and planned to admit the patient to their service for further management and pain control.    I have reviewed the nursing notes. I have reviewed the findings, diagnosis, plan and need for follow up with the patient.    New Prescriptions    No medications on file       Final diagnoses:   Postoperative state   Acute low back pain, unspecified back pain laterality, unspecified whether sciatica present       Yo Hall MD  [facility] EMERGENCY DEPARTMENT  7/14/2025     Yo Hall MD  07/15/25 0050

## 2025-07-15 NOTE — PLAN OF CARE
"Goal Outcome Evaluation:         Assumed cares from 03:30 to 07:30     Blood pressure (!) 152/96, pulse 98, temperature 97.9  F (36.6  C), temperature source Oral, resp. rate 18, SpO2 100%.    Pt was transferred from ED room 5 to room 34 Pt ambulated over to his room and was complaining of left leg radiating to his lower back. Pain was rated 10/10. Pt had multiple complained and said did did not order his muscle spasms medications and no one is telling him why he is here and what the plan is. Provider on call notified and came to see pt. Pt continue to be upset and cursing staff and was very unhappy with \"everything\" CN came to talk to pt. Pt was given his pain medications IV Dilaudid x 2 and 10 ,g of Oxycodone and his scheduled Tylenol. Pain level remained 10/10 throughout the night. He is on LR at 50 ML/hour IV is sensitive to flush but he does not want a new IV placed. He is NPO. He is voiding freely in urinal in bed. Continue to monitor pt and follow plan of care.      Problem: Adult Inpatient Plan of Care  Goal: Plan of Care Review  Description: The Plan of Care Review/Shift note should be completed every shift.  The Outcome Evaluation is a brief statement about your assessment that the patient is improving, declining, or no change.  This information will be displayed automatically on your shift  note.  7/15/2025 0658 by Renetta Sanchez RN  Outcome: Not Progressing  7/15/2025 0658 by Renetta Sanchez RN  Outcome: Not Progressing  Goal: Patient-Specific Goal (Individualized)  Description: You can add care plan individualizations to a care plan. Examples of Individualization might be:  \"Parent requests to be called daily at 9am for status\", \"I have a hard time hearing out of my right ear\", or \"Do not touch me to wake me up as it startles  me\".  7/15/2025 0658 by Renetta Sanchez RN  Outcome: Not Progressing  7/15/2025 0658 by Renetta Sanchez RN  Outcome: Not Progressing  Goal: Absence of " Hospital-Acquired Illness or Injury  7/15/2025 0658 by Renetta Sanchez RN  Outcome: Not Progressing  7/15/2025 0658 by Renetta Sanchez RN  Outcome: Not Progressing  Intervention: Identify and Manage Fall Risk  Recent Flowsheet Documentation  Taken 7/15/2025 0400 by Renetta Sanchez RN  Safety Promotion/Fall Prevention:   clutter free environment maintained   increase visualization of patient   nonskid shoes/slippers when out of bed   room near nurse's station   room organization consistent   patient and family education  Goal: Optimal Comfort and Wellbeing  7/15/2025 0658 by Renetta Sanchez RN  Outcome: Not Progressing  7/15/2025 0658 by Renetta Sanchez RN  Outcome: Not Progressing  Intervention: Monitor Pain and Promote Comfort  Recent Flowsheet Documentation  Taken 7/15/2025 0535 by Renetta Sanchez RN  Pain Management Interventions: medication (see MAR)  Goal: Readiness for Transition of Care  Outcome: Not Progressing

## 2025-07-15 NOTE — PROGRESS NOTES
Report given to OBS 1A RN. Patient went for a walk. Transport will be set up when patient returns to the unit.

## 2025-07-15 NOTE — MEDICATION SCRIBE - ADMISSION MEDICATION HISTORY
Medication Scribe Admission Medication History    Admission medication history is complete. The information provided in this note is only as accurate as the sources available at the time of the update.    Information Source(s): Patient via in-person    Pertinent Information: Spoke with patient in person and completed medication hx. Patient had medication list along with a notebook that has his last doses listed as well.   Patient states that he has not needed to take the Milk of Magnesia and Miralax.  Patient states that he does not have a Lidocaine patch in place.     Changes made to PTA medication list:  Added: Diclofenac 1% Topical Gel  Deleted: None  Changed: None    Allergies reviewed with patient and updates made in EHR: no    Medication History Completed By: Margaret De Anda 7/15/2025 10:05 AM    PTA Med List   Medication Sig Note Last Dose/Taking    acetaminophen (TYLENOL) 500 MG tablet Take 1,000 mg by mouth every 6 hours as needed for mild pain.  7/14/2025 at  5:30 PM    diazepam (VALIUM) 5 MG tablet Take 1 tablet (5 mg) by mouth every 6 hours as needed for muscle spasms.  7/14/2025 at  1:15 PM    diclofenac (VOLTAREN) 1 % topical gel Apply 2 g topically 4 times daily. For 14 days.  7/14/2025 at  4:00 PM    famotidine (PEPCID) 20 MG tablet Take 1 tablet (20 mg) by mouth 2 times daily for 7 days.  7/14/2025 at  4:00 PM    gabapentin (NEURONTIN) 300 MG capsule Take 3 capsules (900 mg) by mouth 3 times daily for 14 days.  7/14/2025 at  8:00 PM    Lidocaine (LIDOCARE) 4 % Patch Place onto the skin every 24 hours. To prevent lidocaine toxicity, patient should be patch free for 12 hrs daily. 7/15/2025: No patch placed. Taking    methocarbamol (ROBAXIN) 500 MG tablet Take 1,000 mg by mouth 4 times daily.  7/14/2025 at  4:00 PM    methylPREDNISolone (MEDROL DOSEPAK) 4 MG tablet therapy pack Take 1 tablet (4 mg) by mouth 3 times daily for 2 days, THEN 1 tablet (4 mg) 2 times daily for 2 days, THEN 1 tablet (4 mg)  every morning for 2 days. Follow Package Directions.  7/14/2025 at  7:10 PM    ondansetron (ZOFRAN ODT) 4 MG ODT tab Take 1 tablet (4 mg) by mouth every 8 hours as needed for nausea.  7/14/2025 at  3:32 PM    oxyCODONE (ROXICODONE) 5 MG tablet Take 1 tablet (5 mg) by mouth every 6 hours as needed.  7/14/2025 at  7:10 PM    tiZANidine (ZANAFLEX) 4 MG tablet Take 1-2 tablets (4-8 mg) by mouth 3 times daily as needed for muscle spasms.  7/14/2025 at  3:53 PM

## 2025-07-15 NOTE — PLAN OF CARE
Goal Outcome Evaluation:  -Adequate pain control using oral analgesics: Not met   -Tolerates oral intake: Not met, pt is NPO   -Cleared for discharge per Provider: Not met

## 2025-07-15 NOTE — PLAN OF CARE
Occupational Therapy: Orders received. Chart reviewed and discussed with care team.? Occupational Therapy not indicated due to pt with no acute OT needs, reporting IND w/ ADL, IND w/ mobility w/o AD, primarily limited by pain at this time. Defer discharge recommendations to Physical Therapy.? Will complete orders.

## 2025-07-15 NOTE — ED TRIAGE NOTES
ED with complaints of uncontrolled back pain. Reports having recent spinal fusion. Seen in another ED last evening for similar complaints.      Triage Assessment (Adult)       Row Name 07/14/25 8724          Triage Assessment    Airway WDL WDL        Respiratory WDL    Respiratory WDL WDL        Skin Circulation/Temperature WDL    Skin Circulation/Temperature WDL WDL        Cardiac WDL    Cardiac WDL WDL        Peripheral/Neurovascular WDL    Peripheral Neurovascular WDL WDL        Cognitive/Neuro/Behavioral WDL    Cognitive/Neuro/Behavioral WDL WDL

## 2025-07-15 NOTE — PROVIDER NOTIFICATION
Provider text paged Re: Re: [facility] ED 34 What is the plan for this patient? Just wondering if he is discharging today or if he can transfer to the OBS Unit. Please advise. Thank you.

## 2025-07-15 NOTE — PLAN OF CARE
Goal Outcome Evaluation:  -Adequate pain control using oral analgesics: Not met   -Tolerates oral intake: Met   -Cleared for discharge per Provider: Not met

## 2025-07-15 NOTE — CONSULTS
"Pain Service Consultation Note  [facility]      Patient Name: Ba Mireles  MRN: [id number]   Age: 45 year old  Sex: male  Date: July 15, 2025                                        Reviewed: Yes  Per [place]  review pulled from system on 07/15/25.   07/12/2025 07/12/2025 2 Oxycodone Hcl (Ir) 5 Mg Tablet 40.00 10   07/12/2025 07/12/2025 1 Gabapentin 300 Mg Capsule 126.00 14   06/25/2025 06/25/2025 2 Oxycodone Hcl (Ir) 10 Mg Tab 60.00 10   06/25/2025 06/25/2025 2 Diazepam 5 Mg Tablet 20.00 5   06/17/2025 06/17/2025 1 Oxycodone-Acetaminophen 5-325 42.00 7   06/13/2025 06/13/2025 1 Pregabalin 150 Mg Capsule 90.00 30   06/11/2025 06/10/2025 1 Gabapentin 300 Mg Capsule 207.00 23   06/10/2025 06/10/2025 1 Gabapentin 300 Mg Capsule 15.00 5       Pain Medications/Prescriber: last oxycodone as prescribed by neurosurgery at discharge.    Referring Provider:      China Rodrigez MD     Referring Service:  neurosurgery   Reason for Consultation: \"    s/p L5-S1 TLIF on 7/8/25, hx of poorly controlled pain, ongoing allodynia in left foot\"       Assessment/Recommendations:  Ba Mireles is a 45 year old male who has PMH of arthritis, kidney stones, and smoking who recently underwent LEFT L5-S1 far-lateral discectomy on 6/24/25 for decompression of left L5 nerve root (L5 radiculopathy) that unfortunately did not treat his dynamic instability despite adequate static decompression, which required a subsequent L5-S1 TLIF with BMP and Interbody Cage with Dr. Winter on 7/8/25 and was discharged on 7/12/25.    He now returns with left dorsal foot pain on 7/14/25.    Richi is seen in the ED. States pain management was \"awesome\" post operatively and at discharge. However, has been frustrated with pain since being at home because the pain medication regimen was changed.  States oxycodone 5mg Q 6 hours PRN was not enough and did try using 10mg instead which was more helpful.  Listening support provided. " Pain recommendations as below. Discussed that post op pain medications are typically tapered over 7-10 days for his type of surgery.  Discussed that neuromodulators are most helpful for the shooting/numbing type pain.  He is agreeable with the plan.    Plan:   Acetaminophen 975mg PO 8 hours-stop other orders  Robaxin 750mg PO Q 6 hours PRN  Oxycodone 10mg PO Q 4 hours PRN x 1-2 days then change to up to 5x/day PRN x 1-2 days then to 4x/day PRN x 1-2 days then to 3x/day PRN x 1-2 days then to 2x/day PRN x 1-2days then to 1/day PRN and stop.     Change Gabapentin 900, 900, 1200mg  (at home was taking 900 TID)-renal function Estimated Creatinine Clearance: 111.4 mL/min (based on SCr of 0.74 mg/dL)-WNL.     After 3 days, if needed can increase to 1000, 1000, and 1200mg.    Bowel regimen.     Thank you for the opportunity to participate in the care of Ba Mireles  Pain Service will sign off.    Discussed with attending anesthesiologist- the context of our conversation was regarding increasing gabapentin dose and opioid tapering plan.    Primary Service Contacted with Recommendations? Yes    Kelly Sanon PA-C  7/15/2025          Past Medical History:  Past Medical History:   Diagnosis Date    Arthritis 08/16/2014    When I had my hip surgery they told me I have arthritis going already    Kidney stone     first stone since age 20    Left ureteral stone 08/02/2023         Family History:    Family History   Problem Relation Age of Onset    Cancer Mother         kidney    Heart Disease Mother 66    Nephrolithiasis Mother     Gout Mother     Cancer Maternal Grandfather         renal cell    Heart Disease Maternal Grandfather     Nephrolithiasis Maternal Grandfather     Hyperlipidemia Maternal Grandfather     Cancer Paternal Grandfather         lucille cell    Kidney Disease Paternal Grandfather     Prostate Cancer Paternal Grandfather     Nephrolithiasis Maternal Aunt     Anesthesia Reaction No family hx of     Deep Vein  "Thrombosis (DVT) No family hx of        Social History:  Social History     Tobacco Use    Smoking status: Every Day     Current packs/day: 0.00     Average packs/day: 1 pack/day for 26.2 years (26.2 ttl pk-yrs)     Types: Cigarettes     Start date: 1997     Last attempt to quit: 2023     Years since quittin.8     Passive exposure: Current    Smokeless tobacco: Never    Tobacco comments:     Recent restarted, less than 1 PPD   Substance Use Topics    Alcohol use: Not Currently     Alcohol/week: 2.0 standard drinks of alcohol     Types: 2 Standard drinks or equivalent per week             Review of Systems:  Complete ROS reviewed. Unless otherwise noted, all other systems found to be negative.        Laboratory Results:  Recent Labs   Lab Test 07/15/25  0644      BUN 17.0       Allergies:  Allergies   Allergen Reactions    Dust Mites     Ragweeds     Amoxicillin-Pot Clavulanate Anxiety     Has tolerated amoxicillin well in the past         Current Pain Related Medications:  Medications related to Pain Management (From now, onward)      Start     Dose/Rate Route Frequency Ordered Stop    07/15/25 0800  methocarbamol (ROBAXIN) tablet 500 mg         500 mg Oral 4 TIMES DAILY 07/15/25 0103      07/15/25 08  gabapentin (NEURONTIN) capsule 300 mg         300 mg Oral 3 TIMES DAILY 07/15/25 0425      07/15/25 010  acetaminophen (TYLENOL) tablet 650 mg         650 mg Oral EVERY 6 HOURS 07/15/25 0102      07/15/25 010  oxyCODONE (ROXICODONE) tablet 5 mg        Placed in \"Or\" Linked Group    5 mg Oral EVERY 4 HOURS PRN 07/15/25 0102      07/15/25 010  oxyCODONE IR (ROXICODONE) tablet 10 mg        Placed in \"Or\" Linked Group    10 mg Oral EVERY 4 HOURS PRN 07/15/25 0102      07/15/25 010  HYDROmorphone (DILAUDID) injection 0.2 mg        Placed in \"Or\" Linked Group    0.2 mg Intravenous EVERY 2 HOURS PRN 07/15/25 0102      07/15/25 010  HYDROmorphone (DILAUDID) injection 0.4 mg        Placed in \"Or\" " "Linked Group    0.4 mg Intravenous EVERY 2 HOURS PRN 07/15/25 0102      07/15/25 0100  lidocaine 1 % 0.1-1 mL         0.1-1 mL Other EVERY 1 HOUR PRN 07/15/25 0102      07/15/25 0100  lidocaine (LMX4) cream          Topical EVERY 1 HOUR PRN 07/15/25 0102                Physical Exam:  Vitals: BP (!) 142/97 (BP Location: Right arm)   Pulse 80   Temp 98.1  F (36.7  C) (Oral)   Resp 16   SpO2 100%     Physical Exam:     CONSTITUTIONAL/GENERAL APPEARANCE:  NAD  EYES: EOMI, sclera anicteric, PERRLA  ENT/NECK: atraumatic, lips and oral mucous membranes dry  RESPIRATORY: non-labored breathing. No cough, wheeze  CV: RRR, no audible rubs, gallops, or murmurs  ABDOMEN: Soft, non-tender, non-distended  MUSCULOSKELETAL/BACK/SPINE/EXTREMITIES: Moves all extremities purposefully.  No edema or obvious joint deformities   NEURO: Alert and Oriented x3. Answers questions appropriately  SKIN/VASCULAR EXAM:  No jaundice, no visible rashes or lesions          Acute Inpatient Pain Service [facility]  Hours of pain coverage 24/7   Please PAGE via Vocera - Link to [facility] Here - Search Pain  Page via Amcom- Please Page the Pain Team Via Amcom: \"PAIN MANAGEMENT ACUTE INPATIENT/ [facility]\"           "

## 2025-07-15 NOTE — CONSULTS
[facility]       NEUROSURGERY CONSULTATION NOTE    This consultation was requested by Dr. Hall from the Emergency Medicine service.    Reason for Consultation  Recent Post-op with Refractory Pain    HPI:  Ba Mireles is a 44yo male with past medical history of arthritis, kidney stones, and smoking who recently underwent LEFT L5-S1 far-lateral discectomy on 6/24/25 for decompression of left L5 nerve root (L5 radiculopathy) that unfortunately did not treat his dynamic instability despite adequate static decompression, which required a subsequent L5-S1 TLIF with BMP and Interbody Cage with Dr. Winter on 7/8/25.    He recovered well from surgery and reported initial relief of his radicular pain but then started endorsing LEFT L5 distribution hyperalgesia, for which pre-gabalin was restarted and a medrol dosepack and 1x IV dexamethasone was given. The medrol dosepack was started 7/11 and will continue for 6 days.    Unfortunately, he has been re-presenting to the ED due to continued uncontrolled low back and LEFT L5 radicular pain, which is why he presents today.    No recent fevers, chills, nausea, vomiting, chest pain, shortness of breath, and denies headaches, new weakness, LOC, new numbness/weakness/paresthesias in extremities, changes in sensation, taste, smell, nor trouble speaking or other neurologic symptoms.    PAST MEDICAL HISTORY:   Past Medical History:   Diagnosis Date    Arthritis 08/16/2014    When I had my hip surgery they told me I have arthritis going already    Kidney stone     first stone since age 20    Left ureteral stone 08/02/2023       PAST SURGICAL HISTORY:   Past Surgical History:   Procedure Laterality Date    COMBINED CYSTOSCOPY, RETROGRADES, URETEROSCOPY, LASER HOLMIUM LITHOTRIPSY URETER(S), INSERT STENT Bilateral 08/07/2023    Procedure: Cystoscopy, Bilateral Ureteroscopy, Bilateral Retrograde Pyelogram, Bilateral Stone Basket Extraction,  Bilateral Stent placement;  Surgeon: Gus Shipman MD;  Location: [facility] OR    CYSTOSCOPY,URETEROSCOPY,LITHOTRIPSY      at [place]    DISCECTOMY LUMBAR POSTERIOR MICROSCOPIC ONE LEVEL N/A 2025    Procedure: Left Lumbar 5-Sacral 1 Far-lateral microdiscectomy;  Surgeon: Misha Winter MD;  Location: [facility] OR    OPTICAL TRACKING SYSTEM FUSION POSTERIOR SPINE LUMBAR N/A 2025    Procedure: o-arm/stealth assisted Lumbar 5-Sacral 1 Transforaminal Lumbar Interbody Fusion with Bone Morphogenic Protein and Interbody cage;  Surgeon: Misha Winter MD;  Location: [facility] OR    ORTHOPEDIC SURGERY      Right Wrist    ORTHOPEDIC SURGERY      left hip surgery    WRIST SURGERY Right        FAMILY HISTORY:   Family History   Problem Relation Age of Onset    Cancer Mother         kidney    Heart Disease Mother 66    Nephrolithiasis Mother     Gout Mother     Cancer Maternal Grandfather         renal cell    Heart Disease Maternal Grandfather     Nephrolithiasis Maternal Grandfather     Hyperlipidemia Maternal Grandfather     Cancer Paternal Grandfather         lucille cell    Kidney Disease Paternal Grandfather     Prostate Cancer Paternal Grandfather     Nephrolithiasis Maternal Aunt     Anesthesia Reaction No family hx of     Deep Vein Thrombosis (DVT) No family hx of        SOCIAL HISTORY:   Social History     Tobacco Use    Smoking status: Every Day     Current packs/day: 0.00     Average packs/day: 1 pack/day for 26.2 years (26.2 ttl pk-yrs)     Types: Cigarettes     Start date: 1997     Last attempt to quit: 2023     Years since quittin.8     Passive exposure: Current    Smokeless tobacco: Never    Tobacco comments:     Recent restarted, less than 1 PPD   Substance Use Topics    Alcohol use: Not Currently     Alcohol/week: 2.0 standard drinks of alcohol     Types: 2 Standard drinks or equivalent per week       MEDICATIONS:  Current Outpatient Medications   Medication Sig Dispense Refill    acetaminophen  (TYLENOL) 500 MG tablet Take 1,000 mg by mouth every 6 hours as needed for mild pain.      diazepam (VALIUM) 5 MG tablet Take 1 tablet (5 mg) by mouth every 8 hours as needed for anxiety. 30 tablet 0    diazepam (VALIUM) 5 MG tablet Take 1 tablet (5 mg) by mouth every 6 hours as needed for muscle spasms. 20 tablet 0    famotidine (PEPCID) 20 MG tablet Take 1 tablet (20 mg) by mouth 2 times daily for 7 days. 14 tablet 0    gabapentin (NEURONTIN) 300 MG capsule Take 3 capsules (900 mg) by mouth 3 times daily for 14 days. 126 capsule 0    Lidocaine (LIDOCARE) 4 % Patch Place onto the skin every 24 hours. To prevent lidocaine toxicity, patient should be patch free for 12 hrs daily. 30 patch 1    magnesium hydroxide (MILK OF MAGNESIA) 400 MG/5ML suspension Take 30 mLs by mouth daily as needed for constipation or heartburn.      methocarbamol (ROBAXIN) 500 MG tablet Take 1,000 mg by mouth 4 times daily.      methylPREDNISolone (MEDROL DOSEPAK) 4 MG tablet therapy pack Take 1 tablet (4 mg) by mouth 3 times daily for 2 days, THEN 1 tablet (4 mg) 2 times daily for 2 days, THEN 1 tablet (4 mg) every morning for 2 days. Follow Package Directions. 12 tablet 0    ondansetron (ZOFRAN ODT) 4 MG ODT tab Take 1 tablet (4 mg) by mouth every 8 hours as needed for nausea. 10 tablet 0    oxyCODONE (ROXICODONE) 5 MG tablet Take 1 tablet (5 mg) by mouth every 6 hours as needed. 40 tablet 0    polyethylene glycol (MIRALAX) 17 g packet Take 1 packet by mouth daily as needed for constipation.      tiZANidine (ZANAFLEX) 4 MG tablet Take 1-2 tablets (4-8 mg) by mouth 3 times daily as needed for muscle spasms. 30 tablet 0       Allergies:  Allergies   Allergen Reactions    Dust Mites     Ragweeds     Amoxicillin-Pot Clavulanate Anxiety     Has tolerated amoxicillin well in the past       ROS: 10 point ROS of systems including Constitutional, Eyes, Respiratory, Cardiovascular, Gastroenterology, Genitourinary, Integumentary, Muscularskeletal,  Psychiatric were all negative except for pertinent positives noted in my HPI.    Physical exam:   Blood pressure 123/74, pulse 98, temperature 98  F (36.7  C), temperature source Oral, resp. rate 18, SpO2 100%.  General: Resting in bed in moderate distress  HEENT: No gross deformities  PULM: Breathing comfortably on room air  MSK: No gross deformities; incision healing well (see pic in chart)  : rectal tone deferred  NEUROLOGIC:  -- Awake; Alert; oriented x 3  -- Follows commands briskly  -- Speech fluent, spontaneous. No aphasia or dysarthria.  -- No gaze preference. No apparent hemineglect. Visual fields full to confrontation    Cranial Nerves:  -- PERRL ~3-4 mm bilat and brisk, extraocular movements intact  -- sensory V1-V3 intact bilaterally  -- face symmetrical, tongue midline  -- hearing grossly intact bilat  -- palate elevates symmetrically, uvula midline  -- Trapezii 5/5 strength bilat symmetric    Motor:  -- Normal bulk / tone; no tremor, rigidity, or bradykinesia.  No muscle wasting or fasciculations  -- No Pronator Drift     Delt Bi Tri Hand Flexion/  Extension Iliopsoas Quadriceps Tibialis Anterior EHL Gastroc    C5 C6 C7 C8/T1 L2 L3 L4 L5 S1   R 5 5 5 5 5 5 4 4 5   L 5 5 5 5 5 5 5 5 5     *mild give-away weakness throughout 2/2 pain; strength in LEFT ankle limited by hyperalgesia    Sensory:  intact to LT x 4 extremities     Reflexes: no clonus       Bi Tri BR Sophia Pat Ach Bab     C5-6 C7-8 C6 UMN L2-4 S1 UMN   R 2+ 2+ 2+ Norm 2+ 2+ Norm   L 2+ 2+ 2+ Norm 2+ 2+ Norm      Gait: Unable to assess 2/2 pain      IMAGING:  Pending    LABS:   Last Comprehensive Metabolic Panel:  Lab Results   Component Value Date     07/13/2025    POTASSIUM 3.9 07/13/2025    CHLORIDE 103 07/13/2025    CO2 25 07/13/2025    ANIONGAP 11 07/13/2025    GLC 94 07/13/2025    BUN 22.2 (H) 07/13/2025    CR 0.74 07/13/2025    GFRESTIMATED >90 07/13/2025    CHARLY 9.5 07/13/2025         Lab Results   Component Value Date    WBC 9.7  "07/13/2025     Lab Results   Component Value Date    RBC 3.82 07/13/2025     Lab Results   Component Value Date    HGB 12.1 07/13/2025     Lab Results   Component Value Date    HCT 35.4 07/13/2025     Lab Results   Component Value Date    MCV 93 07/13/2025     Lab Results   Component Value Date    MCH 31.7 07/13/2025     Lab Results   Component Value Date    MCHC 34.2 07/13/2025     Lab Results   Component Value Date    RDW 13.1 07/13/2025     Lab Results   Component Value Date     07/13/2025     No results found for: \"INR\"   No results found for: \"PTT\"   @Fibrinogen1@    ASSESSMENT:  Ba Mireles is a 44yo male with past medical history of arthritis, kidney stones, and smoking who recently underwent LEFT L5-S1 far-lateral discectomy on 6/24/25 for decompression of left L5 nerve root (L5 radiculopathy) that unfortunately did not treat his dynamic instability despite adequate static decompression, which required a subsequent L5-S1 TLIF with BMP and Interbody Cage with Dr. Winter on 7/8/25, returning due to continued LEFT L5 radicular pain and hyperalgesia.    He is currently post-operative day 7.    RECOMMENDATIONS:  No emergent neurosurgical intervention at this time  Dexamethasone IV 4mg now  CBC/BMP  CRP, Procal, ESR  MRI-Lumbar spine with/without contrast      Compa Sevilla MD, PhD  Neurosurgery Resident, PGY-3    The patient was discussed with Dr. Farmer, neurosurgery chief resident, and Dr. Winter, neurosurgery staff.      "

## 2025-07-15 NOTE — PROGRESS NOTES
ED PT Evaluation:      07/15/25 1320   Appointment Info   Signing Clinician's Name / Credentials (PT) Go Pham, PT, DPT   Quick Adds   Quick Adds Certification   Living Environment   People in Home parent(s)   Current Living Arrangements house   Living Environment Comments Currently staying at their mom's house. 1 SITA, all needs met on main level. Has intermittent support from their mom and daughter.   Self-Care   Equipment Currently Used at Home cane, straight   Fall history within last six months no   Activity/Exercise/Self-Care Comment IND with mobility and ADLs at baseline. Intermittently uses SPC.   General Information   Onset of Illness/Injury or Date of Surgery 07/14/25   Referring Physician China Rodrigez MD   Patient/Family Therapy Goals Statement (PT) Return home   Pertinent History of Current Problem (include personal factors and/or comorbidities that impact the POC) Per EMR: Ba Mireles is a 46yo male with past medical history of arthritis, kidney stones, and smoking who recently underwent LEFT L5-S1 far-lateral discectomy on 6/24/25 for decompression of left L5 nerve root (L5 radiculopathy) that unfortunately did not treat his dynamic instability despite adequate static decompression, which required a subsequent L5-S1 TLIF with BMP and Interbody Cage with Dr. Winter on 7/8/25.   Existing Precautions/Restrictions spinal;brace worn when out of bed  (LSO)   General Observations Pt greeted up in their recliner, agreeable to session.   Cognition   Affect/Mental Status (Cognition) WFL   Cognitive Status Comments mildly agitated and frustrated with ongoing pain   Pain Assessment   Patient Currently in Pain   (Pt endorses pain along anterior lower leg and dorsum of L foot.)   Posture    Posture Forward head position   Range of Motion (ROM)   Range of Motion ROM deficits secondary to pain   ROM Comment L ankle DF/PF pain-limited   Strength (Manual Muscle Testing)   Strength (Manual Muscle Testing)  Deficits observed during functional mobility   Strength Comments LLE grossly pain-limited   Bed Mobility   Bed Mobility supine-sit   Supine-Sit [place] (Bed Mobility) independent   Transfers   Transfers sit-stand transfer   Sit-Stand Transfer   Sit-Stand [place] (Transfers) independent   Gait/Stairs (Locomotion)   [place] Level (Gait) independent   Comment, (Gait/Stairs) L antalgic sequencing   Balance   Balance no deficits were identified   Sensory Examination   Sensory Perception Comments Pt endorses numbness along anterior lower leg and hypersensitivity over dorsum of L foot.   Clinical Impression   Criteria for Skilled Therapeutic Intervention Yes, treatment indicated   PT Diagnosis (PT) Impaired functional mobility   Influenced by the following impairments pain, spinal precautions   Functional limitations due to impairments activity tolerance, stairs, gait   Clinical Presentation (PT Evaluation Complexity) stable   Clinical Presentation Rationale Clinical judgment   Clinical Decision Making (Complexity) low complexity   Planned Therapy Interventions (PT) balance training;gait training;home exercise program;patient/family education;stair training;strengthening;progressive activity/exercise;risk factor education;home program guidelines;neuromuscular re-education   Risk & Benefits of therapy have been explained evaluation/treatment results reviewed;care plan/treatment goals reviewed;risks/benefits reviewed;current/potential barriers reviewed;participants voiced agreement with care plan;participants included;patient   PT Total Evaluation Time   PT Eval, Low Complexity Minutes (87912) 6   Therapy Certification   Start of care date 07/15/25   Certification date from 07/15/25   Certification date to 07/29/25   Medical Diagnosis Acute low back pain, unspecified back pain laterality, unspecified whether sciatica present   Physical Therapy Goals   PT Frequency Daily   PT Predicted Duration/Target Date for  Goal Attainment 07/29/25   PT Goals Gait;Stairs   PT: Gait Independent;Greater than 200 feet;Goal Met   PT: Stairs Independent;1 stair   PT Discharge Planning   PT Plan gait, establish HEP   PT Discharge Recommendation (DC Rec) home with assist;home with outpatient physical therapy   PT Rationale for DC Rec Patient mobilizing independently despite ongoing issues with pain. Recommend OP PT follow up when appropriate to further address symptoms and facilitate return to PLOF.   PT Brief overview of current status IND   PT Total Distance Amb During Session (feet) 2000   Physical Therapy Time and Intention   Total Session Time (sum of timed and untimed services) [address] [place] [address]                                                                                   OUTPATIENT PHYSICAL THERAPY    PLAN OF TREATMENT FOR OUTPATIENT REHABILITATION   Patient's Last Name, First Name, Ba Pedro YOB: 1980   Provider's Name   [facility]   Medical Record No.  [id number]     Onset Date: 07/14/25 Start of Care Date: 07/15/25     Medical Diagnosis:  Acute low back pain, unspecified back pain laterality, unspecified whether sciatica present               PT Diagnosis:  Impaired functional mobility Certification Dates:  From: 07/15/25  To: 07/29/25       See note for plan of treatment, functional goals, and certification details.    I CERTIFY THE NEED FOR THESE SERVICES FURNISHED UNDER        THIS PLAN OF TREATMENT AND WHILE UNDER MY CARE (Physician co-signature of this document indicates review and certification of the therapy plan).

## 2025-07-15 NOTE — PROVIDER NOTIFICATION
ED05. Pt requests food d/t abd discomfort w/oral meds. Informed of NPO status, but can they have anything at all? Denita Aragon RN.

## 2025-07-16 ENCOUNTER — TELEPHONE (OUTPATIENT)
Dept: FAMILY MEDICINE | Facility: CLINIC | Age: 45
End: 2025-07-16

## 2025-07-16 VITALS
RESPIRATION RATE: 18 BRPM | HEART RATE: 95 BPM | OXYGEN SATURATION: 100 % | TEMPERATURE: 97.8 F | SYSTOLIC BLOOD PRESSURE: 138 MMHG | DIASTOLIC BLOOD PRESSURE: 92 MMHG

## 2025-07-16 DIAGNOSIS — Z98.1 S/P LUMBAR FUSION: ICD-10-CM

## 2025-07-16 DIAGNOSIS — M54.16 LUMBAR RADICULOPATHY: ICD-10-CM

## 2025-07-16 LAB
ANION GAP SERPL CALCULATED.3IONS-SCNC: 12 MMOL/L (ref 7–15)
BUN SERPL-MCNC: 21.1 MG/DL (ref 6–20)
CALCIUM SERPL-MCNC: 9.1 MG/DL (ref 8.8–10.4)
CHLORIDE SERPL-SCNC: 105 MMOL/L (ref 98–107)
CREAT SERPL-MCNC: 0.71 MG/DL (ref 0.67–1.17)
EGFRCR SERPLBLD CKD-EPI 2021: >90 ML/MIN/1.73M2
ERYTHROCYTE [DISTWIDTH] IN BLOOD BY AUTOMATED COUNT: 12.6 % (ref 10–15)
GLUCOSE BLDC GLUCOMTR-MCNC: 84 MG/DL (ref 70–99)
GLUCOSE SERPL-MCNC: 84 MG/DL (ref 70–99)
HCO3 SERPL-SCNC: 22 MMOL/L (ref 22–29)
HCT VFR BLD AUTO: 33.5 % (ref 40–53)
HGB BLD-MCNC: 11.5 G/DL (ref 13.3–17.7)
MAGNESIUM SERPL-MCNC: 2 MG/DL (ref 1.7–2.3)
MCH RBC QN AUTO: 31 PG (ref 26.5–33)
MCHC RBC AUTO-ENTMCNC: 34.3 G/DL (ref 31.5–36.5)
MCV RBC AUTO: 90 FL (ref 78–100)
PHOSPHATE SERPL-MCNC: 3.8 MG/DL (ref 2.5–4.5)
PLATELET # BLD AUTO: 378 10E3/UL (ref 150–450)
POTASSIUM SERPL-SCNC: 4 MMOL/L (ref 3.4–5.3)
RBC # BLD AUTO: 3.71 10E6/UL (ref 4.4–5.9)
SODIUM SERPL-SCNC: 139 MMOL/L (ref 135–145)
WBC # BLD AUTO: 8.8 10E3/UL (ref 4–11)

## 2025-07-16 PROCEDURE — 96375 TX/PRO/DX INJ NEW DRUG ADDON: CPT

## 2025-07-16 PROCEDURE — 85027 COMPLETE CBC AUTOMATED: CPT

## 2025-07-16 PROCEDURE — 82962 GLUCOSE BLOOD TEST: CPT

## 2025-07-16 PROCEDURE — 250N000011 HC RX IP 250 OP 636

## 2025-07-16 PROCEDURE — 250N000013 HC RX MED GY IP 250 OP 250 PS 637: Performed by: PHYSICIAN ASSISTANT

## 2025-07-16 PROCEDURE — G0378 HOSPITAL OBSERVATION PER HR: HCPCS

## 2025-07-16 PROCEDURE — 80048 BASIC METABOLIC PNL TOTAL CA: CPT

## 2025-07-16 PROCEDURE — 83735 ASSAY OF MAGNESIUM: CPT

## 2025-07-16 PROCEDURE — 84100 ASSAY OF PHOSPHORUS: CPT

## 2025-07-16 PROCEDURE — 36415 COLL VENOUS BLD VENIPUNCTURE: CPT

## 2025-07-16 PROCEDURE — 250N000013 HC RX MED GY IP 250 OP 250 PS 637

## 2025-07-16 RX ORDER — AMOXICILLIN 250 MG
1 CAPSULE ORAL 2 TIMES DAILY
Qty: 60 TABLET | Refills: 0 | Status: SHIPPED | OUTPATIENT
Start: 2025-07-16

## 2025-07-16 RX ORDER — ACETAMINOPHEN 325 MG/1
975 TABLET ORAL EVERY 6 HOURS PRN
Qty: 30 TABLET | Refills: 0 | Status: SHIPPED | OUTPATIENT
Start: 2025-07-16

## 2025-07-16 RX ORDER — OXYCODONE HYDROCHLORIDE 10 MG/1
TABLET ORAL
Qty: 46 TABLET | Refills: 0 | Status: SHIPPED | OUTPATIENT
Start: 2025-07-16 | End: 2025-07-24

## 2025-07-16 RX ORDER — METHYLPREDNISOLONE 4 MG/1
TABLET ORAL
Qty: 12 TABLET | Refills: 0 | Status: SHIPPED | OUTPATIENT
Start: 2025-07-16 | End: 2025-07-22

## 2025-07-16 RX ORDER — MAGNESIUM OXIDE 400 MG/1
400 TABLET ORAL EVERY 4 HOURS
Status: COMPLETED | OUTPATIENT
Start: 2025-07-16 | End: 2025-07-16

## 2025-07-16 RX ORDER — GABAPENTIN 300 MG/1
CAPSULE ORAL
Qty: 300 CAPSULE | Refills: 0 | Status: ACTIVE | OUTPATIENT
Start: 2025-07-16

## 2025-07-16 RX ORDER — DIAZEPAM 5 MG/1
5 TABLET ORAL EVERY 6 HOURS PRN
Qty: 20 TABLET | Refills: 0 | Status: SHIPPED | OUTPATIENT
Start: 2025-07-16 | End: 2025-07-24

## 2025-07-16 RX ORDER — ONDANSETRON 4 MG/1
4 TABLET, ORALLY DISINTEGRATING ORAL EVERY 6 HOURS PRN
Qty: 30 TABLET | Refills: 0 | Status: SHIPPED | OUTPATIENT
Start: 2025-07-16

## 2025-07-16 RX ORDER — METHOCARBAMOL 1000 MG/1
1000 TABLET, FILM COATED ORAL 4 TIMES DAILY
Qty: 30 TABLET | Refills: 0 | Status: SHIPPED | OUTPATIENT
Start: 2025-07-16 | End: 2025-07-24

## 2025-07-16 RX ORDER — POLYETHYLENE GLYCOL 3350 17 G/17G
17 POWDER, FOR SOLUTION ORAL DAILY
Qty: 510 G | Refills: 0 | Status: SHIPPED | OUTPATIENT
Start: 2025-07-16

## 2025-07-16 RX ORDER — CALCIUM CARBONATE 500 MG/1
2 TABLET, CHEWABLE ORAL DAILY
Qty: 60 TABLET | Refills: 0 | Status: SHIPPED | OUTPATIENT
Start: 2025-07-16

## 2025-07-16 RX ADMIN — Medication 2 G: at 01:49

## 2025-07-16 RX ADMIN — GABAPENTIN 900 MG: 300 CAPSULE ORAL at 07:04

## 2025-07-16 RX ADMIN — OXYCODONE HYDROCHLORIDE 10 MG: 10 TABLET ORAL at 09:18

## 2025-07-16 RX ADMIN — OXYCODONE HYDROCHLORIDE 10 MG: 10 TABLET ORAL at 04:45

## 2025-07-16 RX ADMIN — METHOCARBAMOL 750 MG: 750 TABLET ORAL at 01:46

## 2025-07-16 RX ADMIN — MAGNESIUM OXIDE TAB 400 MG (241.3 MG ELEMENTAL MG) 400 MG: 400 (241.3 MG) TAB at 09:18

## 2025-07-16 RX ADMIN — ACETAMINOPHEN 975 MG: 325 TABLET ORAL at 06:19

## 2025-07-16 RX ADMIN — GABAPENTIN 900 MG: 300 CAPSULE ORAL at 11:25

## 2025-07-16 RX ADMIN — OXYCODONE HYDROCHLORIDE 10 MG: 10 TABLET ORAL at 00:19

## 2025-07-16 RX ADMIN — ONDANSETRON 4 MG: 2 INJECTION INTRAMUSCULAR; INTRAVENOUS at 09:32

## 2025-07-16 RX ADMIN — ONDANSETRON 4 MG: 4 TABLET, ORALLY DISINTEGRATING ORAL at 03:42

## 2025-07-16 RX ADMIN — MAGNESIUM OXIDE TAB 400 MG (241.3 MG ELEMENTAL MG) 400 MG: 400 (241.3 MG) TAB at 11:26

## 2025-07-16 RX ADMIN — METHOCARBAMOL 750 MG: 750 TABLET ORAL at 07:04

## 2025-07-16 RX ADMIN — Medication 2 G: at 09:20

## 2025-07-16 ASSESSMENT — ACTIVITIES OF DAILY LIVING (ADL)
ADLS_ACUITY_SCORE: 40
ADLS_ACUITY_SCORE: 35
ADLS_ACUITY_SCORE: 40
ADLS_ACUITY_SCORE: 35
ADLS_ACUITY_SCORE: 40
ADLS_ACUITY_SCORE: 35
ADLS_ACUITY_SCORE: 35
ADLS_ACUITY_SCORE: 40
ADLS_ACUITY_SCORE: 40

## 2025-07-16 NOTE — PROGRESS NOTES
"Patient requested to talk to the charge nurse about the care received since being admitted post op. He states that his pain has been poorly controled since his surgery and the over-night neuro surgery staff were not responsive to his needs. He states he feels the communication from the providers has been poor. He expressed concern that he is being labeled as a \"drug seeker\" and that his pain is not being taken seriously. He is asking to not be seen by one physician in particular but is unable to recall his name. He has asked to be referred to someone with pain management. He has asked to talk to ANS who is at bedside at this time.    "

## 2025-07-16 NOTE — PROVIDER NOTIFICATION
"Paged provider via [employer]    \"Hi pt is complaining current pain plan is uncontrolled. Could you please advise. Client not sleeping and saying pain is unusual    \"Pt in intense pain\"    \"Morning out in pain\"    Provider response : \"Anything change?\"    \"No pt is just complaining of intense foot pain. And saying he needs to see somebody. Says it is not normal pain\"      Provider saw the patient in during shift change around 7AM.  "

## 2025-07-16 NOTE — TELEPHONE ENCOUNTER
"Spoke to patient.    \"I was recently discharged and I am having issues with the Neurosurgery team. I had spinal fusion and I am having major issues with not getting proper meds. They didn't give me the rest of the steroid I was supposed to take and when I got discharged, they told me I had to start over and they never gave me the medication. I need to continue on the Methyprednisolone..\"     \"I chose to leave the hospital myself. I spent the last 2 days crying in pain because they were not sending proper medication to the nurses to give to me. I don't trust anyone at  of  and patient rights are just out the wondow and I need to get back on this to get the swelling down in my spine..\"    \"I thought I would reach out to my old Primary since I have no one to turn to and I need a referral for a different neurosurgeon..\"       I did advise he is going to need to call his Neurosurgery team, and I doubt Dr. Eli can order this for him without seeing him     Please let pt know if you can order this or if you can see him?    Michelle Jorgensen RN          " Yes

## 2025-07-16 NOTE — PROGRESS NOTES
DISCHARGE                         7/16/2025  1:40 PM  ----------------------------------------------------------------------------  Discharged to: Home  Via: Automobile  Accompanied by: Self  Discharge Instructions: diet, activity, medications, follow up appointments, when to call the MD, aftercare instructions, and what to watchout for (i.e. s/s of infection, increasing SOB, palpitations, chest pain,)  Prescriptions: To be filled by discharge pharmacy per pt's request; medication list reviewed & sent with pt. Medications reviewed with pt.  Follow Up Appointments: arranged; information given  Belongings: All sent with pt  IV: out  Telemetry: off  Pt exhibits understanding of above discharge instructions; all questions answered.    Discharge Paperwork: Sent home with patient.

## 2025-07-16 NOTE — PROGRESS NOTES
Brief Neurosurgery Interval Note    Went to bedside to assess patient this morning.  Patient complained that he had not been informed of his care plan nor spoken to a doctor yesterday.  Reminded patient that he had seen 2 female physicians from the neurosurgery team throughout the day-- myself and Dr. Villalba, and that we had discussed the care plan extensively, including on the phone with his mother who agreed that the best course of action was pain team consult to optimize his regimen, and have PT/OT reevaluate him.  Have had thorough discussions with patient that the allodynia to the left foot is likely a result of nerve damage, and is unlikely to improve within a short time period.    Patient seen by pain team yesterday, and recommendations were started. Pain team then signed off. Patient evaluated by PT and found to be appropriate for discharge home.    This AM when discussing the above with patient, he began yelling at myself. I responded that it is crucial we communicate with respect while we continue optimizing his care plan. He continued yelling, and proceeded to start banging his table aggressively. I proceeded to leave the room.      China Rodrigez MD  Neurosurgery PGY-2  Personal pager #[phone]  Please page #[phone] (urgent)/OSBALDO (non-urgent) the on-call neurosurgery resident per [facility] with questions

## 2025-07-16 NOTE — PROGRESS NOTES
Discharge goals to be met before discharge home:  -Adequate pain control using oral analgesics; not met. Pt complaining of increased pain.    -Tolerates oral intake; met. Tolerating regular diet.   -Cleared for discharge per Provider; not met.    Notify Provider when observation discharge goals have been met and patient is ready for discharge.     BP (!) 138/92 (BP Location: Right arm)   Pulse 95   Temp 97.8  F (36.6  C) (Oral)   Resp 18   SpO2 100%     Complex Repair And Graft Additional Text (Will Appearing After The Standard Complex Repair Text): The complex repair was not sufficient to completely close the primary defect. The remaining additional defect was repaired with the graft mentioned below.

## 2025-07-16 NOTE — TELEPHONE ENCOUNTER
Please clarify what is going on here.   Just receiving a rx request for steroid   I have not seen this patient for some time.  Is pt in hospital?      Or recently discharged?    It is unfortunate that this message was sent with no additional info

## 2025-07-16 NOTE — PLAN OF CARE
Nursing Plan of Care Note  Shift: 9760-4560    /67 (BP Location: Right arm)   Pulse 79   Temp 98.1  F (36.7  C) (Oral)   Resp 16   SpO2 99%     A&Ox4. Patient reports severe left foot pain. Frustrated with lack of prior to arrival medications available. Team paged. VSS on RA.      18

## 2025-07-16 NOTE — TELEPHONE ENCOUNTER
Medication Question or Refill    Contacts       Contact Date/Time Type Contact Phone/Fax    07/16/2025 04:05 PM CDT Phone (Incoming) Richi Mireles (Self) [phone] (M)            What medication are you calling about (include dose and sig)?: Methylprednisolone    Preferred Pharmacy:    [id number] [place], [place] - [address] [place] [address] AT [address]  [address] [facility] [address]  Phone: [phone] Fax: [phone]      Controlled Substance Agreement on file:   CSA -- Patient Level:    CSA: None found at the patient level.       Who prescribed the medication?: Compa Sevilla  Pt is VERY upset w this provider.    Do you need a refill? Yes    When did you use the medication last? 07/14/25    Patient offered an appointment? No    Do you have any questions or concerns?  Yes: Pt needs a new neurosurgeon ASAP      Could we send this information to you in [facility] or would you prefer to receive a phone call?:   Patient would prefer a phone call   Okay to leave a detailed message?: Yes at Cell number on file:    Telephone Information:   Mobile [phone]

## 2025-07-16 NOTE — PROVIDER NOTIFICATION
"Paged provider via [employer]: \"Hi patient would like to speak to someone. They want to potentially leave\"          "

## 2025-07-16 NOTE — PLAN OF CARE
Goal Outcome Evaluation:      Plan of Care Reviewed With: patient    Overall Patient Progress: improving    Blood pressure (!) 145/104, pulse 100, temperature 98.1  F (36.7  C), temperature source Oral, resp. rate 16, SpO2 95%.   Obs Goals:  -Adequate pain control using oral analgesics: In progress  -Tolerates oral intake: Met   -Cleared for discharge per Provider: In progress    Pt tachycardic and htn. Pt on RA, reg diet. Changed clients back wound dressing per request. Changed the bandage and applied iodine. Pt resting then waking up for pain medications. Given oxycodone q4, tylenol, and robaxin. Pt frustrated and agitated toward care team which has been resolved temporarily until the morning when the team said they will meet with him again. Pt using restroom independently to urinate spontaneously.

## 2025-07-16 NOTE — DISCHARGE SUMMARY
[facility] Discharge Summary and Instructions    Ba Mireles MRN# [id number]   Age: 45 year old YOB: 1980     Date of Admission:  7/14/2025  Date of Discharge::  {DISCHARGE DATE:119492}  Admitting Physician:  Misha Winter MD  Discharge Physician:  Misha Winter MD          Admission Diagnoses:   Postoperative state [Z98.890]  Acute low back pain, unspecified back pain laterality, unspecified whether sciatica present [M54.50]          Discharge Diagnosis:     Postoperative state [Z98.890]  Acute low back pain, unspecified back pain laterality, unspecified whether sciatica present [M54.50]     Clinically Significant Risk Factors Present on Admission   { TIP  This section helps capture the illness of the patient on admission.     - Review diagnoses highlighted in blue; right click, edit & delete if not appropriate   - If blank, no additional diagnoses identified   :69382}                                     {Cardiovascular (Optional):946504:::1}    {Gastroenterology (Optional):013872}    {Oncology (Optional):093057:::1}    {Nephrology (Optional):211216:::1}    {Neurology (Optional):686955:::1}    {Pulmonology (Optional):839560}    {Systemic (Optional):212600:::1}             {HDNEURO (Optional):150383},  {HDCV (Optional):063350},  {HDPULM (Optional):391603},  {HDGIIDHEME (Optional):971919},  {HDRENALENDO (Optional):620213},         Procedures:   ***           Brief History of Illness:   *** Patient has elected to undergo above-mentioned procedure.           Hospital Course:   Patient underwent above-mentioned procedure on ***.  Following the procedure the patient was transferred to ***.  The patient's course was ***complicated by ***.  Patient underwent ***imaging revealing ***.  Patient was ***continued/started on ***medications that will be continued until ***.  On post operative day *** he/she was doing well and transferred to the floor. On post operative day ***, he/she was ambulating,  voiding without a ibrahim, eating a regular diet, pain was well controlled and therefore he/she was discharged ***          Discharge Medications:     Current Discharge Medication List        CONTINUE these medications which have NOT CHANGED    Details   acetaminophen (TYLENOL) 500 MG tablet Take 1,000 mg by mouth every 6 hours as needed for mild pain.      !! diazepam (VALIUM) 5 MG tablet Take 1 tablet (5 mg) by mouth every 6 hours as needed for muscle spasms.  Qty: 20 tablet, Refills: 0    Associated Diagnoses: Spinal stenosis of lumbar region without neurogenic claudication      diclofenac (VOLTAREN) 1 % topical gel Apply 2 g topically 4 times daily. For 14 days.      famotidine (PEPCID) 20 MG tablet Take 1 tablet (20 mg) by mouth 2 times daily for 7 days.  Qty: 14 tablet, Refills: 0    Associated Diagnoses: Lumbar radiculopathy; S/P lumbar fusion      gabapentin (NEURONTIN) 300 MG capsule Take 3 capsules (900 mg) by mouth 3 times daily for 14 days.  Qty: 126 capsule, Refills: 0    Associated Diagnoses: S/P lumbar fusion      Lidocaine (LIDOCARE) 4 % Patch Place onto the skin every 24 hours. To prevent lidocaine toxicity, patient should be patch free for 12 hrs daily.  Qty: 30 patch, Refills: 1    Associated Diagnoses: Lumbar disc herniation with radiculopathy      methocarbamol (ROBAXIN) 500 MG tablet Take 1,000 mg by mouth 4 times daily.      methylPREDNISolone (MEDROL DOSEPAK) 4 MG tablet therapy pack Take 1 tablet (4 mg) by mouth 3 times daily for 2 days, THEN 1 tablet (4 mg) 2 times daily for 2 days, THEN 1 tablet (4 mg) every morning for 2 days. Follow Package Directions.  Qty: 12 tablet, Refills: 0    Associated Diagnoses: Lumbar radiculopathy; S/P lumbar fusion      ondansetron (ZOFRAN ODT) 4 MG ODT tab Take 1 tablet (4 mg) by mouth every 8 hours as needed for nausea.  Qty: 10 tablet, Refills: 0      oxyCODONE (ROXICODONE) 5 MG tablet Take 1 tablet (5 mg) by mouth every 6 hours as needed.  Qty: 40 tablet,  Refills: 0    Associated Diagnoses: Lumbar radiculopathy; S/P lumbar fusion      tiZANidine (ZANAFLEX) 4 MG tablet Take 1-2 tablets (4-8 mg) by mouth 3 times daily as needed for muscle spasms.  Qty: 30 tablet, Refills: 0    Associated Diagnoses: Postoperative state; Spinal stenosis of lumbar region without neurogenic claudication; Lumbar radiculopathy      !! diazepam (VALIUM) 5 MG tablet Take 1 tablet (5 mg) by mouth every 8 hours as needed for anxiety.  Qty: 30 tablet, Refills: 0    Associated Diagnoses: Lumbar radiculopathy      magnesium hydroxide (MILK OF MAGNESIA) 400 MG/5ML suspension Take 30 mLs by mouth daily as needed for constipation or heartburn.      polyethylene glycol (MIRALAX) 17 g packet Take 1 packet by mouth daily as needed for constipation.       !! - Potential duplicate medications found. Please discuss with provider.                 Exam:   Physical Exam  BP (!) 138/92 (BP Location: Right arm)   Pulse 95   Temp 97.8  F (36.6  C) (Oral)   Resp 18   SpO2 100%   General: Appears comfortable, NAD  Wound: Incision, clean, dry, intact without strikethrough  Neurologic Exam:  - AOx3.  - Follows commands.  - Speech fluent, spontaneous. No aphasia or dysarthria.  - No gaze preference. No apparent hemineglect.  - PERRL, EOMI.  - Face symmetric with sensation intact to light touch.  - Palate elevates symmetrically, uvula midline, tongue protrudes midline.  - Trapezii and sternocleidomastoid muscles 5/5 bilaterally.  - No pronator drift.  Motor: Normal bulk/tone; no tremor, rigidity, or bradykinesia.   Right:  Deltoid 5/5, tricep 5/5, bicep 5/5, wrist flexor 5/5, wrist extensor 5/5, finger intrinsic 5/5  Left:  Deltoid 5/5, tricep 5/5, bicep 5/5, wrist flexor 5/5, wrist extensor 5/5, finger intrinsic 5/5  Right: Iliopsoas 5/5, quadricep 5/5, hamstring 5/5, tibialis anterior 5/5, gastrocnemius 5/5, EHL 5/5  Left:  Iliopsoas 5/5, quadricep 5/5, hamstring 5/5, tibialis anterior 5/5, gastrocnemius 5/5, [facility]  "5/5    Sensation intact in bilateral L4-S1 dermatones     ***Palpable tenderness to ***    ***Reflexes  Upper Extremity:   Right:  bicep 2+/Tricep 2+/Brachioradialis 2+  Left:  bicep 2+/Tricep 2+/Brachioradialis 2+  Lower Extremity:   Right:   Patellar 2+/Achilles 2+  Left:   Patellar 2+/Achilles 2+    ***Savi's Bilaterally  ***Clonus Bilaterally    ***Gait deferred         Discharge Instructions and Follow-Up:     Discharge diet: Regular***   Discharge activity: You may advance activity as tolerated. No strenuous exercise or heay lifting greater than 10 lbs for 4 weeks or until seen and cleared in clinic.  *** Please continue to wear brace until cleared by Neurosurgical provider.    Discharge follow-up: Follow-up with Neurosurgery [facility] in 2 weeks for wound check/post-hospital evaluation.    Follow-up Dr. Misha Winter MD in ***, all additional follow-up visits to be determined by Dr. Misha Winter MD       Wound care: Ok to shower, however no scrubbing of the wound and no soaking of the wound, meaning no bathtubs or swimming pools. Pat dry only. Leave wound open to air.  Patient to have wound checked 2 weeks following surgery.    Wound location: ***  Closure technique: ***  Dressing needs: ***  Post-op care at follow-up: Keep dry and clean       Please call if you have:  1. increased pain, redness, drainage, swelling at your incision  2. fevers > 101.5 F degrees  3. with any questions or concerns.  You may reach the Neurosurgery clinic at [phone] during regular work hours. ER at [phone].    and ask for the Neurosurgery Resident on call at [phone], during off hours or weekends.         Discharge Disposition:     { :[id number]::\"Discharged to home\"}        China Rodrigez MD  Neurosurgery PGY-2  Personal pager #[phone]  Please page #[phone] (urgent)/VOCERA (non-urgent) the on-call neurosurgery resident per [facility] with questions      "

## 2025-07-16 NOTE — PROGRESS NOTES
Discharge goals to be met before discharge home:  -Adequate pain control using oral analgesics; met with PRN Oxy & Robaxin, scheduled Tylenol & gabapentin.   -Tolerates oral intake; met. Tolerating regular diet.  -Cleared for discharge per Provider; met. Discharge papers signs & waiting for prescriptions to be filled.     Notify Provider when observation discharge goals have been met and patient is ready for discharge.     BP (!) 138/92 (BP Location: Right arm)   Pulse 95   Temp 97.8  F (36.6  C) (Oral)   Resp 18   SpO2 100%

## 2025-07-17 NOTE — TELEPHONE ENCOUNTER
Called patient and was able to get him set up for an appointment on Monday. He will go over and discuss everything that's been going on with Alcira that day.     Irasema. SANDRA

## 2025-07-21 ENCOUNTER — VIRTUAL VISIT (OUTPATIENT)
Dept: FAMILY MEDICINE | Facility: CLINIC | Age: 45
End: 2025-07-21
Payer: COMMERCIAL

## 2025-07-21 DIAGNOSIS — R20.0 NUMBNESS AND TINGLING OF LEFT LEG: ICD-10-CM

## 2025-07-21 DIAGNOSIS — Z98.1 S/P LUMBAR FUSION: ICD-10-CM

## 2025-07-21 DIAGNOSIS — R20.2 NUMBNESS AND TINGLING OF LEFT LEG: ICD-10-CM

## 2025-07-21 DIAGNOSIS — M54.16 LUMBAR RADICULOPATHY: Primary | ICD-10-CM

## 2025-07-21 PROCEDURE — 98005 SYNCH AUDIO-VIDEO EST LOW 20: CPT | Performed by: FAMILY MEDICINE

## 2025-07-21 NOTE — PROGRESS NOTES
Richi is a 45 year old who is being evaluated via a billable video visit.    How would you like to obtain your AVS? MyChart  If the video visit is dropped, the invitation should be resent by: Text to cell phone: [phone]  Will anyone else be joining your video visit? No      Assessment & Plan     Lumbar radiculopathy:  - Referral to a new back surgeon or team, possibly [facility], for further evaluation and management.    Numbness and tingling of left leg:  - Possible nerve damage due to prolonged compression.  - Referral to a new back surgeon or team for further evaluation and management.    Consent was obtained from the patient to use an AI documentation tool in the creation of this note.        Subjective   Richi is a 45 year old, presenting for the following health issues:  Recheck Medication and Referral (For new Neuro Doctor/Team)        7/21/2025     9:13 AM   Additional Questions   Roomed by Irasema KNOWLES   Accompanied by Self          History of Present Illness       Reason for visit:  Help    He eats 2-3 servings of fruits and vegetables daily.He consumes 0 sweetened beverage(s) daily.He exercises with enough effort to increase his heart rate 9 or less minutes per day.  He exercises with enough effort to increase his heart rate 3 or less days per week.   He is taking medications regularly.    Multiple medications he would like to go over and discuss today. Patient needing refills on medications.    Looking for a referral for a new Neuro doctor aand team. Is very unhappy with the current team he is working with.   Richi Mireles, 45-year-old male  - Discectomy on June 24, 2025  - Sacral transforaminal lumbar interbody fusion on July 8, 2025  - Reports lack of trust and dissatisfaction with current surgical team due to poor communication and perceived mismanagement, especially regarding pain management and medication orders during hospitalization  - Experienced severe pain postoperatively in hospital,  with inadequate pain control and delays in receiving medications (gabapentin, muscle relaxers, others)  - Reports ongoing nerve pain in lower foot since surgery, described as stabbing, grinding, shooting, severe sensitivity to touch or pressure on top of foot  - Concern about possible permanent nerve damage due to prolonged nerve compression prior to surgery  - Currently wearing a back brace when upright, otherwise remains lying down  - Unable to wear shoes due to pain on top of foot  - Reports significant weight loss despite increased food and protein intake  - Currently taking oxycodone three times daily and Valium once nightly for pain and muscle relaxation  - Out of stomach medication (famotidine/Pepcid) and inquires about continuing it  - Reports difficulty concentrating and remembering due to multiple medications  - Accompanied by advocate (Eliana) during visit            Review of Systems  Constitutional, HEENT, cardiovascular, pulmonary, gi and gu systems are negative, except as otherwise noted.      Objective    Vitals - Patient Reported  Systolic (Patient Reported): (!) 142  Diastolic (Patient Reported): (!) 94  SpO2 (Patient Reported): 99  Pulse (Patient Reported): 80      Vitals:  No vitals were obtained today due to virtual visit.    Physical Exam   GENERAL: alert and no distress  EYES: Eyes grossly normal to inspection.  No discharge or erythema, or obvious scleral/conjunctival abnormalities.  RESP: No audible wheeze, cough, or visible cyanosis.    SKIN: Visible skin clear. No significant rash, abnormal pigmentation or lesions.  NEURO: Cranial nerves grossly intact.  Mentation and speech appropriate for age.  PSYCH: Appropriate affect, tone, and pace of words          Video-Visit Details    Type of service:  Video Visit   Originating Location (pt. Location): Home    Distant Location (provider location):  On-site  Platform used for Video Visit: Rosy  Signed Electronically by: BRENDA JONES MD

## 2025-07-22 ENCOUNTER — THERAPY VISIT (OUTPATIENT)
Dept: PHYSICAL THERAPY | Facility: REHABILITATION | Age: 45
End: 2025-07-22
Payer: COMMERCIAL

## 2025-07-22 DIAGNOSIS — Z98.1 S/P LUMBAR FUSION: ICD-10-CM

## 2025-07-22 DIAGNOSIS — M54.16 LUMBAR RADICULOPATHY: ICD-10-CM

## 2025-07-22 PROCEDURE — 99207 PR NO CHARGE LOS: CPT | Mod: GP | Performed by: PHYSICAL THERAPIST

## 2025-07-22 NOTE — PROGRESS NOTES
Pt was not seen today as he was not yet appropriate for PT s/p lumbar surgery x2. This was discussed with the patient and his mother.  He has an upcoming follow-up with his surgical team and will inquire about when starting PT is appropriate and obtain a new order.     Shira Mancini, PT

## 2025-07-24 ENCOUNTER — OFFICE VISIT (OUTPATIENT)
Dept: NEUROSURGERY | Facility: CLINIC | Age: 45
End: 2025-07-24
Payer: COMMERCIAL

## 2025-07-24 VITALS
DIASTOLIC BLOOD PRESSURE: 75 MMHG | SYSTOLIC BLOOD PRESSURE: 118 MMHG | HEART RATE: 102 BPM | RESPIRATION RATE: 16 BRPM | OXYGEN SATURATION: 98 %

## 2025-07-24 DIAGNOSIS — Z98.1 S/P LUMBAR FUSION: Primary | ICD-10-CM

## 2025-07-24 DIAGNOSIS — M51.26 LUMBAR DISC HERNIATION: ICD-10-CM

## 2025-07-24 RX ORDER — METHOCARBAMOL 1000 MG/1
1000 TABLET, FILM COATED ORAL 4 TIMES DAILY
Qty: 30 TABLET | Refills: 0 | Status: SHIPPED | OUTPATIENT
Start: 2025-07-24

## 2025-07-24 RX ORDER — OXYCODONE HYDROCHLORIDE 10 MG/1
5 TABLET ORAL EVERY 6 HOURS PRN
Qty: 45 TABLET | Refills: 0 | Status: SHIPPED | OUTPATIENT
Start: 2025-07-24

## 2025-07-24 NOTE — LETTER
7/24/2025       RE: Ba Mireles  [address] [facility] [address]     Dear Colleague,    Thank you for referring your patient, Ba Mireles, to the [facility] NEUROSURGERY CLINIC [employer] at [facility]. Please see a copy of my visit note below.        Neurosurgery Clinic Note    Chief Complaint: surgical follow-up     DATE OF VISIT: 7/24/2025    HPI: Ba Mireles is a pleasant 45 year old male who is s/p L5-S1 TLIF with Dr Misha Winter on 7/8/2025, pre-operatively the patient complained of severe leg pain and left dorsiflexion weakness.  The patient reported initial relief of his radicular pain but then started endorsing LEFT L5 distribution hyperalgesia, for which pre-gabalin was restarted and a medrol dosepack and 1x IV dexamethasone was given. The medrol dosepack was started 7/11 and will continue for 6 days.  On POD#5 the patient was able to discharge.  The patient was re-admitted for pain on 7/14/2025    Patient has had severe back and left leg pain since the Labor day.  He had had PT and epidural steroid injection without improvement.  It is unclear when this started but his left foot pain became burning-like sensation.  His pain doctor placed him on Gabapentin 900 mg TID, a total of 2700 mg a day in early June.  Dr Winter initially decided to perform a L5-S1 far-lateral discectomy and nerve decompression but this failed to resolve his foot pain.  Therefore Dr Winter performed a L5-S1 TLIF (fusion) as there could be some dynamic compression of the L5 nerve compression.  His back and leg pain improved but his foot pain or severe burning sensation failed to improve.     Dr Winter did explain to the patient that the L5 nerve root may have sustained some damage due to the location of the nerve compression (Dorsal root ganglion); furthermore, he has had this since the Labor day 2024.  Sometimes steroid injection can exacerbate inflammation.  The fact that he  "was on Gabapentin 2700 mg a day probably masked the true extent of his foot pain.  His burning was so severe even prior to surgery that it was painful even to touch, similar to allodynia.     Currently, he states the pain on the ventral aspect of his foot has somewhat improved, continues to note a \"burning, stabbing\" type pain in the anterior aspect of the left lower extremity.  Prolonged sitting exacerbates the pain most.  Typically pain is better in the morning and when lying down.   Currently taking oxycodone 5mg TID and 10mg at night, Gabapentin 900 mg BID and 1200 mg at night.   Patient states he feels weak, however is able to walk.   Denies changes in bowel or bladder function.  Wearing brace as directed.  No trauma or injury since surgery.      Patient denies any fevers, chills, wound drainage, or other signs of infection.        Review of Systems   Negative except in HPI    Past Medical History:   Diagnosis Date     Arthritis 08/16/2014    When I had my hip surgery they told me I have arthritis going already     Kidney stone     first stone since age 20     Left ureteral stone 08/02/2023       Past Surgical History:   Procedure Laterality Date     COMBINED CYSTOSCOPY, RETROGRADES, URETEROSCOPY, LASER HOLMIUM LITHOTRIPSY URETER(S), INSERT STENT Bilateral 08/07/2023    Procedure: Cystoscopy, Bilateral Ureteroscopy, Bilateral Retrograde Pyelogram, Bilateral Stone Basket Extraction, Bilateral Stent placement;  Surgeon: Gus Shipman MD;  Location: [facility] OR     CYSTOSCOPY,URETEROSCOPY,LITHOTRIPSY      at [place]     DISCECTOMY LUMBAR POSTERIOR MICROSCOPIC ONE LEVEL N/A 6/24/2025    Procedure: Left Lumbar 5-Sacral 1 Far-lateral microdiscectomy;  Surgeon: Misha Winter MD;  Location:  OR     OPTICAL TRACKING SYSTEM FUSION POSTERIOR SPINE LUMBAR N/A 7/8/2025    Procedure: o-arm/stealth assisted Lumbar 5-Sacral 1 Transforaminal Lumbar Interbody Fusion with Bone Morphogenic Protein and Interbody cage;  " Surgeon: Misha Winter MD;  Location: [facility] OR     ORTHOPEDIC SURGERY      Right Wrist     ORTHOPEDIC SURGERY      left hip surgery     WRIST SURGERY Right        Social History     Socioeconomic History     Marital status:    Tobacco Use     Smoking status: Former     Current packs/day: 0.00     Average packs/day: 1 pack/day for 26.2 years (26.2 ttl pk-yrs)     Types: Cigarettes     Start date: 1997     Quit date: 2023     Years since quittin.9     Passive exposure: Current     Smokeless tobacco: Never     Tobacco comments:     Recent restarted, less than 1 PPD   Vaping Use     Vaping status: Never Used   Substance and Sexual Activity     Alcohol use: Not Currently     Alcohol/week: 2.0 standard drinks of alcohol     Types: 2 Standard drinks or equivalent per week     Drug use: Not Currently     Types: Marijuana     Sexual activity: Yes     Partners: Female     Birth control/protection: Pull-out method   Other Topics Concern     Parent/sibling w/ CABG, MI or angioplasty before 65F 55M? Yes     Comment: Mother     Social Drivers of Health     Financial Resource Strain: Low Risk  (7/15/2025)    Financial Resource Strain      Within the past 12 months, have you or your family members you live with been unable to get utilities (heat, electricity) when it was really needed?: No   Food Insecurity: Low Risk  (7/15/2025)    Food Insecurity      Within the past 12 months, did you worry that your food would run out before you got money to buy more?: No      Within the past 12 months, did the food you bought just not last and you didn t have money to get more?: No   Transportation Needs: Low Risk  (7/15/2025)    Transportation Needs      Within the past 12 months, has lack of transportation kept you from medical appointments, getting your medicines, non-medical meetings or appointments, work, or from getting things that you need?: No   Physical Activity: Insufficiently Active (2024)    Exercise Vital  Sign      Days of Exercise per Week: 2 days      Minutes of Exercise per Session: 20 min   Stress: Stress Concern Present (7/28/2024)    [employer] - Occupational Stress Questionnaire      Feeling of Stress : To some extent   Social Connections: Unknown (7/28/2024)    Social Connection and Isolation Panel [NHANES]      Frequency of Social Gatherings with Friends and Family: Once a week   Interpersonal Safety: Low Risk  (7/8/2025)    Interpersonal Safety      Do you feel physically and emotionally safe where you currently live?: Yes      Within the past 12 months, have you been hit, slapped, kicked or otherwise physically hurt by someone?: No      Within the past 12 months, have you been humiliated or emotionally abused in other ways by your partner or ex-partner?: No   Housing Stability: Low Risk  (7/15/2025)    Housing Stability      Do you have housing? : Yes      Are you worried about losing your housing?: No       family history includes Cancer in his maternal grandfather, mother, and paternal grandfather; Gout in his mother; Heart Disease in his maternal grandfather; Heart Disease (age of onset: 66) in his mother; Hyperlipidemia in his maternal grandfather; Kidney Disease in his paternal grandfather; Nephrolithiasis in his maternal aunt, maternal grandfather, and mother; Prostate Cancer in his paternal grandfather.              Physical Exam   There were no vitals taken for this visit.  Constitutional: Oriented to person, place, and time. Appears well-developed and well-nourished. Cooperative. No distress.   HENT: Head normocephalic and atraumatic.     Incision:  Clean, dry and intact.  No erythema, induration or fluctuance.  No drainage noted.  Neurological: alert and oriented to person, place, and time. CN II-XII grossly  intact      STRENGTH LEFT RIGHT   Deltoid 5 5   Bicep 5 5   Wrist Extensor 5 5   Tricep 5 5   Finger flexion 5 5   Finger abduction 5 5    5 5       Hip  Flexion     5     5   Knee Extension 5 5   Ankle Dorsiflexion 5 5   Extensor Hallucis Longus 2 5   Plantar Flexion 5 5   Foot eversion 5 5   Foot inversion 5 5       Skin: Skin is warm, dry and intact.   Psychiatric: Normal mood and affect. Speech is normal and behavior is normal.      IMAGING:  No imaging available for review today     ASSESSMENT:  Ba Mireles is a 45 year old male s/p  L5-S1 TLIF with Dr Misha Winter on 7/8/2025    PLAN:  Do not do any bending, twisting, strenuous exercise, or heavy lifting (greater than 10 pounds) for 4-6 weeks. Be careful and ask for assistance when walking or going up and down stairs. Avoid any activities that could result in trauma to the surgical wound. Do not drive while using narcotics or other sedating medications, such as sleep aids, muscle relaxants, etc.    Ok to shower, however do not submerge wound in water (no bath tubs, hot tubs, pools, lakes, rivers).     Please refrain from using tobacco products of any form.   Do not use alcohol or other sedating substances while taking prescription pain medications.      Please do not take any NSAIDs (advil, aleve, motrin, ibuprofen, celebrex, etc) as they inhibit bony fusion.    Please continue to wear brace as directed until discontinued by Neurosurgery provider.      If you should sustain new trauma or injury or note increased headache, weakness, speech or vision issues, confusion, or other neurologic changes please call 911 or present to your nearest emergency room for further evaluation.     Please follow-up with Dr Winter in 4 weeks with upright x-rays.                I spent 30 minutes in patient care with greater than 50% spent in counseling and/or coordination of care.     I performed independent visualization of radiographic imaging and entered my own interpretation, reviewed and/or ordered tests in radiology        JUSTINA Richards, CNP  Department of Neurosurgery  Pager: [phone]      Again, thank you for  allowing me to participate in the care of your patient.      Sincerely,    JUSTINA Conroy CNP

## 2025-07-24 NOTE — PROGRESS NOTES
"    Neurosurgery Clinic Note    Chief Complaint: surgical follow-up     DATE OF VISIT: 7/24/2025    HPI: Ba Mireles is a pleasant 45 year old male who is s/p L5-S1 TLIF with Dr Misha Winter on 7/8/2025, pre-operatively the patient complained of severe leg pain and left dorsiflexion weakness.  The patient reported initial relief of his radicular pain but then started endorsing LEFT L5 distribution hyperalgesia, for which pre-gabalin was restarted and a medrol dosepack and 1x IV dexamethasone was given. The medrol dosepack was started 7/11 and will continue for 6 days.  On POD#5 the patient was able to discharge.  The patient was re-admitted for pain on 7/14/2025    Patient has had severe back and left leg pain since the Labor day.  He had had PT and epidural steroid injection without improvement.  It is unclear when this started but his left foot pain became burning-like sensation.  His pain doctor placed him on Gabapentin 900 mg TID, a total of 2700 mg a day in early June.  Dr Winter initially decided to perform a L5-S1 far-lateral discectomy and nerve decompression but this failed to resolve his foot pain.  Therefore Dr Winter performed a L5-S1 TLIF (fusion) as there could be some dynamic compression of the L5 nerve compression.  His back and leg pain improved but his foot pain or severe burning sensation failed to improve.     Dr Winter did explain to the patient that the L5 nerve root may have sustained some damage due to the location of the nerve compression (Dorsal root ganglion); furthermore, he has had this since the Labor day 2024.  Sometimes steroid injection can exacerbate inflammation.  The fact that he was on Gabapentin 2700 mg a day probably masked the true extent of his foot pain.  His burning was so severe even prior to surgery that it was painful even to touch, similar to allodynia.     Currently, he states the pain on the ventral aspect of his foot has somewhat improved, continues to note a \"burning, " "stabbing\" type pain in the anterior aspect of the left lower extremity.  Prolonged sitting exacerbates the pain most.  Typically pain is better in the morning and when lying down.   Currently taking oxycodone 5mg TID and 10mg at night, Gabapentin 900 mg BID and 1200 mg at night.   Patient states he feels weak, however is able to walk.   Denies changes in bowel or bladder function.  Wearing brace as directed.  No trauma or injury since surgery.      Patient denies any fevers, chills, wound drainage, or other signs of infection.        Review of Systems   Negative except in HPI    Past Medical History:   Diagnosis Date    Arthritis 08/16/2014    When I had my hip surgery they told me I have arthritis going already    Kidney stone     first stone since age 20    Left ureteral stone 08/02/2023       Past Surgical History:   Procedure Laterality Date    COMBINED CYSTOSCOPY, RETROGRADES, URETEROSCOPY, LASER HOLMIUM LITHOTRIPSY URETER(S), INSERT STENT Bilateral 08/07/2023    Procedure: Cystoscopy, Bilateral Ureteroscopy, Bilateral Retrograde Pyelogram, Bilateral Stone Basket Extraction, Bilateral Stent placement;  Surgeon: Gus Shipman MD;  Location: [facility] OR    CYSTOSCOPY,URETEROSCOPY,LITHOTRIPSY      at [place]    DISCECTOMY LUMBAR POSTERIOR MICROSCOPIC ONE LEVEL N/A 6/24/2025    Procedure: Left Lumbar 5-Sacral 1 Far-lateral microdiscectomy;  Surgeon: Misha Winter MD;  Location:  OR    OPTICAL TRACKING SYSTEM FUSION POSTERIOR SPINE LUMBAR N/A 7/8/2025    Procedure: o-arm/stealth assisted Lumbar 5-Sacral 1 Transforaminal Lumbar Interbody Fusion with Bone Morphogenic Protein and Interbody cage;  Surgeon: Misha Winter MD;  Location:  OR    ORTHOPEDIC SURGERY  2022    Right Wrist    ORTHOPEDIC SURGERY      left hip surgery    WRIST SURGERY Right        Social History     Socioeconomic History    Marital status:    Tobacco Use    Smoking status: Former     Current packs/day: 0.00     Average packs/day: 1 " pack/day for 26.2 years (26.2 ttl pk-yrs)     Types: Cigarettes     Start date: 1997     Quit date: 2023     Years since quittin.9     Passive exposure: Current    Smokeless tobacco: Never    Tobacco comments:     Recent restarted, less than 1 PPD   Vaping Use    Vaping status: Never Used   Substance and Sexual Activity    Alcohol use: Not Currently     Alcohol/week: 2.0 standard drinks of alcohol     Types: 2 Standard drinks or equivalent per week    Drug use: Not Currently     Types: Marijuana    Sexual activity: Yes     Partners: Female     Birth control/protection: Pull-out method   Other Topics Concern    Parent/sibling w/ CABG, MI or angioplasty before 65F 55M? Yes     Comment: Mother     Social Drivers of Health     Financial Resource Strain: Low Risk  (7/15/2025)    Financial Resource Strain     Within the past 12 months, have you or your family members you live with been unable to get utilities (heat, electricity) when it was really needed?: No   Food Insecurity: Low Risk  (7/15/2025)    Food Insecurity     Within the past 12 months, did you worry that your food would run out before you got money to buy more?: No     Within the past 12 months, did the food you bought just not last and you didn t have money to get more?: No   Transportation Needs: Low Risk  (7/15/2025)    Transportation Needs     Within the past 12 months, has lack of transportation kept you from medical appointments, getting your medicines, non-medical meetings or appointments, work, or from getting things that you need?: No   Physical Activity: Insufficiently Active (2024)    Exercise Vital Sign     Days of Exercise per Week: 2 days     Minutes of Exercise per Session: 20 min   Stress: Stress Concern Present (2024)    [employer] - Occupational Stress Questionnaire     Feeling of Stress : To some extent   Social Connections: Unknown (2024)    Social Connection and Isolation Panel  [NHANES]     Frequency of Social Gatherings with Friends and Family: Once a week   Interpersonal Safety: Low Risk  (7/8/2025)    Interpersonal Safety     Do you feel physically and emotionally safe where you currently live?: Yes     Within the past 12 months, have you been hit, slapped, kicked or otherwise physically hurt by someone?: No     Within the past 12 months, have you been humiliated or emotionally abused in other ways by your partner or ex-partner?: No   Housing Stability: Low Risk  (7/15/2025)    Housing Stability     Do you have housing? : Yes     Are you worried about losing your housing?: No       family history includes Cancer in his maternal grandfather, mother, and paternal grandfather; Gout in his mother; Heart Disease in his maternal grandfather; Heart Disease (age of onset: 66) in his mother; Hyperlipidemia in his maternal grandfather; Kidney Disease in his paternal grandfather; Nephrolithiasis in his maternal aunt, maternal grandfather, and mother; Prostate Cancer in his paternal grandfather.              Physical Exam   There were no vitals taken for this visit.  Constitutional: Oriented to person, place, and time. Appears well-developed and well-nourished. Cooperative. No distress.   HENT: Head normocephalic and atraumatic.     Incision:  Clean, dry and intact.  No erythema, induration or fluctuance.  No drainage noted.  Neurological: alert and oriented to person, place, and time. CN II-XII grossly  intact      STRENGTH LEFT RIGHT   Deltoid 5 5   Bicep 5 5   Wrist Extensor 5 5   Tricep 5 5   Finger flexion 5 5   Finger abduction 5 5    5 5       Hip Flexion     5     5   Knee Extension 5 5   Ankle Dorsiflexion 5 5   Extensor Hallucis Longus 2 5   Plantar Flexion 5 5   Foot eversion 5 5   Foot inversion 5 5       Skin: Skin is warm, dry and intact.   Psychiatric: Normal mood and affect. Speech is normal and behavior is normal.      IMAGING:  No imaging available for review today      ASSESSMENT:  Ba Mireles is a 45 year old male s/p  L5-S1 TLIF with Dr Misha Winter on 7/8/2025    PLAN:  Do not do any bending, twisting, strenuous exercise, or heavy lifting (greater than 10 pounds) for 4-6 weeks. Be careful and ask for assistance when walking or going up and down stairs. Avoid any activities that could result in trauma to the surgical wound. Do not drive while using narcotics or other sedating medications, such as sleep aids, muscle relaxants, etc.    Ok to shower, however do not submerge wound in water (no bath tubs, hot tubs, pools, lakes, rivers).     Please refrain from using tobacco products of any form.   Do not use alcohol or other sedating substances while taking prescription pain medications.      Please do not take any NSAIDs (advil, aleve, motrin, ibuprofen, celebrex, etc) as they inhibit bony fusion.    Please continue to wear brace as directed until discontinued by Neurosurgery provider.      If you should sustain new trauma or injury or note increased headache, weakness, speech or vision issues, confusion, or other neurologic changes please call 911 or present to your nearest emergency room for further evaluation.     Please follow-up with Dr Winter in 4 weeks with upright x-rays.                I spent 30 minutes in patient care with greater than 50% spent in counseling and/or coordination of care.     I performed independent visualization of radiographic imaging and entered my own interpretation, reviewed and/or ordered tests in radiology        JUSTINA Richards, CNP  Department of Neurosurgery  Pager: [phone]

## 2025-07-25 ENCOUNTER — MYC MEDICAL ADVICE (OUTPATIENT)
Dept: NEUROSURGERY | Facility: CLINIC | Age: 45
End: 2025-07-25
Payer: COMMERCIAL

## 2025-07-28 SDOH — HEALTH STABILITY: PHYSICAL HEALTH: ON AVERAGE, HOW MANY MINUTES DO YOU ENGAGE IN EXERCISE AT THIS LEVEL?: 10 MIN

## 2025-07-28 SDOH — HEALTH STABILITY: PHYSICAL HEALTH: ON AVERAGE, HOW MANY DAYS PER WEEK DO YOU ENGAGE IN MODERATE TO STRENUOUS EXERCISE (LIKE A BRISK WALK)?: 1 DAY

## 2025-07-28 ASSESSMENT — SOCIAL DETERMINANTS OF HEALTH (SDOH): HOW OFTEN DO YOU GET TOGETHER WITH FRIENDS OR RELATIVES?: ONCE A WEEK

## 2025-07-28 NOTE — COMMUNITY RESOURCES LIST (ENGLISH)
Transportation  [employer]   Program Provider: [facility]  Program Website : [email or web address]  Program Phone Number: [phone]  Next Steps: Call [phone]    Program Locations:   Address:  [address] [place] [address]   Distance:  18.05 mi   Office Phone Number: [phone]    Hours:   Monday: 7:30 AM - 4:00 PM   Tuesday: 7:30 AM - 4:00 PM   Wednesday: 7:30 AM - 4:00 PM   Thursday: 7:30 AM - 4:00 PM   Friday: 7:30 AM - 4:00 PM   Saturday: CLOSED   Sunday: CLOSED     Transit Link   Program Provider: [facility]  Program Website : [email or web address]  Program Phone Number: [phone]  Next Steps: Call [phone]    Program Locations:   Address:  [address] [place] [address]   Distance:  18.05 mi   Office Phone Number: [phone]    Hours:   Monday: 8:00 AM - 5:00 PM   Tuesday: 8:00 AM - 5:00 PM   Wednesday: 8:00 AM - 5:00 PM   Thursday: 8:00 AM - 5:00 PM   Friday: 8:00 AM - 5:00 PM   Saturday: CLOSED   Sunday: CLOSED     [place] Non-Emergency Transportation Program (MNET)   Program Provider: [employer] ([employer] - [place]  Program Website : [email or web address]  Program Phone Number: [phone]  Next Steps: Call [phone]    Program Locations:   Address:  [address] [place] [address]   Distance:  20.36 mi   Office Phone Number: [phone]    Hours:   Monday: 7:00 AM - 6:00 PM   Tuesday: 7:00 AM - 6:00 PM   Wednesday: 7:00 AM - 6:00 PM   Thursday: 7:00 AM - 6:00 PM   Friday: 7:00 AM - 6:00 PM   Saturday: CLOSED   Sunday: CLOSED     Healthcare Ground Transportation   Program Provider: [facility] ([facility])  Program Website : [email or web address]  Next Steps: apply [email or web address]    Program Locations:   Address:  [address] [place] [address]  [place] [address]   Distance:  2651.35 mi   Office Phone Number: [phone]    Hours:   Monday: 9:00 AM - 5:30 PM   Tuesday: 9:00 AM - 5:30 PM   Wednesday: 9:00 AM - 5:30 PM   Thursday: 9:00 AM - 5:30 PM   Friday: 9:00 AM - 12:00 PM   Saturday: CLOSED   Sunday: CLOSED

## 2025-07-31 ENCOUNTER — OFFICE VISIT (OUTPATIENT)
Dept: FAMILY MEDICINE | Facility: CLINIC | Age: 45
End: 2025-07-31
Attending: FAMILY MEDICINE
Payer: COMMERCIAL

## 2025-07-31 VITALS
SYSTOLIC BLOOD PRESSURE: 146 MMHG | BODY MASS INDEX: 19.79 KG/M2 | DIASTOLIC BLOOD PRESSURE: 86 MMHG | HEART RATE: 72 BPM | RESPIRATION RATE: 18 BRPM | WEIGHT: 134 LBS | TEMPERATURE: 98 F | OXYGEN SATURATION: 98 %

## 2025-07-31 DIAGNOSIS — R20.0 NUMBNESS AND TINGLING OF BOTH LOWER EXTREMITIES: ICD-10-CM

## 2025-07-31 DIAGNOSIS — M54.16 LUMBAR RADICULOPATHY: Primary | ICD-10-CM

## 2025-07-31 DIAGNOSIS — M54.50 ACUTE LOW BACK PAIN, UNSPECIFIED BACK PAIN LATERALITY, UNSPECIFIED WHETHER SCIATICA PRESENT: ICD-10-CM

## 2025-07-31 DIAGNOSIS — R20.2 NUMBNESS AND TINGLING OF BOTH LOWER EXTREMITIES: ICD-10-CM

## 2025-07-31 RX ORDER — DIAZEPAM 5 MG/1
5 TABLET ORAL EVERY 6 HOURS PRN
COMMUNITY

## 2025-07-31 NOTE — PROGRESS NOTES
Preventive Care Visit  [facility]  Lenin Armendariz DO, Family Medicine  Jul 31, 2025      Assessment & Plan     Lumbar radiculopathy  Acute low back pain, unspecified back pain laterality, unspecified whether sciatica present  Numbness and tingling of both lower extremities    Patient is a 45-year-old male  Since Memorial Day 2025 he has been having problems with lower back pain as well as surgical complications  He reports that he was golfing and he herniated a disc arch prompted him to be seen in urgent care whom sent him to the emergency department.  CT at that time showed symmetric disc bulge with mild canal narrowing and known neuroforaminal narrowing at L4-L5 and L5-S1.  Upon chart review patient has been seen multiple times for this over the last 2 months with multiple ER visits, physical therapy visits, office visits, neurosurgery visits.  He did have a microdiscectomy on 6/20/2025 which his discharge paperwork reports that he was ambulating and the pain was well-controlled  He is followed up with neurosurgery  He eventually had a significant amount of pain and went back to the emergency department on 7/8/2025 For similar complaints which she was eventually given an L5-S1 transforaminal lumbar interbody fusion with titanium cage with Dr. Winter.  Per the discharge summary the patient had an uncomplicated course as well as him reporting initial relief of radicular pain however there was left L5 distribution hyperalgesia  Both while in the hospital as well as after discharge patient had increasing frustration with the neurosurgery care team reporting that they were not allowing him to voice feedback or know his options for medication management.  Additionally he is concerned that they sent him home with half of what he was getting in the hospital  .  Additionally he is upset that he was already told that the nerve pain could last 1 month but has not been told he could last up to 1 year  When  discussed with patient why he continues to come to [facility] when he has been unhappy with his care he reports that he does not go to [facility] anymore for previous issues that he does not elaborate on and JOHNIE and Thelma told him that he was to near his surgery that they would not see him.  I believe the patient would benefit from an evaluation from pain management to relieve the pain that he is experiencing in his left foot like the numbness hyperalgesia and tingling so we will place the referral today  I believe ultimately that the patient would benefit from having primary care at that has more experience than me.  provided patient with [facility] scheduling number as well as some providers I think would assist at the [facility].  If patient's pain begins to worsen or he has incontinence he should report to the emergency department        - Pain Management  Referral; Future    The longitudinal plan of care for the diagnosis(es)/condition(s) as documented were addressed during this visit. Due to the added complexity in care, I will continue to support Emily in the subsequent management and with ongoing continuity of care.    The use of Dragon/Camperoo dictation services may have been used to construct the content in this note; any grammatical or spelling errors are non-intentional. Please contact the author of this note directly if you are in need of any clarification.     The purpose of this documentation is for billing as well providing written record for future healthcare providers. Medical vocabulary, knowledge, and acornyms are utilized for those providers.      Counseling  Appropriate preventive services were addressed with this patient via screening, questionnaire, or discussion as appropriate for fall prevention, nutrition, physical activity, Tobacco-use cessation, social engagement, weight loss and cognition.  Checklist reviewing preventive services available has been given  "to the patient.  Reviewed patient's diet, addressing concerns and/or questions.   He is at risk for lack of exercise and has been provided with information to increase physical activity for the benefit of his well-being.   The patient was instructed to see the dentist every 6 months.   He is at risk for psychosocial distress and has been provided with information to reduce risk.       Wendy Stoddard is a 45 year old, presenting for the following:  Physical ([facility] care. )        7/31/2025    11:21 AM   Additional Questions   Roomed by ABI King   Accompanied by SELF          HPI    Primary Care provider has moved to [facility]  Been having problems with the surgery team that he is currently with  Feels like nothing is being explained to him  \"Nobody is giving consideration to my medical stuff'  Feels like he is being left out on the medication choices and that they aren't being sent correctly  Had a discectomy on the 26th and then went back on the 8th  He went to the ER at [facility] for the pain. They gave him dilaudid and sent him home  The next day he went by ambulance to [place] but told them that he did not want his current surgeon. While he was in the ER that surgeon called him and told him he was the only one there  When released from the second surgery he didn't feel ready to be discharged but \"they forced me out' and his medications were only half ready. Believe I was sent home with way less than what he was on before' it was either half of what he was taking or less      Ultimately the patient felt as though nobody was communicating between them    Started in May 2025  Was golfing and herniated a disc  Saw a spine specialist, Dr. Pang  Then was went to Dr. Martinez, Neurosurgeon microscopic   Then went to Dr. Winter whom was 's mentor    Symptoms  Cannot sit or stand without getting nauseous, these are starting to come back. Burning, stinging, stabbing, weakness, numbness. Burning is more in the foot and " "ankle  Cannot touch the top of the foot  Doesn't like taking meds  \"If the surgery didn't take I need pain management\"  Wanted him to be on a different boot but he reports that he cannot wear shoes and is frustrated because he told the providers that and they \"didn't listen to me\"    Only waking up one time per tonight.. \"had to talk them into giving something to get through the night\"    Trials:  Gabapentin - seems to help  Oxycodone - barely touching any of the pain    Biggest Concern Today  They told him that if this didn't work he would need to go to pain management  He saw a pain management specialist in the ER but then reports that he was never told what the plan was and is frustrated with that  Feels as though nothing adds up    When asked the patient why he continues to go to [facility] if he is having so many problems  He reports that he wants to continue at [facility]  He reports that avoids [facility] due to similar situations   Talked to Thelma JETT, whom said he was too near his surgery to switch providers      Narrative & Impression   EXAM: MR LUMBAR SPINE W/O and W CONTRAST  LOCATION: [facility]  DATE: 7/15/2025     INDICATION: recent lumbar surgery, increased pain  COMPARISON: 05/31/2025  CONTRAST: 6ml gadavist  TECHNIQUE: Routine Lumbar Spine MRI without and with IV contrast.     FINDINGS:   Nomenclature is based on 5 lumbar type vertebral bodies.      New anterior and posterior fusion at L5-S1 with associated laminectomy defect. New bilateral L5-S1 foraminotomies. New fluid collection within the operative bed extends into the epidural space and measures 4.5 cm AP by 4.3 cm transverse by 3.5 cm   craniocaudal. Minimal fluid is also seen extending posteriorly along the midline incision site within the paraspinal and subcutaneous soft tissues. There is edema and enhancement involving the paraspinal soft tissues extending from L3-L4 to S1. There is   new " enhancing soft tissue in the epidural space which is more pronounced ventrally and measures up to 9 mm in thickness.     Normal vertebral body heights, alignment and marrow signal. Normal distal spinal cord with conus medullaris at T12. There is crowding of the distal nerve roots Simple appearing cysts in the right kidney measure up to 1.4 cm in diameter and require no   further follow-up. Unremarkable visualized bony pelvis.     T12-L1: Normal disc height and signal. No herniation. Normal facets. No spinal canal or neural foraminal stenosis.      L1-L2: Normal disc height and signal. No herniation. Normal facets. No spinal canal or neural foraminal stenosis.     L2-L3: Normal disc height and signal. No herniation. Normal facets. No spinal canal or neural foraminal stenosis.      L3-L4: Normal disc height and signal. No herniation. Normal facets. No spinal canal or neural foraminal stenosis.     L4-L5: Stable mild disc desiccation with relative preservation of disc height. Stable mild disc bulge with superimposed small central annular tear which along with the enhancing soft tissue in the epidural space now results in moderate central canal   stenosis. Stable mild facet hypertrophic changes with stable mild bilateral foraminal narrowing.     L5-S1: New marked central canal stenosis with complete effacement of the CSF in the thecal sac predominantly due to the fluid collection and enhancing soft tissue in the epidural space. Small central/right central disc protrusion has decreased in size   with decreasing mild narrowing of the right lateral recess. The disc herniation abuts but does not compress the traversing right S1 nerve root. The neural foramina have been decompressed since the prior study although fluid is now seen within the neural   foramina at the operative site.                                                                      IMPRESSION:  1.  New anterior and posterior fusion at L5-S1 with associated  laminectomy defect and bilateral foraminotomies.  2.  New fluid collection within the operative bed extending into the epidural space is likely postoperative in nature.  3.  New enhancing soft tissue in the epidural space at L4-L5 and L5-S1 is also likely postoperative in nature.  4.  New marked central canal stenosis at L5-S1 predominantly due to the fluid collection and enhancing soft tissue in the epidural space.  5.  New moderate central canal stenosis at L4-L5 predominantly due to stable underlying disc bulge and new enhancing soft tissue in the epidural space.  6.  Decreasing size of small central/right central disc protrusion at L5-S1 with decreasing mild narrowing of the right lateral recess.     7.  Findings discussed with Dr. Hall at 12:34 AM CST on 07/15/2025.            Advance Care Planning    Discussed advance care planning with patient; however, patient declined at this time.        7/28/2025   General Health   How would you rate your overall physical health? (!) FAIR   Feel stress (tense, anxious, or unable to sleep) Very much   (!) STRESS CONCERN      7/28/2025   Nutrition   Three or more servings of calcium each day? Yes   Diet: Regular (no restrictions)   How many servings of fruit and vegetables per day? (!) 2-3   How many sweetened beverages each day? 0-1         7/28/2025   Exercise   Days per week of moderate/strenous exercise 1 day   Average minutes spent exercising at this level 10 min   (!) EXERCISE CONCERN      7/28/2025   Social Factors   Frequency of gathering with friends or relatives Once a week   Worry food won't last until get money to buy more No   Food not last or not have enough money for food? No   Do you have housing? (Housing is defined as stable permanent housing and does not include staying outside in a car, in a tent, in an abandoned building, in an overnight shelter, or couch-surfing.) Yes   Are you worried about losing your housing? No   Lack of transportation? Yes    Unable to get utilities (heat,electricity)? No    (!) TRANSPORTATION CONCERN PRESENT      2025   Dental   Dentist two times every year? (!) NO           Today's PHQ-2 Score:       2025     9:25 AM   PHQ-2 (  [employer])   Q1: Little interest or pleasure in doing things 3   Q2: Feeling down, depressed or hopeless 3   PHQ-2 Score 6         2025   Substance Use   Alcohol more than 3/day or more than 7/wk Not Applicable   Do you use any other substances recreationally? (!) CANNABIS PRODUCTS     Social History     Tobacco Use    Smoking status: Former     Current packs/day: 0.00     Average packs/day: 1 pack/day for 26.2 years (26.2 ttl pk-yrs)     Types: Cigarettes     Start date: 1997     Quit date: 2023     Years since quittin.9     Passive exposure: Current    Smokeless tobacco: Never    Tobacco comments:     Recent restarted, less than 1 PPD   Vaping Use    Vaping status: Never Used   Substance Use Topics    Alcohol use: Not Currently     Alcohol/week: 2.0 standard drinks of alcohol     Types: 2 Standard drinks or equivalent per week    Drug use: Not Currently     Types: Marijuana           2025   STI Screening   New sexual partner(s) since last STI/HIV test? No   ASCVD Risk   The 10-year ASCVD risk score (Alex SIGALA, et al., 2019) is: 1.7%    Values used to calculate the score:      Age: 45 years      Sex: Male      Is Non- : No      Diabetic: No      Tobacco smoker: No      Systolic Blood Pressure: 146 mmHg      Is BP treated: No      HDL Cholesterol: 55 mg/dL      Total Cholesterol: 197 mg/dL        2025   Contraception/Family Planning   Questions about contraception or family planning No        Reviewed and updated as needed this visit by Provider                       Objective    Exam  BP (!) 146/86   Pulse 72   Temp 98  F (36.7  C) (Oral)   Resp 18   Wt 60.8 kg (134 lb)   SpO2 98%   BMI 19.79 kg/m     Estimated body mass index is  "19.79 kg/m  as calculated from the following:    Height as of 7/13/25: 1.753 m (5' 9\").    Weight as of this encounter: 60.8 kg (134 lb).    Physical Exam  GENERAL: Appears well, in no acute distress. Walking around the room  HEENT: Normocephalic, Atraumatic. Sclera WNL. No use of respiratory accessory muscles.  CARDIAC: No apparent distress  LUNG: No apparent distress  SKIN: No apparent lesions on exposed skin  NEURO: Answers questions appropriately. No apparent weakness   PSYCH: No apparent distress. Answers questions appropriately  MSK: walking around the room with a boot and back brace. Pacing. Tenderness to the left distal femur with tightness.          Signed Electronically by: Lenin Armendariz DO    "

## 2025-08-07 ENCOUNTER — OFFICE VISIT (OUTPATIENT)
Dept: PALLIATIVE MEDICINE | Facility: OTHER | Age: 45
End: 2025-08-07
Payer: COMMERCIAL

## 2025-08-07 ENCOUNTER — MYC MEDICAL ADVICE (OUTPATIENT)
Dept: NEUROSURGERY | Facility: CLINIC | Age: 45
End: 2025-08-07

## 2025-08-07 VITALS — HEART RATE: 67 BPM | SYSTOLIC BLOOD PRESSURE: 118 MMHG | DIASTOLIC BLOOD PRESSURE: 69 MMHG | OXYGEN SATURATION: 99 %

## 2025-08-07 DIAGNOSIS — R20.2 NUMBNESS AND TINGLING OF BOTH LOWER EXTREMITIES: ICD-10-CM

## 2025-08-07 DIAGNOSIS — M51.26 LUMBAR DISC HERNIATION: ICD-10-CM

## 2025-08-07 DIAGNOSIS — M54.16 LUMBAR RADICULOPATHY: ICD-10-CM

## 2025-08-07 DIAGNOSIS — R20.0 NUMBNESS AND TINGLING OF BOTH LOWER EXTREMITIES: ICD-10-CM

## 2025-08-07 DIAGNOSIS — M54.50 ACUTE LOW BACK PAIN, UNSPECIFIED BACK PAIN LATERALITY, UNSPECIFIED WHETHER SCIATICA PRESENT: ICD-10-CM

## 2025-08-07 PROCEDURE — 99213 OFFICE O/P EST LOW 20 MIN: CPT | Performed by: STUDENT IN AN ORGANIZED HEALTH CARE EDUCATION/TRAINING PROGRAM

## 2025-08-07 RX ORDER — METHOCARBAMOL 1000 MG/1
1000 TABLET, FILM COATED ORAL 4 TIMES DAILY
Qty: 30 TABLET | Refills: 0 | Status: SHIPPED | OUTPATIENT
Start: 2025-08-07

## 2025-08-07 ASSESSMENT — PAIN SCALES - GENERAL: PAINLEVEL_OUTOF10: SEVERE PAIN (7)

## 2025-08-20 ENCOUNTER — MYC MEDICAL ADVICE (OUTPATIENT)
Dept: NEUROSURGERY | Facility: CLINIC | Age: 45
End: 2025-08-20
Payer: COMMERCIAL

## 2025-08-31 ENCOUNTER — HEALTH MAINTENANCE LETTER (OUTPATIENT)
Age: 45
End: 2025-08-31

## 2025-09-04 ENCOUNTER — ANCILLARY PROCEDURE (OUTPATIENT)
Dept: GENERAL RADIOLOGY | Facility: CLINIC | Age: 45
End: 2025-09-04
Attending: NURSE PRACTITIONER
Payer: COMMERCIAL

## 2025-09-04 ENCOUNTER — OFFICE VISIT (OUTPATIENT)
Dept: NEUROSURGERY | Facility: CLINIC | Age: 45
End: 2025-09-04
Payer: COMMERCIAL

## 2025-09-04 VITALS
DIASTOLIC BLOOD PRESSURE: 80 MMHG | HEART RATE: 87 BPM | SYSTOLIC BLOOD PRESSURE: 113 MMHG | OXYGEN SATURATION: 99 % | RESPIRATION RATE: 16 BRPM

## 2025-09-04 DIAGNOSIS — Z98.1 S/P LUMBAR FUSION: ICD-10-CM

## 2025-09-04 DIAGNOSIS — Z98.1 S/P LUMBAR FUSION: Primary | ICD-10-CM

## (undated) DEVICE — NDL BLUNT 17GA 1.5" 8881202330

## (undated) DEVICE — CATH TRAY FOLEY SURESTEP 16FR W/TMP PRB STLK LATEX A319416AM

## (undated) DEVICE — RX SURGIFLO HEMOSTATIC MATRIX W/THROMBIN 8ML 2994

## (undated) DEVICE — DRAPE MAYO STAND 23X54 8337

## (undated) DEVICE — DRAPE POUCH INSTRUMENT 1018

## (undated) DEVICE — Device

## (undated) DEVICE — LINEN TOWEL PACK X6 WHITE 5487

## (undated) DEVICE — DRAPE STERI TOWEL LG 1010

## (undated) DEVICE — LINEN TOWEL PACK X30 5481

## (undated) DEVICE — TUBING SMOKE EVACUATOR 7/8"X10' W/WAND 0700-026-000

## (undated) DEVICE — WIPES FOLEY CARE SURESTEP PROVON DFC100

## (undated) DEVICE — PREP SKIN SCRUB TRAY 4461A

## (undated) DEVICE — IOM SUPPLIES/SPECIALTY CARE

## (undated) DEVICE — BUR STRK CARBIDE MATCH HEAD 3.0MM 5820-107-530C

## (undated) DEVICE — DRAPE C-ARMOR 5 SIDED 5523

## (undated) DEVICE — DRAPE MICROSCOPE LEICA 54X150" 09-MK653

## (undated) DEVICE — SOL NACL 0.9% IRRIG 1000ML BOTTLE 2F7124

## (undated) DEVICE — PREP CHLORAPREP CLEAR 3ML 930400

## (undated) DEVICE — SU DERMABOND ADVANCED .7ML DNX12

## (undated) DEVICE — SYR 30ML LL W/O NDL 302832

## (undated) DEVICE — DRAPE SHEET REV FOLD 3/4 9349

## (undated) DEVICE — CATH TRAY FOLEY SURESTEP 16FR W/URNE MTR STLK LATEX A303316A

## (undated) DEVICE — SURGICEL HEMOSTAT 4X8" 1952

## (undated) DEVICE — BUR STRK PRECISION MATCH HEAD 3.0MM 8450-107-530

## (undated) DEVICE — DRSG PRIMAPORE 02X3" 7133

## (undated) DEVICE — TUBING SMOKE EVACUATOR 7/8IN F/NEPTUNE 3 0700-025-000

## (undated) DEVICE — SYR BULB IRRIG DOVER 60 ML LATEX FREE 67000

## (undated) DEVICE — DRSG MEPILEX BORDER 6"X6" 595600

## (undated) DEVICE — PACK NEURO MINOR UMMC SNE32MNMU4

## (undated) DEVICE — PREP POVIDONE-IODINE 7.5% SCRUB 4OZ BOTTLE MDS093945

## (undated) DEVICE — CUP AND LID 2PK 2OZ STERILE  SSK9006A

## (undated) DEVICE — NEEDLE SPINAL DISP 18GA X 3.5" QUINCKE 333350

## (undated) DEVICE — PREP POVIDONE-IODINE 10% SOLUTION 4OZ BOTTLE MDS093944

## (undated) DEVICE — SU VICRYL 0 CT-1 CR 8X18" J740D

## (undated) DEVICE — DRAIN JACKSON PRATT TUBING W/TROCAR 10FR ROUND SU130-0521

## (undated) DEVICE — SUCTION MANIFOLD NEPTUNE 2 SYS 4 PORT 0702-020-000

## (undated) DEVICE — SPONGE COTTONOID 1/2X1/2" 80-1400

## (undated) DEVICE — SU MONOCRYL 4-0 PS-2 27" UND Y426H

## (undated) DEVICE — DRSG PRIMAPORE 03 1/8X6" 66000318

## (undated) DEVICE — SYR 10ML FINGER CONTROL W/O NDL 309695

## (undated) DEVICE — DRAPE SHEET MED 44X70" 9355

## (undated) DEVICE — ESU ELEC BLADE 2.75" COATED/INSULATED E1455

## (undated) DEVICE — GLOVE PROTEXIS POWDER FREE 7.5 ORTHO LIME 2D73ET75

## (undated) DEVICE — SPONGE COTTONOID 1/2X1/2" 20-04S

## (undated) DEVICE — SU SILK 2-0 TIE 12X30" A305H

## (undated) DEVICE — NDL ANGIOCATH 14GA 1.25" 4048

## (undated) DEVICE — PAD CHUX UNDERPAD 23X36" 676105

## (undated) DEVICE — SPONGE COTTONOID 1/2X1 1/2" 20-06S

## (undated) DEVICE — ESU CORD BIPOLAR GREEN 10-4000

## (undated) DEVICE — SOL WATER IRRIG 1000ML BOTTLE 2F7114

## (undated) DEVICE — PREP DURAPREP 26ML APL 8630

## (undated) DEVICE — ESU ELEC BLADE 6" COATED/INSULATED E1455-6

## (undated) DEVICE — SU ETHILON 3-0 PS-1 18" 1663H

## (undated) DEVICE — NDL BLUNT 18GA 1" W/O FILTER 305181

## (undated) DEVICE — SU VICRYL 2-0 CT-2 CR 8X18" J726D

## (undated) DEVICE — SPONGE COTTONOID 1X3" 20-10S

## (undated) DEVICE — ESU GROUND PAD ADULT W/CORD E7507

## (undated) DEVICE — DRAPE C-ARM W/STRAPS 42X72" 07-CA104

## (undated) DEVICE — DRAPE O ARM TUBE 9732722

## (undated) DEVICE — MARKER SPHERES PASSIVE MEDT PACK 5 8801075

## (undated) DEVICE — ESU PENCIL W/COATED BLADE E2450H

## (undated) RX ORDER — HYDROMORPHONE HCL IN WATER/PF 6 MG/30 ML
PATIENT CONTROLLED ANALGESIA SYRINGE INTRAVENOUS
Status: DISPENSED
Start: 2025-07-08

## (undated) RX ORDER — OXYCODONE HYDROCHLORIDE 10 MG/1
TABLET ORAL
Status: DISPENSED
Start: 2025-07-08

## (undated) RX ORDER — HYDROMORPHONE HYDROCHLORIDE 1 MG/ML
INJECTION, SOLUTION INTRAMUSCULAR; INTRAVENOUS; SUBCUTANEOUS
Status: DISPENSED
Start: 2025-07-08

## (undated) RX ORDER — LIDOCAINE HYDROCHLORIDE AND EPINEPHRINE 10; 10 MG/ML; UG/ML
INJECTION, SOLUTION INFILTRATION; PERINEURAL
Status: DISPENSED
Start: 2025-07-08

## (undated) RX ORDER — ONDANSETRON 2 MG/ML
INJECTION INTRAMUSCULAR; INTRAVENOUS
Status: DISPENSED
Start: 2025-07-08

## (undated) RX ORDER — ACETAMINOPHEN 325 MG/1
TABLET ORAL
Status: DISPENSED
Start: 2025-06-24

## (undated) RX ORDER — CEFAZOLIN SODIUM/WATER 2 G/20 ML
SYRINGE (ML) INTRAVENOUS
Status: DISPENSED
Start: 2025-06-24

## (undated) RX ORDER — FENTANYL CITRATE-0.9 % NACL/PF 10 MCG/ML
PLASTIC BAG, INJECTION (ML) INTRAVENOUS
Status: DISPENSED
Start: 2025-07-08

## (undated) RX ORDER — SODIUM CHLORIDE 9 MG/ML
INJECTION, SOLUTION INTRAVENOUS
Status: DISPENSED
Start: 2025-07-08

## (undated) RX ORDER — FENTANYL CITRATE 50 UG/ML
INJECTION, SOLUTION INTRAMUSCULAR; INTRAVENOUS
Status: DISPENSED
Start: 2025-06-24

## (undated) RX ORDER — VANCOMYCIN HYDROCHLORIDE 1 G/20ML
INJECTION, POWDER, LYOPHILIZED, FOR SOLUTION INTRAVENOUS
Status: DISPENSED
Start: 2025-07-08

## (undated) RX ORDER — ACETAMINOPHEN 325 MG/1
TABLET ORAL
Status: DISPENSED
Start: 2025-07-08

## (undated) RX ORDER — FENTANYL CITRATE 50 UG/ML
INJECTION, SOLUTION INTRAMUSCULAR; INTRAVENOUS
Status: DISPENSED
Start: 2025-07-08

## (undated) RX ORDER — PROPOFOL 10 MG/ML
INJECTION, EMULSION INTRAVENOUS
Status: DISPENSED
Start: 2025-07-08

## (undated) RX ORDER — LIDOCAINE HYDROCHLORIDE AND EPINEPHRINE 10; 10 MG/ML; UG/ML
INJECTION, SOLUTION INFILTRATION; PERINEURAL
Status: DISPENSED
Start: 2025-06-24

## (undated) RX ORDER — DEXAMETHASONE SODIUM PHOSPHATE 4 MG/ML
INJECTION, SOLUTION INTRA-ARTICULAR; INTRALESIONAL; INTRAMUSCULAR; INTRAVENOUS; SOFT TISSUE
Status: DISPENSED
Start: 2025-07-08

## (undated) RX ORDER — HYDROMORPHONE HCL IN WATER/PF 6 MG/30 ML
PATIENT CONTROLLED ANALGESIA SYRINGE INTRAVENOUS
Status: DISPENSED
Start: 2025-06-24

## (undated) RX ORDER — CEFAZOLIN SODIUM/WATER 2 G/20 ML
SYRINGE (ML) INTRAVENOUS
Status: DISPENSED
Start: 2025-07-08

## (undated) RX ORDER — EPHEDRINE SULFATE 50 MG/ML
INJECTION, SOLUTION INTRAMUSCULAR; INTRAVENOUS; SUBCUTANEOUS
Status: DISPENSED
Start: 2025-06-24